# Patient Record
Sex: MALE | Race: WHITE | NOT HISPANIC OR LATINO
[De-identification: names, ages, dates, MRNs, and addresses within clinical notes are randomized per-mention and may not be internally consistent; named-entity substitution may affect disease eponyms.]

---

## 2021-09-02 ENCOUNTER — APPOINTMENT (OUTPATIENT)
Dept: UROLOGY | Facility: CLINIC | Age: 51
End: 2021-09-02

## 2021-09-02 ENCOUNTER — APPOINTMENT (OUTPATIENT)
Dept: UROLOGY | Facility: CLINIC | Age: 51
End: 2021-09-02
Payer: COMMERCIAL

## 2021-09-02 PROCEDURE — 99443: CPT | Mod: 95

## 2021-09-02 NOTE — ASSESSMENT
[FreeTextEntry1] : I had long discussion with patient about their stones, and about options, risks, and benefits of all treatments.  Main options for definitive stone treatment include ESWL, URS, PCNL.  If stones are pure uric acid, alkalinization may be an option. Generally, 545 HU density suggests not pure UA, but given size of the stone it is possible.\par \par ESWL success best with smaller, less dense stones, and with short skin to stone distance and favorable location of the stone within the urinary tract, while URS is more successful treatment with multiple stones, more dense stones, or challenging body habitus or stone location.  PCNL is best option for larger, more dense and complex stones, and particularly those involving the lower pole.  Non-definitive stone treatment options for drainage, using either stents or nephrostomy, also reviewed: these are of lower immediate surgical risks, but incur multiple procedures to manage and may have their own complications and effects on quality of life.  Still, nephrostomy or nephroureteral catheter can allow maintenance of urinary system drainage without surgical risks, and management in office with exchanges (avoiding the anesthesia and testing which would be present with bilateral internal stent exchange).\par \par Risks of nontreatment with obstruction can lead to very high rate of renal function loss in stone-bearing kidney over the next months to years.\par \par In this patient's case, I recommend... consider alkalinization up front, vs left PCNL or left URS with laser expecting more than 1 stage- notably, pt without pain or sig sx or obstruction at this time.\par \par Risks/benefits/success/recovery expectations all reviewed at length, particularly with respect to patient's comorbidities, and inclusive of infection/sepsis, bleeding, need for secondary procedures or secondary stages such as embolization or open surgery, and even risks of death due to acute issues superimposed on comorbidities.\par \par Pt prefers to undergo: alkalinization\par \par Will start potassium citrate\par Pt instructed to self test urine pH, 2 to 3 times per day using pH paper, and to record results over next ~ 1 week.\par \par Target pH range for UA stone prevention is between 6-7, for dissolution ideally 6.5\par \par If pH < 6, pt to call and will expect/need to titrate up.\par

## 2021-09-02 NOTE — HISTORY OF PRESENT ILLNESS
[FreeTextEntry1] : The patient-doctor relationship has been established in a face to face fashion via real time video/audio HIPAA compliant communication using telemedicine software. The patient's identity has been confirmed. The patient was previously emailed a copy of the telemedicine consent. They have had a chance to review and has now given verbal consent and has requested care to be assessed and treated via telemedicine. They understand there may be limitations in this process, and that they may need further followup care in the office and/or hospital settings.\par \par Verbal consent given on Sep  2 2021  5:20PM\par \par ANNMARIE RICK is a 51 year M who presents for eval and management of stones, referred by Dr. Bellamy. Pt with h/o passing stones twice approx 12 years ago with colic, and several smaller stones passed without pain (? bladder). Microhematuria prompted recent w/u and CT done (cystoscopy also done) showing 3.5 cm and 1.2 cm left mid-upper stones. Not aware of prior stone analysis, but pt notes he is obese, eats high animal flesh protein diet, and has DM.\par \par PMH: DM, HTN, cholesterol, esophageal achalasia\par PSH: splenic trauma (abdominal)\par FH: no sig  FH\par Meds: meformin, rosuvastatin, lisinopril, jardiance, alogliptin\par SH: former smoker\par Allergies: pcn (child, told by parent)\par \par CT film ([facility]) reviewed: 3.5 cm stone left upper, and 1.2 cm left upper mid. Mild dilation of left upper pole. Density 545 HU on largest stone.\par \par 09/02/2021 \par \par The patient denies fevers, chills, nausea and/or vomiting, and no unexplained weight loss.\par \par All pertinent parts of the patient PFSH (past medical, family, and social histories), laboratory, radiological studies and available physician notes were reviewed prior to starting the face-to-face portion of the telemedicine visit. Questionnaire results, where appropriate, were discussed with the patient.\par \par

## 2021-11-10 ENCOUNTER — APPOINTMENT (OUTPATIENT)
Dept: UROLOGY | Facility: CLINIC | Age: 51
End: 2021-11-10

## 2021-11-14 ENCOUNTER — TRANSCRIPTION ENCOUNTER (OUTPATIENT)
Age: 51
End: 2021-11-14

## 2021-11-16 ENCOUNTER — APPOINTMENT (OUTPATIENT)
Dept: CT IMAGING | Facility: CLINIC | Age: 51
End: 2021-11-16
Payer: COMMERCIAL

## 2021-11-16 ENCOUNTER — OUTPATIENT (OUTPATIENT)
Dept: OUTPATIENT SERVICES | Facility: HOSPITAL | Age: 51
LOS: 1 days | End: 2021-11-16
Payer: COMMERCIAL

## 2021-11-16 DIAGNOSIS — N20.0 CALCULUS OF KIDNEY: ICD-10-CM

## 2021-11-16 PROCEDURE — 74176 CT ABD & PELVIS W/O CONTRAST: CPT

## 2021-11-16 PROCEDURE — 74176 CT ABD & PELVIS W/O CONTRAST: CPT | Mod: 26

## 2021-12-02 ENCOUNTER — APPOINTMENT (OUTPATIENT)
Dept: UROLOGY | Facility: CLINIC | Age: 51
End: 2021-12-02
Payer: COMMERCIAL

## 2021-12-02 VITALS
WEIGHT: 252 LBS | HEART RATE: 91 BPM | DIASTOLIC BLOOD PRESSURE: 94 MMHG | RESPIRATION RATE: 16 BRPM | OXYGEN SATURATION: 96 % | HEIGHT: 72 IN | BODY MASS INDEX: 34.13 KG/M2 | SYSTOLIC BLOOD PRESSURE: 180 MMHG

## 2021-12-02 PROCEDURE — 99214 OFFICE O/P EST MOD 30 MIN: CPT

## 2021-12-02 NOTE — HISTORY OF PRESENT ILLNESS
[FreeTextEntry1] : ANNMARIE RICK is a 51 year M who presents for eval and management of stones, referred by Dr. Bellamy. Pt with h/o passing stones twice approx 12 years ago with colic, and several smaller stones passed without pain (? bladder). Microhematuria prompted recent w/u and CT done (cystoscopy also done) showing 3.5 cm and 1.2 cm left mid-upper stones. Not aware of prior stone analysis, but pt notes he is obese, eats high animal flesh protein diet, and has DM.\par \par \par f/u of CT scan and surgical planning

## 2021-12-02 NOTE — ASSESSMENT
[FreeTextEntry1] : CT scan:\par \par I had long discussion with patient about their stones, and about options, risks, and benefits of all treatments.  Main options for definitive stone treatment include ESWL, URS, PCNL.  \par \par ESWL success best with smaller, less dense stones, and with short skin to stone distance and favorable location of the stone within the urinary tract, while URS is more successful treatment with multiple stones, more dense stones, or challenging body habitus or stone location.  PCNL is best option for larger, more dense and complex stones, and particularly those involving the lower pole.  Non-definitive stone treatment options for drainage, using either stents or nephrostomy, also reviewed: these are of lower immediate surgical risks, but incur multiple procedures to manage and may have their own complications and effects on quality of life.  Still, nephrostomy or nephroureteral catheter can allow maintenance of urinary system drainage without surgical risks, and management in office with exchanges (avoiding the anesthesia and testing which would be present with bilateral internal stent exchange).\par \par Risks of nontreatment with obstruction can lead to very high rate of renal function loss in stone-bearing kidney over the next months to years.\par \par In this patient's case, I recommend...either left PCNL or multistage left URS with laser. Possible second stage for left PCNL reviewed\par \par Risks/benefits/success/recovery expectations all reviewed at length, particularly with respect to patient's comorbidities, and inclusive of infection/sepsis, bleeding, need for secondary procedures or secondary stages such as embolization or open surgery, and even risks of death due to acute issues superimposed on comorbidities.\par \par Pt prefers to undergo: left pcnl\par \par Will schedule.\par \par Urine culture, PST appt to schedule once surgery scheduled.\par

## 2022-02-28 ENCOUNTER — OUTPATIENT (OUTPATIENT)
Dept: OUTPATIENT SERVICES | Facility: HOSPITAL | Age: 52
LOS: 1 days | End: 2022-02-28
Payer: COMMERCIAL

## 2022-02-28 VITALS
WEIGHT: 250 LBS | HEIGHT: 71.5 IN | OXYGEN SATURATION: 98 % | DIASTOLIC BLOOD PRESSURE: 89 MMHG | SYSTOLIC BLOOD PRESSURE: 147 MMHG | TEMPERATURE: 97 F | HEART RATE: 74 BPM | RESPIRATION RATE: 16 BRPM

## 2022-02-28 DIAGNOSIS — N20.0 CALCULUS OF KIDNEY: ICD-10-CM

## 2022-02-28 DIAGNOSIS — S36.09XA OTHER INJURY OF SPLEEN, INITIAL ENCOUNTER: Chronic | ICD-10-CM

## 2022-02-28 DIAGNOSIS — Z01.818 ENCOUNTER FOR OTHER PREPROCEDURAL EXAMINATION: ICD-10-CM

## 2022-02-28 DIAGNOSIS — Z98.890 OTHER SPECIFIED POSTPROCEDURAL STATES: Chronic | ICD-10-CM

## 2022-02-28 DIAGNOSIS — E11.9 TYPE 2 DIABETES MELLITUS WITHOUT COMPLICATIONS: ICD-10-CM

## 2022-02-28 DIAGNOSIS — Z29.9 ENCOUNTER FOR PROPHYLACTIC MEASURES, UNSPECIFIED: ICD-10-CM

## 2022-02-28 LAB
A1C WITH ESTIMATED AVERAGE GLUCOSE RESULT: 7.2 % — HIGH (ref 4–5.6)
ANION GAP SERPL CALC-SCNC: 12 MMOL/L — SIGNIFICANT CHANGE UP (ref 5–17)
BLD GP AB SCN SERPL QL: NEGATIVE — SIGNIFICANT CHANGE UP
BUN SERPL-MCNC: 20 MG/DL — SIGNIFICANT CHANGE UP (ref 7–23)
CALCIUM SERPL-MCNC: 10.2 MG/DL — SIGNIFICANT CHANGE UP (ref 8.4–10.5)
CHLORIDE SERPL-SCNC: 103 MMOL/L — SIGNIFICANT CHANGE UP (ref 96–108)
CO2 SERPL-SCNC: 26 MMOL/L — SIGNIFICANT CHANGE UP (ref 22–31)
CREAT SERPL-MCNC: 0.86 MG/DL — SIGNIFICANT CHANGE UP (ref 0.5–1.3)
EGFR: 104 ML/MIN/1.73M2 — SIGNIFICANT CHANGE UP
ESTIMATED AVERAGE GLUCOSE: 160 MG/DL — HIGH (ref 68–114)
GLUCOSE SERPL-MCNC: 177 MG/DL — HIGH (ref 70–99)
HCT VFR BLD CALC: 43.6 % — SIGNIFICANT CHANGE UP (ref 39–50)
HGB BLD-MCNC: 14.3 G/DL — SIGNIFICANT CHANGE UP (ref 13–17)
MCHC RBC-ENTMCNC: 27.9 PG — SIGNIFICANT CHANGE UP (ref 27–34)
MCHC RBC-ENTMCNC: 32.8 GM/DL — SIGNIFICANT CHANGE UP (ref 32–36)
MCV RBC AUTO: 85 FL — SIGNIFICANT CHANGE UP (ref 80–100)
NRBC # BLD: 0 /100 WBCS — SIGNIFICANT CHANGE UP (ref 0–0)
PLATELET # BLD AUTO: 169 K/UL — SIGNIFICANT CHANGE UP (ref 150–400)
POTASSIUM SERPL-MCNC: 5 MMOL/L — SIGNIFICANT CHANGE UP (ref 3.5–5.3)
POTASSIUM SERPL-SCNC: 5 MMOL/L — SIGNIFICANT CHANGE UP (ref 3.5–5.3)
RBC # BLD: 5.13 M/UL — SIGNIFICANT CHANGE UP (ref 4.2–5.8)
RBC # FLD: 12.6 % — SIGNIFICANT CHANGE UP (ref 10.3–14.5)
RH IG SCN BLD-IMP: POSITIVE — SIGNIFICANT CHANGE UP
SODIUM SERPL-SCNC: 141 MMOL/L — SIGNIFICANT CHANGE UP (ref 135–145)
WBC # BLD: 7.64 K/UL — SIGNIFICANT CHANGE UP (ref 3.8–10.5)
WBC # FLD AUTO: 7.64 K/UL — SIGNIFICANT CHANGE UP (ref 3.8–10.5)

## 2022-02-28 PROCEDURE — 86850 RBC ANTIBODY SCREEN: CPT

## 2022-02-28 PROCEDURE — 80048 BASIC METABOLIC PNL TOTAL CA: CPT

## 2022-02-28 PROCEDURE — 83036 HEMOGLOBIN GLYCOSYLATED A1C: CPT

## 2022-02-28 PROCEDURE — 86900 BLOOD TYPING SEROLOGIC ABO: CPT

## 2022-02-28 PROCEDURE — 86901 BLOOD TYPING SEROLOGIC RH(D): CPT

## 2022-02-28 PROCEDURE — 87086 URINE CULTURE/COLONY COUNT: CPT

## 2022-02-28 PROCEDURE — 85027 COMPLETE CBC AUTOMATED: CPT

## 2022-02-28 PROCEDURE — G0463: CPT

## 2022-02-28 RX ORDER — CIPROFLOXACIN LACTATE 400MG/40ML
400 VIAL (ML) INTRAVENOUS ONCE
Refills: 0 | Status: DISCONTINUED | OUTPATIENT
Start: 2022-03-21 | End: 2022-03-25

## 2022-02-28 NOTE — H&P PST ADULT - NSICDXPASTMEDICALHX_GEN_ALL_CORE_FT
PAST MEDICAL HISTORY:  Calculus of kidney passed on own in the past; CT scan showing a 3.5 cm and 1.2 cm left mid-upper stones in December 2021    Esophageal achalasia s/p esophageal repair about 5 years ago    Former smoker 1 PPD x 16 years; Quit in 2003    Obesity BMI 34.4    Type 2 diabetes mellitus

## 2022-02-28 NOTE — H&P PST ADULT - PROBLEM SELECTOR PLAN 1
Pt is scheduled for a cystoscopy and left percutaneous stone removal with Dr. Hoenig on 3/21/22  Covid PCR test scheduled for 3/17/22 at [facility]  CBC, BMP, T/S ordered and obtained at [facility]  ABO on admit

## 2022-02-28 NOTE — H&P PST ADULT - PROBLEM SELECTOR PLAN 2
HGA1C ordered and obtained at [facility]  FS on admit  Advised to hold metformin and Alogliptin 24 hours prior to procedure; Last doses 3/20/22 AM Dose  Advised to hold Januvia 3 days prior to procedure; Last dose 3/18/22 AM dose

## 2022-02-28 NOTE — H&P PST ADULT - NSICDXPASTSURGICALHX_GEN_ALL_CORE_FT
PAST SURGICAL HISTORY:  History of esophageal surgery esophageal repair about 5 years ago esopheal achalasia    Other injury of spleen fall on ice s/p spleen embolization at [facility] IR over 10 years ago     PAST SURGICAL HISTORY:  History of esophageal surgery esophageal repair about 5 years ago for esopheal achalasia    Other injury of spleen fall on ice s/p spleen embolization at [facility] IR over 10 years ago

## 2022-02-28 NOTE — H&P PST ADULT - ASSESSMENT
ALIVIAI VTE 2.0 SCORE [CLOT updated 2019]    AGE RELATED RISK FACTORS                                                       MOBILITY RELATED FACTORS  [X ] Age 41-60 years                                            (1 Point)                    [ ] Bed rest                                                        (1 Point)  [ ] Age: 61-74 years                                           (2 Points)                  [ ] Plaster cast                                                   (2 Points)  [ ] Age= 75 years                                              (3 Points)                    [ ] Bed bound for more than 72 hours                 (2 Points)    DISEASE RELATED RISK FACTORS                                               GENDER SPECIFIC FACTORS  [ ] Edema in the lower extremities                       (1 Point)              [ ] Pregnancy                                                     (1 Point)  [ ] Varicose veins                                               (1 Point)                     [ ] Post-partum < 6 weeks                                   (1 Point)             [X ] BMI > 25 Kg/m2                                            (1 Point)                     [ ] Hormonal therapy  or oral contraception          (1 Point)                 [ ] Sepsis (in the previous month)                        (1 Point)               [ ] History of pregnancy complications                 (1 point)  [ ] Pneumonia or serious lung disease                                               [ ] Unexplained or recurrent                     (1 Point)           (in the previous month)                               (1 Point)  [ ] Abnormal pulmonary function test                     (1 Point)                 SURGERY RELATED RISK FACTORS  [ ] Acute myocardial infarction                              (1 Point)               [ ]  Section                                             (1 Point)  [ ] Congestive heart failure (in the previous month)  (1 Point)      [ ] Minor surgery                                                  (1 Point)   [ ] Inflammatory bowel disease                             (1 Point)               [ ] Arthroscopic surgery                                        (2 Points)  [ ] Central venous access                                      (2 Points)                [ X] General surgery lasting more than 45 minutes (2 points)  [ ] Malignancy- Present or previous                   (2 Points)                [ ] Elective arthroplasty                                         (5 points)    [ ] Stroke (in the previous month)                          (5 Points)                                                                                                                                                           HEMATOLOGY RELATED FACTORS                                                 TRAUMA RELATED RISK FACTORS  [ ] Prior episodes of VTE                                     (3 Points)                [ ] Fracture of the hip, pelvis, or leg                       (5 Points)  [ ] Positive family history for VTE                         (3 Points)             [ ] Acute spinal cord injury (in the previous month)  (5 Points)  [ ] Prothrombin 71897 A                                     (3 Points)               [ ] Paralysis  (less than 1 month)                             (5 Points)  [ ] Factor V Leiden                                             (3 Points)                  [ ] Multiple Trauma within 1 month                        (5 Points)  [ ] Lupus anticoagulants                                     (3 Points)                                                           [ ] Anticardiolipin antibodies                               (3 Points)                                                       [ ] High homocysteine in the blood                      (3 Points)                                             [ ] Other congenital or acquired thrombophilia      (3 Points)                                                [ ] Heparin induced thrombocytopenia                  (3 Points)                                     Total Score [    4      ]

## 2022-02-28 NOTE — H&P PST ADULT - FALL HARM RISK - UNIVERSAL INTERVENTIONS
Bed in lowest position, wheels locked, appropriate side rails in place/Call bell, personal items and telephone in reach/Instruct patient to call for assistance before getting out of bed or chair/Non-slip footwear when patient is out of bed/[place] to call system/Physically safe environment - no spills, clutter or unnecessary equipment/Purposeful Proactive Rounding/Room/bathroom lighting operational, light cord in reach

## 2022-02-28 NOTE — H&P PST ADULT - REASON FOR ADMISSION
1. Reduce sulfasalazine to 1 pill twice a day with meals, continue the HÜTTEN twice a day. 2. Send me a message in 3 months once you've done labs for your oncologist and can update me on how you're doing on the low-dose sulfasalazine. .. if you're doing well then, we may just stop the suflasalazine entirely. 3. We'll call you to schedule a 6 month followup in person. "I am having a kidney stone removed."

## 2022-02-28 NOTE — H&P PST ADULT - HISTORY OF PRESENT ILLNESS
51 year old male with PMH Former Smoker (1 PPD x 16 years; Quit in 2003), DMT2, Obesity (BMI 34.4)    Covid vaccine Moderna 2nd dose received 3/29/22; Moderna Booster received 11/5/2021  Covid PCR test scheduled 3/17/2022 at [facility]  Denies Recent travel, Exposure or Covid symptoms   No recent hospitalizations in the last 3 months 51 year old male with PMH Former Smoker (1 PPD x 16 years; Quit in 2003), DMT2 and Obesity (BMI 34.4) reports that he was recently at his regular check-up with PCP when microscopic hematuria was noted on urinalysis s/p CT scan revealed a 3.5 cm and 1.2 cm left mid-upper stones. Pt denies any hematuria, flank pain, renal colic, fever or chills. Pt now presents to [facility] for scheduled cystoscopy and left percutaneous stone removal with Dr. Hoenig on 3/21/22.    Covid vaccine Moderna 2nd dose received 3/29/22; Moderna Booster received 11/5/2021  Covid PCR test scheduled 3/17/2022 at [facility]  Denies Recent travel, Exposure or Covid symptoms   No recent hospitalizations in the last 3 months

## 2022-03-01 LAB
CULTURE RESULTS: SIGNIFICANT CHANGE UP
SPECIMEN SOURCE: SIGNIFICANT CHANGE UP

## 2022-03-04 PROBLEM — Z87.891 PERSONAL HISTORY OF NICOTINE DEPENDENCE: Chronic | Status: ACTIVE | Noted: 2022-02-28

## 2022-03-04 PROBLEM — E66.9 OBESITY, UNSPECIFIED: Chronic | Status: ACTIVE | Noted: 2022-02-28

## 2022-03-04 PROBLEM — K22.0 ACHALASIA OF CARDIA: Chronic | Status: ACTIVE | Noted: 2022-02-28

## 2022-03-04 PROBLEM — E11.9 TYPE 2 DIABETES MELLITUS WITHOUT COMPLICATIONS: Chronic | Status: ACTIVE | Noted: 2022-02-28

## 2022-03-04 PROBLEM — N20.0 CALCULUS OF KIDNEY: Chronic | Status: ACTIVE | Noted: 2022-02-28

## 2022-03-17 ENCOUNTER — OUTPATIENT (OUTPATIENT)
Dept: OUTPATIENT SERVICES | Facility: HOSPITAL | Age: 52
LOS: 1 days | End: 2022-03-17
Payer: COMMERCIAL

## 2022-03-17 DIAGNOSIS — Z11.52 ENCOUNTER FOR SCREENING FOR COVID-19: ICD-10-CM

## 2022-03-17 DIAGNOSIS — S36.09XA OTHER INJURY OF SPLEEN, INITIAL ENCOUNTER: Chronic | ICD-10-CM

## 2022-03-17 DIAGNOSIS — Z98.890 OTHER SPECIFIED POSTPROCEDURAL STATES: Chronic | ICD-10-CM

## 2022-03-17 LAB — SARS-COV-2 RNA SPEC QL NAA+PROBE: SIGNIFICANT CHANGE UP

## 2022-03-17 PROCEDURE — C9803: CPT

## 2022-03-17 PROCEDURE — U0003: CPT

## 2022-03-17 PROCEDURE — U0005: CPT

## 2022-03-20 ENCOUNTER — TRANSCRIPTION ENCOUNTER (OUTPATIENT)
Age: 52
End: 2022-03-20

## 2022-03-21 ENCOUNTER — APPOINTMENT (OUTPATIENT)
Dept: UROLOGY | Facility: HOSPITAL | Age: 52
End: 2022-03-21

## 2022-03-21 ENCOUNTER — INPATIENT (INPATIENT)
Facility: HOSPITAL | Age: 52
LOS: 3 days | Discharge: HOME | DRG: 660 | End: 2022-03-25
Attending: UROLOGY | Admitting: UROLOGY
Payer: COMMERCIAL

## 2022-03-21 ENCOUNTER — RESULT REVIEW (OUTPATIENT)
Age: 52
End: 2022-03-21

## 2022-03-21 VITALS
SYSTOLIC BLOOD PRESSURE: 178 MMHG | RESPIRATION RATE: 14 BRPM | DIASTOLIC BLOOD PRESSURE: 82 MMHG | HEART RATE: 66 BPM | HEIGHT: 71 IN | WEIGHT: 250 LBS | TEMPERATURE: 98 F | OXYGEN SATURATION: 100 %

## 2022-03-21 DIAGNOSIS — S36.09XA OTHER INJURY OF SPLEEN, INITIAL ENCOUNTER: Chronic | ICD-10-CM

## 2022-03-21 DIAGNOSIS — N20.0 CALCULUS OF KIDNEY: ICD-10-CM

## 2022-03-21 DIAGNOSIS — Z98.890 OTHER SPECIFIED POSTPROCEDURAL STATES: Chronic | ICD-10-CM

## 2022-03-21 LAB
ANION GAP SERPL CALC-SCNC: 12 MMOL/L — SIGNIFICANT CHANGE UP (ref 5–17)
BASOPHILS # BLD AUTO: 0.02 K/UL — SIGNIFICANT CHANGE UP (ref 0–0.2)
BASOPHILS NFR BLD AUTO: 0.2 % — SIGNIFICANT CHANGE UP (ref 0–2)
BUN SERPL-MCNC: 17 MG/DL — SIGNIFICANT CHANGE UP (ref 7–23)
CALCIUM SERPL-MCNC: 9 MG/DL — SIGNIFICANT CHANGE UP (ref 8.4–10.5)
CHLORIDE SERPL-SCNC: 104 MMOL/L — SIGNIFICANT CHANGE UP (ref 96–108)
CO2 SERPL-SCNC: 22 MMOL/L — SIGNIFICANT CHANGE UP (ref 22–31)
CREAT SERPL-MCNC: 0.79 MG/DL — SIGNIFICANT CHANGE UP (ref 0.5–1.3)
EGFR: 106 ML/MIN/1.73M2 — SIGNIFICANT CHANGE UP
EOSINOPHIL # BLD AUTO: 0.02 K/UL — SIGNIFICANT CHANGE UP (ref 0–0.5)
EOSINOPHIL NFR BLD AUTO: 0.2 % — SIGNIFICANT CHANGE UP (ref 0–6)
GLUCOSE BLDC GLUCOMTR-MCNC: 156 MG/DL — HIGH (ref 70–99)
GLUCOSE BLDC GLUCOMTR-MCNC: 214 MG/DL — HIGH (ref 70–99)
GLUCOSE BLDC GLUCOMTR-MCNC: 217 MG/DL — HIGH (ref 70–99)
GLUCOSE BLDC GLUCOMTR-MCNC: 226 MG/DL — HIGH (ref 70–99)
GLUCOSE BLDC GLUCOMTR-MCNC: 228 MG/DL — HIGH (ref 70–99)
GLUCOSE SERPL-MCNC: 234 MG/DL — HIGH (ref 70–99)
HCT VFR BLD CALC: 41.6 % — SIGNIFICANT CHANGE UP (ref 39–50)
HGB BLD-MCNC: 14.1 G/DL — SIGNIFICANT CHANGE UP (ref 13–17)
IMM GRANULOCYTES NFR BLD AUTO: 0.5 % — SIGNIFICANT CHANGE UP (ref 0–1.5)
LYMPHOCYTES # BLD AUTO: 0.51 K/UL — LOW (ref 1–3.3)
LYMPHOCYTES # BLD AUTO: 4.8 % — LOW (ref 13–44)
MCHC RBC-ENTMCNC: 28.1 PG — SIGNIFICANT CHANGE UP (ref 27–34)
MCHC RBC-ENTMCNC: 33.9 GM/DL — SIGNIFICANT CHANGE UP (ref 32–36)
MCV RBC AUTO: 82.9 FL — SIGNIFICANT CHANGE UP (ref 80–100)
MONOCYTES # BLD AUTO: 0.17 K/UL — SIGNIFICANT CHANGE UP (ref 0–0.9)
MONOCYTES NFR BLD AUTO: 1.6 % — LOW (ref 2–14)
NEUTROPHILS # BLD AUTO: 9.8 K/UL — HIGH (ref 1.8–7.4)
NEUTROPHILS NFR BLD AUTO: 92.7 % — HIGH (ref 43–77)
NRBC # BLD: 0 /100 WBCS — SIGNIFICANT CHANGE UP (ref 0–0)
PLATELET # BLD AUTO: 158 K/UL — SIGNIFICANT CHANGE UP (ref 150–400)
POTASSIUM SERPL-MCNC: 4.5 MMOL/L — SIGNIFICANT CHANGE UP (ref 3.5–5.3)
POTASSIUM SERPL-SCNC: 4.5 MMOL/L — SIGNIFICANT CHANGE UP (ref 3.5–5.3)
RBC # BLD: 5.02 M/UL — SIGNIFICANT CHANGE UP (ref 4.2–5.8)
RBC # FLD: 13 % — SIGNIFICANT CHANGE UP (ref 10.3–14.5)
SODIUM SERPL-SCNC: 138 MMOL/L — SIGNIFICANT CHANGE UP (ref 135–145)
WBC # BLD: 10.57 K/UL — HIGH (ref 3.8–10.5)
WBC # FLD AUTO: 10.57 K/UL — HIGH (ref 3.8–10.5)

## 2022-03-21 PROCEDURE — 50390 DRAINAGE OF KIDNEY LESION: CPT | Mod: LT,59

## 2022-03-21 PROCEDURE — 71045 X-RAY EXAM CHEST 1 VIEW: CPT | Mod: 26

## 2022-03-21 PROCEDURE — 88300 SURGICAL PATH GROSS: CPT | Mod: 26

## 2022-03-21 PROCEDURE — 50433 PLMT NEPHROURETERAL CATHETER: CPT | Mod: LT,59

## 2022-03-21 PROCEDURE — 50081 PERQ NL/PL LITHOTRP CPLX>2CM: CPT | Mod: LT

## 2022-03-21 DEVICE — AMPLATZ RENAL DILATOR 6FR-30FR X 16CM: Type: IMPLANTABLE DEVICE | Status: FUNCTIONAL

## 2022-03-21 DEVICE — TRILOGY PROBE KIT 3.9MM X 440MM (BLUE): Type: IMPLANTABLE DEVICE | Status: FUNCTIONAL

## 2022-03-21 DEVICE — GUIDEWIRE SENSOR DUAL-FLEX NITINOL STRAIGHT .035" X 150CM: Type: IMPLANTABLE DEVICE | Status: FUNCTIONAL

## 2022-03-21 DEVICE — NEPHROSTOMY BALLOON CATH X-FORCE 7FR X 15CM 10MM: Type: IMPLANTABLE DEVICE | Status: FUNCTIONAL

## 2022-03-21 RX ORDER — CIPROFLOXACIN LACTATE 400MG/40ML
400 VIAL (ML) INTRAVENOUS EVERY 12 HOURS
Refills: 0 | Status: DISCONTINUED | OUTPATIENT
Start: 2022-03-21 | End: 2022-03-24

## 2022-03-21 RX ORDER — LIDOCAINE 4 G/100G
1 CREAM TOPICAL EVERY 24 HOURS
Refills: 0 | Status: DISCONTINUED | OUTPATIENT
Start: 2022-03-21 | End: 2022-03-24

## 2022-03-21 RX ORDER — SODIUM CHLORIDE 9 MG/ML
3 INJECTION INTRAMUSCULAR; INTRAVENOUS; SUBCUTANEOUS EVERY 8 HOURS
Refills: 0 | Status: DISCONTINUED | OUTPATIENT
Start: 2022-03-21 | End: 2022-03-21

## 2022-03-21 RX ORDER — ACETAMINOPHEN 500 MG
975 TABLET ORAL EVERY 6 HOURS
Refills: 0 | Status: DISCONTINUED | OUTPATIENT
Start: 2022-03-21 | End: 2022-03-24

## 2022-03-21 RX ORDER — ONDANSETRON 8 MG/1
4 TABLET, FILM COATED ORAL ONCE
Refills: 0 | Status: DISCONTINUED | OUTPATIENT
Start: 2022-03-21 | End: 2022-03-21

## 2022-03-21 RX ORDER — DEXTROSE 50 % IN WATER 50 %
25 SYRINGE (ML) INTRAVENOUS ONCE
Refills: 0 | Status: DISCONTINUED | OUTPATIENT
Start: 2022-03-21 | End: 2022-03-24

## 2022-03-21 RX ORDER — OXYCODONE HYDROCHLORIDE 5 MG/1
5 TABLET ORAL EVERY 4 HOURS
Refills: 0 | Status: DISCONTINUED | OUTPATIENT
Start: 2022-03-21 | End: 2022-03-24

## 2022-03-21 RX ORDER — SENNA PLUS 8.6 MG/1
2 TABLET ORAL AT BEDTIME
Refills: 0 | Status: DISCONTINUED | OUTPATIENT
Start: 2022-03-21 | End: 2022-03-24

## 2022-03-21 RX ORDER — SIMETHICONE 80 MG/1
80 TABLET, CHEWABLE ORAL ONCE
Refills: 0 | Status: COMPLETED | OUTPATIENT
Start: 2022-03-21 | End: 2022-03-21

## 2022-03-21 RX ORDER — SODIUM CHLORIDE 9 MG/ML
1000 INJECTION, SOLUTION INTRAVENOUS
Refills: 0 | Status: DISCONTINUED | OUTPATIENT
Start: 2022-03-21 | End: 2022-03-24

## 2022-03-21 RX ORDER — LIDOCAINE HCL 20 MG/ML
0.2 VIAL (ML) INJECTION ONCE
Refills: 0 | Status: DISCONTINUED | OUTPATIENT
Start: 2022-03-21 | End: 2022-03-21

## 2022-03-21 RX ORDER — HEPARIN SODIUM 5000 [USP'U]/ML
5000 INJECTION INTRAVENOUS; SUBCUTANEOUS EVERY 8 HOURS
Refills: 0 | Status: DISCONTINUED | OUTPATIENT
Start: 2022-03-21 | End: 2022-03-24

## 2022-03-21 RX ORDER — GLUCAGON INJECTION, SOLUTION 0.5 MG/.1ML
1 INJECTION, SOLUTION SUBCUTANEOUS ONCE
Refills: 0 | Status: DISCONTINUED | OUTPATIENT
Start: 2022-03-21 | End: 2022-03-24

## 2022-03-21 RX ORDER — DEXTROSE 50 % IN WATER 50 %
12.5 SYRINGE (ML) INTRAVENOUS ONCE
Refills: 0 | Status: DISCONTINUED | OUTPATIENT
Start: 2022-03-21 | End: 2022-03-24

## 2022-03-21 RX ORDER — INSULIN LISPRO 100/ML
VIAL (ML) SUBCUTANEOUS
Refills: 0 | Status: DISCONTINUED | OUTPATIENT
Start: 2022-03-21 | End: 2022-03-24

## 2022-03-21 RX ORDER — DEXTROSE 50 % IN WATER 50 %
15 SYRINGE (ML) INTRAVENOUS ONCE
Refills: 0 | Status: DISCONTINUED | OUTPATIENT
Start: 2022-03-21 | End: 2022-03-24

## 2022-03-21 RX ORDER — FENTANYL CITRATE 50 UG/ML
50 INJECTION INTRAVENOUS
Refills: 0 | Status: DISCONTINUED | OUTPATIENT
Start: 2022-03-21 | End: 2022-03-21

## 2022-03-21 RX ORDER — CHLORHEXIDINE GLUCONATE 213 G/1000ML
1 SOLUTION TOPICAL ONCE
Refills: 0 | Status: DISCONTINUED | OUTPATIENT
Start: 2022-03-21 | End: 2022-03-21

## 2022-03-21 RX ORDER — KETOROLAC TROMETHAMINE 30 MG/ML
15 SYRINGE (ML) INJECTION EVERY 6 HOURS
Refills: 0 | Status: DISCONTINUED | OUTPATIENT
Start: 2022-03-21 | End: 2022-03-24

## 2022-03-21 RX ORDER — INSULIN LISPRO 100/ML
VIAL (ML) SUBCUTANEOUS AT BEDTIME
Refills: 0 | Status: DISCONTINUED | OUTPATIENT
Start: 2022-03-21 | End: 2022-03-24

## 2022-03-21 RX ORDER — HYDROMORPHONE HYDROCHLORIDE 2 MG/ML
0.5 INJECTION INTRAMUSCULAR; INTRAVENOUS; SUBCUTANEOUS
Refills: 0 | Status: DISCONTINUED | OUTPATIENT
Start: 2022-03-21 | End: 2022-03-21

## 2022-03-21 RX ORDER — SODIUM CHLORIDE 9 MG/ML
1000 INJECTION, SOLUTION INTRAVENOUS
Refills: 0 | Status: DISCONTINUED | OUTPATIENT
Start: 2022-03-21 | End: 2022-03-22

## 2022-03-21 RX ADMIN — Medication 975 MILLIGRAM(S): at 14:19

## 2022-03-21 RX ADMIN — Medication 2: at 14:24

## 2022-03-21 RX ADMIN — HYDROMORPHONE HYDROCHLORIDE 0.5 MILLIGRAM(S): 2 INJECTION INTRAMUSCULAR; INTRAVENOUS; SUBCUTANEOUS at 10:52

## 2022-03-21 RX ADMIN — HYDROMORPHONE HYDROCHLORIDE 0.5 MILLIGRAM(S): 2 INJECTION INTRAMUSCULAR; INTRAVENOUS; SUBCUTANEOUS at 11:15

## 2022-03-21 RX ADMIN — SODIUM CHLORIDE 3 MILLILITER(S): 9 INJECTION INTRAMUSCULAR; INTRAVENOUS; SUBCUTANEOUS at 06:06

## 2022-03-21 RX ADMIN — Medication 975 MILLIGRAM(S): at 20:12

## 2022-03-21 RX ADMIN — SODIUM CHLORIDE 125 MILLILITER(S): 9 INJECTION, SOLUTION INTRAVENOUS at 11:14

## 2022-03-21 RX ADMIN — Medication 15 MILLIGRAM(S): at 12:52

## 2022-03-21 RX ADMIN — Medication 2: at 11:40

## 2022-03-21 RX ADMIN — HEPARIN SODIUM 5000 UNIT(S): 5000 INJECTION INTRAVENOUS; SUBCUTANEOUS at 17:05

## 2022-03-21 RX ADMIN — Medication 200 MILLIGRAM(S): at 17:02

## 2022-03-21 RX ADMIN — Medication 15 MILLIGRAM(S): at 18:22

## 2022-03-21 RX ADMIN — Medication 15 MILLIGRAM(S): at 19:10

## 2022-03-21 RX ADMIN — SIMETHICONE 80 MILLIGRAM(S): 80 TABLET, CHEWABLE ORAL at 21:17

## 2022-03-21 RX ADMIN — Medication 15 MILLIGRAM(S): at 13:20

## 2022-03-21 RX ADMIN — Medication 975 MILLIGRAM(S): at 21:12

## 2022-03-21 RX ADMIN — Medication 2: at 18:15

## 2022-03-21 RX ADMIN — SENNA PLUS 2 TABLET(S): 8.6 TABLET ORAL at 20:12

## 2022-03-21 NOTE — PRE-ANESTHESIA EVALUATION ADULT - NSANTHPMHFT_GEN_ALL_CORE
51 year old male with PMH Former Smoker (1 PPD x 16 years; Quit in 2003), DMT2 and Obesity (BMI 34.4) reports that he was recently at his regular check-up with PCP when microscopic hematuria was noted on urinalysis s/p CT scan revealed a 3.5 cm and 1.2 cm left mid-upper stones. Pt denies any hematuria, flank pain, renal colic, fever or chills. Pt now presents to PST for scheduled cystoscopy and left percutaneous stone removal with Dr. Hoenig 51 year old male with PMH Former Smoker (1 PPD x 16 years; Quit in 2003), DMT2 and Obesity (BMI 34.4) reports that he was recently at his regular check-up with PCP when microscopic hematuria was noted on urinalysis s/p CT scan revealed a 3.5 cm and 1.2 cm left mid-upper stones. Pt denies any hematuria, flank pain, renal colic, fever or chills. Pt now presents to PST for scheduled cystoscopy and left percutaneous stone removal with Dr. Hoenig    Pt with loose tooth, poor dentition 51 year old male with PMH Former Smoker (1 PPD x 16 years; Quit in 2003), DMT2 and Obesity (BMI 34.4) reports that he was recently at his regular check-up with PCP when microscopic hematuria was noted on urinalysis s/p CT scan revealed a 3.5 cm and 1.2 cm left mid-upper stones. Pt denies any hematuria, flank pain, renal colic, fever or chills. Pt now presents to PST for scheduled cystoscopy and left percutaneous stone removal with Dr. Hoenig    Pt with loose tooth and poor dentition. Pt counseled on possibility of damage to teeth during intubation, given poor dentition. Patient expresses understanding.

## 2022-03-21 NOTE — CONSULT NOTE ADULT - SUBJECTIVE AND OBJECTIVE BOX
HPI:  51 year old male with PMH Former Smoker (1 PPD x 16 years; Quit in 2003), DMT2 and Obesity (BMI 34.4) reports that he was recently at his regular check-up with PCP when microscopic hematuria was noted on urinalysis s/p CT scan revealed a 3.5 cm and 1.2 cm left mid-upper stones. Pt denies any hematuria, flank pain, renal colic, fever or chills. Pt now presents to [facility] for scheduled cystoscopy and left percutaneous stone removal with Dr. Hoenig on 3/21/22.    Covid vaccine Moderna 2nd dose received 3/29/22; Moderna Booster received 11/5/2021  Covid PCR test scheduled 3/17/2022 at [facility]  Denies Recent travel, Exposure or Covid symptoms   No recent hospitalizations in the last 3 months (28 Feb 2022 11:16)      PAST MEDICAL & SURGICAL HISTORY:  Esophageal achalasia  s/p esophageal repair about 5 years ago    Type 2 diabetes mellitus    Calculus of kidney  passed on own in the past; CT scan showing a 3.5 cm and 1.2 cm left mid-upper stones in December 2021    Former smoker  1 PPD x 16 years; Quit in 2003    Obesity  BMI 34.4    History of esophageal surgery  esophageal repair about 5 years ago for esopheal achalasia    Other injury of spleen  fall on ice s/p spleen embolization at [facility] IR over 10 years ago        Review of Systems:   CONSTITUTIONAL: No fever, weight loss, or fatigue  EYES: No eye pain, visual disturbances, or discharge  ENMT:  No difficulty hearing, tinnitus, vertigo; No sinus or throat pain  NECK: No pain or stiffness  BREASTS: No pain, masses, or nipple discharge  RESPIRATORY: No cough, wheezing, chills or hemoptysis; No shortness of breath  CARDIOVASCULAR: No chest pain, palpitations, dizziness, or leg swelling  GASTROINTESTINAL: No abdominal or epigastric pain. No nausea, vomiting, or hematemesis; No diarrhea or constipation. No melena or hematochezia.  GENITOURINARY: No dysuria, frequency, hematuria, or incontinence  NEUROLOGICAL: No headaches, memory loss, loss of strength, numbness, or tremors  SKIN: No itching, burning, rashes, or lesions   LYMPH NODES: No enlarged glands  ENDOCRINE: No heat or cold intolerance; No hair loss  MUSCULOSKELETAL: No joint pain or swelling; No muscle, back, or extremity pain  PSYCHIATRIC: No depression, anxiety, mood swings, or difficulty sleeping  HEME/LYMPH: No easy bruising, or bleeding gums  ALLERY AND IMMUNOLOGIC: No hives or eczema    Allergies    penicillins (Unknown)    Intolerances        Social History:     FAMILY HISTORY:      MEDICATIONS  (STANDING):  acetaminophen     Tablet .. 975 milliGRAM(s) Oral every 6 hours  ciprofloxacin   IVPB 400 milliGRAM(s) IV Intermittent once  ciprofloxacin   IVPB 400 milliGRAM(s) IV Intermittent every 12 hours  dextrose 40% Gel 15 Gram(s) Oral once  dextrose 5%. 1000 milliLiter(s) (50 mL/Hr) IV Continuous <Continuous>  dextrose 5%. 1000 milliLiter(s) (100 mL/Hr) IV Continuous <Continuous>  dextrose 50% Injectable 25 Gram(s) IV Push once  dextrose 50% Injectable 12.5 Gram(s) IV Push once  dextrose 50% Injectable 25 Gram(s) IV Push once  glucagon  Injectable 1 milliGRAM(s) IntraMuscular once  heparin   Injectable 5000 Unit(s) SubCutaneous every 8 hours  insulin lispro (ADMELOG) corrective regimen sliding scale   SubCutaneous three times a day before meals  insulin lispro (ADMELOG) corrective regimen sliding scale   SubCutaneous at bedtime  lactated ringers. 1000 milliLiter(s) (125 mL/Hr) IV Continuous <Continuous>    MEDICATIONS  (PRN):  ketorolac   Injectable 15 milliGRAM(s) IV Push every 6 hours PRN Moderate Pain (4 - 6)  lidocaine   4% Patch 1 Patch Transdermal every 24 hours PRN surgical site pain  oxyCODONE    IR 5 milliGRAM(s) Oral every 4 hours PRN Severe Pain (7 - 10)  senna 2 Tablet(s) Oral at bedtime PRN Constipation        CAPILLARY BLOOD GLUCOSE      POCT Blood Glucose.: 214 mg/dL (21 Mar 2022 21:41)  POCT Blood Glucose.: 226 mg/dL (21 Mar 2022 18:06)  POCT Blood Glucose.: 217 mg/dL (21 Mar 2022 14:14)  POCT Blood Glucose.: 228 mg/dL (21 Mar 2022 11:32)  POCT Blood Glucose.: 156 mg/dL (21 Mar 2022 05:56)      PHYSICAL EXAM:  GENERAL: NAD, well-developed  HEAD:  Atraumatic, Normocephalic  EYES: EOMI, PERRLA, conjunctiva and sclera clear  NECK: Supple, No JVD  CHEST/LUNG: Clear to auscultation bilaterally; No wheeze  HEART: Regular rate and rhythm; No murmurs, rubs, or gallops  ABDOMEN: Soft, Nontender, Nondistended; Bowel sounds present  EXTREMITIES:  2+ Peripheral Pulses, No clubbing, cyanosis, or edema  PSYCH: AAOx3  NEUROLOGY: non-focal  SKIN: No rashes or lesions                          14.1   10.57 )-----------( 158      ( 21 Mar 2022 12:50 )             41.6               138|104|17<234  4.5|22|0.79  9.0,--,--  03-21 @ 12:50      CAPILLARY BLOOD GLUCOSE      POCT Blood Glucose.: 214 mg/dL (21 Mar 2022 21:41)  POCT Blood Glucose.: 226 mg/dL (21 Mar 2022 18:06)  POCT Blood Glucose.: 217 mg/dL (21 Mar 2022 14:14)  POCT Blood Glucose.: 228 mg/dL (21 Mar 2022 11:32)  POCT Blood Glucose.: 156 mg/dL (21 Mar 2022 05:56)              RADIOLOGY & ADDITIONAL TESTS:    Imaging Personally Reviewed:    Consultant(s) Notes Reviewed:      Care Discussed with Consultants/Other Providers:

## 2022-03-21 NOTE — PRE-ANESTHESIA EVALUATION ADULT - NSANTHAIRWAYFT_ENT_ALL_CORE
adequate mouth opening but large tongue, MP 4, extremely loose tooth top left, poor dentition overall, neck ROM wnl

## 2022-03-21 NOTE — PATIENT PROFILE ADULT - FALL HARM RISK - UNIVERSAL INTERVENTIONS
Bed in lowest position, wheels locked, appropriate side rails in place/Call bell, personal items and telephone in reach/Instruct patient to call for assistance before getting out of bed or chair/Non-slip footwear when patient is out of bed/[place] to call system/Physically safe environment - no spills, clutter or unnecessary equipment/Purposeful Proactive Rounding/Room/bathroom lighting operational, light cord in reach

## 2022-03-21 NOTE — CONSULT NOTE ADULT - SUBJECTIVE AND OBJECTIVE BOX
Patient is a 51y old  Male who presents with a chief complaint of "I am having a kidney stone removed." (28 Feb 2022 11:16)      HPI:  51 year old male with PMH Former Smoker (1 PPD x 16 years; Quit in 2003), DMT2 and Obesity (BMI 34.4) reports that he was recently at his regular check-up with PCP when microscopic hematuria was noted on urinalysis s/p CT scan revealed a 3.5 cm and 1.2 cm left mid-upper stones. Pt denies any hematuria, flank pain, renal colic, fever or chills. Pt now presents to [facility] for scheduled cystoscopy and left percutaneous stone removal with Dr. Hoenig on 3/21/22.    Covid vaccine Moderna 2nd dose received 3/29/22; Moderna Booster received 11/5/2021  Covid PCR test scheduled 3/17/2022 at [facility]  Denies Recent travel, Exposure or Covid symptoms   No recent hospitalizations in the last 3 months (28 Feb 2022 11:16)      PAST MEDICAL & SURGICAL HISTORY:  Esophageal achalasia  s/p esophageal repair about 5 years ago    Type 2 diabetes mellitus    Calculus of kidney  passed on own in the past; CT scan showing a 3.5 cm and 1.2 cm left mid-upper stones in December 2021    Former smoker  1 PPD x 16 years; Quit in 2003    Obesity  BMI 34.4    History of esophageal surgery  esophageal repair about 5 years ago for esopheal achalasia    Other injury of spleen  fall on ice s/p spleen embolization at [facility] IR over 10 years ago      (   ) heart valve replacement  (   ) joint replacement  (   ) pregnancy    MEDICATIONS  (STANDING):  acetaminophen     Tablet .. 975 milliGRAM(s) Oral every 6 hours  ciprofloxacin   IVPB 400 milliGRAM(s) IV Intermittent once  ciprofloxacin   IVPB 400 milliGRAM(s) IV Intermittent every 12 hours  dextrose 40% Gel 15 Gram(s) Oral once  dextrose 5%. 1000 milliLiter(s) (50 mL/Hr) IV Continuous <Continuous>  dextrose 5%. 1000 milliLiter(s) (100 mL/Hr) IV Continuous <Continuous>  dextrose 50% Injectable 25 Gram(s) IV Push once  dextrose 50% Injectable 12.5 Gram(s) IV Push once  dextrose 50% Injectable 25 Gram(s) IV Push once  glucagon  Injectable 1 milliGRAM(s) IntraMuscular once  heparin   Injectable 5000 Unit(s) SubCutaneous every 8 hours  insulin lispro (ADMELOG) corrective regimen sliding scale   SubCutaneous three times a day before meals  insulin lispro (ADMELOG) corrective regimen sliding scale   SubCutaneous at bedtime  lactated ringers. 1000 milliLiter(s) (125 mL/Hr) IV Continuous <Continuous>    MEDICATIONS  (PRN):  fentaNYL    Injectable 50 MICROGram(s) IV Push every 10 minutes PRN Moderate Pain (4 - 6)  HYDROmorphone  Injectable 0.5 milliGRAM(s) IV Push every 30 minutes PRN Severe Pain (7 - 10)  ketorolac   Injectable 15 milliGRAM(s) IV Push every 6 hours PRN Moderate Pain (4 - 6)  lidocaine   4% Patch 1 Patch Transdermal every 24 hours PRN surgical site pain  ondansetron Injectable 4 milliGRAM(s) IV Push once PRN Nausea and/or Vomiting  oxyCODONE    IR 5 milliGRAM(s) Oral every 4 hours PRN Severe Pain (7 - 10)  senna 2 Tablet(s) Oral at bedtime PRN Constipation      Allergies    penicillins (Unknown)    Intolerances        FAMILY HISTORY:      *SOCIAL HISTORY: (guardian or who pt came with), (smoking hx)    *Last Dental Visit:    Vital Signs Last 24 Hrs  T(C): 36 (21 Mar 2022 12:15), Max: 36.8 (21 Mar 2022 05:45)  T(F): 96.8 (21 Mar 2022 12:15), Max: 98.2 (21 Mar 2022 05:45)  HR: 70 (21 Mar 2022 12:15) (58 - 73)  BP: 158/80 (21 Mar 2022 12:15) (112/74 - 178/82)  BP(mean): 112 (21 Mar 2022 12:15) (88 - 112)  RR: 16 (21 Mar 2022 12:15) (14 - 16)  SpO2: 92% (21 Mar 2022 12:15) (92% - 100%)    EOE:  TMJ ( - ) clicks                    ( - ) pops                    ( - ) crepitus             Mandible FROM             Facial bones and MOM grossly intact             ( - ) trismus             ( - ) LAD             ( - ) swelling             ( - ) asymmetry             ( - ) palpation             ( - ) SOB             ( - ) dysphagia             ( - ) LOC    IOE:  permanent dentition: generalized recession and signs of periodontal disease           hard/soft palate:  ( - ) palatal torus           tongue/FOM WNL           labial/buccal mucosa WNL           ( - ) percussion           ( - ) palpation           ( - ) swelling     Radiographs: patient not transportable at this time    ASSESSMENT: #12 (upper left first premolar) reported being very mobile by patient to pre-op anesthesia.  During surgery, #12 was avulsed, chest x-ray taken & no evidence of foreign body aspiration found. #12 retrieved intraorally and saved for evaluation.  Evaluation of #12 showed both roots present and no evidence of fracture fragments. Intraoral evaluation of socket was hemostatic without signs of infection or inflammation.  Explained findings to patient and all questions/concerns were addressed.     RECOMMENDATIONS:   1) If bleeding, gauze pressure for 20 minutes  2) Dental F/U with outpatient dentist for comprehensive dental care.      Polina Woods, BLADIMIR #[id number]

## 2022-03-21 NOTE — CONSULT NOTE ADULT - ASSESSMENT
51 year old male s/p left PCNL      # s/p Left PCNL iv abx follow up blood cultures  IV Abx Ciprofloxacin     # DM- HISS follow up A1C

## 2022-03-22 LAB
ANION GAP SERPL CALC-SCNC: 12 MMOL/L — SIGNIFICANT CHANGE UP (ref 5–17)
BASOPHILS # BLD AUTO: 0.02 K/UL — SIGNIFICANT CHANGE UP (ref 0–0.2)
BASOPHILS NFR BLD AUTO: 0.2 % — SIGNIFICANT CHANGE UP (ref 0–2)
BUN SERPL-MCNC: 25 MG/DL — HIGH (ref 7–23)
CALCIUM SERPL-MCNC: 8.8 MG/DL — SIGNIFICANT CHANGE UP (ref 8.4–10.5)
CHLORIDE SERPL-SCNC: 100 MMOL/L — SIGNIFICANT CHANGE UP (ref 96–108)
CO2 SERPL-SCNC: 22 MMOL/L — SIGNIFICANT CHANGE UP (ref 22–31)
CREAT SERPL-MCNC: 1.02 MG/DL — SIGNIFICANT CHANGE UP (ref 0.5–1.3)
EGFR: 88 ML/MIN/1.73M2 — SIGNIFICANT CHANGE UP
EOSINOPHIL # BLD AUTO: 0.07 K/UL — SIGNIFICANT CHANGE UP (ref 0–0.5)
EOSINOPHIL NFR BLD AUTO: 0.6 % — SIGNIFICANT CHANGE UP (ref 0–6)
GLUCOSE BLDC GLUCOMTR-MCNC: 172 MG/DL — HIGH (ref 70–99)
GLUCOSE BLDC GLUCOMTR-MCNC: 175 MG/DL — HIGH (ref 70–99)
GLUCOSE BLDC GLUCOMTR-MCNC: 213 MG/DL — HIGH (ref 70–99)
GLUCOSE BLDC GLUCOMTR-MCNC: 218 MG/DL — HIGH (ref 70–99)
GLUCOSE SERPL-MCNC: 183 MG/DL — HIGH (ref 70–99)
HCT VFR BLD CALC: 37.5 % — LOW (ref 39–50)
HGB BLD-MCNC: 12.3 G/DL — LOW (ref 13–17)
IMM GRANULOCYTES NFR BLD AUTO: 0.5 % — SIGNIFICANT CHANGE UP (ref 0–1.5)
LYMPHOCYTES # BLD AUTO: 0.8 K/UL — LOW (ref 1–3.3)
LYMPHOCYTES # BLD AUTO: 6.5 % — LOW (ref 13–44)
MCHC RBC-ENTMCNC: 27.8 PG — SIGNIFICANT CHANGE UP (ref 27–34)
MCHC RBC-ENTMCNC: 32.8 GM/DL — SIGNIFICANT CHANGE UP (ref 32–36)
MCV RBC AUTO: 84.7 FL — SIGNIFICANT CHANGE UP (ref 80–100)
MONOCYTES # BLD AUTO: 1.03 K/UL — HIGH (ref 0–0.9)
MONOCYTES NFR BLD AUTO: 8.3 % — SIGNIFICANT CHANGE UP (ref 2–14)
NEUTROPHILS # BLD AUTO: 10.41 K/UL — HIGH (ref 1.8–7.4)
NEUTROPHILS NFR BLD AUTO: 83.9 % — HIGH (ref 43–77)
NRBC # BLD: 0 /100 WBCS — SIGNIFICANT CHANGE UP (ref 0–0)
PLATELET # BLD AUTO: 141 K/UL — LOW (ref 150–400)
POTASSIUM SERPL-MCNC: 3.7 MMOL/L — SIGNIFICANT CHANGE UP (ref 3.5–5.3)
POTASSIUM SERPL-SCNC: 3.7 MMOL/L — SIGNIFICANT CHANGE UP (ref 3.5–5.3)
RBC # BLD: 4.43 M/UL — SIGNIFICANT CHANGE UP (ref 4.2–5.8)
RBC # FLD: 13 % — SIGNIFICANT CHANGE UP (ref 10.3–14.5)
SODIUM SERPL-SCNC: 134 MMOL/L — LOW (ref 135–145)
WBC # BLD: 12.39 K/UL — HIGH (ref 3.8–10.5)
WBC # FLD AUTO: 12.39 K/UL — HIGH (ref 3.8–10.5)

## 2022-03-22 RX ORDER — SODIUM CHLORIDE 9 MG/ML
1000 INJECTION, SOLUTION INTRAVENOUS
Refills: 0 | Status: DISCONTINUED | OUTPATIENT
Start: 2022-03-22 | End: 2022-03-23

## 2022-03-22 RX ORDER — OXYCODONE HYDROCHLORIDE 5 MG/1
5 TABLET ORAL ONCE
Refills: 0 | Status: DISCONTINUED | OUTPATIENT
Start: 2022-03-22 | End: 2022-03-22

## 2022-03-22 RX ORDER — KETOROLAC TROMETHAMINE 30 MG/ML
15 SYRINGE (ML) INJECTION ONCE
Refills: 0 | Status: DISCONTINUED | OUTPATIENT
Start: 2022-03-22 | End: 2022-03-22

## 2022-03-22 RX ORDER — HYDROMORPHONE HYDROCHLORIDE 2 MG/ML
1 INJECTION INTRAMUSCULAR; INTRAVENOUS; SUBCUTANEOUS ONCE
Refills: 0 | Status: DISCONTINUED | OUTPATIENT
Start: 2022-03-22 | End: 2022-03-22

## 2022-03-22 RX ADMIN — Medication 15 MILLIGRAM(S): at 15:59

## 2022-03-22 RX ADMIN — HEPARIN SODIUM 5000 UNIT(S): 5000 INJECTION INTRAVENOUS; SUBCUTANEOUS at 10:14

## 2022-03-22 RX ADMIN — Medication 15 MILLIGRAM(S): at 13:34

## 2022-03-22 RX ADMIN — OXYCODONE HYDROCHLORIDE 5 MILLIGRAM(S): 5 TABLET ORAL at 04:20

## 2022-03-22 RX ADMIN — HEPARIN SODIUM 5000 UNIT(S): 5000 INJECTION INTRAVENOUS; SUBCUTANEOUS at 17:09

## 2022-03-22 RX ADMIN — OXYCODONE HYDROCHLORIDE 5 MILLIGRAM(S): 5 TABLET ORAL at 01:51

## 2022-03-22 RX ADMIN — OXYCODONE HYDROCHLORIDE 5 MILLIGRAM(S): 5 TABLET ORAL at 20:00

## 2022-03-22 RX ADMIN — Medication 2: at 18:29

## 2022-03-22 RX ADMIN — Medication 975 MILLIGRAM(S): at 16:00

## 2022-03-22 RX ADMIN — OXYCODONE HYDROCHLORIDE 5 MILLIGRAM(S): 5 TABLET ORAL at 19:27

## 2022-03-22 RX ADMIN — OXYCODONE HYDROCHLORIDE 5 MILLIGRAM(S): 5 TABLET ORAL at 23:29

## 2022-03-22 RX ADMIN — Medication 975 MILLIGRAM(S): at 10:11

## 2022-03-22 RX ADMIN — Medication 15 MILLIGRAM(S): at 16:30

## 2022-03-22 RX ADMIN — Medication 200 MILLIGRAM(S): at 17:06

## 2022-03-22 RX ADMIN — Medication 2: at 14:23

## 2022-03-22 RX ADMIN — Medication 15 MILLIGRAM(S): at 04:23

## 2022-03-22 RX ADMIN — OXYCODONE HYDROCHLORIDE 5 MILLIGRAM(S): 5 TABLET ORAL at 03:36

## 2022-03-22 RX ADMIN — OXYCODONE HYDROCHLORIDE 5 MILLIGRAM(S): 5 TABLET ORAL at 02:50

## 2022-03-22 RX ADMIN — Medication 975 MILLIGRAM(S): at 15:53

## 2022-03-22 RX ADMIN — Medication 15 MILLIGRAM(S): at 15:00

## 2022-03-22 RX ADMIN — HEPARIN SODIUM 5000 UNIT(S): 5000 INJECTION INTRAVENOUS; SUBCUTANEOUS at 01:17

## 2022-03-22 RX ADMIN — SODIUM CHLORIDE 75 MILLILITER(S): 9 INJECTION, SOLUTION INTRAVENOUS at 08:16

## 2022-03-22 RX ADMIN — Medication 15 MILLIGRAM(S): at 05:23

## 2022-03-22 RX ADMIN — Medication 975 MILLIGRAM(S): at 20:00

## 2022-03-22 RX ADMIN — LIDOCAINE 1 PATCH: 4 CREAM TOPICAL at 14:29

## 2022-03-22 RX ADMIN — Medication 975 MILLIGRAM(S): at 08:14

## 2022-03-22 RX ADMIN — Medication 975 MILLIGRAM(S): at 01:17

## 2022-03-22 RX ADMIN — Medication 975 MILLIGRAM(S): at 19:26

## 2022-03-22 RX ADMIN — LIDOCAINE 1 PATCH: 4 CREAM TOPICAL at 03:15

## 2022-03-22 RX ADMIN — Medication 30 MILLILITER(S): at 12:35

## 2022-03-22 RX ADMIN — Medication 200 MILLIGRAM(S): at 05:04

## 2022-03-22 RX ADMIN — Medication 975 MILLIGRAM(S): at 01:50

## 2022-03-22 RX ADMIN — HYDROMORPHONE HYDROCHLORIDE 1 MILLIGRAM(S): 2 INJECTION INTRAMUSCULAR; INTRAVENOUS; SUBCUTANEOUS at 15:30

## 2022-03-22 RX ADMIN — Medication 1: at 09:22

## 2022-03-22 RX ADMIN — HYDROMORPHONE HYDROCHLORIDE 1 MILLIGRAM(S): 2 INJECTION INTRAMUSCULAR; INTRAVENOUS; SUBCUTANEOUS at 14:19

## 2022-03-22 RX ADMIN — LIDOCAINE 1 PATCH: 4 CREAM TOPICAL at 06:19

## 2022-03-23 ENCOUNTER — TRANSCRIPTION ENCOUNTER (OUTPATIENT)
Age: 52
End: 2022-03-23

## 2022-03-23 LAB
ANION GAP SERPL CALC-SCNC: 13 MMOL/L — SIGNIFICANT CHANGE UP (ref 5–17)
BUN SERPL-MCNC: 15 MG/DL — SIGNIFICANT CHANGE UP (ref 7–23)
CALCIUM SERPL-MCNC: 9 MG/DL — SIGNIFICANT CHANGE UP (ref 8.4–10.5)
CHLORIDE SERPL-SCNC: 100 MMOL/L — SIGNIFICANT CHANGE UP (ref 96–108)
CO2 SERPL-SCNC: 24 MMOL/L — SIGNIFICANT CHANGE UP (ref 22–31)
CREAT SERPL-MCNC: 1.08 MG/DL — SIGNIFICANT CHANGE UP (ref 0.5–1.3)
CULTURE RESULTS: SIGNIFICANT CHANGE UP
EGFR: 82 ML/MIN/1.73M2 — SIGNIFICANT CHANGE UP
GLUCOSE BLDC GLUCOMTR-MCNC: 146 MG/DL — HIGH (ref 70–99)
GLUCOSE BLDC GLUCOMTR-MCNC: 158 MG/DL — HIGH (ref 70–99)
GLUCOSE BLDC GLUCOMTR-MCNC: 184 MG/DL — HIGH (ref 70–99)
GLUCOSE BLDC GLUCOMTR-MCNC: 245 MG/DL — HIGH (ref 70–99)
GLUCOSE SERPL-MCNC: 182 MG/DL — HIGH (ref 70–99)
HCT VFR BLD CALC: 34.7 % — LOW (ref 39–50)
HGB BLD-MCNC: 11.7 G/DL — LOW (ref 13–17)
MCHC RBC-ENTMCNC: 28 PG — SIGNIFICANT CHANGE UP (ref 27–34)
MCHC RBC-ENTMCNC: 33.7 GM/DL — SIGNIFICANT CHANGE UP (ref 32–36)
MCV RBC AUTO: 83 FL — SIGNIFICANT CHANGE UP (ref 80–100)
NRBC # BLD: 0 /100 WBCS — SIGNIFICANT CHANGE UP (ref 0–0)
PLATELET # BLD AUTO: 139 K/UL — LOW (ref 150–400)
POTASSIUM SERPL-MCNC: 4 MMOL/L — SIGNIFICANT CHANGE UP (ref 3.5–5.3)
POTASSIUM SERPL-SCNC: 4 MMOL/L — SIGNIFICANT CHANGE UP (ref 3.5–5.3)
RBC # BLD: 4.18 M/UL — LOW (ref 4.2–5.8)
RBC # FLD: 13.1 % — SIGNIFICANT CHANGE UP (ref 10.3–14.5)
SARS-COV-2 RNA SPEC QL NAA+PROBE: SIGNIFICANT CHANGE UP
SODIUM SERPL-SCNC: 137 MMOL/L — SIGNIFICANT CHANGE UP (ref 135–145)
SPECIMEN SOURCE: SIGNIFICANT CHANGE UP
WBC # BLD: 9.37 K/UL — SIGNIFICANT CHANGE UP (ref 3.8–10.5)
WBC # FLD AUTO: 9.37 K/UL — SIGNIFICANT CHANGE UP (ref 3.8–10.5)

## 2022-03-23 RX ORDER — SODIUM CHLORIDE 9 MG/ML
1000 INJECTION, SOLUTION INTRAVENOUS
Refills: 0 | Status: DISCONTINUED | OUTPATIENT
Start: 2022-03-23 | End: 2022-03-24

## 2022-03-23 RX ORDER — ACETAMINOPHEN 500 MG
1000 TABLET ORAL ONCE
Refills: 0 | Status: COMPLETED | OUTPATIENT
Start: 2022-03-23 | End: 2022-03-23

## 2022-03-23 RX ORDER — OXYCODONE HYDROCHLORIDE 5 MG/1
5 TABLET ORAL ONCE
Refills: 0 | Status: DISCONTINUED | OUTPATIENT
Start: 2022-03-23 | End: 2022-03-23

## 2022-03-23 RX ADMIN — HEPARIN SODIUM 5000 UNIT(S): 5000 INJECTION INTRAVENOUS; SUBCUTANEOUS at 17:45

## 2022-03-23 RX ADMIN — OXYCODONE HYDROCHLORIDE 5 MILLIGRAM(S): 5 TABLET ORAL at 06:54

## 2022-03-23 RX ADMIN — Medication 200 MILLIGRAM(S): at 17:45

## 2022-03-23 RX ADMIN — Medication 400 MILLIGRAM(S): at 19:07

## 2022-03-23 RX ADMIN — Medication 15 MILLIGRAM(S): at 23:42

## 2022-03-23 RX ADMIN — Medication 1: at 09:18

## 2022-03-23 RX ADMIN — Medication 975 MILLIGRAM(S): at 07:58

## 2022-03-23 RX ADMIN — OXYCODONE HYDROCHLORIDE 5 MILLIGRAM(S): 5 TABLET ORAL at 03:41

## 2022-03-23 RX ADMIN — HEPARIN SODIUM 5000 UNIT(S): 5000 INJECTION INTRAVENOUS; SUBCUTANEOUS at 01:20

## 2022-03-23 RX ADMIN — Medication 1: at 18:49

## 2022-03-23 RX ADMIN — Medication 975 MILLIGRAM(S): at 01:21

## 2022-03-23 RX ADMIN — Medication 975 MILLIGRAM(S): at 14:21

## 2022-03-23 RX ADMIN — Medication 400 MILLIGRAM(S): at 07:14

## 2022-03-23 RX ADMIN — Medication 975 MILLIGRAM(S): at 08:38

## 2022-03-23 RX ADMIN — OXYCODONE HYDROCHLORIDE 5 MILLIGRAM(S): 5 TABLET ORAL at 00:00

## 2022-03-23 RX ADMIN — Medication 1000 MILLIGRAM(S): at 19:37

## 2022-03-23 RX ADMIN — Medication 2: at 14:21

## 2022-03-23 RX ADMIN — Medication 975 MILLIGRAM(S): at 15:30

## 2022-03-23 RX ADMIN — Medication 200 MILLIGRAM(S): at 05:52

## 2022-03-23 RX ADMIN — HEPARIN SODIUM 5000 UNIT(S): 5000 INJECTION INTRAVENOUS; SUBCUTANEOUS at 09:21

## 2022-03-23 RX ADMIN — OXYCODONE HYDROCHLORIDE 5 MILLIGRAM(S): 5 TABLET ORAL at 06:13

## 2022-03-23 RX ADMIN — Medication 15 MILLIGRAM(S): at 23:12

## 2022-03-23 RX ADMIN — OXYCODONE HYDROCHLORIDE 5 MILLIGRAM(S): 5 TABLET ORAL at 04:11

## 2022-03-24 LAB
ANION GAP SERPL CALC-SCNC: 13 MMOL/L — SIGNIFICANT CHANGE UP (ref 5–17)
ANION GAP SERPL CALC-SCNC: 13 MMOL/L — SIGNIFICANT CHANGE UP (ref 5–17)
APTT BLD: 34.7 SEC — SIGNIFICANT CHANGE UP (ref 27.5–35.5)
BUN SERPL-MCNC: 14 MG/DL — SIGNIFICANT CHANGE UP (ref 7–23)
BUN SERPL-MCNC: 16 MG/DL — SIGNIFICANT CHANGE UP (ref 7–23)
CALCIUM SERPL-MCNC: 8.8 MG/DL — SIGNIFICANT CHANGE UP (ref 8.4–10.5)
CALCIUM SERPL-MCNC: 8.8 MG/DL — SIGNIFICANT CHANGE UP (ref 8.4–10.5)
CHLORIDE SERPL-SCNC: 101 MMOL/L — SIGNIFICANT CHANGE UP (ref 96–108)
CHLORIDE SERPL-SCNC: 99 MMOL/L — SIGNIFICANT CHANGE UP (ref 96–108)
CO2 SERPL-SCNC: 24 MMOL/L — SIGNIFICANT CHANGE UP (ref 22–31)
CO2 SERPL-SCNC: 24 MMOL/L — SIGNIFICANT CHANGE UP (ref 22–31)
CREAT SERPL-MCNC: 1 MG/DL — SIGNIFICANT CHANGE UP (ref 0.5–1.3)
CREAT SERPL-MCNC: 1.13 MG/DL — SIGNIFICANT CHANGE UP (ref 0.5–1.3)
EGFR: 78 ML/MIN/1.73M2 — SIGNIFICANT CHANGE UP
EGFR: 90 ML/MIN/1.73M2 — SIGNIFICANT CHANGE UP
GLUCOSE BLDC GLUCOMTR-MCNC: 158 MG/DL — HIGH (ref 70–99)
GLUCOSE BLDC GLUCOMTR-MCNC: 175 MG/DL — HIGH (ref 70–99)
GLUCOSE BLDC GLUCOMTR-MCNC: 182 MG/DL — HIGH (ref 70–99)
GLUCOSE BLDC GLUCOMTR-MCNC: 273 MG/DL — HIGH (ref 70–99)
GLUCOSE SERPL-MCNC: 157 MG/DL — HIGH (ref 70–99)
GLUCOSE SERPL-MCNC: 179 MG/DL — HIGH (ref 70–99)
HCT VFR BLD CALC: 32.7 % — LOW (ref 39–50)
HCT VFR BLD CALC: 33.5 % — LOW (ref 39–50)
HGB BLD-MCNC: 10.7 G/DL — LOW (ref 13–17)
HGB BLD-MCNC: 11 G/DL — LOW (ref 13–17)
INR BLD: 1.15 RATIO — SIGNIFICANT CHANGE UP (ref 0.88–1.16)
MCHC RBC-ENTMCNC: 27.8 PG — SIGNIFICANT CHANGE UP (ref 27–34)
MCHC RBC-ENTMCNC: 28.1 PG — SIGNIFICANT CHANGE UP (ref 27–34)
MCHC RBC-ENTMCNC: 32.7 GM/DL — SIGNIFICANT CHANGE UP (ref 32–36)
MCHC RBC-ENTMCNC: 32.8 GM/DL — SIGNIFICANT CHANGE UP (ref 32–36)
MCV RBC AUTO: 84.6 FL — SIGNIFICANT CHANGE UP (ref 80–100)
MCV RBC AUTO: 85.8 FL — SIGNIFICANT CHANGE UP (ref 80–100)
NRBC # BLD: 0 /100 WBCS — SIGNIFICANT CHANGE UP (ref 0–0)
NRBC # BLD: 0 /100 WBCS — SIGNIFICANT CHANGE UP (ref 0–0)
PLATELET # BLD AUTO: 135 K/UL — LOW (ref 150–400)
PLATELET # BLD AUTO: 148 K/UL — LOW (ref 150–400)
POTASSIUM SERPL-MCNC: 3.7 MMOL/L — SIGNIFICANT CHANGE UP (ref 3.5–5.3)
POTASSIUM SERPL-MCNC: 4.4 MMOL/L — SIGNIFICANT CHANGE UP (ref 3.5–5.3)
POTASSIUM SERPL-SCNC: 3.7 MMOL/L — SIGNIFICANT CHANGE UP (ref 3.5–5.3)
POTASSIUM SERPL-SCNC: 4.4 MMOL/L — SIGNIFICANT CHANGE UP (ref 3.5–5.3)
PROTHROM AB SERPL-ACNC: 13.4 SEC — SIGNIFICANT CHANGE UP (ref 10.5–13.4)
RBC # BLD: 3.81 M/UL — LOW (ref 4.2–5.8)
RBC # BLD: 3.96 M/UL — LOW (ref 4.2–5.8)
RBC # FLD: 12.9 % — SIGNIFICANT CHANGE UP (ref 10.3–14.5)
RBC # FLD: 13 % — SIGNIFICANT CHANGE UP (ref 10.3–14.5)
SODIUM SERPL-SCNC: 136 MMOL/L — SIGNIFICANT CHANGE UP (ref 135–145)
SODIUM SERPL-SCNC: 138 MMOL/L — SIGNIFICANT CHANGE UP (ref 135–145)
WBC # BLD: 7.46 K/UL — SIGNIFICANT CHANGE UP (ref 3.8–10.5)
WBC # BLD: 9.42 K/UL — SIGNIFICANT CHANGE UP (ref 3.8–10.5)
WBC # FLD AUTO: 7.46 K/UL — SIGNIFICANT CHANGE UP (ref 3.8–10.5)
WBC # FLD AUTO: 9.42 K/UL — SIGNIFICANT CHANGE UP (ref 3.8–10.5)

## 2022-03-24 PROCEDURE — 50435 EXCHANGE NEPHROSTOMY CATH: CPT | Mod: LT,58

## 2022-03-24 PROCEDURE — 50551 KIDNEY ENDOSCOPY: CPT | Mod: LT,58

## 2022-03-24 DEVICE — CATH GUID IMAGER II 5FR 65CM: Type: IMPLANTABLE DEVICE | Site: LEFT | Status: FUNCTIONAL

## 2022-03-24 DEVICE — STENT URET PERCFLX 8X24 DBL J: Type: IMPLANTABLE DEVICE | Site: LEFT | Status: FUNCTIONAL

## 2022-03-24 DEVICE — URETERAL CATH AXXCESS OPEN END 6FR 70CM: Type: IMPLANTABLE DEVICE | Site: LEFT | Status: FUNCTIONAL

## 2022-03-24 RX ORDER — OXYCODONE HYDROCHLORIDE 5 MG/1
5 TABLET ORAL EVERY 4 HOURS
Refills: 0 | Status: DISCONTINUED | OUTPATIENT
Start: 2022-03-24 | End: 2022-03-25

## 2022-03-24 RX ORDER — DEXTROSE 50 % IN WATER 50 %
25 SYRINGE (ML) INTRAVENOUS ONCE
Refills: 0 | Status: DISCONTINUED | OUTPATIENT
Start: 2022-03-24 | End: 2022-03-25

## 2022-03-24 RX ORDER — INSULIN LISPRO 100/ML
VIAL (ML) SUBCUTANEOUS
Refills: 0 | Status: DISCONTINUED | OUTPATIENT
Start: 2022-03-24 | End: 2022-03-25

## 2022-03-24 RX ORDER — HYDROMORPHONE HYDROCHLORIDE 2 MG/ML
0.5 INJECTION INTRAMUSCULAR; INTRAVENOUS; SUBCUTANEOUS ONCE
Refills: 0 | Status: DISCONTINUED | OUTPATIENT
Start: 2022-03-24 | End: 2022-03-24

## 2022-03-24 RX ORDER — SENNA PLUS 8.6 MG/1
2 TABLET ORAL AT BEDTIME
Refills: 0 | Status: DISCONTINUED | OUTPATIENT
Start: 2022-03-24 | End: 2022-03-25

## 2022-03-24 RX ORDER — DEXTROSE 50 % IN WATER 50 %
12.5 SYRINGE (ML) INTRAVENOUS ONCE
Refills: 0 | Status: DISCONTINUED | OUTPATIENT
Start: 2022-03-24 | End: 2022-03-25

## 2022-03-24 RX ORDER — INSULIN LISPRO 100/ML
VIAL (ML) SUBCUTANEOUS EVERY 6 HOURS
Refills: 0 | Status: DISCONTINUED | OUTPATIENT
Start: 2022-03-24 | End: 2022-03-24

## 2022-03-24 RX ORDER — DEXTROSE 50 % IN WATER 50 %
15 SYRINGE (ML) INTRAVENOUS ONCE
Refills: 0 | Status: DISCONTINUED | OUTPATIENT
Start: 2022-03-24 | End: 2022-03-25

## 2022-03-24 RX ORDER — SODIUM CHLORIDE 9 MG/ML
1000 INJECTION, SOLUTION INTRAVENOUS
Refills: 0 | Status: DISCONTINUED | OUTPATIENT
Start: 2022-03-24 | End: 2022-03-25

## 2022-03-24 RX ORDER — ACETAMINOPHEN 500 MG
975 TABLET ORAL EVERY 6 HOURS
Refills: 0 | Status: DISCONTINUED | OUTPATIENT
Start: 2022-03-24 | End: 2022-03-25

## 2022-03-24 RX ORDER — ONDANSETRON 8 MG/1
4 TABLET, FILM COATED ORAL ONCE
Refills: 0 | Status: DISCONTINUED | OUTPATIENT
Start: 2022-03-24 | End: 2022-03-24

## 2022-03-24 RX ORDER — HEPARIN SODIUM 5000 [USP'U]/ML
5000 INJECTION INTRAVENOUS; SUBCUTANEOUS EVERY 8 HOURS
Refills: 0 | Status: DISCONTINUED | OUTPATIENT
Start: 2022-03-24 | End: 2022-03-25

## 2022-03-24 RX ORDER — INSULIN LISPRO 100/ML
VIAL (ML) SUBCUTANEOUS AT BEDTIME
Refills: 0 | Status: DISCONTINUED | OUTPATIENT
Start: 2022-03-24 | End: 2022-03-25

## 2022-03-24 RX ORDER — GLUCAGON INJECTION, SOLUTION 0.5 MG/.1ML
1 INJECTION, SOLUTION SUBCUTANEOUS ONCE
Refills: 0 | Status: DISCONTINUED | OUTPATIENT
Start: 2022-03-24 | End: 2022-03-25

## 2022-03-24 RX ORDER — FENTANYL CITRATE 50 UG/ML
50 INJECTION INTRAVENOUS
Refills: 0 | Status: DISCONTINUED | OUTPATIENT
Start: 2022-03-24 | End: 2022-03-24

## 2022-03-24 RX ORDER — LIDOCAINE 4 G/100G
1 CREAM TOPICAL EVERY 24 HOURS
Refills: 0 | Status: DISCONTINUED | OUTPATIENT
Start: 2022-03-24 | End: 2022-03-25

## 2022-03-24 RX ORDER — CIPROFLOXACIN LACTATE 400MG/40ML
400 VIAL (ML) INTRAVENOUS EVERY 12 HOURS
Refills: 0 | Status: DISCONTINUED | OUTPATIENT
Start: 2022-03-24 | End: 2022-03-25

## 2022-03-24 RX ADMIN — Medication 200 MILLIGRAM(S): at 17:35

## 2022-03-24 RX ADMIN — SODIUM CHLORIDE 75 MILLILITER(S): 9 INJECTION, SOLUTION INTRAVENOUS at 10:18

## 2022-03-24 RX ADMIN — Medication 15 MILLIGRAM(S): at 11:31

## 2022-03-24 RX ADMIN — SODIUM CHLORIDE 75 MILLILITER(S): 9 INJECTION, SOLUTION INTRAVENOUS at 00:30

## 2022-03-24 RX ADMIN — Medication 975 MILLIGRAM(S): at 07:41

## 2022-03-24 RX ADMIN — HEPARIN SODIUM 5000 UNIT(S): 5000 INJECTION INTRAVENOUS; SUBCUTANEOUS at 10:18

## 2022-03-24 RX ADMIN — Medication 975 MILLIGRAM(S): at 01:41

## 2022-03-24 RX ADMIN — Medication 975 MILLIGRAM(S): at 23:36

## 2022-03-24 RX ADMIN — SODIUM CHLORIDE 125 MILLILITER(S): 9 INJECTION, SOLUTION INTRAVENOUS at 17:11

## 2022-03-24 RX ADMIN — Medication 1: at 12:30

## 2022-03-24 RX ADMIN — Medication 1: at 17:00

## 2022-03-24 RX ADMIN — HEPARIN SODIUM 5000 UNIT(S): 5000 INJECTION INTRAVENOUS; SUBCUTANEOUS at 21:33

## 2022-03-24 RX ADMIN — Medication 1: at 05:38

## 2022-03-24 RX ADMIN — Medication 15 MILLIGRAM(S): at 11:01

## 2022-03-24 RX ADMIN — Medication 200 MILLIGRAM(S): at 05:38

## 2022-03-24 RX ADMIN — Medication 30 MILLILITER(S): at 10:28

## 2022-03-24 RX ADMIN — HEPARIN SODIUM 5000 UNIT(S): 5000 INJECTION INTRAVENOUS; SUBCUTANEOUS at 01:41

## 2022-03-24 RX ADMIN — Medication 1: at 21:33

## 2022-03-24 NOTE — PRE-ANESTHESIA EVALUATION ADULT - NSANTHOSAYNRD_GEN_A_CORE
No. CHAU screening performed.  STOP BANG Legend: 0-2 = LOW Risk; 3-4 = INTERMEDIATE Risk; 5-8 = HIGH Risk
No. CHAU screening performed.  STOP BANG Legend: 0-2 = LOW Risk; 3-4 = INTERMEDIATE Risk; 5-8 = HIGH Risk

## 2022-03-24 NOTE — BRIEF OPERATIVE NOTE - NSICDXBRIEFPROCEDURE_GEN_ALL_CORE_FT
PROCEDURES:  Nephroscopy, second look, after percutaneous nephrolithotomy 24-Mar-2022 16:32:32  Jackson Acuna

## 2022-03-24 NOTE — BRIEF OPERATIVE NOTE - OPERATION/FINDINGS
second look nephroscopy, left antegrade nephrostogram, exchange of nephrostomy tube to nephroureteral catheter   collecting system filled with clot, unable to visualize clear anatomy. decision made to place tube and return to OR at later date

## 2022-03-24 NOTE — PRE-ANESTHESIA EVALUATION ADULT - MALLAMPATI CLASS
Class IV (difficult) - the soft palate is not visible at all
Class IV (difficult) - the soft palate is not visible at all

## 2022-03-25 ENCOUNTER — TRANSCRIPTION ENCOUNTER (OUTPATIENT)
Age: 52
End: 2022-03-25

## 2022-03-25 VITALS
RESPIRATION RATE: 18 BRPM | OXYGEN SATURATION: 95 % | DIASTOLIC BLOOD PRESSURE: 72 MMHG | HEART RATE: 80 BPM | SYSTOLIC BLOOD PRESSURE: 128 MMHG | TEMPERATURE: 99 F

## 2022-03-25 LAB
ANION GAP SERPL CALC-SCNC: 13 MMOL/L — SIGNIFICANT CHANGE UP (ref 5–17)
BUN SERPL-MCNC: 14 MG/DL — SIGNIFICANT CHANGE UP (ref 7–23)
CALCIUM SERPL-MCNC: 8.9 MG/DL — SIGNIFICANT CHANGE UP (ref 8.4–10.5)
CHLORIDE SERPL-SCNC: 101 MMOL/L — SIGNIFICANT CHANGE UP (ref 96–108)
CO2 SERPL-SCNC: 24 MMOL/L — SIGNIFICANT CHANGE UP (ref 22–31)
CREAT SERPL-MCNC: 0.96 MG/DL — SIGNIFICANT CHANGE UP (ref 0.5–1.3)
EGFR: 95 ML/MIN/1.73M2 — SIGNIFICANT CHANGE UP
GLUCOSE BLDC GLUCOMTR-MCNC: 147 MG/DL — HIGH (ref 70–99)
GLUCOSE SERPL-MCNC: 147 MG/DL — HIGH (ref 70–99)
HCT VFR BLD CALC: 31.8 % — LOW (ref 39–50)
HGB BLD-MCNC: 10.7 G/DL — LOW (ref 13–17)
MCHC RBC-ENTMCNC: 28.1 PG — SIGNIFICANT CHANGE UP (ref 27–34)
MCHC RBC-ENTMCNC: 33.6 GM/DL — SIGNIFICANT CHANGE UP (ref 32–36)
MCV RBC AUTO: 83.5 FL — SIGNIFICANT CHANGE UP (ref 80–100)
NIDUS STONE QN: SIGNIFICANT CHANGE UP
NRBC # BLD: 0 /100 WBCS — SIGNIFICANT CHANGE UP (ref 0–0)
PLATELET # BLD AUTO: 166 K/UL — SIGNIFICANT CHANGE UP (ref 150–400)
POTASSIUM SERPL-MCNC: 4.3 MMOL/L — SIGNIFICANT CHANGE UP (ref 3.5–5.3)
POTASSIUM SERPL-SCNC: 4.3 MMOL/L — SIGNIFICANT CHANGE UP (ref 3.5–5.3)
RBC # BLD: 3.81 M/UL — LOW (ref 4.2–5.8)
RBC # FLD: 13.2 % — SIGNIFICANT CHANGE UP (ref 10.3–14.5)
SODIUM SERPL-SCNC: 138 MMOL/L — SIGNIFICANT CHANGE UP (ref 135–145)
WBC # BLD: 8.6 K/UL — SIGNIFICANT CHANGE UP (ref 3.8–10.5)
WBC # FLD AUTO: 8.6 K/UL — SIGNIFICANT CHANGE UP (ref 3.8–10.5)

## 2022-03-25 PROCEDURE — 36415 COLL VENOUS BLD VENIPUNCTURE: CPT

## 2022-03-25 PROCEDURE — C9399: CPT

## 2022-03-25 PROCEDURE — 85025 COMPLETE CBC W/AUTO DIFF WBC: CPT

## 2022-03-25 PROCEDURE — C1758: CPT

## 2022-03-25 PROCEDURE — 80048 BASIC METABOLIC PNL TOTAL CA: CPT

## 2022-03-25 PROCEDURE — C2617: CPT

## 2022-03-25 PROCEDURE — 87086 URINE CULTURE/COLONY COUNT: CPT

## 2022-03-25 PROCEDURE — 85027 COMPLETE CBC AUTOMATED: CPT

## 2022-03-25 PROCEDURE — U0003: CPT

## 2022-03-25 PROCEDURE — 87070 CULTURE OTHR SPECIMN AEROBIC: CPT

## 2022-03-25 PROCEDURE — C1769: CPT

## 2022-03-25 PROCEDURE — C1726: CPT

## 2022-03-25 PROCEDURE — 85610 PROTHROMBIN TIME: CPT

## 2022-03-25 PROCEDURE — U0005: CPT

## 2022-03-25 PROCEDURE — C1887: CPT

## 2022-03-25 PROCEDURE — 88300 SURGICAL PATH GROSS: CPT

## 2022-03-25 PROCEDURE — 82365 CALCULUS SPECTROSCOPY: CPT

## 2022-03-25 PROCEDURE — 85730 THROMBOPLASTIN TIME PARTIAL: CPT

## 2022-03-25 PROCEDURE — 71045 X-RAY EXAM CHEST 1 VIEW: CPT

## 2022-03-25 PROCEDURE — 82962 GLUCOSE BLOOD TEST: CPT

## 2022-03-25 PROCEDURE — 76000 FLUOROSCOPY <1 HR PHYS/QHP: CPT

## 2022-03-25 PROCEDURE — C1889: CPT

## 2022-03-25 RX ORDER — OXYCODONE HYDROCHLORIDE 5 MG/1
1 TABLET ORAL
Qty: 12 | Refills: 0
Start: 2022-03-25 | End: 2022-03-27

## 2022-03-25 RX ORDER — ACETAMINOPHEN 500 MG
3 TABLET ORAL
Qty: 0 | Refills: 0 | DISCHARGE
Start: 2022-03-25

## 2022-03-25 RX ORDER — MOXIFLOXACIN HYDROCHLORIDE TABLETS, 400 MG 400 MG/1
1 TABLET, FILM COATED ORAL
Qty: 2 | Refills: 0
Start: 2022-03-25 | End: 2022-03-25

## 2022-03-25 RX ADMIN — Medication 975 MILLIGRAM(S): at 05:41

## 2022-03-25 RX ADMIN — Medication 975 MILLIGRAM(S): at 11:17

## 2022-03-25 RX ADMIN — Medication 30 MILLILITER(S): at 10:45

## 2022-03-25 RX ADMIN — HEPARIN SODIUM 5000 UNIT(S): 5000 INJECTION INTRAVENOUS; SUBCUTANEOUS at 05:40

## 2022-03-25 RX ADMIN — Medication 200 MILLIGRAM(S): at 05:39

## 2022-03-25 NOTE — PROGRESS NOTE ADULT - SUBJECTIVE AND OBJECTIVE BOX
HPI:  51 year old male with PMH Former Smoker (1 PPD x 16 years; Quit in 2003), DMT2 and Obesity (BMI 34.4) reports that he was recently at his regular check-up with PCP when microscopic hematuria was noted on urinalysis s/p CT scan revealed a 3.5 cm and 1.2 cm left mid-upper stones. Pt denies any hematuria, flank pain, renal colic, fever or chills. Pt now presents to [facility] for scheduled cystoscopy and left percutaneous stone removal with Dr. Hoenig on 3/21/22.    Covid vaccine Moderna 2nd dose received 3/29/22; Moderna Booster received 11/5/2021  Covid PCR test scheduled 3/17/2022 at [facility]  Denies Recent travel, Exposure or Covid symptoms   No recent hospitalizations in the last 3 months (28 Feb 2022 11:16)      PAST MEDICAL & SURGICAL HISTORY:  Esophageal achalasia  s/p esophageal repair about 5 years ago    Type 2 diabetes mellitus    Calculus of kidney  passed on own in the past; CT scan showing a 3.5 cm and 1.2 cm left mid-upper stones in December 2021    Former smoker  1 PPD x 16 years; Quit in 2003    Obesity  BMI 34.4    History of esophageal surgery  esophageal repair about 5 years ago for esopheal achalasia    Other injury of spleen  fall on ice s/p spleen embolization at [facility] IR over 10 years ago        Review of Systems:   CONSTITUTIONAL: No fever, weight loss, or fatigue  EYES: No eye pain, visual disturbances, or discharge  ENMT:  No difficulty hearing, tinnitus, vertigo; No sinus or throat pain  NECK: No pain or stiffness  BREASTS: No pain, masses, or nipple discharge  RESPIRATORY: No cough, wheezing, chills or hemoptysis; No shortness of breath  CARDIOVASCULAR: No chest pain, palpitations, dizziness, or leg swelling  GASTROINTESTINAL: No abdominal or epigastric pain. No nausea, vomiting, or hematemesis; No diarrhea or constipation. No melena or hematochezia.  GENITOURINARY: No dysuria, frequency, hematuria, or incontinence  NEUROLOGICAL: No headaches, memory loss, loss of strength, numbness, or tremors  SKIN: No itching, burning, rashes, or lesions   LYMPH NODES: No enlarged glands  ENDOCRINE: No heat or cold intolerance; No hair loss  MUSCULOSKELETAL: No joint pain or swelling; No muscle, back, or extremity pain  PSYCHIATRIC: No depression, anxiety, mood swings, or difficulty sleeping  HEME/LYMPH: No easy bruising, or bleeding gums  ALLERY AND IMMUNOLOGIC: No hives or eczema    Allergies    penicillins (Unknown)    Intolerances        Social History:     FAMILY HISTORY:    T(C): 37.1 (03-23-22 @ 22:05), Max: 37.1 (03-23-22 @ 22:05)  HR: 63 (03-23-22 @ 22:05) (63 - 66)  BP: 139/83 (03-23-22 @ 22:05) (130/79 - 139/83)  RR: 18 (03-23-22 @ 22:05) (18 - 18)  SpO2: 95% (03-23-22 @ 22:05) (95% - 96%)      MEDICATIONS  (STANDING):  acetaminophen     Tablet .. 975 milliGRAM(s) Oral every 6 hours  ciprofloxacin   IVPB 400 milliGRAM(s) IV Intermittent once  ciprofloxacin   IVPB 400 milliGRAM(s) IV Intermittent every 12 hours  dextrose 40% Gel 15 Gram(s) Oral once  dextrose 5% + sodium chloride 0.45%. 1000 milliLiter(s) (75 mL/Hr) IV Continuous <Continuous>  dextrose 5%. 1000 milliLiter(s) (50 mL/Hr) IV Continuous <Continuous>  dextrose 5%. 1000 milliLiter(s) (100 mL/Hr) IV Continuous <Continuous>  dextrose 50% Injectable 25 Gram(s) IV Push once  dextrose 50% Injectable 12.5 Gram(s) IV Push once  dextrose 50% Injectable 25 Gram(s) IV Push once  glucagon  Injectable 1 milliGRAM(s) IntraMuscular once  heparin   Injectable 5000 Unit(s) SubCutaneous every 8 hours  insulin lispro (ADMELOG) corrective regimen sliding scale   SubCutaneous three times a day before meals  insulin lispro (ADMELOG) corrective regimen sliding scale   SubCutaneous at bedtime    MEDICATIONS  (PRN):  aluminum hydroxide/magnesium hydroxide/simethicone Suspension 30 milliLiter(s) Oral every 6 hours PRN Dyspepsia  ketorolac   Injectable 15 milliGRAM(s) IV Push every 6 hours PRN Moderate Pain (4 - 6)  lidocaine   4% Patch 1 Patch Transdermal every 24 hours PRN surgical site pain  oxyCODONE    IR 5 milliGRAM(s) Oral every 4 hours PRN Severe Pain (7 - 10)  senna 2 Tablet(s) Oral at bedtime PRN Constipation      PHYSICAL EXAM:  GENERAL: NAD, well-developed  HEAD:  Atraumatic, Normocephalic  EYES: EOMI, PERRLA, conjunctiva and sclera clear  NECK: Supple, No JVD  CHEST/LUNG: Clear to auscultation bilaterally; No wheeze  HEART: Regular rate and rhythm; No murmurs, rubs, or gallops  ABDOMEN: Soft, Nontender, Nondistended; Bowel sounds present  EXTREMITIES:  2+ Peripheral Pulses, No clubbing, cyanosis, or edema  PSYCH: AAOx3  NEUROLOGY: non-focal  SKIN: No rashes or lesions                                        11.7   9.37  )-----------( 139      ( 23 Mar 2022 07:23 )             34.7               137|100|15<182  4.0|24|1.08  9.0,--,--  03-23 @ 07:23      CAPILLARY BLOOD GLUCOSE    CAPILLARY BLOOD GLUCOSE      POCT Blood Glucose.: 146 mg/dL (23 Mar 2022 21:32)  POCT Blood Glucose.: 158 mg/dL (23 Mar 2022 18:47)  POCT Blood Glucose.: 245 mg/dL (23 Mar 2022 13:30)  POCT Blood Glucose.: 184 mg/dL (23 Mar 2022 08:59)          RADIOLOGY & ADDITIONAL TESTS:    Imaging Personally Reviewed:    Consultant(s) Notes Reviewed:      Care Discussed with Consultants/Other Providers:  
Subjective  pain controlled, urine concentrated red wine no clots   Objective    Vital signs  T(F): , Max: 98.7 (03-23-22 @ 22:05)  HR: 58 (03-24-22 @ 05:40)  BP: 136/82 (03-24-22 @ 05:40)  SpO2: 97% (03-24-22 @ 05:40)  Wt(kg): --    Output     03-23 @ 07:01  -  03-24 @ 07:00  --------------------------------------------------------  IN: 1360 mL / OUT: 1650 mL / NET: -290 mL        Gen awake alert nad axox3  Abd obese soft ntnd   Back tube in place scant red output   no hematoma or ecchymosis    nonpalp bladder   voided urine concentrated red wine     Labs      03-23 @ 07:23    WBC 9.37  / Hct 34.7  / SCr 1.08       
UROLOGY DAILY PROGRESS NOTE:     Subjective: Patient seen and examined at bedside. No overnight events. Pain better controlled, voiding       Objective:  Vital signs  T(F): , Max: 98.9 (03-22-22 @ 13:16)  HR: 68 (03-23-22 @ 06:35)  BP: 159/81 (03-23-22 @ 06:35)  SpO2: 96% (03-23-22 @ 06:35)  Wt(kg): --    I&O's Summary    22 Mar 2022 07:01  -  23 Mar 2022 07:00  --------------------------------------------------------  IN: 1915 mL / OUT: 2850 mL / NET: -935 mL        Gen: NAD  Pulm: No respiratory distress, no subcostal retractions  CV: RRR, no JVD  Abd: Soft, NT, ND  Back: L NT - scant bloody drainage     Labs:  03-22  12.39 / 37.5  /1.02   03-21  10.57 / 41.6  /0.79                           12.3   12.39 )-----------( 141      ( 22 Mar 2022 06:33 )             37.5     03-22    134<L>  |  100  |  25<H>  ----------------------------<  183<H>  3.7   |  22  |  1.02    Ca    8.8      22 Mar 2022 06:33          Urine Cx:  Culture - Urine (03.21.22 @ 18:37)    Specimen Source: Kidney Kidney    Culture Results:   No growth to date.    Culture - Other (03.21.22 @ 18:35)    Specimen Source: .Other Other    Culture Results:   No growth    
Urology Preop Note     Diagnosis: kidney stone  Procedure: second stage L PCNL  Surgeon: Hoenig       T(C): 36.7 (03-23-22 @ 13:08), Max: 36.8 (03-23-22 @ 06:35)  HR: 66 (03-23-22 @ 13:08) (61 - 76)  BP: 136/79 (03-23-22 @ 13:08) (136/79 - 159/81)  RR: 18 (03-23-22 @ 13:08) (17 - 18)  SpO2: 96% (03-23-22 @ 13:08) (95% - 96%)  Wt(kg): --        Ucx:  Culture - Urine (03.21.22 @ 18:37)    Specimen Source: Kidney Kidney    Culture Results:   No growth to date.        EKG: see chart     CXR: < from: Xray Chest 1 View AP/PA (03.21.22 @ 11:02) >    INTERPRETATION:  Chest one view    HISTORY: Post PCNL    COMPARISON STUDY: Earlier the same day    Frontal expiratory view of the chest shows the heart to be enlarged in   size. The lungs show partial clearing of the lower left lung and there is   no evidence of pneumothorax nor pleural effusion.    IMPRESSION:  No pneumothorax.        Thank you for the courtesy of this referral.    --- End of Report ---    < end of copied text >      51yMale admitted for  going to OR for second stage L PCNL  - NPO p MN  - IV fluids  - consent obtained  - 2 units pRBC on hold      
 Post op Check    Pt seen and examined without complaints. Pain is controlled. Denies SOB/CP/N/V. States he is hungry and wants to eat. Pain significantly better than previously.        Vital Signs Last 24 Hrs  T(C): 36.7 (24 Mar 2022 18:20), Max: 37.1 (23 Mar 2022 22:05)  T(F): 98 (24 Mar 2022 18:20), Max: 98.7 (23 Mar 2022 22:05)  HR: 73 (24 Mar 2022 18:20) (58 - 74)  BP: 132/79 (24 Mar 2022 18:20) (108/69 - 160/94)  BP(mean): 93 (24 Mar 2022 17:45) (88 - 100)  RR: 18 (24 Mar 2022 18:20) (16 - 18)  SpO2: 93% (24 Mar 2022 18:20) (93% - 100%)    I&O's Summary    23 Mar 2022 07:01  -  24 Mar 2022 07:00  --------------------------------------------------------  IN: 1360 mL / OUT: 1650 mL / NET: -290 mL    24 Mar 2022 07:01  -  24 Mar 2022 19:02  --------------------------------------------------------  IN: 825 mL / OUT: 1230 mL / NET: -405 mL        Physical Exam  Gen: NAD, A&Ox3  Pulm: No respiratory distress, no subcostal retractions  Abd: Soft, NT, ND  Back: L flank dressing saturated, changed at bedside. L NT output cherry  : +sierra; clear yellow urine                          10.7   9.42  )-----------( 135      ( 24 Mar 2022 16:47 )             32.7       03-24    136  |  99  |  16  ----------------------------<  179<H>  4.4   |  24  |  1.13    Ca    8.8      24 Mar 2022 16:47      
Subjective    Objective    Vital signs  T(F): , Max: 98.4 (03-21-22 @ 13:40)  HR: 59 (03-22-22 @ 05:30)  BP: 143/77 (03-22-22 @ 05:30)  SpO2: 96% (03-22-22 @ 05:30)  Wt(kg): --    Output     03-21 @ 07:01  -  03-22 @ 07:00  --------------------------------------------------------  IN: 2420 mL / OUT: 2857 mL / NET: -437 mL        Gen awake alert nad axox3  Abd obese soft ntnd   Back tube in place no hematoma/ ecchymosis  urine wine    sierra in place urine wine to d[place]     Labs      03-22 @ 06:33    WBC 12.39 / Hct 37.5  / SCr 1.02     03-21 @ 12:50    WBC 10.57 / Hct 41.6  / SCr 0.79       Urine Cx: kid x 2 and stone sent and rec 
The patient was seen and examined at bedside.  Denies complaints of chest pain, shortness of breath, nausea, acute pain.    T(C): 36.9 (03-21-22 @ 13:40), Max: 36.9 (03-21-22 @ 13:40)  HR: 67 (03-21-22 @ 13:40) (58 - 73)  BP: 131/77 (03-21-22 @ 13:40) (112/74 - 178/82)  RR: 16 (03-21-22 @ 13:40) (14 - 18)  SpO2: 95% (03-21-22 @ 13:40) (92% - 100%)  Wt(kg): --    Physical Exam:    General: NAD, A+Ox3  Abdomen: soft, non-tender, non-distended  Back: no hematoma, dressing clean/dry/intact      03-21 @ 07:01  -  03-21 @ 14:13  --------------------------------------------------------  IN: 375 mL / OUT: 1350 mL / NET: -975 mL      L NT - 450cc punch-colored    F - 900cc blood tinged                            14.1   10.57 )-----------( 158      ( 21 Mar 2022 12:50 )             41.6       138  |  104  |  17  ----------------------------<  234<H>  4.5   |  22  |  0.79    Ca    9.0      21 Mar 2022 12:50  
UROLOGY DAILY PROGRESS NOTE:     Subjective: Patient seen and examined at bedside. No overnight events. Pain controlled.       Objective:  Vital signs  T(F): , Max: 98.8 (03-24-22 @ 21:22)  HR: 64 (03-25-22 @ 05:48)  BP: 132/83 (03-25-22 @ 05:48)  SpO2: 95% (03-25-22 @ 05:48)  Wt(kg): --    I&O's Summary    24 Mar 2022 07:01  -  25 Mar 2022 07:00  --------------------------------------------------------  IN: 2565 mL / OUT: 3330 mL / NET: -765 mL    I&O's Detail    24 Mar 2022 07:01  -  25 Mar 2022 07:00  --------------------------------------------------------  IN:    dextrose 5% + sodium chloride 0.45%: 450 mL    Lactated Ringers: 1875 mL    Oral Fluid: 240 mL  Total IN: 2565 mL    OUT:    Indwelling Catheter - Urethral (mL): 2275 mL    Nephrostomy Tube (mL): 375 mL    Voided (mL): 680 mL  Total OUT: 3330 mL    Total NET: -765 mL          Gen: NAD  Pulm: No respiratory distress, no subcostal retractions  CV: RRR, no JVD  Abd: Soft, NT, ND  Back: L NT draining red urine  : sierra- clear     Labs:  03-25  8.60  / 31.8  /0.96   03-24  9.42  / 32.7  /1.13                           10.7   8.60  )-----------( 166      ( 25 Mar 2022 05:59 )             31.8     03-25    138  |  101  |  14  ----------------------------<  147<H>  4.3   |  24  |  0.96    Ca    8.9      25 Mar 2022 05:59      PT/INR - ( 24 Mar 2022 07:15 )   PT: 13.4 sec;   INR: 1.15 ratio         PTT - ( 24 Mar 2022 07:15 )  PTT:34.7 sec    Urine Cx:

## 2022-03-25 NOTE — DISCHARGE NOTE PROVIDER - CARE PROVIDERS DIRECT ADDRESSES
[email or web address]

## 2022-03-25 NOTE — DISCHARGE NOTE PROVIDER - HOSPITAL COURSE
Underwent a L PCNL on 3/21/22. Post op he voided and ambulated, had some pain issues but controlled.  He had second look PCNL on 3/24 however unable to get to stone due to bleeding so the neph tube was changed to a nephroureterstomy tube.  POD1 he passed TOV.  Labs were stable.  He was sent home with NT  open to bag drainage.

## 2022-03-25 NOTE — PROGRESS NOTE ADULT - ASSESSMENT
A/P: 51y Male s/p second look nephroscopy, left antegrade nephrostogram, exchange of nephrostomy tube to nephroureteral catheter   -DVT prophylaxis/OOB  -Incentive spirometry  -Strict I&O's  -Analgesia and antiemetics as needed  -Diet  -AM labs  -continue cipro  -plan for AM TOV  -dc planning for tomorrow with NT
51 year old male s/p left PCNL    -AM labs  -f/u CXR read  -monitor I's and O's  -continue antibiotics  -f/u OR cultures  -OOB/DVT prophylaxis/incentive spirometry  -possible second stage PCNL this week
51 year old male s/p left PCNL 3/21/22    -AM labs  -monitor I's and O's  -continue antibiotics  -f/u OR cultures  -OOB/DVT prophylaxis/incentive spirometry  -possible second stage PCNL this week vs pt wishes to go home and return   tov underway 
A/P: 51y Male s/p second look nephroscopy, left antegrade nephrostogram, exchange of nephrostomy tube to nephroureteral catheter   -DVT prophylaxis/OOB  -Incentive spirometry  -Strict I&O's  -Analgesia and antiemetics as needed  -Diet  -AM labs  -continue cipro  -TOV   -dc planning for tomorrow with NT open 
51 year old male s/p left PCNL      # s/p Left PCNL iv abx follow up blood cultures  IV Abx Ciprofloxacin     For 2 nd stage PCNL     # DM- HISS follow up A1C  7.2 
51 year old male s/p left PCNL 3/21/22,     -AM labs  -monitor I's and O's  -continue antibiotics  - cont L NT   -f/u OR cultures  -OOB/DVT prophylaxis/incentive spirometry  - second stage PCNL today  - NPO   - pain control 
51 year old male s/p left PCNL 3/21/22, POD2    -AM labs  -monitor I's and O's  -continue antibiotics  - cont L NT   -f/u OR cultures  -OOB/DVT prophylaxis/incentive spirometry  - second stage PCNL tomorrow  - NPO p MN  - pain control

## 2022-03-25 NOTE — DISCHARGE NOTE PROVIDER - NSDCCPCAREPLAN_GEN_ALL_CORE_FT
PRINCIPAL DISCHARGE DIAGNOSIS  Diagnosis: Calculus of kidney  Assessment and Plan of Treatment: Please follow up with Dr. Hoenig in 1-2 weeks.  Call [phone] to schedule/confirm your appointment.  Call or follow up sooner with fevers, chills, nausea, vomiting, increasing pain, or with other concerns.

## 2022-03-25 NOTE — DISCHARGE NOTE NURSING/CASE MANAGEMENT/SOCIAL WORK - PATIENT PORTAL LINK FT
You can access the FollowMyHealth Patient Portal offered by [facility] by registering at the following website: [email or web address]. By joining [facility]’s FollowMyHealth portal, you will also be able to view your health information using other applications (apps) compatible with our system.

## 2022-03-25 NOTE — DISCHARGE NOTE NURSING/CASE MANAGEMENT/SOCIAL WORK - NSDCPEFALRISK_GEN_ALL_CORE
For information on Fall & Injury Prevention, visit: [email or web address] OR  [email or web address] OR  https://www.cdc.gov/steadi/patient.html

## 2022-03-25 NOTE — DISCHARGE NOTE NURSING/CASE MANAGEMENT/SOCIAL WORK - NSTRANSFERBELONGINGSDISPO_GEN_A_NUR
MICHEAL Clinician Contact & Progress Note  For Individual Resiliency Training (IRT)  A Part of the [facility] First Episode of Psychosis Program    NAVIGATE Enrollee: Alex العراقي (2006)     MRN: [id number]  Date:  8/31/20  Diagnosis: unspecified psychosis  Clinician: MICHEAL Individual Resiliency Trainer, Lori Vazquez, PhD     1. Type of contact: (majority of time spent)  IRT Session via telehealth  Mode of communication: [employer] (HIPAA compliant, secure platform). Patient consented verbally to this mode of therapy today.  Reason for telehealth: COVID-19. This patient visit was converted to a telehealth visit to minimize exposure to COVID-19.    2. People present:   Writer  Client: Yes - Alex العراقي  Other: none    3. Length of Actual Contact: Start Time: 3:00pm; End Time: 3:50pm     4. Location of contact:  Originating Location (patient location):  home, located in [place], [place]  Distant Location (provider location): Home office, located in [place], [place], using appropriate privacy considerations and procedures    5. Did the client complete the home practice option(s) from the previous session: Partially Completed    6. Motivational Teaching Strategies:  Connect info and skills with personal goals  Promote hope and positive expectations  Re-frame experiences in positive light    7. Educational Teaching Strategies:  Relate information to client's experience  Ask questions to check comprehension  Break down information into small chunks  Adopt client's language     8. CBT Teaching Strategies:  Reinforcement and shaping (positive feedback for steps towards goals, gains in knowledge & skills and follow-through on home assignments)    9. IRT Module(s) Addressed:  Module 3 - Education about Psychosis    10. Techniques utilized:   [place] announced at beginning of session  Review of homework  Review of goal  Review of previous meeting  Present new material  Help client choose a home practice  option  Summarize progress made in current session    11. Measures:    Mental Status Exam  Alertness: alert  and oriented  Behavior/Demeanor: cooperative, pleasant and calm  Speech: normal and regular rate and rhythm  Language: intact, no problems, good and no obvious problem.   Mood: description consistent with euthymia  Thought Process/Associations: unremarkable  Thought Content:  Reports none;  Denies suicidal and violent ideation  Perception:  Reports auditory hallucinations and visual hallucinations;  Denies none  Insight: excellent  Judgment: excellent  Cognition: does  appear grossly intact; formal cognitive testing was not done    DEVI-7  Over the last 2 weeks, how often have you been bothered by the following problems?    1. Feeling nervous, anxious or on edge: 0 - Not at all  2. Not being able to stop or control worryin - Not at all  3. Worrying too much about different things: 0 - Not at all  4. Trouble relaxin - Not at all  5. Being so restless that it is hard to sit still: 0 - Not at all  6. Becoming easily annoyed or irritable: 0 - Not at all  7. Feeling afraid as if something awful might happen: 0 - Not at all    PHQ-9  Over the last 2 weeks, how often have you been bothered by any the following problems?    1. Little interest or pleasure in doing things: 0 - Not at all  2. Feeling down, depressed, or hopeless: 0 - Not at all  3. Trouble falling or staying asleep, or sleeping too much: 0 - Not at all  4. Feeling tired or having little energy: 0 - Not at all  5. Poor appetite or overeatin - Not at all  6. Feeling bad about yourself - or that you are a failure or have let yourself or your family down: 0 - Not at all  7. Trouble concentrating on things, such as reading the newspaper or watching television: 0 - Not at all  8. Moving or speaking so slowly that other people could have noticed. Or the opposite-being fidgety or restless that you have been moving around a lot more than usual: 0 -  Not at all  9. Thoughts that you would be better off dead, or of hurting yourself in some way: 0 - Not at all    If you checked off any problems, how difficulty have these problems made it for you to do your work, take care of things at home, or get along with other people? Not answered    Hamel Protocol Risk Identification  1) Have you wished you were dead or wished you could go to sleep and not wake up? No  2) Have you actually had any thoughts about killing yourself? No  If YES to 2, answer questions 3, 4, 5, 6  If NO to 2, go directly to question 6  3) Have you thought about how you might do this? N/A  4) Have you had any intension of acting on these thoughts of killing yourself, as opposed to you have the thoughts but you definitely would not act on them? N/A  5) Have you started to work out or worked out the details of how to kill yourself? Do you intend to carry out this plan? N/A  Always Ask Question 6  6) Have you done anything, started to do anything, or prepared to do anything to end your life? No  Examples: collected pills, obtained a gun, gave away valuables, wrote a will or suicide note, held a gun but changed your mind, cut yourself, tried to hang yourself, etc.    12. Assessment/Progress Note:     Individual IRT session. Pt reported that he has been doing well.  He stated that his mood has been good and he has been sleeping well.  He reported that he has not had any upsetting experiences over the last 3 weeks.    Pt continues to work towards goal of finding a community.  He stated that he has been in touch with SEE and working on reaching out to another person to interview.    Pt has started his class at [employer] and he will be doing school remotely this semester for his high school classes.  So far, he reported that he is enjoying his class and that he has been listening to some psychology lectures online.    Pt stated that he had some questions about diagnosis that we discussed in  "session.  He asked what his current dx is and we discussed what it means to have a unspecified diagnosis.  He brought some of the people that he has been listening about in his psychology lectures which he states encouraged him to think about his past history of therapy.  We discussed how his diverse treatment approaches have been helpful to him and how a diagnosis is made. Pt responded well to the educational information.    13. Plan/Referrals:     Continue weekly IRT  sessions after labor day; continue working with SEE to identify a community    Billing for \"Interactive Complexity\"?    No      Lori Vazquez, PhD    NAVIGATE Individual Resiliency Trainer  " with patient

## 2022-03-25 NOTE — DISCHARGE NOTE PROVIDER - NSDCMRMEDTOKEN_GEN_ALL_CORE_FT
acetaminophen 325 mg oral tablet: 3 tab(s) orally every 6 hours  Alogliptin 6.25 mg oral tablet: 1 tab(s) orally once a day  Cipro 500 mg oral tablet: 1 tab(s) orally every 12 hours   Jardiance 25 mg oral tablet: 1 tab(s) orally once a day (in the morning)  lisinopril 20 mg oral tablet: 1 tab(s) orally once a day  metFORMIN 500 mg oral tablet: 2 tab(s) orally 2 times a day  oxyCODONE 5 mg oral tablet: 1 tab(s) orally every 6 hours, As Needed -Severe Pain (7 - 10) MDD:4  rosuvastatin 10 mg oral tablet: 1 tab(s) orally once a day

## 2022-03-26 LAB — SURGICAL PATHOLOGY STUDY: SIGNIFICANT CHANGE UP

## 2022-04-05 ENCOUNTER — NON-APPOINTMENT (OUTPATIENT)
Age: 52
End: 2022-04-05

## 2022-04-05 ENCOUNTER — INPATIENT (INPATIENT)
Facility: HOSPITAL | Age: 52
LOS: 1 days | Discharge: HOME HEALTH | DRG: 674 | End: 2022-04-07
Attending: UROLOGY | Admitting: UROLOGY
Payer: COMMERCIAL

## 2022-04-05 VITALS
OXYGEN SATURATION: 99 % | SYSTOLIC BLOOD PRESSURE: 157 MMHG | HEART RATE: 80 BPM | HEIGHT: 71 IN | TEMPERATURE: 98 F | DIASTOLIC BLOOD PRESSURE: 90 MMHG | RESPIRATION RATE: 20 BRPM | WEIGHT: 250 LBS

## 2022-04-05 DIAGNOSIS — T83.83XA HEMORRHAGE DUE TO GENITOURINARY PROSTHETIC DEVICES, IMPLANTS AND GRAFTS, INITIAL ENCOUNTER: Chronic | ICD-10-CM

## 2022-04-05 DIAGNOSIS — Z98.890 OTHER SPECIFIED POSTPROCEDURAL STATES: Chronic | ICD-10-CM

## 2022-04-05 DIAGNOSIS — N99.528 OTHER COMPLICATION OF INCONTINENT EXTERNAL STOMA OF URINARY TRACT: ICD-10-CM

## 2022-04-05 DIAGNOSIS — S36.09XA OTHER INJURY OF SPLEEN, INITIAL ENCOUNTER: Chronic | ICD-10-CM

## 2022-04-05 LAB
ALBUMIN SERPL ELPH-MCNC: 4.6 G/DL — SIGNIFICANT CHANGE UP (ref 3.3–5)
ALP SERPL-CCNC: 76 U/L — SIGNIFICANT CHANGE UP (ref 40–120)
ALT FLD-CCNC: 20 U/L — SIGNIFICANT CHANGE UP (ref 10–45)
ANION GAP SERPL CALC-SCNC: 13 MMOL/L — SIGNIFICANT CHANGE UP (ref 5–17)
APPEARANCE UR: ABNORMAL
APTT BLD: 34.1 SEC — SIGNIFICANT CHANGE UP (ref 27.5–35.5)
AST SERPL-CCNC: 16 U/L — SIGNIFICANT CHANGE UP (ref 10–40)
BACTERIA # UR AUTO: NEGATIVE — SIGNIFICANT CHANGE UP
BASOPHILS # BLD AUTO: 0.04 K/UL — SIGNIFICANT CHANGE UP (ref 0–0.2)
BASOPHILS NFR BLD AUTO: 0.2 % — SIGNIFICANT CHANGE UP (ref 0–2)
BILIRUB SERPL-MCNC: 0.3 MG/DL — SIGNIFICANT CHANGE UP (ref 0.2–1.2)
BILIRUB UR-MCNC: NEGATIVE — SIGNIFICANT CHANGE UP
BLD GP AB SCN SERPL QL: NEGATIVE — SIGNIFICANT CHANGE UP
BUN SERPL-MCNC: 22 MG/DL — SIGNIFICANT CHANGE UP (ref 7–23)
CALCIUM SERPL-MCNC: 10.1 MG/DL — SIGNIFICANT CHANGE UP (ref 8.4–10.5)
CHLORIDE SERPL-SCNC: 103 MMOL/L — SIGNIFICANT CHANGE UP (ref 96–108)
CO2 SERPL-SCNC: 25 MMOL/L — SIGNIFICANT CHANGE UP (ref 22–31)
COLOR SPEC: ABNORMAL
CREAT SERPL-MCNC: 1.04 MG/DL — SIGNIFICANT CHANGE UP (ref 0.5–1.3)
DIFF PNL FLD: ABNORMAL
EGFR: 86 ML/MIN/1.73M2 — SIGNIFICANT CHANGE UP
EOSINOPHIL # BLD AUTO: 0.1 K/UL — SIGNIFICANT CHANGE UP (ref 0–0.5)
EOSINOPHIL NFR BLD AUTO: 0.6 % — SIGNIFICANT CHANGE UP (ref 0–6)
EPI CELLS # UR: 2 /HPF — SIGNIFICANT CHANGE UP
GLUCOSE BLDC GLUCOMTR-MCNC: 141 MG/DL — HIGH (ref 70–99)
GLUCOSE BLDC GLUCOMTR-MCNC: 195 MG/DL — HIGH (ref 70–99)
GLUCOSE SERPL-MCNC: 182 MG/DL — HIGH (ref 70–99)
GLUCOSE UR QL: ABNORMAL
HCT VFR BLD CALC: 36.5 % — LOW (ref 39–50)
HGB BLD-MCNC: 11.6 G/DL — LOW (ref 13–17)
HYALINE CASTS # UR AUTO: 0 /LPF — SIGNIFICANT CHANGE UP (ref 0–2)
IMM GRANULOCYTES NFR BLD AUTO: 1.1 % — SIGNIFICANT CHANGE UP (ref 0–1.5)
INR BLD: 1.05 RATIO — SIGNIFICANT CHANGE UP (ref 0.88–1.16)
KETONES UR-MCNC: NEGATIVE — SIGNIFICANT CHANGE UP
LEUKOCYTE ESTERASE UR-ACNC: NEGATIVE — SIGNIFICANT CHANGE UP
LYMPHOCYTES # BLD AUTO: 0.99 K/UL — LOW (ref 1–3.3)
LYMPHOCYTES # BLD AUTO: 5.8 % — LOW (ref 13–44)
MCHC RBC-ENTMCNC: 28 PG — SIGNIFICANT CHANGE UP (ref 27–34)
MCHC RBC-ENTMCNC: 31.8 GM/DL — LOW (ref 32–36)
MCV RBC AUTO: 88 FL — SIGNIFICANT CHANGE UP (ref 80–100)
MONOCYTES # BLD AUTO: 0.79 K/UL — SIGNIFICANT CHANGE UP (ref 0–0.9)
MONOCYTES NFR BLD AUTO: 4.7 % — SIGNIFICANT CHANGE UP (ref 2–14)
NEUTROPHILS # BLD AUTO: 14.85 K/UL — HIGH (ref 1.8–7.4)
NEUTROPHILS NFR BLD AUTO: 87.6 % — HIGH (ref 43–77)
NITRITE UR-MCNC: NEGATIVE — SIGNIFICANT CHANGE UP
NRBC # BLD: 0 /100 WBCS — SIGNIFICANT CHANGE UP (ref 0–0)
PH UR: 6.5 — SIGNIFICANT CHANGE UP (ref 5–8)
PLATELET # BLD AUTO: 300 K/UL — SIGNIFICANT CHANGE UP (ref 150–400)
POTASSIUM SERPL-MCNC: 4.7 MMOL/L — SIGNIFICANT CHANGE UP (ref 3.5–5.3)
POTASSIUM SERPL-SCNC: 4.7 MMOL/L — SIGNIFICANT CHANGE UP (ref 3.5–5.3)
PROT SERPL-MCNC: 7.6 G/DL — SIGNIFICANT CHANGE UP (ref 6–8.3)
PROT UR-MCNC: >600
PROTHROM AB SERPL-ACNC: 12.2 SEC — SIGNIFICANT CHANGE UP (ref 10.5–13.4)
RBC # BLD: 4.15 M/UL — LOW (ref 4.2–5.8)
RBC # FLD: 14.6 % — HIGH (ref 10.3–14.5)
RBC CASTS # UR COMP ASSIST: >50 /HPF — HIGH (ref 0–4)
RH IG SCN BLD-IMP: POSITIVE — SIGNIFICANT CHANGE UP
SARS-COV-2 RNA SPEC QL NAA+PROBE: SIGNIFICANT CHANGE UP
SODIUM SERPL-SCNC: 141 MMOL/L — SIGNIFICANT CHANGE UP (ref 135–145)
SP GR SPEC: 1.04 — HIGH (ref 1.01–1.02)
UROBILINOGEN FLD QL: NEGATIVE — SIGNIFICANT CHANGE UP
WBC # BLD: 16.96 K/UL — HIGH (ref 3.8–10.5)
WBC # FLD AUTO: 16.96 K/UL — HIGH (ref 3.8–10.5)
WBC UR QL: 1 /HPF — SIGNIFICANT CHANGE UP (ref 0–5)

## 2022-04-05 PROCEDURE — 99284 EMERGENCY DEPT VISIT MOD MDM: CPT

## 2022-04-05 PROCEDURE — 99252 IP/OBS CONSLTJ NEW/EST SF 35: CPT

## 2022-04-05 RX ORDER — LISINOPRIL 2.5 MG/1
20 TABLET ORAL DAILY
Refills: 0 | Status: DISCONTINUED | OUTPATIENT
Start: 2022-04-05 | End: 2022-04-07

## 2022-04-05 RX ORDER — DEXTROSE 50 % IN WATER 50 %
25 SYRINGE (ML) INTRAVENOUS ONCE
Refills: 0 | Status: DISCONTINUED | OUTPATIENT
Start: 2022-04-05 | End: 2022-04-07

## 2022-04-05 RX ORDER — CIPROFLOXACIN LACTATE 400MG/40ML
400 VIAL (ML) INTRAVENOUS EVERY 12 HOURS
Refills: 0 | Status: DISCONTINUED | OUTPATIENT
Start: 2022-04-05 | End: 2022-04-07

## 2022-04-05 RX ORDER — SODIUM CHLORIDE 9 MG/ML
1000 INJECTION, SOLUTION INTRAVENOUS
Refills: 0 | Status: DISCONTINUED | OUTPATIENT
Start: 2022-04-05 | End: 2022-04-07

## 2022-04-05 RX ORDER — DEXTROSE 50 % IN WATER 50 %
15 SYRINGE (ML) INTRAVENOUS ONCE
Refills: 0 | Status: DISCONTINUED | OUTPATIENT
Start: 2022-04-05 | End: 2022-04-07

## 2022-04-05 RX ORDER — INSULIN LISPRO 100/ML
VIAL (ML) SUBCUTANEOUS
Refills: 0 | Status: DISCONTINUED | OUTPATIENT
Start: 2022-04-05 | End: 2022-04-06

## 2022-04-05 RX ORDER — INSULIN LISPRO 100/ML
VIAL (ML) SUBCUTANEOUS AT BEDTIME
Refills: 0 | Status: DISCONTINUED | OUTPATIENT
Start: 2022-04-05 | End: 2022-04-06

## 2022-04-05 RX ORDER — OXYCODONE HYDROCHLORIDE 5 MG/1
5 TABLET ORAL EVERY 6 HOURS
Refills: 0 | Status: DISCONTINUED | OUTPATIENT
Start: 2022-04-05 | End: 2022-04-07

## 2022-04-05 RX ORDER — ATORVASTATIN CALCIUM 80 MG/1
40 TABLET, FILM COATED ORAL AT BEDTIME
Refills: 0 | Status: DISCONTINUED | OUTPATIENT
Start: 2022-04-05 | End: 2022-04-07

## 2022-04-05 RX ORDER — DEXTROSE 50 % IN WATER 50 %
12.5 SYRINGE (ML) INTRAVENOUS ONCE
Refills: 0 | Status: DISCONTINUED | OUTPATIENT
Start: 2022-04-05 | End: 2022-04-07

## 2022-04-05 RX ORDER — LIDOCAINE HCL 20 MG/ML
10 VIAL (ML) INJECTION ONCE
Refills: 0 | Status: COMPLETED | OUTPATIENT
Start: 2022-04-05 | End: 2022-04-05

## 2022-04-05 RX ORDER — SODIUM CHLORIDE 9 MG/ML
1000 INJECTION INTRAMUSCULAR; INTRAVENOUS; SUBCUTANEOUS
Refills: 0 | Status: DISCONTINUED | OUTPATIENT
Start: 2022-04-05 | End: 2022-04-07

## 2022-04-05 RX ORDER — GLUCAGON INJECTION, SOLUTION 0.5 MG/.1ML
1 INJECTION, SOLUTION SUBCUTANEOUS ONCE
Refills: 0 | Status: DISCONTINUED | OUTPATIENT
Start: 2022-04-05 | End: 2022-04-07

## 2022-04-05 RX ORDER — ONDANSETRON 8 MG/1
4 TABLET, FILM COATED ORAL EVERY 6 HOURS
Refills: 0 | Status: DISCONTINUED | OUTPATIENT
Start: 2022-04-05 | End: 2022-04-07

## 2022-04-05 RX ORDER — ACETAMINOPHEN 500 MG
650 TABLET ORAL EVERY 6 HOURS
Refills: 0 | Status: DISCONTINUED | OUTPATIENT
Start: 2022-04-05 | End: 2022-04-07

## 2022-04-05 RX ADMIN — Medication 10 MILLILITER(S): at 15:00

## 2022-04-05 RX ADMIN — Medication 650 MILLIGRAM(S): at 22:33

## 2022-04-05 RX ADMIN — SODIUM CHLORIDE 75 MILLILITER(S): 9 INJECTION INTRAMUSCULAR; INTRAVENOUS; SUBCUTANEOUS at 17:43

## 2022-04-05 RX ADMIN — ATORVASTATIN CALCIUM 40 MILLIGRAM(S): 80 TABLET, FILM COATED ORAL at 22:31

## 2022-04-05 RX ADMIN — OXYCODONE HYDROCHLORIDE 5 MILLIGRAM(S): 5 TABLET ORAL at 17:43

## 2022-04-05 RX ADMIN — Medication 200 MILLIGRAM(S): at 17:44

## 2022-04-05 RX ADMIN — OXYCODONE HYDROCHLORIDE 5 MILLIGRAM(S): 5 TABLET ORAL at 18:43

## 2022-04-05 NOTE — H&P ADULT - NSHPLABSRESULTS_GEN_ALL_CORE
11.6   16.96 )-----------( 300      ( 05 Apr 2022 15:20 )             36.5     chem pending  pt/ptt/inr pending  UA/UCx pending

## 2022-04-05 NOTE — H&P ADULT - NSICDXPASTSURGICALHX_GEN_ALL_CORE_FT
PAST SURGICAL HISTORY:  History of esophageal surgery esophageal repair about 5 years ago for esopheal achalasia    Nephrostomy tube bleed     Other injury of spleen fall on ice s/p spleen embolization at [facility] IR over 10 years ago

## 2022-04-05 NOTE — H&P ADULT - ASSESSMENT
52 yo male PMHx diabetes, kidney stones s/p L PCNL on 3/21 with second look nephroscopy and placement of L nephroureteral tube 3/24 by Dr. David Hoenig now with gross hematuria and clots c/f pseudoaneurysm     - Admit to Dr. Hoenig  - IR consulted for L renal angio/poss embo  - monitor color  - trend H/H  - follow up urine culture     Discussed with Dr. Hoenig

## 2022-04-05 NOTE — ED PROVIDER NOTE - SKIN, MLM
+blood around L nephrostomy tube site as above. Otherwise skin normal color for race, warm, dry and intact. No evidence of rash.

## 2022-04-05 NOTE — ED PROVIDER NOTE - NSICDXPASTSURGICALHX_GEN_ALL_CORE_FT
PAST SURGICAL HISTORY:  History of esophageal surgery esophageal repair about 5 years ago for esopheal achalasia    Nephrostomy tube bleed     Other injury of spleen fall on ice s/p spleen embolization at [facility] IR over 10 years ago

## 2022-04-05 NOTE — H&P ADULT - HISTORY OF PRESENT ILLNESS
52 yo male pmhx diabetes, kidney stones s/p L PCNL on 3/21 with second look nephroscopy and placement of L nephroureteral tube 3/24 by Dr. David Hoenig, d/c'ed home on 3/25 scheduled to remove nephrostomy tube tomorrow in office presents to the ED c/o blood in nephrostomy tube bag and at insertion site. States urine has looked clear since d/c until this AM, noted blood in the bag with clots. Called Dr. Hoenig and was told to monitor, 1 hour later noted blood around insertion site, held pressure w/ resolution, but states still having blood in bag. Last changed about 1 hour ago. Has not urinated much on own since procedure, states small amount this AM, bloody. Denies fever/chills, back/flank pain, abd pain, n/v/d, anticoagulant usage. 50 yo male pmhx diabetes, kidney stones s/p L PCNL on 3/21 with second look nephroscopy and placement of L nephroureteral tube 3/24 by Dr. David Hoenig, d/c'ed home on 3/25 scheduled to remove nephrostomy tube tomorrow in office presents to the ED c/o blood in nephrostomy tube bag and at insertion site. States urine has looked clear since d/c until this AM, noted blood in the bag with small clot. Called Dr. Hoenig and was told to monitor, 1 hour later noted blood around insertion site, held pressure w/ resolution, but states still having blood in bag. Dressing last changed about 1 hour ago. Has not urinated much on own since procedure, states small amount this AM, bloody. Denies fever/chills, back/flank pain, abd pain, n/v/d, anticoagulant usage.

## 2022-04-05 NOTE — ED PROVIDER NOTE - OBJECTIVE STATEMENT
52 yo male pmhx diabetes, kidney stones s/p lithotripsy on 3/21 and 3/24 by Dr. David Hoenig, d/c'ed home on 3/25 scheduled to remove nephrostomy tube tomorrow in office presents to the ED c/o blood in nephrostomy tube bag and at insertion site. States urine has looked clear since d/c until this AM, noted blood in the bag, called Dr. Hoenig and was told to monitor, 1 hour later noted blood around insertion site, held pressure w/ resolution, but states still having blood in bag. Last changed about 1 hour ago. Has not urinated much on own since procedure, states small amount this AM, bloody. Denies fever/chills, back/flank pain, abd pain, n/v/d, anticoagulant usage.

## 2022-04-05 NOTE — ED ADULT NURSE NOTE - NSICDXPASTMEDICALHX_GEN_ALL_CORE_FT
PAST MEDICAL HISTORY:  Calculus of kidney passed on own in the past; CT scan showing a 3.5 cm and 1.2 cm left mid-upper stones in December 2021    Esophageal achalasia s/p esophageal repair about 5 years ago    Former smoker 1 PPD x 16 years; Quit in 2003    Kidney stones     Obesity BMI 34.4    Type 2 diabetes mellitus

## 2022-04-05 NOTE — H&P ADULT - NSHPPHYSICALEXAM_GEN_ALL_CORE
Vital Signs Last 24 Hrs  T(C): 36.8 (05 Apr 2022 13:32), Max: 36.8 (05 Apr 2022 13:32)  T(F): 98.2 (05 Apr 2022 13:32), Max: 98.2 (05 Apr 2022 13:32)  HR: 80 (05 Apr 2022 13:32) (80 - 80)  BP: 157/90 (05 Apr 2022 13:32) (157/90 - 157/90)  RR: 20 (05 Apr 2022 13:32) (20 - 20)  SpO2: 99% (05 Apr 2022 13:32) (99% - 99%)    General: NAD, Lying in bed comfortably  Neuro: A+Ox3, no focal deficits  Resp: No respiratory distress or accessory muscle use. no supplemental O2  Abd: Soft, NT/ND, no rebound/guarding  Back: L NephU in place draining maroon urine in bag and clot. L flank dressing with small area of pinkish red saturation, mostly c/d/i. no active bleeding around NephU. no ecchymosis or palpable hematoma  : + suprapubic tenderness. + palpable bladder. sierra placed with 700cc of maroon urine minimal clots.  Vascular: All 4 extremities warm and appear well perfused  Skin: Intact, no breakdown  Musculoskeletal: All 4 extremities moving spontaneously, no limitations.     using aseptic technique, sierra placed with maroon urine return. bladder irrigated with NS. few small clots evacuated. color improved to clear red. draining well. Vital Signs Last 24 Hrs  T(C): 36.8 (05 Apr 2022 13:32), Max: 36.8 (05 Apr 2022 13:32)  T(F): 98.2 (05 Apr 2022 13:32), Max: 98.2 (05 Apr 2022 13:32)  HR: 80 (05 Apr 2022 13:32) (80 - 80)  BP: 157/90 (05 Apr 2022 13:32) (157/90 - 157/90)  RR: 20 (05 Apr 2022 13:32) (20 - 20)  SpO2: 99% (05 Apr 2022 13:32) (99% - 99%)    General: NAD, Lying in bed comfortably  Neuro: A+Ox3, no focal deficits  Resp: No respiratory distress or accessory muscle use. no supplemental O2  Abd: Soft, NT/ND, no rebound/guarding  Back: L NephU in place draining maroon urine in bag and clot. L flank dressing with small area of pinkish red saturation, mostly c/d/i. no active bleeding around NephU. no ecchymosis or palpable hematoma  : + suprapubic tenderness. + palpable bladder. sierra placed with 700cc of maroon urine minimal clots.  Vascular: All 4 extremities warm and appear well perfused  Skin: Intact, no breakdown  Musculoskeletal: All 4 extremities moving spontaneously, no limitations.     using aseptic technique, sierra placed with maroon urine return ~ 700 cc. bladder irrigated with NS. few small clots evacuated. color improved to clear red. draining well.

## 2022-04-05 NOTE — ED ADULT NURSE NOTE - NSICDXPASTSURGICALHX_GEN_ALL_CORE_FT
PAST SURGICAL HISTORY:  History of esophageal surgery esophageal repair about 5 years ago for esopheal achalasia    Nephrostomy tube bleed     Other injury of spleen fall on ice s/p spleen embolization at [facility] IR over 10 years ago

## 2022-04-05 NOTE — ED PROVIDER NOTE - PROGRESS NOTE DETAILS
Urology called, informed to admit to Dr. Hoenig. They will plan w/ IR for exchange or removal. - Jayjay Dumont PA-C

## 2022-04-05 NOTE — ED PROVIDER NOTE - CLINICAL SUMMARY MEDICAL DECISION MAKING FREE TEXT BOX
50 yo male hx DM, HTN, renal stones s/p lithotripsy on 3/21 and 3/24 by Dr. David Hoenig, d/c'ed home on 3/25 scheduled to remove nephrostomy tube tomorrow, came in c/o gross hematuria in sierra bag and bleeding around nephrostomy bag. No fever/chills, d/w Dr. Hoenig who requested come to ED for checkup. Will get bloodwork, UA,  consult and reassess. MELIDA. 50 yo male hx DM, HTN, renal stones s/p lithotripsy on 3/21 and 3/24 by Dr. David Hoenig, d/c'ed home on 3/25 scheduled to remove nephrostomy tube tomorrow, came in c/o gross hematuria in Jung bag and bleeding around nephrostomy bag. No fever/chills, d/w Dr. Hoenig who requested come to ED for checkup. Will get blood work, UA,  consult and reassess. MELIDA.

## 2022-04-05 NOTE — ED ADULT NURSE NOTE - OBJECTIVE STATEMENT
Pt presents for eval of new onset bleeding at the left nephrostomy tube insertion site and in the drainage tube as well.  He was instructed by his Urologist to apply direct pressure to the site which he did and bleeding at the site decreased, but persisted into the tube.  Denies fevers or chills.  States he passes little to no urine from his penis and was told this was normal by urology.  Bladder scan shows over 500cc urine in his bladder.  Urology at bedside.

## 2022-04-06 ENCOUNTER — APPOINTMENT (OUTPATIENT)
Dept: UROLOGY | Facility: CLINIC | Age: 52
End: 2022-04-06

## 2022-04-06 LAB
ANION GAP SERPL CALC-SCNC: 9 MMOL/L — SIGNIFICANT CHANGE UP (ref 5–17)
APTT BLD: 34.3 SEC — SIGNIFICANT CHANGE UP (ref 27.5–35.5)
BLD GP AB SCN SERPL QL: NEGATIVE — SIGNIFICANT CHANGE UP
BUN SERPL-MCNC: 25 MG/DL — HIGH (ref 7–23)
CALCIUM SERPL-MCNC: 9.7 MG/DL — SIGNIFICANT CHANGE UP (ref 8.4–10.5)
CHLORIDE SERPL-SCNC: 101 MMOL/L — SIGNIFICANT CHANGE UP (ref 96–108)
CO2 SERPL-SCNC: 27 MMOL/L — SIGNIFICANT CHANGE UP (ref 22–31)
CREAT SERPL-MCNC: 1.05 MG/DL — SIGNIFICANT CHANGE UP (ref 0.5–1.3)
CULTURE RESULTS: SIGNIFICANT CHANGE UP
EGFR: 85 ML/MIN/1.73M2 — SIGNIFICANT CHANGE UP
GLUCOSE BLDC GLUCOMTR-MCNC: 102 MG/DL — HIGH (ref 70–99)
GLUCOSE BLDC GLUCOMTR-MCNC: 120 MG/DL — HIGH (ref 70–99)
GLUCOSE BLDC GLUCOMTR-MCNC: 144 MG/DL — HIGH (ref 70–99)
GLUCOSE BLDC GLUCOMTR-MCNC: 147 MG/DL — HIGH (ref 70–99)
GLUCOSE SERPL-MCNC: 150 MG/DL — HIGH (ref 70–99)
HCT VFR BLD CALC: 32.9 % — LOW (ref 39–50)
HGB BLD-MCNC: 10.4 G/DL — LOW (ref 13–17)
INR BLD: 1.12 RATIO — SIGNIFICANT CHANGE UP (ref 0.88–1.16)
MCHC RBC-ENTMCNC: 28.2 PG — SIGNIFICANT CHANGE UP (ref 27–34)
MCHC RBC-ENTMCNC: 31.6 GM/DL — LOW (ref 32–36)
MCV RBC AUTO: 89.2 FL — SIGNIFICANT CHANGE UP (ref 80–100)
NRBC # BLD: 0 /100 WBCS — SIGNIFICANT CHANGE UP (ref 0–0)
PLATELET # BLD AUTO: 253 K/UL — SIGNIFICANT CHANGE UP (ref 150–400)
POTASSIUM SERPL-MCNC: 5 MMOL/L — SIGNIFICANT CHANGE UP (ref 3.5–5.3)
POTASSIUM SERPL-SCNC: 5 MMOL/L — SIGNIFICANT CHANGE UP (ref 3.5–5.3)
PROTHROM AB SERPL-ACNC: 12.9 SEC — SIGNIFICANT CHANGE UP (ref 10.5–13.4)
RBC # BLD: 3.69 M/UL — LOW (ref 4.2–5.8)
RBC # FLD: 14.7 % — HIGH (ref 10.3–14.5)
RH IG SCN BLD-IMP: POSITIVE — SIGNIFICANT CHANGE UP
SODIUM SERPL-SCNC: 137 MMOL/L — SIGNIFICANT CHANGE UP (ref 135–145)
SPECIMEN SOURCE: SIGNIFICANT CHANGE UP
WBC # BLD: 9.16 K/UL — SIGNIFICANT CHANGE UP (ref 3.8–10.5)
WBC # FLD AUTO: 9.16 K/UL — SIGNIFICANT CHANGE UP (ref 3.8–10.5)

## 2022-04-06 PROCEDURE — 36247 INS CATH ABD/L-EXT ART 3RD: CPT

## 2022-04-06 PROCEDURE — 99024 POSTOP FOLLOW-UP VISIT: CPT

## 2022-04-06 PROCEDURE — 50387 CHANGE NEPHROURETERAL CATH: CPT | Mod: LT

## 2022-04-06 PROCEDURE — 76937 US GUIDE VASCULAR ACCESS: CPT | Mod: 26

## 2022-04-06 PROCEDURE — 37242 VASC EMBOLIZE/OCCLUDE ARTERY: CPT

## 2022-04-06 RX ORDER — ONDANSETRON 8 MG/1
4 TABLET, FILM COATED ORAL ONCE
Refills: 0 | Status: DISCONTINUED | OUTPATIENT
Start: 2022-04-06 | End: 2022-04-06

## 2022-04-06 RX ORDER — INSULIN LISPRO 100/ML
VIAL (ML) SUBCUTANEOUS AT BEDTIME
Refills: 0 | Status: DISCONTINUED | OUTPATIENT
Start: 2022-04-06 | End: 2022-04-07

## 2022-04-06 RX ORDER — INSULIN LISPRO 100/ML
VIAL (ML) SUBCUTANEOUS
Refills: 0 | Status: DISCONTINUED | OUTPATIENT
Start: 2022-04-06 | End: 2022-04-07

## 2022-04-06 RX ORDER — INSULIN LISPRO 100/ML
VIAL (ML) SUBCUTANEOUS EVERY 6 HOURS
Refills: 0 | Status: DISCONTINUED | OUTPATIENT
Start: 2022-04-06 | End: 2022-04-06

## 2022-04-06 RX ADMIN — OXYCODONE HYDROCHLORIDE 5 MILLIGRAM(S): 5 TABLET ORAL at 02:03

## 2022-04-06 RX ADMIN — LISINOPRIL 20 MILLIGRAM(S): 2.5 TABLET ORAL at 06:18

## 2022-04-06 RX ADMIN — Medication 200 MILLIGRAM(S): at 06:18

## 2022-04-06 RX ADMIN — OXYCODONE HYDROCHLORIDE 5 MILLIGRAM(S): 5 TABLET ORAL at 01:03

## 2022-04-06 RX ADMIN — SODIUM CHLORIDE 75 MILLILITER(S): 9 INJECTION INTRAMUSCULAR; INTRAVENOUS; SUBCUTANEOUS at 20:20

## 2022-04-06 NOTE — CONSULT NOTE ADULT - ASSESSMENT
50 yo male PMHx diabetes, kidney stones s/p L PCNL on 3/21 with second look nephroscopy and placement of L nephroureteral tube 3/24 by Dr. David Hoenig now with gross hematuria and clots c/f pseudoaneurysm     -- IR consulted for L renal angio/poss embo  -Monitor CBC   -Urology following     -DM HISS 
Assessment and plan: This is a 51 year old male with a PMH of diabetes and renal stones s/p Left PCNL on 3/21 with Dr. Hoenig admitted with hematuria in PCNL collection bag. IR consulted for left renal angiogram and embolization.    Patient seen and examined at bedside. VSS, H/H stable. Patient is hemodynamically stable. Case discussed with IR attending Dr. Kelly and Dr. Hoening, no emergent indication for intervention today. Will plan to perform procedure tomorrow. In the event the patient should worsen clinically (drop in H/H, hemodynamic instability) please reach out to IR.    -Place IR procedure order for Left renal angiogram and embolization. Case approved with Dr. Kelly, will plan to perform procedure tomorrow 4/6/22.  -Keep patient NPO after midnight except meds  -Send STAT AM labs and transfuse accordingly, maintain active type and screen  -Hold anticoagulation   -Case discussed with Dr. Kelly    Please call extension [phone] with any questions, concerns or issues regarding above.

## 2022-04-06 NOTE — PROGRESS NOTE ADULT - SUBJECTIVE AND OBJECTIVE BOX
Urology PA Progress Note    Subjective:  Patient seen and examined at bedside. No acute events overnight. No complaints    Objective:  Vital signs  T(F): , Max: 98.7 (22 @ 00:41)  HR: 65 (22 @ 06:07)  BP: 124/77 (22 @ 06:07)  SpO2: 98% (22 @ 06:07)  Wt(kg): --    Output      @ 07:01  -   @ 06:50  --------------------------------------------------------  IN: 725 mL / OUT: 2150 mL / NET: -1425 mL    L NT- 50cc  Sierra 1300cc    Physical Exam:  Gen: NAD. AAOx3  Pulm: nonlabored  Abd: soft, NT/ND  : Left NT with dark red urine. sierra in place with dark red urine    Labs:  04  16.96 / 36.5  /1.04                           11.6   16.96 )-----------( 300      ( 2022 15:20 )             36.5     04-    141  |  103  |  22  ----------------------------<  182<H>  4.7   |  25  |  1.04    Ca    10.1      2022 15:20    TPro  7.6  /  Alb  4.6  /  TBili  0.3  /  DBili  x   /  AST  16  /  ALT  20  /  AlkPhos  76  04-05    PT/INR - ( 2022 15:20 )   PT: 12.2 sec;   INR: 1.05 ratio         PTT - ( 2022 15:20 )  PTT:34.1 sec  Urinalysis Basic - ( 2022 16:10 )    Color: Red / Appearance: Turbid / S.038 / pH: x  Gluc: x / Ketone: Negative  / Bili: Negative / Urobili: Negative   Blood: x / Protein: >600 / Nitrite: Negative   Leuk Esterase: Negative / RBC: >50 /hpf / WBC 1 /HPF   Sq Epi: x / Non Sq Epi: 2 /hpf / Bacteria: Negative      Cx: Kidney Kidney   @ 18:37   No growth at 48 hours  --  --      .Other Other   @ 18:35   No growth at 48 hours  --  --      Clean Catch Clean Catch (Midstream)   @ 14:35   <10,000 CFU/mL Normal Urogenital Kelsie  --  --

## 2022-04-06 NOTE — CONSULT NOTE ADULT - SUBJECTIVE AND OBJECTIVE BOX
Interventional Radiology    Evaluate for Procedure: Left renal angiogram and embolization    HPI:  51 year male with a PMH of diabetes, kidney stones s/p Left PCNL on 3/21 with second look nephroscopy and placement of Left nephroureteral tube on 3/24 by Dr. David Hoenig, d/c'ed home on 3/25 scheduled to remove nephrostomy tube tomorrow in office presents to the ED c/o blood in nephrostomy tube bag and at insertion site. States urine has looked clear since d/c until this AM, noted blood in the bag with clots. Called Dr. Hoenig and was told to monitor, 1 hour later noted blood around insertion site, held pressure w/ resolution, but states still having blood in bag. Denies fever/chills, back/flank pain, abd pain, n/v/d, anticoagulant usage. IR consulted for left renal angiogram and embolization.    ROS  General:  No , fevers, chills  CV:  No pain, palpitations,   Resp:  No dyspnea, cough, tachypnea, wheezing  GI:  No pain, nausea, vomiting  :  hemeturia    PMHx  Esophageal achalasia    Type 2 diabetes mellitus    Calculus of kidney    Former smoker    Obesity    Kidney stones      Allergies  penicillins (Unknown)    Medications  lisinopril, STAT    PHYSICAL EXAM:  T(C): 36.8, Max: 36.8 (04-05-22 @ 13:32)  HR: 65  BP: 112/65  RR: 18  SpO2: 96%    General:  No acute distress, well-appearing  Neuro:  A&O x 3  Respiratory: Non-labored breathing  Abdomen:  Soft, non-tender, non-distended, no peritoneal signs  Extremities:  No swelling, warm, normal color  : Hematuria noted in PCN collection bag and urethral catheter bag    LABS:  WBC 16.96 / HgB 11.6 / Hct 36.5 / Plt 300  Na 141 / K 4.7 / CO2 25 / Cl 103 / BUN 22 / Cr 1.04 / Glucose 182  ALT 20 / 16 / Alk Phos 76 / TBili 0.3  PTT 34.1 / PT 12.2 / INR 1.05              
HPI:  52 yo male pmhx diabetes, kidney stones s/p L PCNL on 3/21 with second look nephroscopy and placement of L nephroureteral tube 3/24 by Dr. David Hoenig, d/c'ed home on 3/25 scheduled to remove nephrostomy tube tomorrow in office presents to the ED c/o blood in nephrostomy tube bag and at insertion site. States urine has looked clear since d/c until this AM, noted blood in the bag with small clot. Called Dr. Hoenig and was told to monitor, 1 hour later noted blood around insertion site, held pressure w/ resolution, but states still having blood in bag. Dressing last changed about 1 hour ago. Has not urinated much on own since procedure, states small amount this AM, bloody. Denies fever/chills, back/flank pain, abd pain, n/v/d, anticoagulant usage. (2022 15:23)      PAST MEDICAL & SURGICAL HISTORY:  Esophageal achalasia  s/p esophageal repair about 5 years ago    Type 2 diabetes mellitus    Calculus of kidney  passed on own in the past; CT scan showing a 3.5 cm and 1.2 cm left mid-upper stones in 2021    Former smoker  1 PPD x 16 years; Quit in     Obesity  BMI 34.4    Kidney stones    History of esophageal surgery  esophageal repair about 5 years ago for esopheal achalasia    Other injury of spleen  fall on ice s/p spleen embolization at [facility] over 10 years ago    Nephrostomy tube bleed        Review of Systems:   CONSTITUTIONAL: No fever, weight loss, or fatigue  EYES: No eye pain, visual disturbances, or discharge  ENMT:  No difficulty hearing, tinnitus, vertigo; No sinus or throat pain  NECK: No pain or stiffness  BREASTS: No pain, masses, or nipple discharge  RESPIRATORY: No cough, wheezing, chills or hemoptysis; No shortness of breath  CARDIOVASCULAR: No chest pain, palpitations, dizziness, or leg swelling  GASTROINTESTINAL: No abdominal or epigastric pain. No nausea, vomiting, or hematemesis; No diarrhea or constipation. No melena or hematochezia.  GENITOURINARY: No dysuria, frequency, hematuria, or incontinence  NEUROLOGICAL: No headaches, memory loss, loss of strength, numbness, or tremors  SKIN: No itching, burning, rashes, or lesions   LYMPH NODES: No enlarged glands  ENDOCRINE: No heat or cold intolerance; No hair loss  MUSCULOSKELETAL: No joint pain or swelling; No muscle, back, or extremity pain  PSYCHIATRIC: No depression, anxiety, mood swings, or difficulty sleeping  HEME/LYMPH: No easy bruising, or bleeding gums  ALLERY AND IMMUNOLOGIC: No hives or eczema    Allergies    penicillins (Unknown)    Intolerances        Social History:     FAMILY HISTORY:      MEDICATIONS  (STANDING):  atorvastatin 40 milliGRAM(s) Oral at bedtime  ciprofloxacin   IVPB 400 milliGRAM(s) IV Intermittent every 12 hours  dextrose 5%. 1000 milliLiter(s) (50 mL/Hr) IV Continuous <Continuous>  dextrose 5%. 1000 milliLiter(s) (100 mL/Hr) IV Continuous <Continuous>  dextrose 50% Injectable 25 Gram(s) IV Push once  dextrose 50% Injectable 12.5 Gram(s) IV Push once  dextrose 50% Injectable 25 Gram(s) IV Push once  glucagon  Injectable 1 milliGRAM(s) IntraMuscular once  insulin lispro (ADMELOG) corrective regimen sliding scale   SubCutaneous three times a day before meals  insulin lispro (ADMELOG) corrective regimen sliding scale   SubCutaneous at bedtime  lisinopril 20 milliGRAM(s) Oral daily  sodium chloride 0.9%. 1000 milliLiter(s) (75 mL/Hr) IV Continuous <Continuous>    MEDICATIONS  (PRN):  acetaminophen     Tablet .. 650 milliGRAM(s) Oral every 6 hours PRN Temp greater or equal to 38C (100.4F), Mild Pain (1 - 3)  dextrose Oral Gel 15 Gram(s) Oral once PRN Blood Glucose LESS THAN 70 milliGRAM(s)/deciliter  ondansetron Injectable 4 milliGRAM(s) IV Push every 6 hours PRN Nausea and/or Vomiting  oxyCODONE    IR 5 milliGRAM(s) Oral every 6 hours PRN Severe Pain (7 - 10)        CAPILLARY BLOOD GLUCOSE      POCT Blood Glucose.: 102 mg/dL (2022 22:12)  POCT Blood Glucose.: 120 mg/dL (2022 20:05)  POCT Blood Glucose.: 144 mg/dL (2022 12:03)  POCT Blood Glucose.: 147 mg/dL (2022 06:21)    I&O's Summary    2022 07:01  -  2022 07:00  --------------------------------------------------------  IN: 725 mL / OUT: 2150 mL / NET: -1425 mL    2022 07:01  -  2022 00:16  --------------------------------------------------------  IN: 1200 mL / OUT: 2000 mL / NET: -800 mL        PHYSICAL EXAM:  GENERAL: NAD, well-developed  HEAD:  Atraumatic, Normocephalic  EYES: EOMI, PERRLA, conjunctiva and sclera clear  NECK: Supple, No JVD  CHEST/LUNG: Clear to auscultation bilaterally; No wheeze  HEART: Regular rate and rhythm; No murmurs, rubs, or gallops  ABDOMEN: Soft, Nontender, Nondistended; Bowel sounds present  EXTREMITIES:  2+ Peripheral Pulses, No clubbing, cyanosis, or edema  PSYCH: AAOx3  NEUROLOGY: non-focal  SKIN: No rashes or lesions    LABS:                        10.4   9.16  )-----------( 253      ( 2022 07:06 )             32.9     04-06    137  |  101  |  25<H>  ----------------------------<  150<H>  5.0   |  27  |  1.05    Ca    9.7      2022 07:04    TPro  7.6  /  Alb  4.6  /  TBili  0.3  /  DBili  x   /  AST  16  /  ALT  20  /  AlkPhos  76  04-05    PT/INR - ( 2022 07:06 )   PT: 12.9 sec;   INR: 1.12 ratio         PTT - ( 2022 07:06 )  PTT:34.3 sec      Urinalysis Basic - ( 2022 16:10 )    Color: Red / Appearance: Turbid / S.038 / pH: x  Gluc: x / Ketone: Negative  / Bili: Negative / Urobili: Negative   Blood: x / Protein: >600 / Nitrite: Negative   Leuk Esterase: Negative / RBC: >50 /hpf / WBC 1 /HPF   Sq Epi: x / Non Sq Epi: 2 /hpf / Bacteria: Negative        RADIOLOGY & ADDITIONAL TESTS:    Imaging Personally Reviewed:    Consultant(s) Notes Reviewed:      Care Discussed with Consultants/Other Providers:

## 2022-04-06 NOTE — PRE PROCEDURE NOTE - PRE PROCEDURE EVALUATION
Interventional Radiology    HPI: 51y Male with hematuria for 1 week status post left sided PCNL    Allergies: penicillins (Unknown)    Medications (Abx/Cardiac/Anticoagulation/Blood Products)  ciprofloxacin   IVPB: 200 mL/Hr IV Intermittent (04-06 @ 06:18)  lisinopril: 20 milliGRAM(s) Oral (04-06 @ 06:18)    Data:    T(C): 36.8  HR: 67  BP: 125/76  RR: 18  SpO2: 98%    Exam  General: No acute distress  Chest: Non labored breathing  Abdomen: Non-distended  Extremities: No swelling, warm    -WBC 9.16 / HgB 10.4 / Hct 32.9 / Plt 253  -Na 137 / Cl 101 / BUN 25 / Glucose 150  -K 5.0 / CO2 27 / Cr 1.05  -ALT -- / Alk Phos -- / T.Bili --  -INR1.12    Imaging:     Plan: 51y Male presents for bilateral renal angiogram, possible embolization  -Discussed with patient that left renal angiogram will be performed first, as that is the side where the procedure was done, but that if a bleed is not visualized, right sided angiogram will be done in order to exclude all potential arterial causes of hematuria  -Discussed post embolization syndrome, as well as potential for renal infarction if embolization is performed. I explained to the patient that while patient has a normally functioning kidney on the side opposite embolization, and injury to the kidney can put the patient at risk of requiring dialysis in the future should he have more global renal dysfunction or future injury. Patient states he understood.   -Risks/Benefits/alternatives explained with the patient and/or healthcare proxy and witnessed informed consent obtained.

## 2022-04-06 NOTE — PROGRESS NOTE ADULT - ASSESSMENT
A/P: 51y Male s/p IR angio and embolization of 2 pseudoaneurysms     -DVT prophylaxis/OOB  -Incentive spirometry  -Strict I&O's  -Analgesia and antiemetics as needed  -Diabetic Diet  -AM labs

## 2022-04-06 NOTE — PATIENT PROFILE ADULT - FOOD INSECURITY
January 3, 2022        Cristian Barros    To Whom It May Concern:    This is to certify Cristian Barros was evaluated on 01/03/22 and is unable to return to work.    Cristian Barros should self-isolate.  ?  CDC guidelines for return to work are as follows:  · At least 24 hours have passed since fever resolution without use of fever reducing medication and   · Symptoms have improved and  · At least 5 days have passed since symptoms first appeared  · A mask should be worn when around others for the 5 days after return to work    **The loss of taste and smell may persist for weeks or months after recovery and do not need to delay the end of isolation.     Per CDC recommendations, employers should not require a COVID-19 test result or a healthcare provider’s note for employees who are sick to validate their illness, qualify for sick leave, or to return to work.    The Coronavirus is a rapidly evolving situation and recommendations are changing regularly to prevent spread of the disease and further loss of life.    Thank you for your understanding during these unusual times.     Electronically signed by:     Loreto Martinez CNP                 [facility]   [address] [place], [place] [address]         no

## 2022-04-06 NOTE — PROCEDURE NOTE - PROCEDURE FINDINGS AND DETAILS
L radial access. L renal angio w/ 2 pseudoaneurysms arising from the lower pole s/p glue embo. L PCNU exchange. TR band closure.

## 2022-04-06 NOTE — PROGRESS NOTE ADULT - ASSESSMENT
A/P: 51 year old male PMHx diabetes, kidney stones s/p L PCNL on 3/21 with second look nephroscopy and placement of L nephroureteral tube 3/24 a/w gross hematuria c/f pseudoaneurysm     - IR today for L renal angio/poss embolization  - AM labs  - NPO/IVF  - Cipro  - OOB/ambulate  - L NT and sierra to gravity  - monitor urine color  - DVT ppx

## 2022-04-06 NOTE — PROGRESS NOTE ADULT - SUBJECTIVE AND OBJECTIVE BOX
Post IR angio and Embolization check     Pt seen and examined without complaints. Pain is controlled. Has not had anything to eat/ drink yet. Denies SOB/CP/N/V.     Vital Signs Last 24 Hrs  T(C): 37.3 (06 Apr 2022 19:45), Max: 37.3 (06 Apr 2022 19:45)  T(F): 99.1 (06 Apr 2022 19:45), Max: 99.1 (06 Apr 2022 19:45)  HR: 68 (06 Apr 2022 21:15) (60 - 79)  BP: 127/70 (06 Apr 2022 21:15) (97/62 - 149/73)  BP(mean): 93 (06 Apr 2022 21:15) (93 - 108)  RR: 16 (06 Apr 2022 21:15) (16 - 18)  SpO2: 98% (06 Apr 2022 21:15) (96% - 100%)    I&O's Summary    05 Apr 2022 07:01  -  06 Apr 2022 07:00  --------------------------------------------------------  IN: 725 mL / OUT: 2150 mL / NET: -1425 mL    06 Apr 2022 07:01  -  06 Apr 2022 21:47  --------------------------------------------------------  IN: 1050 mL / OUT: 1720 mL / NET: -670 mL    I&O's Detail    05 Apr 2022 07:01  -  06 Apr 2022 07:00  --------------------------------------------------------  IN:    IV PiggyBack: 200 mL    sodium chloride 0.9%: 525 mL  Total IN: 725 mL    OUT:    Indwelling Catheter - Urethral (mL): 2100 mL    Nephrostomy Tube (mL): 50 mL    Oral Fluid: 0 mL  Total OUT: 2150 mL    Total NET: -1425 mL      06 Apr 2022 07:01  -  06 Apr 2022 21:47  --------------------------------------------------------  IN:    sodium chloride 0.9%: 1050 mL  Total IN: 1050 mL    OUT:    Indwelling Catheter - Urethral (mL): 1650 mL    Nephrostomy Tube (mL): 70 mL    Oral Fluid: 0 mL  Total OUT: 1720 mL    Total NET: -670 mL          Physical Exam  Gen: awake alert NAD AXOX3  Pulm: No respiratory distress  Abd: Soft, NT, ND  Back: L NT minimal SS output, L flank nontender to palpation, dressing C/D/I, no palpable hematoma   : 3 way sierra in place wine colored with a few small clots in tubing, flushed with NS with aspiration of 30-40cc clot, urine is now draining pink lemonade colored urine   Extremities: L wrist access, no active bleeding, nontender to palpation                           10.4   9.16  )-----------( 253      ( 06 Apr 2022 07:06 )             32.9       04-06    137  |  101  |  25<H>  ----------------------------<  150<H>  5.0   |  27  |  1.05    Ca    9.7      06 Apr 2022 07:04    TPro  7.6  /  Alb  4.6  /  TBili  0.3  /  DBili  x   /  AST  16  /  ALT  20  /  AlkPhos  76  04-05

## 2022-04-06 NOTE — PROGRESS NOTE ADULT - ATTENDING COMMENTS
Pt seen and examined  agree with above assessment, plan  d/w pt timing, angiogram, and management/expectations post

## 2022-04-06 NOTE — PATIENT PROFILE ADULT - MONEY FOR FOOD
Followup with primary care  Return to the ED as needed    Our goal in the emergency department is to always give you outstanding care and exceptional service. You may receive a survey by mail or e-mail in the next week regarding your experience in our ED. We would greatly appreciate your completing and returning the survey. Your feedback provides us with a way to recognize our staff who give very good care and it helps us learn how to improve when your experience was below our aspiration of excellence.     
no

## 2022-04-06 NOTE — PATIENT PROFILE ADULT - FALL HARM RISK - UNIVERSAL INTERVENTIONS
Bed in lowest position, wheels locked, appropriate side rails in place/Call bell, personal items and telephone in reach/Instruct patient to call for assistance before getting out of bed or chair/Non-slip footwear when patient is out of bed/[place] to call system/Physically safe environment - no spills, clutter or unnecessary equipment/Purposeful Proactive Rounding/Room/bathroom lighting operational, light cord in reach

## 2022-04-07 ENCOUNTER — TRANSCRIPTION ENCOUNTER (OUTPATIENT)
Age: 52
End: 2022-04-07

## 2022-04-07 VITALS
TEMPERATURE: 98 F | HEART RATE: 86 BPM | SYSTOLIC BLOOD PRESSURE: 132 MMHG | OXYGEN SATURATION: 98 % | DIASTOLIC BLOOD PRESSURE: 81 MMHG | RESPIRATION RATE: 18 BRPM

## 2022-04-07 LAB
ANION GAP SERPL CALC-SCNC: 13 MMOL/L — SIGNIFICANT CHANGE UP (ref 5–17)
BUN SERPL-MCNC: 22 MG/DL — SIGNIFICANT CHANGE UP (ref 7–23)
CALCIUM SERPL-MCNC: 9.2 MG/DL — SIGNIFICANT CHANGE UP (ref 8.4–10.5)
CHLORIDE SERPL-SCNC: 100 MMOL/L — SIGNIFICANT CHANGE UP (ref 96–108)
CO2 SERPL-SCNC: 24 MMOL/L — SIGNIFICANT CHANGE UP (ref 22–31)
CREAT SERPL-MCNC: 0.99 MG/DL — SIGNIFICANT CHANGE UP (ref 0.5–1.3)
EGFR: 91 ML/MIN/1.73M2 — SIGNIFICANT CHANGE UP
GLUCOSE BLDC GLUCOMTR-MCNC: 131 MG/DL — HIGH (ref 70–99)
GLUCOSE BLDC GLUCOMTR-MCNC: 153 MG/DL — HIGH (ref 70–99)
GLUCOSE SERPL-MCNC: 118 MG/DL — HIGH (ref 70–99)
HCT VFR BLD CALC: 32.6 % — LOW (ref 39–50)
HGB BLD-MCNC: 10.3 G/DL — LOW (ref 13–17)
MCHC RBC-ENTMCNC: 27.8 PG — SIGNIFICANT CHANGE UP (ref 27–34)
MCHC RBC-ENTMCNC: 31.6 GM/DL — LOW (ref 32–36)
MCV RBC AUTO: 88.1 FL — SIGNIFICANT CHANGE UP (ref 80–100)
NRBC # BLD: 0 /100 WBCS — SIGNIFICANT CHANGE UP (ref 0–0)
PLATELET # BLD AUTO: 250 K/UL — SIGNIFICANT CHANGE UP (ref 150–400)
POTASSIUM SERPL-MCNC: 4.5 MMOL/L — SIGNIFICANT CHANGE UP (ref 3.5–5.3)
POTASSIUM SERPL-SCNC: 4.5 MMOL/L — SIGNIFICANT CHANGE UP (ref 3.5–5.3)
RBC # BLD: 3.7 M/UL — LOW (ref 4.2–5.8)
RBC # FLD: 14.6 % — HIGH (ref 10.3–14.5)
SODIUM SERPL-SCNC: 137 MMOL/L — SIGNIFICANT CHANGE UP (ref 135–145)
WBC # BLD: 10.23 K/UL — SIGNIFICANT CHANGE UP (ref 3.8–10.5)
WBC # FLD AUTO: 10.23 K/UL — SIGNIFICANT CHANGE UP (ref 3.8–10.5)

## 2022-04-07 PROCEDURE — 86850 RBC ANTIBODY SCREEN: CPT

## 2022-04-07 PROCEDURE — 81001 URINALYSIS AUTO W/SCOPE: CPT

## 2022-04-07 PROCEDURE — 76937 US GUIDE VASCULAR ACCESS: CPT

## 2022-04-07 PROCEDURE — 80053 COMPREHEN METABOLIC PANEL: CPT

## 2022-04-07 PROCEDURE — 85027 COMPLETE CBC AUTOMATED: CPT

## 2022-04-07 PROCEDURE — C1887: CPT

## 2022-04-07 PROCEDURE — 50387 CHANGE NEPHROURETERAL CATH: CPT

## 2022-04-07 PROCEDURE — 85730 THROMBOPLASTIN TIME PARTIAL: CPT

## 2022-04-07 PROCEDURE — 85610 PROTHROMBIN TIME: CPT

## 2022-04-07 PROCEDURE — 85025 COMPLETE CBC W/AUTO DIFF WBC: CPT

## 2022-04-07 PROCEDURE — C2625: CPT

## 2022-04-07 PROCEDURE — 86901 BLOOD TYPING SEROLOGIC RH(D): CPT

## 2022-04-07 PROCEDURE — U0003: CPT

## 2022-04-07 PROCEDURE — 82962 GLUCOSE BLOOD TEST: CPT

## 2022-04-07 PROCEDURE — 99231 SBSQ HOSP IP/OBS SF/LOW 25: CPT

## 2022-04-07 PROCEDURE — C1889: CPT

## 2022-04-07 PROCEDURE — 86900 BLOOD TYPING SEROLOGIC ABO: CPT

## 2022-04-07 PROCEDURE — 87086 URINE CULTURE/COLONY COUNT: CPT

## 2022-04-07 PROCEDURE — 36415 COLL VENOUS BLD VENIPUNCTURE: CPT

## 2022-04-07 PROCEDURE — 80048 BASIC METABOLIC PNL TOTAL CA: CPT

## 2022-04-07 PROCEDURE — 37242 VASC EMBOLIZE/OCCLUDE ARTERY: CPT

## 2022-04-07 PROCEDURE — 36247 INS CATH ABD/L-EXT ART 3RD: CPT

## 2022-04-07 PROCEDURE — C1769: CPT

## 2022-04-07 PROCEDURE — 99285 EMERGENCY DEPT VISIT HI MDM: CPT | Mod: 25

## 2022-04-07 PROCEDURE — U0005: CPT

## 2022-04-07 RX ORDER — OXYCODONE HYDROCHLORIDE 5 MG/1
5 TABLET ORAL ONCE
Refills: 0 | Status: DISCONTINUED | OUTPATIENT
Start: 2022-04-07 | End: 2022-04-07

## 2022-04-07 RX ORDER — OXYCODONE HYDROCHLORIDE 5 MG/1
1 TABLET ORAL
Qty: 12 | Refills: 0
Start: 2022-04-07 | End: 2022-04-09

## 2022-04-07 RX ADMIN — OXYCODONE HYDROCHLORIDE 5 MILLIGRAM(S): 5 TABLET ORAL at 06:26

## 2022-04-07 RX ADMIN — OXYCODONE HYDROCHLORIDE 5 MILLIGRAM(S): 5 TABLET ORAL at 11:31

## 2022-04-07 RX ADMIN — OXYCODONE HYDROCHLORIDE 5 MILLIGRAM(S): 5 TABLET ORAL at 00:48

## 2022-04-07 RX ADMIN — Medication 650 MILLIGRAM(S): at 09:35

## 2022-04-07 RX ADMIN — Medication 200 MILLIGRAM(S): at 05:43

## 2022-04-07 RX ADMIN — Medication 1: at 08:55

## 2022-04-07 RX ADMIN — OXYCODONE HYDROCHLORIDE 5 MILLIGRAM(S): 5 TABLET ORAL at 05:56

## 2022-04-07 RX ADMIN — OXYCODONE HYDROCHLORIDE 5 MILLIGRAM(S): 5 TABLET ORAL at 12:01

## 2022-04-07 RX ADMIN — OXYCODONE HYDROCHLORIDE 5 MILLIGRAM(S): 5 TABLET ORAL at 00:18

## 2022-04-07 RX ADMIN — Medication 650 MILLIGRAM(S): at 09:05

## 2022-04-07 NOTE — DISCHARGE NOTE NURSING/CASE MANAGEMENT/SOCIAL WORK - NSSCTYPOFSERV_GEN_ALL_CORE
pls make sure you are given flushing and dressings supplies to take home - reinforce teaching and nephrostomy tube care

## 2022-04-07 NOTE — PROGRESS NOTE ADULT - ASSESSMENT
Interventional Radiology Follow-Up Note  This is a 51y Male s/p left renal embolization of two pseudoaneurysms and left pcn-u exchange on 4/6 in Interventional Radiology with Dr. Gonzalez.     S: Patient seen and examined @ bedside. No complaints offered.     Medication:   ciprofloxacin   IVPB: (04-07)  lisinopril: (04-06)    Vitals:   T(F): 97.9, Max: 99.1 (19:45)  HR: 72  BP: 105/69  RR: 18  SpO2: 96%    Physical Exam:  General: Nontoxic, in NAD  Abdomen: soft, NTND, no peritoneal signs.  Extremities:  Left wrist clean, dry and intact, soft with no evidence of hematoma, radial pulse +2. No edema or mild tenderness noted.  Drain Device: Drain intact attached to gravity bag. Dressing clean, dry, intact.    LABS:  WBC 9.16 / Hgb 10.4 / Hct 32.9 / Plt 253  Na -- / K -- / CO2 -- / Cl -- / BUN -- / Cr -- / Glucose --  ALT -- / AST -- / Alk Phos -- / Tbili --  Ptt 34.3 / Pt 12.9 / INR 1.12      24hr Drain output: 245cc; Flushed with 5cc NS without resistance, leaking or pain at the site     Assessment/Plan: 51 year old male with hematuria s/p L PCNL on 3/21, 2nd look nephroscopy and placement of L nephroureteral tube 3/24 c/b hematuria now most recently s/p IR left renal angioembolization of 2x pseudoaneurysms and pcn-u exchange on 4/6.    -continue global management per primary team  -monitor h/h; transfuse as needed  -trend vs/labs  -flush drain with 5cc NS daily forward only; DO NOT aspirate  -change dressing q3 days or when dressing is saturated  -regarding outpatient follow up with IR, if the patient is d/c home with drainage catheter they can make an appointment with IR by calling the IR booking office at [phone]; recommend IR follow in 3 months for tube evaluation.  -they will benefit from VNS service to help with drainage catheter care; they should continue same drainage catheter care as an outpatient.     Please call IR at extension [phone] with any questions, concerns, or issues regarding above.      Lenora Charles AGAP-BC  Available on Teams

## 2022-04-07 NOTE — PROGRESS NOTE ADULT - ASSESSMENT
A/P: 51 year old male with hematuria s/p L PCNL on 3/21, 2nd look nephroscopy and placement of L nephroureteral tube 3/24; underwent IR angioembolization of 2x pseudoaneurysms 4/6    - AM labs  - continue Cipro  - OOB/ambulate/DVT ppx  - L NT and sierra to gravity  - monitor urine color   A/P: 51 year old male with hematuria s/p L PCNL on 3/21, 2nd look nephroscopy and placement of L nephroureteral tube 3/24; underwent IR angioembolization of 2x pseudoaneurysms 4/6    - AM labs  - continue Cipro  - trial of void and PVR today  - monitor urine color and outputs  - OOB/ambulate/DVT ppx  - discharge planning with antibiotics

## 2022-04-07 NOTE — DISCHARGE NOTE PROVIDER - CARE PROVIDERS DIRECT ADDRESSES
[email or web address]

## 2022-04-07 NOTE — PROGRESS NOTE ADULT - SUBJECTIVE AND OBJECTIVE BOX
The patient was seen and examined at bedside.  No acute events overnight and the patient is without acute complaints this AM.  Patient required irrigation of the sierra catheter overnight.    T(C): 36.6 (04-07-22 @ 05:50), Max: 37.3 (04-06-22 @ 19:45)  HR: 72 (04-07-22 @ 05:50) (65 - 98)  BP: 105/69 (04-07-22 @ 05:50) (105/69 - 149/73)  RR: 18 (04-07-22 @ 05:50) (16 - 18)  SpO2: 96% (04-07-22 @ 05:50) (94% - 100%)  Wt(kg): --    Physical Exam:    General: NAD, A+Ox3  Abdomen: soft, non-tender, non-distended  Ext: no hematoma at the access site on left arm      04-05 @ 07:01  -  04-06 @ 07:00  --------------------------------------------------------  IN: 725 mL / OUT: 2150 mL / NET: -1425 mL    04-06 @ 07:01  -  04-07 @ 06:46  --------------------------------------------------------  IN: 1665 mL / OUT: 2225 mL / NET: -560 mL      Sierra - 1180cc, dilute red color    L NT - 225cc

## 2022-04-07 NOTE — DISCHARGE NOTE PROVIDER - NSDCMRMEDTOKEN_GEN_ALL_CORE_FT
acetaminophen 325 mg oral tablet: 3 tab(s) orally every 6 hours  Alogliptin 6.25 mg oral tablet: 1 tab(s) orally once a day  Jardiance 25 mg oral tablet: 1 tab(s) orally once a day (in the morning)  lisinopril 20 mg oral tablet: 1 tab(s) orally once a day  metFORMIN 500 mg oral tablet: 2 tab(s) orally 2 times a day  oxyCODONE 5 mg oral tablet: 1 tab(s) orally every 6 hours, As Needed -Severe Pain (7 - 10) MDD:4  rosuvastatin 10 mg oral tablet: 1 tab(s) orally once a day

## 2022-04-07 NOTE — DISCHARGE NOTE NURSING/CASE MANAGEMENT/SOCIAL WORK - PATIENT PORTAL LINK FT
You can access the FollowMyHealth Patient Portal offered by [facility] by registering at the following website: [email or web address]. By joining [facility]’s FollowMyHealth portal, you will also be able to view your health information using other applications (apps) compatible with our system.

## 2022-04-07 NOTE — DISCHARGE NOTE PROVIDER - CARE PROVIDER_API CALL
Hoenig, David M (MD)  Urology  [facility]- Dept. of Urology, [address] [place] [address]  Phone: [phone]  Fax: [phone]  Follow Up Time:

## 2022-04-07 NOTE — DISCHARGE NOTE PROVIDER - HOSPITAL COURSE
Pt is as 52 yo m with a pmh of dm2, htn, hld and nephrolithiasis who had underwent a two stage right pcnl 3/21 & 3/24 who arrived at   [facility] on 4/5/22 for sudden gross hematuria rising concern for pseudoaneuryusm development. Vitals and labs were stable. On hosp day 1  he underwent successful right renal angio s/p embolization of two pseudoaneurysms and exchange of right nephro-ureteral tube. His urine color remained clear  He was dcd home in stable condition

## 2022-04-07 NOTE — DISCHARGE NOTE NURSING/CASE MANAGEMENT/SOCIAL WORK - NSDCPEFALRISK_GEN_ALL_CORE
For information on Fall & Injury Prevention, visit: [email or web address] OR  [email or web address] OR  https://www.cdc.gov/steadi/patient.html

## 2022-04-07 NOTE — DISCHARGE NOTE PROVIDER - NSDCCPCAREPLAN_GEN_ALL_CORE_FT
PRINCIPAL DISCHARGE DIAGNOSIS  Diagnosis: Gross hematuria  Assessment and Plan of Treatment: you required a right renal angiogram which successfully embolized two pseudoaneurysms.   Call the office if you experience fever, chills, uncontrolled pain, the inability to tolerate liquids, or the urine does not drain  Please care for your nephrostomy tube as you have been instructed   You may see Dr. Hoenig for follow up in on to two weeks for further tube plans    do not take a bath while tube is in place, continue to walk frequently, return to daily living activities slowly, no heavy lifting greater than 10lbs for 4-6 weeks,      SECONDARY DISCHARGE DIAGNOSES  Diagnosis: DM (diabetes mellitus)  Assessment and Plan of Treatment: continue home medication    Diagnosis: HTN (hypertension)  Assessment and Plan of Treatment: continue home medication    Diagnosis: HLD (hyperlipidemia)  Assessment and Plan of Treatment: continue home medication

## 2022-04-08 PROBLEM — N20.0 CALCULUS OF KIDNEY: Chronic | Status: ACTIVE | Noted: 2022-04-05

## 2022-04-14 ENCOUNTER — APPOINTMENT (OUTPATIENT)
Dept: UROLOGY | Facility: CLINIC | Age: 52
End: 2022-04-14
Payer: COMMERCIAL

## 2022-04-14 DIAGNOSIS — Z87.442 PERSONAL HISTORY OF URINARY CALCULI: ICD-10-CM

## 2022-04-14 PROCEDURE — 99213 OFFICE O/P EST LOW 20 MIN: CPT | Mod: 25

## 2022-04-19 NOTE — HISTORY OF PRESENT ILLNESS
[Currently Experiencing ___] :  [unfilled] [FreeTextEntry1] : Pt returns for metabolic evaluation and prevention of future stone disease following recent surgery for stone.  At issue today is review of the stone analysis, risk factors for future stone episodes and establishing whether they have pure dietary, metabolic, or mixed causes for their stone risks which is unrelated to the surgical management of their stone disease.\par \par They underwent 2 stage left PCNL on .... 3/21/22, 3/24/22\par \par Developed bleed, requiring angioembolization of pseudoaneurysm. Remains with 8 F NU cath in place.\par \par Stone analysis: mixed calcium oxalate and uric acid\par \par \par

## 2022-04-19 NOTE — PHYSICAL EXAM

## 2022-04-19 NOTE — ASSESSMENT
[FreeTextEntry1] : NU cath removed\par ostomy bag applied as 'dressing'.\par \par Diet modification reviewed at length- increasing fluids (primarily water), citrus is good, and decreasing/moderating salt, animal flesh protein, oxalate containing foods, and moderation of calcium intake (1000 mg/day is USRDA).\par \par I reviewed with the patient the risks of metabolic stone disease given their underlying risk parameters (all of which include large stones, multiple stones, bilateral stones, family history, and young age), and the indications for 24 hour urine metabolic assessment.\par \par DASH diet a good overall diet plan to consider and review for general health and stone health.\par \par We also discussed benefits of regular exercise and weight loss as independent risk reducers for stones.\par \par 1. Diet modification as discussed\par 2. 24 hour urine collection x 2 in the next few weeks\par 3. RTC with renal US in 8 to 10 weeks\par 4. start pot citrate 15 meq bid\par 5. Pt instructed to self test urine pH, 2 to 3 times per day using pH paper, and to record results over next ~ 1 week.\par \par Target pH range for UA stone prevention is between 6-7\par \par If pH < 6, pt to call and will expect/need to titrate up.\par \par For additional alkalinization of the urine, can also try 1/4 teaspoon baking soda in water or juice po bid for alkalinization-- studies have show sig alkalinization with low risks, though concerns about the addition of sodium also reviewed with pt.\par

## 2022-11-18 NOTE — H&P PST ADULT - DENTITION
59 year old male, from [facility], ambulates independently +left BKA w/ prosthetic leg, with PMHx ESRD (T/Th/S, last dialyzed Thursday), Anemia with remote GIB, DM, HTN, HLD, BPH, CHF, and PAD presents after incomplete dialysis session. Patient states that he only had 11 minutes of dialysis because he couldn't tolerate the mask given to him. Reports last full dialysis was Tuesday. Patient denies chest pain, shortness of breath, weakness or dizziness. Was seen last night in the ED for chronic leg pain and with multiple admission to [facility] for various medical problems, last discharged last weekend for AHRF 2/2 ARTHUR and COVID. NKDA    In ED:  VS afebrile, 92 HR, 163/86 BP, 18 RR SpO2 97% on RA  HGB 8.5, Na 127, Cr 14.50,    s/p 1mg ativan    Denies loose teeth or dentures/normal 59 year old male, from [facility], ambulates independently +left BKA w/ prosthetic leg, with PMHx ESRD (T/Th/S), Anemia with remote GIB, DM, HTN, HLD, BPH, CHF, and PAD presents after incomplete dialysis session. Patient states that he only had 11 minutes of dialysis because he couldn't tolerate the mask given to him. Reports last full dialysis was Tuesday. Patient denies chest pain, shortness of breath, weakness or dizziness. Was seen last night in the ED for chronic leg pain and with multiple admission to [facility] for various medical problems, last discharged last weekend for AHRF 2/2 ARTHUR and COVID. NKDA    In ED:  VS afebrile, 92 HR, 163/86 BP, 18 RR SpO2 97% on RA  HGB 8.5, Na 127, Cr 14.50,    s/p 1mg ativan    59 year old male, from [facility], ambulates independently +left BKA w/ prosthetic leg, with PMHx ESRD (T/Th/S), Anemia with remote GIB, DM, HTN, HLD, BPH, CHF, and PAD presents after incomplete dialysis session. Patient states that he only had 11 minutes of dialysis because he couldn't tolerate the mask given to him. Reports last full dialysis was Tuesday. Patient denies chest pain, shortness of breath, weakness or dizziness. Was seen last night in the ED for chronic leg pain and with multiple admission to [facility] for various medical problems, last discharged last weekend for AHRF 2/2 PNA and COVID. NKDA    In ED:  VS afebrile, 92 HR, 163/86 BP, 18 RR SpO2 97% on RA  HGB 8.5, Na 127, Cr 14.50,    s/p 1mg ativan

## 2023-04-24 NOTE — H&P PST ADULT - PRO ANTICIPATED DISCH DISP
Dunia Corley was seen and treated in our emergency department on 4/24/2023.  She may return to work on 04/25/2023.       If you have any questions or concerns, please don't hesitate to call.      Ambrosio Doss MD home

## 2023-07-07 NOTE — H&P PST ADULT - ADMIT DATE
The patient will be seen in the office between 2-3 weeks for wound check.   **Your first post-operative visit has been scheduled prior to your admission. PLEASE CONTACT OFFICE TO CONFIRM THE APPOINTMENT DATE. Sutures/Staples/Tape will be removed at that time.  **  The silver based dressing is to be removed 7 days from the date of surgery.   ** CONTACT THE OFFICE IF THE FOLLOWING DEVELOP:  - the dressing becomes soiled or saturated  - you develop a fever greater that 101F  - the wound becomes red or you develop blistering around the wound  * Patient may shower after post-op day #3.   * The patient will continue home PT consistent with posterior total hip replacement protocol and will continue to practice posterior total hip precautions for a minimum of 6 week. Transition to outpatient PT will occur at the time of the first office visit.   * The patient is FULL weight bearing.  * The patient will continue ASPIRIN for 6 weeks after surgery for blood clot prevention.  *** While on aspirin, the patient will take daily omeprazole or other similar medication to protect the stomach from irritation.   * The patient will take Tramadol AND TYLENOL for pain control and adjust according to prescription and patient needs. Contact the office if pain increases while taking prescribed pain medications or related concerns develop.  * Celebrex will be taken twice daily for 3 weeks for pain control and prevention of excessive bone growth. Additional prescription may be requested at your office follow-up visit.  * The patient will take Senna S while taking tramadol to prevent narcotic associated constipation.  Additionally, increase water intake (drink at least 8 glasses of water daily) and try adding fiber to the diet by eating fruits, vegetables and foods that are rich in grains. If constipation is experienced, contact the medical/primary care provider to discuss further treatment options.  * To avoid injury at home:  - continue use of rolling walker until cleared by physical therapist  - have family or friend remove all throw rug or objects in hallways that may present a trip hazard.  - if you experience any dizziness or medical concerns, call your medical doctor or  911.  * The implant may activate metal detection devices.    
English
The patient will be seen in the office between 2-3 weeks for wound check.   **Your first post-operative visit has been scheduled prior to your admission. PLEASE CONTACT OFFICE TO CONFIRM THE APPOINTMENT DATE. Tape will be removed at that time.  **  The silver based dressing is to be removed 7 days from the date of surgery.   ** CONTACT THE OFFICE IF THE FOLLOWING DEVELOP:  - the dressing becomes soiled or saturated  - you develop a fever greater that 101F  - the wound becomes red or you develop blistering around the wound  * Patient may shower after post-op day #3.   * The patient will continue home PT consistent with posterior total hip replacement protocol and will continue to practice posterior total hip precautions for a minimum of 6 week. Transition to outpatient PT will occur at the time of the first office visit.   * The patient is FULL weight bearing.  * The patient will continue ASPIRIN for 6 weeks after surgery for blood clot prevention.  *** While on aspirin, the patient will take daily omeprazole or other similar medication to protect the stomach from irritation.   * The patient will take Tramadol AND TYLENOL for pain control and adjust according to prescription and patient needs. Contact the office if pain increases while taking prescribed pain medications or related concerns develop.  * Celebrex will be taken twice daily for 3 weeks for pain control and prevention of excessive bone growth. Additional prescription may be requested at your office follow-up visit.  * The patient will take Senna S while taking tramadol to prevent narcotic associated constipation.  Additionally, increase water intake (drink at least 8 glasses of water daily) and try adding fiber to the diet by eating fruits, vegetables and foods that are rich in grains. If constipation is experienced, contact the medical/primary care provider to discuss further treatment options.  * To avoid injury at home:  - continue use of rolling walker until cleared by physical therapist  - have family or friend remove all throw rug or objects in hallways that may present a trip hazard.  - if you experience any dizziness or medical concerns, call your medical doctor or  911.  * The implant may activate metal detection devices.    
28-Feb-2022

## 2023-11-02 ENCOUNTER — APPOINTMENT (OUTPATIENT)
Dept: UROLOGY | Facility: CLINIC | Age: 53
End: 2023-11-02
Payer: COMMERCIAL

## 2023-11-02 VITALS
DIASTOLIC BLOOD PRESSURE: 88 MMHG | TEMPERATURE: 97.7 F | HEIGHT: 72 IN | HEART RATE: 86 BPM | WEIGHT: 247 LBS | OXYGEN SATURATION: 98 % | BODY MASS INDEX: 33.46 KG/M2 | SYSTOLIC BLOOD PRESSURE: 176 MMHG

## 2023-11-02 PROCEDURE — 99214 OFFICE O/P EST MOD 30 MIN: CPT

## 2023-11-02 RX ORDER — POTASSIUM CITRATE 15 MEQ/1
15 MEQ TABLET, EXTENDED RELEASE ORAL
Qty: 270 | Refills: 3 | Status: ACTIVE | COMMUNITY
Start: 2021-09-02 | End: 1900-01-01

## 2023-11-02 RX ORDER — METFORMIN HYDROCHLORIDE 500 MG/1
500 TABLET, COATED ORAL
Refills: 0 | Status: ACTIVE | COMMUNITY

## 2023-11-02 RX ORDER — ROSUVASTATIN CALCIUM 5 MG/1
TABLET, FILM COATED ORAL
Refills: 0 | Status: ACTIVE | COMMUNITY

## 2023-11-02 RX ORDER — EMPAGLIFLOZIN 25 MG/1
TABLET, FILM COATED ORAL
Refills: 0 | Status: ACTIVE | COMMUNITY

## 2023-11-02 RX ORDER — LISINOPRIL 30 MG/1
TABLET ORAL
Refills: 0 | Status: ACTIVE | COMMUNITY

## 2023-11-02 RX ORDER — SULFAMETHOXAZOLE AND TRIMETHOPRIM 800; 160 MG/1; MG/1
800-160 TABLET ORAL
Qty: 28 | Refills: 0 | Status: ACTIVE | COMMUNITY
Start: 2023-11-02 | End: 1900-01-01

## 2023-11-06 LAB
APPEARANCE: CLEAR
BACTERIA UR CULT: NORMAL
BACTERIA: NEGATIVE /HPF
BILIRUBIN URINE: NEGATIVE
BLOOD URINE: ABNORMAL
CAST: 1 /LPF
COLOR: YELLOW
EPITHELIAL CELLS: 0 /HPF
GLUCOSE QUALITATIVE U: >=1000 MG/DL
KETONES URINE: NEGATIVE MG/DL
LEUKOCYTE ESTERASE URINE: ABNORMAL
MICROSCOPIC-UA: NORMAL
NITRITE URINE: NEGATIVE
PH URINE: 5.5
PROTEIN URINE: 30 MG/DL
RED BLOOD CELLS URINE: 30 /HPF
SPECIFIC GRAVITY URINE: >1.03
UROBILINOGEN URINE: 0.2 MG/DL
WHITE BLOOD CELLS URINE: 315 /HPF

## 2023-11-17 ENCOUNTER — APPOINTMENT (OUTPATIENT)
Dept: ULTRASOUND IMAGING | Facility: CLINIC | Age: 53
End: 2023-11-17
Payer: COMMERCIAL

## 2023-11-17 PROCEDURE — 76770 US EXAM ABDO BACK WALL COMP: CPT

## 2023-11-21 ENCOUNTER — APPOINTMENT (OUTPATIENT)
Dept: UROLOGY | Facility: CLINIC | Age: 53
End: 2023-11-21
Payer: COMMERCIAL

## 2023-11-21 DIAGNOSIS — N48.1 BALANITIS: ICD-10-CM

## 2023-11-21 DIAGNOSIS — N28.1 CYST OF KIDNEY, ACQUIRED: ICD-10-CM

## 2023-11-21 PROCEDURE — 99442: CPT

## 2023-11-21 RX ORDER — NYSTATIN AND TRIAMCINOLONE ACETONIDE 100000; 1 MG/G; MG/G
100000-0.1 CREAM TOPICAL TWICE DAILY
Qty: 30 | Refills: 0 | Status: ACTIVE | COMMUNITY
Start: 2023-11-21 | End: 1900-01-01

## 2023-12-11 RX ORDER — CIPROFLOXACIN HYDROCHLORIDE 500 MG/1
500 TABLET, FILM COATED ORAL
Qty: 28 | Refills: 0 | Status: ACTIVE | COMMUNITY
Start: 2023-12-11 | End: 1900-01-01

## 2023-12-13 LAB
APPEARANCE: ABNORMAL
BACTERIA UR CULT: NORMAL
BACTERIA: NEGATIVE /HPF
BILIRUBIN URINE: NEGATIVE
BLOOD URINE: ABNORMAL
CAST: 0 /LPF
COLOR: YELLOW
EPITHELIAL CELLS: 0 /HPF
GLUCOSE QUALITATIVE U: >=1000 MG/DL
KETONES URINE: NEGATIVE MG/DL
LEUKOCYTE ESTERASE URINE: ABNORMAL
MICROSCOPIC-UA: NORMAL
NITRITE URINE: NEGATIVE
PH URINE: 6
PROTEIN URINE: 100 MG/DL
RED BLOOD CELLS URINE: 100 /HPF
SPECIFIC GRAVITY URINE: >1.03
UROBILINOGEN URINE: 0.2 MG/DL
WHITE BLOOD CELLS URINE: >998 /HPF

## 2024-01-29 NOTE — CONSULT NOTE ADULT - PA/NP ONLY VISIT
Problem: Alteration in Thoughts and Perception  Goal: Treatment Goal: Gain control of psychotic behaviors/thinking, reduce/eliminate presenting symptoms and demonstrate improved reality functioning upon discharge  Outcome: Progressing  Goal: Verbalize thoughts and feelings  Description: Interventions:  - Promote a nonjudgmental and trusting relationship with the patient through active listening and therapeutic communication  - Assess patient's level of functioning, behavior and potential for risk  - Engage patient in 1 on 1 interactions  - Encourage patient to express fears, feelings, frustrations, and discuss symptoms    - [place] patient to reality, help patient recognize reality-based thinking   - Administer medications as ordered and assess for potential side effects  - Provide the patient education related to the signs and symptoms of the illness and desired effects of prescribed medications  Outcome: Progressing  Goal: Refrain from acting on delusional thinking/internal stimuli  Description: Interventions:  - Monitor patient closely, per order   - Utilize least restrictive measures   - Set reasonable limits, give positive feedback for acceptable   - Administer medications as ordered and monitor of potential side effects  Outcome: Progressing  Goal: Agree to be compliant with medication regime, as prescribed and report medication side effects  Description: Interventions:  - Offer appropriate PRN medication and supervise ingestion; conduct AIMS, as needed   Outcome: Progressing  Goal: Attend and participate in unit activities, including therapeutic, recreational, and educational groups  Description: Interventions:  -Encourage Visitation and family involvement in care  Outcome: Progressing  Goal: Recognize dysfunctional thoughts, communicate reality-based thoughts at the time of discharge  Description: Interventions:  - Provide medication and psycho-education to assist patient in compliance and developing  insight into his/her illness   Outcome: Progressing  Goal: Complete daily ADLs, including personal hygiene independently, as able  Description: Interventions:  - Observe, teach, and assist patient with ADLS  - Monitor and promote a balance of rest/activity, with adequate nutrition and elimination   Outcome: Progressing     Problem: Ineffective Coping  Goal: Identifies ineffective coping skills  Outcome: Progressing  Goal: Identifies healthy coping skills  Outcome: Progressing  Goal: Demonstrates healthy coping skills  Outcome: Progressing     Problem: Anxiety  Goal: Anxiety is at manageable level  Description: Interventions:  - Assess and monitor patient's anxiety level.   - Monitor for signs and symptoms (heart palpitations, chest pain, shortness of breath, headaches, nausea, feeling jumpy, restlessness, irritable, apprehensive).   - Collaborate with interdisciplinary team and initiate plan and interventions as ordered.  - [place] patient to unit/surroundings  - Explain treatment plan  - Encourage participation in care  - Encourage verbalization of concerns/fears  - Identify coping mechanisms  - Assist in developing anxiety-reducing skills  - Administer/offer alternative therapies  - Limit or eliminate stimulants  Outcome: Progressing     Problem: Alteration in Orientation  Goal: Treatment Goal: Demonstrate a reduction of confusion and improved orientation to person, place, time and/or situation upon discharge, according to optimum baseline/ability  Outcome: Progressing  Goal: Allow medical examinations, as recommended  Description: Interventions:  - Provide physical/neurological exams and/or referrals, per provider   Outcome: Progressing  Goal: Cooperate with recommended testing/procedures  Description: Interventions:  - Determine need for ancillary testing  - Observe for mental status changes  - Implement falls/precaution protocol   Outcome: Progressing     Problem: Ineffective Coping  Goal: Participates in unit  activities  Description: Interventions:  - Provide therapeutic environment   - Provide required programming   - Redirect inappropriate behaviors   Outcome: Progressing      PA/NP only visit

## 2024-02-06 ENCOUNTER — APPOINTMENT (OUTPATIENT)
Dept: UROLOGY | Facility: CLINIC | Age: 54
End: 2024-02-06
Payer: COMMERCIAL

## 2024-02-06 DIAGNOSIS — N20.0 CALCULUS OF KIDNEY: ICD-10-CM

## 2024-02-06 PROCEDURE — 99442: CPT

## 2024-02-06 RX ORDER — CEFUROXIME AXETIL 500 MG/1
500 TABLET ORAL
Qty: 28 | Refills: 0 | Status: ACTIVE | COMMUNITY
Start: 2024-02-06 | End: 1900-01-01

## 2024-02-06 NOTE — HISTORY OF PRESENT ILLNESS
[Other Location: e.g. School (Enter Location, City,State)___] : at [unfilled], at the time of the visit. [Other Location: e.g. Home (Enter Location, City,State)___] : at [unfilled] [Verbal consent obtained from patient] : the patient, [unfilled] [FreeTextEntry1] : The patient-doctor relationship has been established in audio HIPAA compliant communication. The patient's identity has been confirmed. The patient was previously emailed a copy of the telemedicine consent. They have had a chance to review and has now given verbal consent and has requested care to be assessed and treated via telemedicine. They understand there may be limitations in this process, and that they may need further followup care in the office and/or hospital settings.  Verbal consent given on Feb 6 2024  1:20PM  ANNMARIE RICK is a 53 year M who presents for f/u of possible subacute prostatitis and stones. Left renal stone on recent CT scan ([facility]), but non obstructive- no ureteral or bladder stones noted. There is thickening of bladder wall, appears reactive including left ureter and renal pelvis and bladder- ? inflammatory? No hydro.  Pt with neg culture x2, but very sig u/a with heavy pyuria and microhematuria. Has had a course of bactrim with mild, transient improvement. Now back on but again with neg culture results.  Pain in lower abd/pelvis, bladder area.  02/06/2024   The patient denies fevers, chills, nausea and/or vomiting, and no unexplained weight loss.  All pertinent parts of the patient PFSH (past medical, family, and social histories), laboratory, radiological studies and available physician notes were reviewed prior to starting the face-to-face portion of the telemedicine visit. Questionnaire results, where appropriate, were discussed with the patient. [Urinary Urgency] : urinary urgency [Urinary Frequency] : urinary frequency [Dysuria] : dysuria

## 2024-02-06 NOTE — ASSESSMENT
[FreeTextEntry1] : d/w pt at length; sig concerns regarding sx, u/a results. ? prostatitis, with resistance to  sulfa? we discussed alternative abx- has childhood reported 'pcn' allergy; given local antibiotigram, will avoid cipro at this time, and try cefuroxime x 2 weeks will also rec to come for cysto in office- last was with surgery pcnl 2022. Pt in agreement- will start cefuroxime and come for cyst in next 2-3 weeks

## 2024-02-13 ENCOUNTER — LABORATORY RESULT (OUTPATIENT)
Age: 54
End: 2024-02-13

## 2024-02-13 ENCOUNTER — RESULT CHARGE (OUTPATIENT)
Age: 54
End: 2024-02-13

## 2024-02-14 ENCOUNTER — OUTPATIENT (OUTPATIENT)
Dept: OUTPATIENT SERVICES | Facility: HOSPITAL | Age: 54
LOS: 1 days | End: 2024-02-14
Payer: COMMERCIAL

## 2024-02-14 ENCOUNTER — NON-APPOINTMENT (OUTPATIENT)
Age: 54
End: 2024-02-14

## 2024-02-14 ENCOUNTER — APPOINTMENT (OUTPATIENT)
Dept: UROLOGY | Facility: CLINIC | Age: 54
End: 2024-02-14
Payer: COMMERCIAL

## 2024-02-14 DIAGNOSIS — Z98.890 OTHER SPECIFIED POSTPROCEDURAL STATES: Chronic | ICD-10-CM

## 2024-02-14 DIAGNOSIS — T83.83XA HEMORRHAGE DUE TO GENITOURINARY PROSTHETIC DEVICES, IMPLANTS AND GRAFTS, INITIAL ENCOUNTER: Chronic | ICD-10-CM

## 2024-02-14 DIAGNOSIS — N41.1 CHRONIC PROSTATITIS: ICD-10-CM

## 2024-02-14 DIAGNOSIS — S36.09XA OTHER INJURY OF SPLEEN, INITIAL ENCOUNTER: Chronic | ICD-10-CM

## 2024-02-14 LAB
BILIRUB UR QL STRIP: NORMAL
CLARITY UR: NORMAL
COLLECTION METHOD: NORMAL
GLUCOSE UR-MCNC: NORMAL
HCG UR QL: 0.2 EU/DL
HGB UR QL STRIP.AUTO: NORMAL
KETONES UR-MCNC: NORMAL
LEUKOCYTE ESTERASE UR QL STRIP: NORMAL
NITRITE UR QL STRIP: NORMAL
PH UR STRIP: 5.5
PROT UR STRIP-MCNC: NORMAL
SP GR UR STRIP: 1.03

## 2024-02-14 PROCEDURE — 52000 CYSTOURETHROSCOPY: CPT

## 2024-02-14 PROCEDURE — 99213 OFFICE O/P EST LOW 20 MIN: CPT | Mod: 25

## 2024-02-14 RX ORDER — FLUCONAZOLE 100 MG/1
100 TABLET ORAL
Qty: 11 | Refills: 0 | Status: ACTIVE | COMMUNITY
Start: 2024-02-14 | End: 1900-01-01

## 2024-02-14 RX ORDER — PHENAZOPYRIDINE HYDROCHLORIDE 200 MG/1
200 TABLET ORAL 3 TIMES DAILY
Qty: 30 | Refills: 1 | Status: ACTIVE | COMMUNITY
Start: 2024-02-14 | End: 1900-01-01

## 2024-02-15 LAB — BACTERIA UR CULT: NORMAL

## 2024-02-16 ENCOUNTER — NON-APPOINTMENT (OUTPATIENT)
Age: 54
End: 2024-02-16

## 2024-02-21 NOTE — HISTORY OF PRESENT ILLNESS
[Urinary Urgency] : urinary urgency [Urinary Frequency] : urinary frequency [Weak Stream] : weak stream [Dysuria] : dysuria [FreeTextEntry1] : This is a 53-year-old male presenting to the office c/o sharp and burning groin pain and dysuria. PMH includes prostatitis and stones.   Pt was diagnosed with prostatitis on 2/6/24 and started on 2 weeks of cefuroxime. Pt states he is taking the cefuroxime as prescribed. Pt states that states he has been having the groin pain since before seeing Dr. Hoenig on 2/6/24, but it has been getting worse. He states that in the morning, he feels intolerable pain especially after waking up in morning, has the urge to urinate, but only a little urine comes out. He states that at some point during the day, he releases a lot of urine into his diaper and then the pain resolves. The same episodes of pain and retention also happen at night before bed.  Pt endorses pelvic/urethral pain, dysuria, urinary retention, straining and frequency.   Pt denies fevers, chills, n/v

## 2024-02-21 NOTE — PHYSICAL EXAM
[Normal Appearance] : normal appearance [Well Groomed] : well groomed [General Appearance - In No Acute Distress] : no acute distress [Edema] : no peripheral edema [Respiration, Rhythm And Depth] : normal respiratory rhythm and effort [Exaggerated Use Of Accessory Muscles For Inspiration] : no accessory muscle use [Abdomen Soft] : soft [Abdomen Tenderness] : non-tender [Costovertebral Angle Tenderness] : no ~M costovertebral angle tenderness [Urinary Bladder Findings] : the bladder was normal on palpation [Rectal Exam - Rectum] : digital rectal exam was normal [Prostate Size ___ (0-4)] : prostate size [unfilled] (scale: 0-4) [Normal Station and Gait] : the gait and station were normal for the patient's age [] : no rash [No Focal Deficits] : no focal deficits [Oriented To Time, Place, And Person] : oriented to person, place, and time [Affect] : the affect was normal [Mood] : the mood was normal [No Palpable Adenopathy] : no palpable adenopathy [de-identified] : sensitive at bladder palpation, without pain. Tender at bulbous urethra. No tenderness on CLAUDIA palpation

## 2024-02-21 NOTE — ASSESSMENT
[FreeTextEntry1] : u/a with 3+ blood, glu. min leuk est. bladder scan: 0 cc pvr  CT scan reviewed- bladder inflammation and thickening, unclear etiology. will do cysto today discussed with pt

## 2024-02-22 ENCOUNTER — APPOINTMENT (OUTPATIENT)
Dept: UROLOGY | Facility: CLINIC | Age: 54
End: 2024-02-22
Payer: COMMERCIAL

## 2024-02-22 VITALS
SYSTOLIC BLOOD PRESSURE: 189 MMHG | OXYGEN SATURATION: 97 % | TEMPERATURE: 97.8 F | DIASTOLIC BLOOD PRESSURE: 68 MMHG | HEART RATE: 84 BPM | RESPIRATION RATE: 16 BRPM

## 2024-02-22 DIAGNOSIS — R30.0 DYSURIA: ICD-10-CM

## 2024-02-22 DIAGNOSIS — N30.00 ACUTE CYSTITIS WITHOUT HEMATURIA: ICD-10-CM

## 2024-02-22 DIAGNOSIS — N41.1 CHRONIC PROSTATITIS: ICD-10-CM

## 2024-02-22 PROCEDURE — 99213 OFFICE O/P EST LOW 20 MIN: CPT

## 2024-02-24 NOTE — PHYSICAL EXAM
[General Appearance - Well Nourished] : well nourished [Well Groomed] : well groomed [Normal Appearance] : normal appearance [Normal Station and Gait] : the gait and station were normal for the patient's age [Respiration, Rhythm And Depth] : normal respiratory rhythm and effort [No Focal Deficits] : no focal deficits [] : no rash [Oriented To Time, Place, And Person] : oriented to person, place, and time [Affect] : the affect was normal [Mood] : the mood was normal [de-identified] : no jvd

## 2024-02-24 NOTE — HISTORY OF PRESENT ILLNESS
[FreeTextEntry1] : pt returns early in f/u cont with sig bladder pain, more so prior to urination, and during, with some relief upon completion urination  urine culture with rare fungus, candida glabrata- currently on diflucan since cysto last week no fever.

## 2024-02-24 NOTE — ASSESSMENT
[FreeTextEntry1] : on antifungal awaiting finalized fungal culture urine sx persist cysto findings last week, CT scan findings all reviewed  d/w pt at length; 1. will send urine for afb today to r/o tb 2. rec cysto, bladder biopsies/fulg, wash-out in OR  may benefit from course steroid, but is of unclear origin- reviewed with pt, will proceed

## 2024-02-25 ENCOUNTER — TRANSCRIPTION ENCOUNTER (OUTPATIENT)
Age: 54
End: 2024-02-25

## 2024-02-26 ENCOUNTER — RESULT REVIEW (OUTPATIENT)
Age: 54
End: 2024-02-26

## 2024-02-26 ENCOUNTER — TRANSCRIPTION ENCOUNTER (OUTPATIENT)
Age: 54
End: 2024-02-26

## 2024-02-26 ENCOUNTER — OUTPATIENT (OUTPATIENT)
Dept: OUTPATIENT SERVICES | Facility: HOSPITAL | Age: 54
LOS: 1 days | End: 2024-02-26
Payer: COMMERCIAL

## 2024-02-26 ENCOUNTER — APPOINTMENT (OUTPATIENT)
Dept: UROLOGY | Facility: HOSPITAL | Age: 54
End: 2024-02-26

## 2024-02-26 VITALS
SYSTOLIC BLOOD PRESSURE: 165 MMHG | DIASTOLIC BLOOD PRESSURE: 98 MMHG | HEIGHT: 69.02 IN | TEMPERATURE: 99 F | HEART RATE: 77 BPM | WEIGHT: 149.91 LBS | OXYGEN SATURATION: 96 % | RESPIRATION RATE: 18 BRPM

## 2024-02-26 VITALS
RESPIRATION RATE: 18 BRPM | SYSTOLIC BLOOD PRESSURE: 115 MMHG | HEART RATE: 78 BPM | OXYGEN SATURATION: 94 % | DIASTOLIC BLOOD PRESSURE: 91 MMHG

## 2024-02-26 DIAGNOSIS — N30.00 ACUTE CYSTITIS WITHOUT HEMATURIA: ICD-10-CM

## 2024-02-26 DIAGNOSIS — S36.09XA OTHER INJURY OF SPLEEN, INITIAL ENCOUNTER: Chronic | ICD-10-CM

## 2024-02-26 DIAGNOSIS — Z98.890 OTHER SPECIFIED POSTPROCEDURAL STATES: Chronic | ICD-10-CM

## 2024-02-26 DIAGNOSIS — N32.89 OTHER SPECIFIED DISORDERS OF BLADDER: ICD-10-CM

## 2024-02-26 DIAGNOSIS — T83.83XA HEMORRHAGE DUE TO GENITOURINARY PROSTHETIC DEVICES, IMPLANTS AND GRAFTS, INITIAL ENCOUNTER: Chronic | ICD-10-CM

## 2024-02-26 LAB
GLUCOSE BLDC GLUCOMTR-MCNC: 211 MG/DL — HIGH (ref 70–99)
GLUCOSE BLDC GLUCOMTR-MCNC: 255 MG/DL — HIGH (ref 70–99)

## 2024-02-26 PROCEDURE — 52214 CYSTOSCOPY AND TREATMENT: CPT

## 2024-02-26 PROCEDURE — 88305 TISSUE EXAM BY PATHOLOGIST: CPT | Mod: 26

## 2024-02-26 PROCEDURE — 82962 GLUCOSE BLOOD TEST: CPT

## 2024-02-26 PROCEDURE — 52224 CYSTOSCOPY AND TREATMENT: CPT

## 2024-02-26 PROCEDURE — 88305 TISSUE EXAM BY PATHOLOGIST: CPT

## 2024-02-26 RX ORDER — EMPAGLIFLOZIN 10 MG/1
1 TABLET, FILM COATED ORAL
Qty: 0 | Refills: 0 | DISCHARGE

## 2024-02-26 RX ORDER — LISINOPRIL 2.5 MG/1
1 TABLET ORAL
Qty: 0 | Refills: 0 | DISCHARGE

## 2024-02-26 RX ORDER — ROSUVASTATIN CALCIUM 5 MG/1
1 TABLET ORAL
Qty: 0 | Refills: 0 | DISCHARGE

## 2024-02-26 RX ORDER — FLUCONAZOLE 150 MG/1
1 TABLET ORAL
Qty: 21 | Refills: 0
Start: 2024-02-26 | End: 2024-03-17

## 2024-02-26 RX ORDER — METFORMIN HYDROCHLORIDE 850 MG/1
2 TABLET ORAL
Qty: 0 | Refills: 0 | DISCHARGE

## 2024-02-26 RX ORDER — ALOGLIPTIN 12.5 MG/1
1 TABLET, FILM COATED ORAL
Qty: 0 | Refills: 0 | DISCHARGE

## 2024-02-26 NOTE — ASU DISCHARGE PLAN (ADULT/PEDIATRIC) - ASU DC SPECIAL INSTRUCTIONSFT
It is common to have blood in the urine after your procedure.  It may be pink or even red; inform your doctor if you have a significant amount of clots in the urine or if you are unable to void at all.  Be sure to drink plenty of liquids at all times.    -You may resume your regular diet and regular medication regimen.    -Provided that you are not restricted with fluids by your physician, you should drink 6-8 (8 oz.) glasses of fluid per day.  -You may shower or bathe.    -Provided you are not restricted by your physician, you may take Tylenol and Ibuprofen, available over the counter, for pain. You may take either one every 6 hours, you may alternate these medications so that you take one every 3 hours (e.g., Tylenol at 12pm, Ibuprofen at 3pm, Tylenol at 6pm, Ibuprofen at 9pm, etc...). Follow the maximum doses and administration instructions on the bottles. Do not exceed 4 grams of Tylenol daily. If your pain is not controlled with over the counter pain medications, please call your urologist.  -You will likely have a prescription for an antibiotic when you go home.  Take this medication as instructed; if you miss one dose, resume taking it on your previous schedule until you have completed the entire course of treatment.  -Do not perform strenuous activities or resume sexual activity until your are told to do so by your doctor.   You may climb stairs.  -Call your physician if you have a fever over 101F.  -Make a follow up appointment with your urologist when you arrive home (or the next business day).  -Call your urologist during normal business hours with any other routine questions.  -Please take diflucan daily as prescribed  -Follow up with Dr. Hoenig in 7-10 days

## 2024-02-26 NOTE — H&P ADULT - NSICDXPASTSURGICALHX_GEN_ALL_CORE_FT
PAST SURGICAL HISTORY:  History of esophageal surgery esophageal repair about 5 years ago for esopheal achalasia    Nephrostomy tube bleed     Other injury of spleen fall on ice s/p spleen embolization at [facility] IR over 10 years ago

## 2024-02-26 NOTE — BRIEF OPERATIVE NOTE - OPERATION/FINDINGS
Cystoscopy, bladder biopsy, and fulguration. Bladder was very inflamed. 3 biopsies were sent. No sierra

## 2024-02-26 NOTE — BRIEF OPERATIVE NOTE - SPECIMENS
Bladder trigone biopsy, left lateral posterior bladder wall biopsy, right lateral bladder wall biopsy

## 2024-02-26 NOTE — ASU DISCHARGE PLAN (ADULT/PEDIATRIC) - NS MD DC FALL RISK RISK
For information on Fall & Injury Prevention, visit: [email or web address] OR  [email or web address] OR  https://www.cdc.gov/steadi/patient.html

## 2024-02-26 NOTE — H&P ADULT - NSHPPHYSICALEXAM_GEN_ALL_CORE
General: wnl  alert, oriented  neuro nonfocal  resp: NAD, normal rate  cv no jvd  abd soft   tender over bladder

## 2024-02-26 NOTE — H&P ADULT - HISTORY OF PRESENT ILLNESS
Pt is 52 yo male h/o clinical prostatitis and stones- Has had persistence of bladder pain, dysuria, 2/14 UC is negative.  Pt completed course of Fluconazole and Pyridium, both were started on 2/15, per pt symptoms initially improved significantly and now have returned. Pt denies fever, denies chills, states he does not feel that he is retaining urine, had PVR done in office on 2/14 =0ml.    Bladder abnormal at cysto in office and via CT scan    for cysto and biopsies today

## 2024-02-26 NOTE — BRIEF OPERATIVE NOTE - NSICDXBRIEFPROCEDURE_GEN_ALL_CORE_FT
PROCEDURES:  Cystoscopy, with bladder biopsy and clot evacuation 26-Feb-2024 14:30:11  Jerome Vera

## 2024-02-26 NOTE — PRE-OP CHECKLIST - SKIN PREP
"  CHIEF COMPLAINT                                                      Chief Complaint   Patient presents with     Vaginal Problem     getting boils in vaginal area, [facility] STI screen     Medication Reconciliation     SUBJECTIVE:                                                    Ochoa Peters is a 28 year old year old female who presents to clinic today for the following health issues:    Vaginal symptoms/repeat \"zits\" coming to a head and draining  -Has had three \"boils\" in the last month   -Start and will get worse, then drain on their own before improving  -Does not recall having these problems previously  -With this has also had some vaginal pruritis consistent with her history of bacterial vaginosis  -Denies any new sexual encounters  -Currently has regular menstrual cycle   -18th was her last period, just stopped haiivng a couple of days ago.    -Denies any new body washes or anything like that   -No new jobs or new underwear or other concerns like that.      Patient is an established patient of this clinic.  ----------------------------------------------------------------------------------------------------------------------  Patient Active Problem List   Diagnosis     Encounter for cervical Pap smear with pelvic exam     Past Surgical History:   Procedure Laterality Date     C EACH ADD TOOTH EXTRACTION       NO HISTORY OF SURGERY         Social History     Tobacco Use     Smoking status: Never Smoker     Smokeless tobacco: Never Used   Substance Use Topics     Alcohol use: Yes     Comment: Rare     Family History   Problem Relation Age of Onset     Hypertension Mother      Diabetes No family hx of      Cancer No family hx of          Problem list and past medical, surgical, social, and family histories reviewed & adjusted, as indicated.    Current Outpatient Medications   Medication Sig Dispense Refill     ibuprofen (ADVIL/MOTRIN) 200 MG tablet Take 400 mg by mouth       omeprazole (PRILOSEC) " "20 MG DR capsule Take 20 mg by mouth       Medication list reviewed and updated as indicated.    No Known Allergies  Allergies reviewed and updated as indicated.  ----------------------------------------------------------------------------------------------------------------------  ROS:  Constitutional, HEENT, cardiovascular, pulmonary, GI, musculoskeletal, neuro, skin, and psych systems are negative, except as otherwise noted.    OBJECTIVE:     /83 (BP Location: Right arm, Cuff Size: Adult Large)   Pulse 76   Temp 97.7  F (36.5  C) (Oral)   Resp 16   Ht 1.67 m (5' 5.75\")   Wt 109.3 kg (241 lb)   LMP 12/18/2021   SpO2 99%   BMI 39.20 kg/m    Body mass index is 39.2 kg/m .  Exam:  Constitutional: healthy, alert and no distress  Cardiovascular: negative, PMI normal. No lifts, heaves, or thrills. RRR. No murmurs, clicks gallops or rub  Respiratory: negative, Percussion normal. Good diaphragmatic excursion. Lungs clear  Gastrointestinal: Elevated BMI. Abdomen soft, non-tender. BS normal. No masses, organomegaly  : Normal external genitalia without lesions and female positive for some white discharge from the cervix  Musculoskeletal: extremities normal- no gross deformities noted, gait normal and normal muscle tone  Skin: no suspicious lesions or rashes  Neurologic: Gait normal. Reflexes normal and symmetric. Sensation grossly WNL.  Psychiatric: mentation appears normal and affect normal/bright  Hematologic/Lymphatic/Immunologic: Normal cervical lymph nodes    Diagnostic Test Results:  Results for orders placed or performed in visit on 12/23/21 (from the past 24 hour(s))   Wet preparation    Specimen: Vagina; Swab   Result Value Ref Range    Trichomonas Absent Absent    Yeast Present (A) Absent    Clue Cells Present (A) Absent    WBCs/high power field 1+ (A) None    Narrative    No odor  Ph 5.6  Clue cells >20%   Gonorrhea, chlamydia, syphillis, HIV pending    ASSESSMENT/PLAN:     (L73.2) Hidradenitis " suppurativa  (primary encounter diagnosis)  -Patient has symptoms consistent with hidradenitis suparative   -Currently has small area on her left buttocks consistent with problems with this   -Went over general recommendations to help with this, including weight loss, avoiding gluten, and other lifestyle changes  -Will prescribe Hibiclens to use intermittently to help with skin to improve  -No need for incision and drainage at this time      (Z11.3) Routine screening for STI (sexually transmitted infection)  -Patient requesting STI screening today  -She denies any new sexual encounters at this time but is having some symptoms and would like to be screened for this  -On exam had some white discharge from the cervix but is otherwise stable  -Other tests are pending at this time     There are no discontinued medications.    Options for treatment and follow-up care were reviewed with the patient and/or guardian. Ochoa Peters and/or guardian engaged in the decision making process and verbalized understanding of the options discussed and agreed with the final plan    Precepted with Dr. Baum.    Satish Amos MD on 12/23/2021 at 9:44 AM     n/a

## 2024-02-29 ENCOUNTER — APPOINTMENT (OUTPATIENT)
Dept: UROLOGY | Facility: CLINIC | Age: 54
End: 2024-02-29
Payer: COMMERCIAL

## 2024-02-29 ENCOUNTER — NON-APPOINTMENT (OUTPATIENT)
Age: 54
End: 2024-02-29

## 2024-02-29 DIAGNOSIS — R31.0 GROSS HEMATURIA: ICD-10-CM

## 2024-02-29 LAB — SURGICAL PATHOLOGY STUDY: SIGNIFICANT CHANGE UP

## 2024-02-29 PROCEDURE — 51798 US URINE CAPACITY MEASURE: CPT

## 2024-02-29 PROCEDURE — 99213 OFFICE O/P EST LOW 20 MIN: CPT | Mod: 25

## 2024-02-29 RX ORDER — METHYLPREDNISOLONE 4 MG/1
4 TABLET ORAL
Qty: 21 | Refills: 0 | Status: ACTIVE | COMMUNITY
Start: 2024-02-29 | End: 1900-01-01

## 2024-02-29 NOTE — ASSESSMENT
[FreeTextEntry1] : bladder scan: 0 cc PVR here  d/w pt options to cath irrigate, vs. observe conservatively without cath as emptying, can do the latter, which pt prefers to do will cont diflucan awaiting path reassured- may need ER or office and catheter/irrigation, but will try to stay hydrated and treat conservatively we spoke about lab work with CBC, but pt also feels not needed today. no straining or lifting cont hydration will call if new issues/retention and needs more urgent intervention

## 2024-02-29 NOTE — PHYSICAL EXAM
[Normal Appearance] : normal appearance [Well Groomed] : well groomed [General Appearance - In No Acute Distress] : no acute distress [Edema] : no peripheral edema [Respiration, Rhythm And Depth] : normal respiratory rhythm and effort [Exaggerated Use Of Accessory Muscles For Inspiration] : no accessory muscle use [Abdomen Soft] : soft [Costovertebral Angle Tenderness] : no ~M costovertebral angle tenderness [Abdomen Tenderness] : non-tender [Urinary Bladder Findings] : the bladder was normal on palpation [Normal Station and Gait] : the gait and station were normal for the patient's age [] : no rash [No Focal Deficits] : no focal deficits [Oriented To Time, Place, And Person] : oriented to person, place, and time [Affect] : the affect was normal [Mood] : the mood was normal

## 2024-02-29 NOTE — HISTORY OF PRESENT ILLNESS
[Hematuria - Gross] : gross hematuria [FreeTextEntry1] : Pt returns early for eval- new gross hematuria and clots starting last night, after ~ 48 hours clear since OR 2/26/24 where we did cysto, bladder bx'es and fulguration under general.  Feels able to void completely. No dysuria. Prior pain in bladder resolved since OR, and   pathology not yet back  on diflucan extended course, 200 mg per day- only organism so far is candida glabrata, which can be potential source.

## 2024-03-05 ENCOUNTER — APPOINTMENT (OUTPATIENT)
Dept: UROLOGY | Facility: CLINIC | Age: 54
End: 2024-03-05
Payer: COMMERCIAL

## 2024-03-05 DIAGNOSIS — R30.0 DYSURIA: ICD-10-CM

## 2024-03-05 PROCEDURE — 99443: CPT

## 2024-03-05 RX ORDER — CELECOXIB 100 MG/1
100 CAPSULE ORAL TWICE DAILY
Qty: 28 | Refills: 0 | Status: ACTIVE | COMMUNITY
Start: 2024-03-05 | End: 1900-01-01

## 2024-03-05 RX ORDER — OXYBUTYNIN CHLORIDE 5 MG/1
5 TABLET, EXTENDED RELEASE ORAL
Qty: 90 | Refills: 3 | Status: ACTIVE | COMMUNITY
Start: 2024-03-05 | End: 1900-01-01

## 2024-03-05 NOTE — ASSESSMENT
[FreeTextEntry1] : D/w pt at length  to date, appears to be acute cystitis with denudation seen on path some improvement since diflucan given candida glabrata. and some improvement based on cysto and fulg some further mild incremental improvement on medrol pack, though still with sig urge/frequency  1. will trial celebrex 100 mg bid with meals x 2 weeks after the medrol done. 2. oxybutynin 5 mg ER for OAB/frequency, which may be temporary for now depending on future course 3. a very good option which is more intrusive would be bladder instillations with cocktail of lidocaine, gentamycin, heparin to see if any regeneration of GAG layer and further improvement in sx possible. Pt would like to reserve this if the meds don't have full desired effect- will review at new ttm in approx 2 weeks, or earlier if any issues

## 2024-03-05 NOTE — HISTORY OF PRESENT ILLNESS
[Other Location: e.g. School (Enter Location, City,State)___] : at [unfilled], at the time of the visit. [Verbal consent obtained from patient] : the patient, [unfilled] [Other Location: e.g. Home (Enter Location, City,State)___] : at [unfilled] [FreeTextEntry1] : The patient-doctor relationship has been established in audio HIPAA compliant communication. The patient's identity has been confirmed. The patient was previously emailed a copy of the telemedicine consent. They have had a chance to review and has now given verbal consent and has requested care to be assessed and treated via telemedicine. They understand there may be limitations in this process, and that they may need further followup care in the office and/or hospital settings.  Verbal consent given on Mar  5 2024  1:20PM  ANNMARIE RICK is a 53 year M who presents for acute cystitis, pain, dysuria. Had gross hematuria and clots starting after ~ 48 hours clear since OR 2/26/24 where we did cysto, bladder bx'es and fulguration under general.  Feels able to void completely. No dysuria. Prior pain in bladder resolved since OR, and  pathology not yet back  on diflucan extended course, 200 mg per day- only organism so far is candida glabrata on urinary studies.  started medrol pack 2/29/24.  Pain dramatically better than prior, including dysuria. Now, however, continues with extreme urinary frequency, to the point where difficult to leave the house. Some gassiness, but pt states having normal bowel movements at this time. Hematuria has improved- has passed some small clots, but urine remaining clear now.  03/05/2024   The patient denies fevers, chills, nausea and/or vomiting, and no unexplained weight loss.  All pertinent parts of the patient PFSH (past medical, family, and social histories), laboratory, radiological studies and available physician notes were reviewed prior to starting the face-to-face portion of the telemedicine visit. Questionnaire results, where appropriate, were discussed with the patient.

## 2024-03-19 ENCOUNTER — APPOINTMENT (OUTPATIENT)
Dept: UROLOGY | Facility: CLINIC | Age: 54
End: 2024-03-19
Payer: COMMERCIAL

## 2024-03-19 DIAGNOSIS — B37.49 OTHER UROGENITAL CANDIDIASIS: ICD-10-CM

## 2024-03-19 DIAGNOSIS — N32.89 OTHER SPECIFIED DISORDERS OF BLADDER: ICD-10-CM

## 2024-03-19 DIAGNOSIS — N20.9 URINARY CALCULUS, UNSPECIFIED: ICD-10-CM

## 2024-03-19 PROCEDURE — 99442: CPT

## 2024-03-19 NOTE — HISTORY OF PRESENT ILLNESS
[Other Location: e.g. School (Enter Location, City,State)___] : at [unfilled], at the time of the visit. [Other Location: e.g. Home (Enter Location, City,State)___] : at [unfilled] [Verbal consent obtained from patient] : the patient, [unfilled] [FreeTextEntry1] : The patient-doctor relationship has been established in audio HIPAA compliant communication. The patient's identity has been confirmed. The patient was previously emailed a copy of the telemedicine consent. They have had a chance to review and has now given verbal consent and has requested care to be assessed and treated via telemedicine. They understand there may be limitations in this process, and that they may need further followup care in the office and/or hospital settings.  Verbal consent given on Mar  19 2024  2:00PM  ANNMARIE RICK is a 53 year M who presents for acute cystitis, pain, dysuria. Had gross hematuria and clots starting after ~ 48 hours clear since OR 2/26/24 where we did cysto, bladder bx'es and fulguration under general.  Feels able to void completely. No dysuria. Prior pain in bladder resolved since OR, and  completed diflucan extended course, 200 mg per day- only organism so far is candida glabrata on urinary studies.  took medrol pack 2/29/24. Overall feels improved.  Pain dramatically better than prior, including dysuria. Now, however, continues with extreme urinary frequency, urgency, despite trial 5 mg oxybutynin ER. No further clots, urine more clear than prior. Still with urge/and urge incontinence.  03/19/2024   The patient denies fevers, chills, nausea and/or vomiting, and no unexplained weight loss.  All pertinent parts of the patient PFSH (past medical, family, and social histories), laboratory, radiological studies and available physician notes were reviewed prior to starting the face-to-face portion of the telemedicine visit. Questionnaire results, where appropriate, were discussed with the patient.

## 2024-03-19 NOTE — ASSESSMENT
[FreeTextEntry1] : D/w pt at length  to date, appears to be acute cystitis with denudation seen on path some improvement since diflucan given candida glabrata. and some improvement based on cysto and fulg some further mild incremental improvement on medrol pack, though still with sig urge/frequency  1. will finish celebrex 100 mg bid  2. increase oxybutynin 10 mg ER for OAB/frequency, which may be temporary for now depending on future course 3. a very good option which is more intrusive would be bladder instillations with cocktail of lidocaine, gentamycin, heparin to see if any regeneration of GAG layer and further improvement in sx possible. Pt in agreement, will come for  instillation in next couple of weeks

## 2024-03-25 ENCOUNTER — NON-APPOINTMENT (OUTPATIENT)
Age: 54
End: 2024-03-25

## 2024-03-25 RX ORDER — KETOROLAC TROMETHAMINE 10 MG/1
10 TABLET, FILM COATED ORAL EVERY 6 HOURS
Qty: 20 | Refills: 0 | Status: ACTIVE | COMMUNITY
Start: 2024-03-25 | End: 1900-01-01

## 2024-03-27 ENCOUNTER — OUTPATIENT (OUTPATIENT)
Dept: OUTPATIENT SERVICES | Facility: HOSPITAL | Age: 54
LOS: 1 days | End: 2024-03-27
Payer: COMMERCIAL

## 2024-03-27 ENCOUNTER — APPOINTMENT (OUTPATIENT)
Dept: UROLOGY | Facility: CLINIC | Age: 54
End: 2024-03-27
Payer: COMMERCIAL

## 2024-03-27 VITALS
DIASTOLIC BLOOD PRESSURE: 74 MMHG | SYSTOLIC BLOOD PRESSURE: 142 MMHG | RESPIRATION RATE: 16 BRPM | HEART RATE: 79 BPM | OXYGEN SATURATION: 98 %

## 2024-03-27 DIAGNOSIS — R35.0 FREQUENCY OF MICTURITION: ICD-10-CM

## 2024-03-27 DIAGNOSIS — Z98.890 OTHER SPECIFIED POSTPROCEDURAL STATES: Chronic | ICD-10-CM

## 2024-03-27 DIAGNOSIS — T83.83XA HEMORRHAGE DUE TO GENITOURINARY PROSTHETIC DEVICES, IMPLANTS AND GRAFTS, INITIAL ENCOUNTER: Chronic | ICD-10-CM

## 2024-03-27 DIAGNOSIS — S36.09XA OTHER INJURY OF SPLEEN, INITIAL ENCOUNTER: Chronic | ICD-10-CM

## 2024-03-27 PROCEDURE — 51700 IRRIGATION OF BLADDER: CPT

## 2024-03-28 DIAGNOSIS — R39.89 OTHER SYMPTOMS AND SIGNS INVOLVING THE GENITOURINARY SYSTEM: ICD-10-CM

## 2024-03-28 DIAGNOSIS — N30.00 ACUTE CYSTITIS WITHOUT HEMATURIA: ICD-10-CM

## 2024-03-28 DIAGNOSIS — N32.81 OVERACTIVE BLADDER: ICD-10-CM

## 2024-04-03 ENCOUNTER — APPOINTMENT (OUTPATIENT)
Dept: UROLOGY | Facility: CLINIC | Age: 54
End: 2024-04-03
Payer: COMMERCIAL

## 2024-04-03 VITALS — HEART RATE: 79 BPM | OXYGEN SATURATION: 95 % | SYSTOLIC BLOOD PRESSURE: 106 MMHG | DIASTOLIC BLOOD PRESSURE: 66 MMHG

## 2024-04-03 DIAGNOSIS — N30.00 ACUTE CYSTITIS W/OUT HEMATURIA: ICD-10-CM

## 2024-04-03 DIAGNOSIS — R39.89 OTHER SYMPTOMS AND SIGNS INVOLVING THE GENITOURINARY SYSTEM: ICD-10-CM

## 2024-04-03 DIAGNOSIS — N32.81 OVERACTIVE BLADDER: ICD-10-CM

## 2024-04-03 PROCEDURE — 99213 OFFICE O/P EST LOW 20 MIN: CPT

## 2024-04-03 NOTE — HISTORY OF PRESENT ILLNESS
[Dysuria] : dysuria [FreeTextEntry1] : Pt feels more symptoms since last catheterization and instillation anesthesia cocktail to the bladder- new cloudiness is back in the urine, dysuria with burning at meatus. Frequency remains better on oxybutynin 5 mg. No fever. No hematuria.  Pt notes ~ 30  lb weight loss total over the past 5 months or so with all the urinary disturbance  Prefers to hold off on cath and instillation today, and I agree

## 2024-04-03 NOTE — PHYSICAL EXAM
[Normal Appearance] : normal appearance [Well Groomed] : well groomed [General Appearance - In No Acute Distress] : no acute distress [Respiration, Rhythm And Depth] : normal respiratory rhythm and effort [Exaggerated Use Of Accessory Muscles For Inspiration] : no accessory muscle use [Normal Station and Gait] : the gait and station were normal for the patient's age [] : no rash [No Focal Deficits] : no focal deficits [Oriented To Time, Place, And Person] : oriented to person, place, and time [Mood] : the mood was normal [Affect] : the affect was normal [de-identified] : no jvd

## 2024-04-03 NOTE — ASSESSMENT
[FreeTextEntry1] : u/a, culture, fungal urine culture today will review by phone once available possible return in 1 week for anesthesia/heparin bladder instillation

## 2024-04-04 ENCOUNTER — NON-APPOINTMENT (OUTPATIENT)
Age: 54
End: 2024-04-04

## 2024-04-04 RX ORDER — FLUCONAZOLE 100 MG/1
100 TABLET ORAL
Qty: 22 | Refills: 0 | Status: ACTIVE | COMMUNITY
Start: 2024-04-04 | End: 1900-01-01

## 2024-04-09 LAB
APPEARANCE: ABNORMAL
BACTERIA UR CULT: ABNORMAL
BACTERIA: NEGATIVE /HPF
BILIRUBIN URINE: NEGATIVE
BLOOD URINE: ABNORMAL
CAST: 2 /LPF
COLOR: ABNORMAL
EPITHELIAL CELLS: 3 /HPF
GLUCOSE QUALITATIVE U: >=1000 MG/DL
KETONES URINE: NEGATIVE MG/DL
LEUKOCYTE ESTERASE URINE: ABNORMAL
MICROSCOPIC-UA: NORMAL
NITRITE URINE: NEGATIVE
PH URINE: 6
PROTEIN URINE: 30 MG/DL
RED BLOOD CELLS URINE: NORMAL /HPF
REVIEW: NORMAL
SPECIFIC GRAVITY URINE: 1.03
UROBILINOGEN URINE: 0.2 MG/DL
WBC CLUMPS: PRESENT
WHITE BLOOD CELLS URINE: 984 /HPF
YEAST-LIKE CELLS: PRESENT

## 2024-04-10 LAB — ACID FAST STN UR: NORMAL

## 2024-04-19 ENCOUNTER — NON-APPOINTMENT (OUTPATIENT)
Age: 54
End: 2024-04-19

## 2024-04-19 DIAGNOSIS — N39.0 URINARY TRACT INFECTION, SITE NOT SPECIFIED: ICD-10-CM

## 2024-04-19 RX ORDER — NYSTATIN AND TRIAMCINOLONE ACETONIDE 100000; 1 MG/G; MG/G
100000-0.1 CREAM TOPICAL TWICE DAILY
Qty: 1 | Refills: 1 | Status: ACTIVE | COMMUNITY
Start: 2024-04-19 | End: 1900-01-01

## 2024-06-20 ENCOUNTER — APPOINTMENT (OUTPATIENT)
Dept: MRI IMAGING | Facility: IMAGING CENTER | Age: 54
End: 2024-06-20
Payer: COMMERCIAL

## 2024-06-20 ENCOUNTER — OUTPATIENT (OUTPATIENT)
Dept: OUTPATIENT SERVICES | Facility: HOSPITAL | Age: 54
LOS: 1 days | End: 2024-06-20
Payer: COMMERCIAL

## 2024-06-20 DIAGNOSIS — T83.83XA HEMORRHAGE DUE TO GENITOURINARY PROSTHETIC DEVICES, IMPLANTS AND GRAFTS, INITIAL ENCOUNTER: Chronic | ICD-10-CM

## 2024-06-20 DIAGNOSIS — Z98.890 OTHER SPECIFIED POSTPROCEDURAL STATES: Chronic | ICD-10-CM

## 2024-06-20 DIAGNOSIS — S36.09XA OTHER INJURY OF SPLEEN, INITIAL ENCOUNTER: Chronic | ICD-10-CM

## 2024-06-20 DIAGNOSIS — Z00.8 ENCOUNTER FOR OTHER GENERAL EXAMINATION: ICD-10-CM

## 2024-06-20 PROCEDURE — 72197 MRI PELVIS W/O & W/DYE: CPT | Mod: 26

## 2024-06-20 PROCEDURE — 72197 MRI PELVIS W/O & W/DYE: CPT

## 2024-06-20 PROCEDURE — A9585: CPT

## 2024-12-21 ENCOUNTER — INPATIENT (INPATIENT)
Facility: HOSPITAL | Age: 54
LOS: 8 days | Discharge: HOME | End: 2024-12-30
Attending: GENERAL PRACTICE | Admitting: INTERNAL MEDICINE
Payer: COMMERCIAL

## 2024-12-21 VITALS
DIASTOLIC BLOOD PRESSURE: 72 MMHG | SYSTOLIC BLOOD PRESSURE: 114 MMHG | HEIGHT: 71 IN | RESPIRATION RATE: 14 BRPM | OXYGEN SATURATION: 100 % | HEART RATE: 81 BPM | WEIGHT: 205.91 LBS | TEMPERATURE: 98 F

## 2024-12-21 DIAGNOSIS — T83.83XA HEMORRHAGE DUE TO GENITOURINARY PROSTHETIC DEVICES, IMPLANTS AND GRAFTS, INITIAL ENCOUNTER: Chronic | ICD-10-CM

## 2024-12-21 DIAGNOSIS — Z98.890 OTHER SPECIFIED POSTPROCEDURAL STATES: Chronic | ICD-10-CM

## 2024-12-21 DIAGNOSIS — S36.09XA OTHER INJURY OF SPLEEN, INITIAL ENCOUNTER: Chronic | ICD-10-CM

## 2024-12-21 LAB
ALBUMIN SERPL ELPH-MCNC: 4 G/DL — SIGNIFICANT CHANGE UP (ref 3.3–5)
ALP SERPL-CCNC: 72 U/L — SIGNIFICANT CHANGE UP (ref 40–120)
ALT FLD-CCNC: 10 U/L — SIGNIFICANT CHANGE UP (ref 4–41)
ANION GAP SERPL CALC-SCNC: 25 MMOL/L — HIGH (ref 7–14)
APPEARANCE UR: ABNORMAL
AST SERPL-CCNC: 6 U/L — SIGNIFICANT CHANGE UP (ref 4–40)
BACTERIA # UR AUTO: ABNORMAL /HPF
BASOPHILS # BLD AUTO: 0.02 K/UL — SIGNIFICANT CHANGE UP (ref 0–0.2)
BASOPHILS NFR BLD AUTO: 0.2 % — SIGNIFICANT CHANGE UP (ref 0–2)
BILIRUB SERPL-MCNC: 0.3 MG/DL — SIGNIFICANT CHANGE UP (ref 0.2–1.2)
BILIRUB UR-MCNC: NEGATIVE — SIGNIFICANT CHANGE UP
BLOOD GAS VENOUS COMPREHENSIVE RESULT: SIGNIFICANT CHANGE UP
BUN SERPL-MCNC: 164 MG/DL — HIGH (ref 7–23)
CALCIUM SERPL-MCNC: 9.4 MG/DL — SIGNIFICANT CHANGE UP (ref 8.4–10.5)
CHLORIDE SERPL-SCNC: 94 MMOL/L — LOW (ref 98–107)
CHLORIDE UR-SCNC: 51 MMOL/L — SIGNIFICANT CHANGE UP
CO2 SERPL-SCNC: 11 MMOL/L — LOW (ref 22–31)
COLOR SPEC: ABNORMAL
COMMENT - URINE: SIGNIFICANT CHANGE UP
CREAT SERPL-MCNC: 12.52 MG/DL — HIGH (ref 0.5–1.3)
DIFF PNL FLD: ABNORMAL
EGFR: 4 ML/MIN/1.73M2 — LOW
EOSINOPHIL # BLD AUTO: 0.91 K/UL — HIGH (ref 0–0.5)
EOSINOPHIL NFR BLD AUTO: 8 % — HIGH (ref 0–6)
GLUCOSE SERPL-MCNC: 170 MG/DL — HIGH (ref 70–99)
GLUCOSE UR QL: NEGATIVE MG/DL — SIGNIFICANT CHANGE UP
HCT VFR BLD CALC: 32.5 % — LOW (ref 39–50)
HGB BLD-MCNC: 10.5 G/DL — LOW (ref 13–17)
IANC: 8.82 K/UL — HIGH (ref 1.8–7.4)
IMM GRANULOCYTES NFR BLD AUTO: 0.5 % — SIGNIFICANT CHANGE UP (ref 0–0.9)
KETONES UR-MCNC: ABNORMAL MG/DL
LEUKOCYTE ESTERASE UR-ACNC: ABNORMAL
LYMPHOCYTES # BLD AUTO: 0.51 K/UL — LOW (ref 1–3.3)
LYMPHOCYTES # BLD AUTO: 4.5 % — LOW (ref 13–44)
MCHC RBC-ENTMCNC: 25.9 PG — LOW (ref 27–34)
MCHC RBC-ENTMCNC: 32.3 G/DL — SIGNIFICANT CHANGE UP (ref 32–36)
MCV RBC AUTO: 80.2 FL — SIGNIFICANT CHANGE UP (ref 80–100)
MONOCYTES # BLD AUTO: 0.99 K/UL — HIGH (ref 0–0.9)
MONOCYTES NFR BLD AUTO: 8.8 % — SIGNIFICANT CHANGE UP (ref 2–14)
NEUTROPHILS # BLD AUTO: 8.82 K/UL — HIGH (ref 1.8–7.4)
NEUTROPHILS NFR BLD AUTO: 78 % — HIGH (ref 43–77)
NITRITE UR-MCNC: NEGATIVE — SIGNIFICANT CHANGE UP
NRBC # BLD: 0 /100 WBCS — SIGNIFICANT CHANGE UP (ref 0–0)
NRBC # FLD: 0 K/UL — SIGNIFICANT CHANGE UP (ref 0–0)
PH UR: 6 — SIGNIFICANT CHANGE UP (ref 5–8)
PLATELET # BLD AUTO: 217 K/UL — SIGNIFICANT CHANGE UP (ref 150–400)
POTASSIUM SERPL-MCNC: 5.5 MMOL/L — HIGH (ref 3.5–5.3)
POTASSIUM SERPL-SCNC: 5.5 MMOL/L — HIGH (ref 3.5–5.3)
POTASSIUM UR-SCNC: 16.1 MMOL/L — SIGNIFICANT CHANGE UP
PROT SERPL-MCNC: 8.2 G/DL — SIGNIFICANT CHANGE UP (ref 6–8.3)
PROT UR-MCNC: 300 MG/DL
RBC # BLD: 4.05 M/UL — LOW (ref 4.2–5.8)
RBC # FLD: 13.5 % — SIGNIFICANT CHANGE UP (ref 10.3–14.5)
RBC CASTS # UR COMP ASSIST: ABNORMAL /HPF
SODIUM SERPL-SCNC: 130 MMOL/L — LOW (ref 135–145)
SODIUM UR-SCNC: 77 MMOL/L — SIGNIFICANT CHANGE UP
SP GR SPEC: 1.02 — SIGNIFICANT CHANGE UP (ref 1–1.03)
UROBILINOGEN FLD QL: 0.2 MG/DL — SIGNIFICANT CHANGE UP (ref 0.2–1)
WBC # BLD: 11.31 K/UL — HIGH (ref 3.8–10.5)
WBC # FLD AUTO: 11.31 K/UL — HIGH (ref 3.8–10.5)
WBC UR QL: ABNORMAL /HPF (ref 0–5)

## 2024-12-21 PROCEDURE — 99285 EMERGENCY DEPT VISIT HI MDM: CPT

## 2024-12-21 PROCEDURE — 76770 US EXAM ABDO BACK WALL COMP: CPT | Mod: 26

## 2024-12-21 RX ORDER — PIPERACILLIN AND TAZOBACTAM 3; .375 G/15ML; G/15ML
3.38 INJECTION, POWDER, LYOPHILIZED, FOR SOLUTION INTRAVENOUS ONCE
Refills: 0 | Status: DISCONTINUED | OUTPATIENT
Start: 2024-12-21 | End: 2024-12-21

## 2024-12-21 RX ORDER — SODIUM ZIRCONIUM CYCLOSILICATE 10 G/10G
5 POWDER, FOR SUSPENSION ORAL ONCE
Refills: 0 | Status: COMPLETED | OUTPATIENT
Start: 2024-12-21 | End: 2024-12-21

## 2024-12-21 RX ADMIN — SODIUM ZIRCONIUM CYCLOSILICATE 5 GRAM(S): 10 POWDER, FOR SUSPENSION ORAL at 22:37

## 2024-12-21 NOTE — ED ADULT TRIAGE NOTE - CHIEF COMPLAINT QUOTE
pt ambulatory, sent by MD for abnormal "kdiney labs" pt endorses R flank pain, polyuria, dysuria, n/v, lack of appetite. denies chest pain, sob. FS: 183. hx: DM,

## 2024-12-21 NOTE — ED PROVIDER NOTE - NSICDXPASTSURGICALHX_GEN_ALL_CORE_FT
PAST SURGICAL HISTORY:  History of esophageal surgery esophageal repair about 5 years ago for esopheal achalasia    Nephrostomy tube bleed     Other injury of spleen fall on ice s/p spleen embolization at [facility] IR over 10 years ago

## 2024-12-21 NOTE — ED PROVIDER NOTE - OBJECTIVE STATEMENT
Richa DUQUE:   54-year-old male, history of diabetes "prostate inflammation", on Ozempic, presents with a chief complaint of concern for abnormal lab results, was seen recently by PMD was sent to the ER for abnormal kidney test, patient reports he is having history of darker urine, mild left-sided flank pain radiating to the left lower quadrant has been going on for the last few months, his outpatient doctors are aware of this, has a history of kidney stones but this does not feel like a kidney stones, denies any new midline back pain or pain with defecation, has had a history of urinary tract infections, but no fever no chest pain or trouble breathing, he can move everything and feel everything, no testicular pain.

## 2024-12-21 NOTE — ED PROVIDER NOTE - PROGRESS NOTE DETAILS
Dr. Bryan D.O.: pt signed out by dr jean. pt stable, very well appearing even tho labs are sig abn. k is moderately hemolyzed with no concern for hyperk. pt speaking full sentences, non diaphoretic, with no signs of instability.  pending nephro w/o for admission. potentially emergent hd. Dr. Irvin ROCHA: renal recommended bicarb, and tba, likely admission, pt only complaining of mild suprrapubic pain, likely consistent with cystitis.

## 2024-12-21 NOTE — ED PROVIDER NOTE - PHYSICAL EXAMINATION
Richa DUQUE:  VITALS: Initial triage and subsequent vitals have been reviewed by me.  GEN APPEARANCE: Alert, cooperative. Non-toxic appearing. Well appearing. NAD.  HEAD: Atraumatic, normocephalic   EYES: PERRLa, EOMI, vision grossly intact.   EARS: Gross hearing intact.   NOSE: No nasal discharge, no external evidence of epistaxis.   NECK: Supple  CV: RRR, S1S2, no c/r/m/g. No cyanosis. Extremities warm, well perfused. Cap refill <2 seconds. No bruits.   LUNGS: CTAB. No wheezing. No rales. No rhonchi. No diminished breath sounds.   ABDOMEN: Soft, NTND. No guarding or rebound. No masses.   MSK/EXT: Spine appears normal, no spine point tenderness. No CVA ttp. Normal muscular development. Pelvis stable. No obvious joint or bony deformity, no peripheral edema.   NEURO: Alert, follows commands. Weight bearing normal. Speech normal. Sensation and motor normal x4 extremities.   SKIN: Normal color for race, warm, dry and intact. No evidence of rash.  PSYCH: Normal mood and affect.  PROSTATE: mild diffuse ttp exam w RADHA Noonan    Exam (Male): Un-Circumcised penis, external anatomy appears normal, no e/o priapism, no e/o trauma. No testicular swelling, erythema, or tenderness. No urethral discharge or bleeding. Cremasteric reflex intact b/l. trace rash/ petechial? to L inguinal fold non tender   EXAM WITH: Saran GARCIA,

## 2024-12-21 NOTE — ED ADULT NURSE NOTE - OBJECTIVE STATEMENT
pt sent to ed by primary care physician for abnormal labs , pt has a history of ongoing prostate problems , with poor empting of bladder

## 2024-12-21 NOTE — ED PROVIDER NOTE - CLINICAL SUMMARY MEDICAL DECISION MAKING FREE TEXT BOX
Richa DUQUE: Exam vitals are stable nontoxic with physical exam as above DDx patient is unable to provide lab results unclear etiology of constellation symptoms consider chronic urinary tract infection, complication of diabetes, possible subacute to chronic prostatitis, given presentation we will check labs urine get renal ultrasound, give antibiotics for prostatitis, will reassess, pending lab studies patient may require admission. Presentation does not appear consistent with that of kidney stone.  Exam is otherwise reassuring.

## 2024-12-21 NOTE — ED ADULT NURSE NOTE - CAS EDN DISCHARGE ASSESSMENT
Opt out
Alert and oriented to person, place and time/Patient baseline mental status/Awake/No adverse reaction to first time med in ED

## 2024-12-22 DIAGNOSIS — Z29.9 ENCOUNTER FOR PROPHYLACTIC MEASURES, UNSPECIFIED: ICD-10-CM

## 2024-12-22 DIAGNOSIS — D72.829 ELEVATED WHITE BLOOD CELL COUNT, UNSPECIFIED: ICD-10-CM

## 2024-12-22 DIAGNOSIS — N17.9 ACUTE KIDNEY FAILURE, UNSPECIFIED: ICD-10-CM

## 2024-12-22 DIAGNOSIS — E11.9 TYPE 2 DIABETES MELLITUS WITHOUT COMPLICATIONS: ICD-10-CM

## 2024-12-22 DIAGNOSIS — E87.5 HYPERKALEMIA: ICD-10-CM

## 2024-12-22 DIAGNOSIS — E87.20 ACIDOSIS, UNSPECIFIED: ICD-10-CM

## 2024-12-22 DIAGNOSIS — N13.30 UNSPECIFIED HYDRONEPHROSIS: ICD-10-CM

## 2024-12-22 LAB
A1C WITH ESTIMATED AVERAGE GLUCOSE RESULT: 8.3 % — HIGH (ref 4–5.6)
ANION GAP SERPL CALC-SCNC: 19 MMOL/L — HIGH (ref 7–14)
ANION GAP SERPL CALC-SCNC: 20 MMOL/L — HIGH (ref 7–14)
ANION GAP SERPL CALC-SCNC: 21 MMOL/L — HIGH (ref 7–14)
ANION GAP SERPL CALC-SCNC: 22 MMOL/L — HIGH (ref 7–14)
APTT BLD: 38.4 SEC — HIGH (ref 24.5–35.6)
B-OH-BUTYR SERPL-SCNC: 0.6 MMOL/L — HIGH (ref 0–0.4)
BLD GP AB SCN SERPL QL: NEGATIVE — SIGNIFICANT CHANGE UP
BLOOD GAS VENOUS COMPREHENSIVE RESULT: SIGNIFICANT CHANGE UP
BUN SERPL-MCNC: 117 MG/DL — HIGH (ref 7–23)
BUN SERPL-MCNC: 119 MG/DL — HIGH (ref 7–23)
BUN SERPL-MCNC: 160 MG/DL — HIGH (ref 7–23)
BUN SERPL-MCNC: 162 MG/DL — HIGH (ref 7–23)
C3 SERPL-MCNC: 109 MG/DL — SIGNIFICANT CHANGE UP (ref 90–180)
C4 SERPL-MCNC: 28 MG/DL — SIGNIFICANT CHANGE UP (ref 10–40)
CALCIUM SERPL-MCNC: 9 MG/DL — SIGNIFICANT CHANGE UP (ref 8.4–10.5)
CALCIUM SERPL-MCNC: 9 MG/DL — SIGNIFICANT CHANGE UP (ref 8.4–10.5)
CALCIUM SERPL-MCNC: 9.1 MG/DL — SIGNIFICANT CHANGE UP (ref 8.4–10.5)
CALCIUM SERPL-MCNC: 9.7 MG/DL — SIGNIFICANT CHANGE UP (ref 8.4–10.5)
CHLORIDE SERPL-SCNC: 96 MMOL/L — LOW (ref 98–107)
CHLORIDE SERPL-SCNC: 96 MMOL/L — LOW (ref 98–107)
CHLORIDE SERPL-SCNC: 98 MMOL/L — SIGNIFICANT CHANGE UP (ref 98–107)
CHLORIDE SERPL-SCNC: 99 MMOL/L — SIGNIFICANT CHANGE UP (ref 98–107)
CO2 SERPL-SCNC: 14 MMOL/L — LOW (ref 22–31)
CO2 SERPL-SCNC: 17 MMOL/L — LOW (ref 22–31)
CO2 SERPL-SCNC: 18 MMOL/L — LOW (ref 22–31)
CO2 SERPL-SCNC: 9 MMOL/L — CRITICAL LOW (ref 22–31)
CREAT ?TM UR-MCNC: 88 MG/DL — SIGNIFICANT CHANGE UP
CREAT SERPL-MCNC: 11.81 MG/DL — HIGH (ref 0.5–1.3)
CREAT SERPL-MCNC: 12.08 MG/DL — HIGH (ref 0.5–1.3)
CREAT SERPL-MCNC: 9.21 MG/DL — HIGH (ref 0.5–1.3)
CREAT SERPL-MCNC: 9.36 MG/DL — HIGH (ref 0.5–1.3)
DIALYSIS INSTRUMENT RESULT - HEPATITIS B SURFACE ANTIGEN: NEGATIVE — SIGNIFICANT CHANGE UP
EGFR: 4 ML/MIN/1.73M2 — LOW
EGFR: 5 ML/MIN/1.73M2 — LOW
EGFR: 6 ML/MIN/1.73M2 — LOW
EGFR: 6 ML/MIN/1.73M2 — LOW
ESTIMATED AVERAGE GLUCOSE: 192 — SIGNIFICANT CHANGE UP
GAS PNL BLDV: SIGNIFICANT CHANGE UP
GLUCOSE BLDC GLUCOMTR-MCNC: 120 MG/DL — HIGH (ref 70–99)
GLUCOSE BLDC GLUCOMTR-MCNC: 126 MG/DL — HIGH (ref 70–99)
GLUCOSE BLDC GLUCOMTR-MCNC: 130 MG/DL — HIGH (ref 70–99)
GLUCOSE BLDC GLUCOMTR-MCNC: 131 MG/DL — HIGH (ref 70–99)
GLUCOSE BLDC GLUCOMTR-MCNC: 137 MG/DL — HIGH (ref 70–99)
GLUCOSE BLDC GLUCOMTR-MCNC: 143 MG/DL — HIGH (ref 70–99)
GLUCOSE BLDC GLUCOMTR-MCNC: 206 MG/DL — HIGH (ref 70–99)
GLUCOSE SERPL-MCNC: 129 MG/DL — HIGH (ref 70–99)
GLUCOSE SERPL-MCNC: 143 MG/DL — HIGH (ref 70–99)
GLUCOSE SERPL-MCNC: 233 MG/DL — HIGH (ref 70–99)
GLUCOSE SERPL-MCNC: 238 MG/DL — HIGH (ref 70–99)
HAV IGM SER-ACNC: SIGNIFICANT CHANGE UP
HBV CORE IGM SER-ACNC: SIGNIFICANT CHANGE UP
HBV SURFACE AG SER-ACNC: SIGNIFICANT CHANGE UP
HCV AB S/CO SERPL IA: 0.38 S/CO — SIGNIFICANT CHANGE UP (ref 0–0.99)
HCV AB SERPL-IMP: SIGNIFICANT CHANGE UP
HIV 1+2 AB+HIV1 P24 AG SERPL QL IA: SIGNIFICANT CHANGE UP
INR BLD: 1.03 RATIO — SIGNIFICANT CHANGE UP (ref 0.85–1.16)
MAGNESIUM SERPL-MCNC: 2.3 MG/DL — SIGNIFICANT CHANGE UP (ref 1.6–2.6)
MAGNESIUM SERPL-MCNC: 2.3 MG/DL — SIGNIFICANT CHANGE UP (ref 1.6–2.6)
MAGNESIUM SERPL-MCNC: 2.7 MG/DL — HIGH (ref 1.6–2.6)
MAGNESIUM SERPL-MCNC: 2.7 MG/DL — HIGH (ref 1.6–2.6)
OSMOLALITY UR: 329 MOSM/KG — SIGNIFICANT CHANGE UP (ref 50–1200)
PHOSPHATE SERPL-MCNC: 7.2 MG/DL — HIGH (ref 2.5–4.5)
PHOSPHATE SERPL-MCNC: 7.3 MG/DL — HIGH (ref 2.5–4.5)
PHOSPHATE SERPL-MCNC: 8.2 MG/DL — HIGH (ref 2.5–4.5)
PHOSPHATE SERPL-MCNC: 8.6 MG/DL — HIGH (ref 2.5–4.5)
POTASSIUM SERPL-MCNC: 4.2 MMOL/L — SIGNIFICANT CHANGE UP (ref 3.5–5.3)
POTASSIUM SERPL-MCNC: 4.2 MMOL/L — SIGNIFICANT CHANGE UP (ref 3.5–5.3)
POTASSIUM SERPL-MCNC: 5.5 MMOL/L — HIGH (ref 3.5–5.3)
POTASSIUM SERPL-MCNC: 5.5 MMOL/L — HIGH (ref 3.5–5.3)
POTASSIUM SERPL-SCNC: 4.2 MMOL/L — SIGNIFICANT CHANGE UP (ref 3.5–5.3)
POTASSIUM SERPL-SCNC: 4.2 MMOL/L — SIGNIFICANT CHANGE UP (ref 3.5–5.3)
POTASSIUM SERPL-SCNC: 5.5 MMOL/L — HIGH (ref 3.5–5.3)
POTASSIUM SERPL-SCNC: 5.5 MMOL/L — HIGH (ref 3.5–5.3)
PROT ?TM UR-MCNC: 98 MG/DL — SIGNIFICANT CHANGE UP
PROT SERPL-MCNC: 6.9 G/DL — SIGNIFICANT CHANGE UP (ref 6–8.3)
PROT/CREAT UR-RTO: 1.1 RATIO — HIGH (ref 0–0.2)
PROTHROM AB SERPL-ACNC: 12.2 SEC — SIGNIFICANT CHANGE UP (ref 9.9–13.4)
RH IG SCN BLD-IMP: POSITIVE — SIGNIFICANT CHANGE UP
SODIUM SERPL-SCNC: 130 MMOL/L — LOW (ref 135–145)
SODIUM SERPL-SCNC: 133 MMOL/L — LOW (ref 135–145)
SODIUM UR-SCNC: 51 MMOL/L — SIGNIFICANT CHANGE UP
UUN UR-MCNC: 439.7 MG/DL — SIGNIFICANT CHANGE UP

## 2024-12-22 PROCEDURE — 84165 PROTEIN E-PHORESIS SERUM: CPT | Mod: 26

## 2024-12-22 PROCEDURE — 71046 X-RAY EXAM CHEST 2 VIEWS: CPT | Mod: 26

## 2024-12-22 PROCEDURE — 99223 1ST HOSP IP/OBS HIGH 75: CPT

## 2024-12-22 PROCEDURE — 74176 CT ABD & PELVIS W/O CONTRAST: CPT | Mod: 26

## 2024-12-22 PROCEDURE — 36556 INSERT NON-TUNNEL CV CATH: CPT

## 2024-12-22 PROCEDURE — 86334 IMMUNOFIX E-PHORESIS SERUM: CPT | Mod: 26

## 2024-12-22 PROCEDURE — 99223 1ST HOSP IP/OBS HIGH 75: CPT | Mod: GC

## 2024-12-22 PROCEDURE — 99254 IP/OBS CNSLTJ NEW/EST MOD 60: CPT | Mod: GC

## 2024-12-22 RX ORDER — DEXTROSE MONOHYDRATE 25 G/50ML
15 INJECTION, SOLUTION INTRAVENOUS ONCE
Refills: 0 | Status: DISCONTINUED | OUTPATIENT
Start: 2024-12-22 | End: 2024-12-30

## 2024-12-22 RX ORDER — INSULIN HUMAN 100 [IU]/ML
5 INJECTION, SOLUTION SUBCUTANEOUS ONCE
Refills: 0 | Status: COMPLETED | OUTPATIENT
Start: 2024-12-22 | End: 2024-12-22

## 2024-12-22 RX ORDER — DEXTROSE MONOHYDRATE 25 G/50ML
25 INJECTION, SOLUTION INTRAVENOUS ONCE
Refills: 0 | Status: DISCONTINUED | OUTPATIENT
Start: 2024-12-22 | End: 2024-12-30

## 2024-12-22 RX ORDER — INSULIN LISPRO 100/ML
VIAL (ML) SUBCUTANEOUS AT BEDTIME
Refills: 0 | Status: DISCONTINUED | OUTPATIENT
Start: 2024-12-22 | End: 2024-12-30

## 2024-12-22 RX ORDER — CALCIUM GLUCONATE 94 MG/ML
2 INJECTION, SOLUTION INTRAVENOUS ONCE
Refills: 0 | Status: COMPLETED | OUTPATIENT
Start: 2024-12-22 | End: 2024-12-22

## 2024-12-22 RX ORDER — ACETAMINOPHEN 80 MG/.8ML
1000 SOLUTION/ DROPS ORAL ONCE
Refills: 0 | Status: COMPLETED | OUTPATIENT
Start: 2024-12-22 | End: 2024-12-22

## 2024-12-22 RX ORDER — SODIUM CHLORIDE 9 MG/ML
1000 INJECTION, SOLUTION INTRAVENOUS
Refills: 0 | Status: DISCONTINUED | OUTPATIENT
Start: 2024-12-22 | End: 2024-12-30

## 2024-12-22 RX ORDER — INSULIN LISPRO 100/ML
VIAL (ML) SUBCUTANEOUS
Refills: 0 | Status: DISCONTINUED | OUTPATIENT
Start: 2024-12-22 | End: 2024-12-30

## 2024-12-22 RX ORDER — SODIUM BICARBONATE 84 MG/ML
0.16 INJECTION, SOLUTION INTRAVENOUS
Qty: 150 | Refills: 0 | Status: DISCONTINUED | OUTPATIENT
Start: 2024-12-22 | End: 2024-12-22

## 2024-12-22 RX ORDER — ORAL SEMAGLUTIDE 3 MG/1
0.25 TABLET ORAL
Refills: 0 | DISCHARGE

## 2024-12-22 RX ORDER — DEXTROSE MONOHYDRATE 25 G/50ML
50 INJECTION, SOLUTION INTRAVENOUS ONCE
Refills: 0 | Status: COMPLETED | OUTPATIENT
Start: 2024-12-22 | End: 2024-12-22

## 2024-12-22 RX ORDER — ONDANSETRON 4 MG/1
1 TABLET ORAL
Refills: 0 | DISCHARGE

## 2024-12-22 RX ORDER — CHLORHEXIDINE GLUCONATE 1.2 MG/ML
1 RINSE ORAL DAILY
Refills: 0 | Status: DISCONTINUED | OUTPATIENT
Start: 2024-12-22 | End: 2024-12-30

## 2024-12-22 RX ORDER — SODIUM ZIRCONIUM CYCLOSILICATE 10 G/10G
10 POWDER, FOR SUSPENSION ORAL ONCE
Refills: 0 | Status: COMPLETED | OUTPATIENT
Start: 2024-12-22 | End: 2024-12-22

## 2024-12-22 RX ORDER — GLUCAGON INJECTION, SOLUTION 0.5 MG/.1ML
1 INJECTION, SOLUTION SUBCUTANEOUS ONCE
Refills: 0 | Status: DISCONTINUED | OUTPATIENT
Start: 2024-12-22 | End: 2024-12-30

## 2024-12-22 RX ORDER — ROSUVASTATIN 40 MG/1
10 TABLET, FILM COATED ORAL AT BEDTIME
Refills: 0 | Status: DISCONTINUED | OUTPATIENT
Start: 2024-12-22 | End: 2024-12-30

## 2024-12-22 RX ORDER — SODIUM CHLORIDE 9 MG/ML
100 INJECTION, SOLUTION INTRAMUSCULAR; INTRAVENOUS; SUBCUTANEOUS
Refills: 0 | Status: DISCONTINUED | OUTPATIENT
Start: 2024-12-22 | End: 2024-12-30

## 2024-12-22 RX ORDER — SODIUM BICARBONATE 84 MG/ML
1300 INJECTION, SOLUTION INTRAVENOUS THREE TIMES A DAY
Refills: 0 | Status: DISCONTINUED | OUTPATIENT
Start: 2024-12-22 | End: 2024-12-30

## 2024-12-22 RX ORDER — DEXTROSE MONOHYDRATE 25 G/50ML
12.5 INJECTION, SOLUTION INTRAVENOUS ONCE
Refills: 0 | Status: DISCONTINUED | OUTPATIENT
Start: 2024-12-22 | End: 2024-12-30

## 2024-12-22 RX ORDER — ACETAMINOPHEN 80 MG/.8ML
650 SOLUTION/ DROPS ORAL EVERY 6 HOURS
Refills: 0 | Status: DISCONTINUED | OUTPATIENT
Start: 2024-12-22 | End: 2024-12-27

## 2024-12-22 RX ADMIN — Medication 100 MILLIGRAM(S): at 23:03

## 2024-12-22 RX ADMIN — ACETAMINOPHEN 650 MILLIGRAM(S): 80 SOLUTION/ DROPS ORAL at 13:15

## 2024-12-22 RX ADMIN — INSULIN HUMAN 5 UNIT(S): 100 INJECTION, SOLUTION SUBCUTANEOUS at 08:57

## 2024-12-22 RX ADMIN — ACETAMINOPHEN 400 MILLIGRAM(S): 80 SOLUTION/ DROPS ORAL at 02:19

## 2024-12-22 RX ADMIN — SODIUM BICARBONATE 100 MEQ/KG/HR: 84 INJECTION, SOLUTION INTRAVENOUS at 16:08

## 2024-12-22 RX ADMIN — CHLORHEXIDINE GLUCONATE 1 APPLICATION(S): 1.2 RINSE ORAL at 23:54

## 2024-12-22 RX ADMIN — ACETAMINOPHEN 650 MILLIGRAM(S): 80 SOLUTION/ DROPS ORAL at 12:15

## 2024-12-22 RX ADMIN — SODIUM ZIRCONIUM CYCLOSILICATE 10 GRAM(S): 10 POWDER, FOR SUSPENSION ORAL at 06:04

## 2024-12-22 RX ADMIN — SODIUM BICARBONATE 100 MEQ/KG/HR: 84 INJECTION, SOLUTION INTRAVENOUS at 04:09

## 2024-12-22 RX ADMIN — ACETAMINOPHEN 400 MILLIGRAM(S): 80 SOLUTION/ DROPS ORAL at 19:22

## 2024-12-22 RX ADMIN — SODIUM ZIRCONIUM CYCLOSILICATE 10 GRAM(S): 10 POWDER, FOR SUSPENSION ORAL at 13:34

## 2024-12-22 RX ADMIN — CALCIUM GLUCONATE 200 GRAM(S): 94 INJECTION, SOLUTION INTRAVENOUS at 08:51

## 2024-12-22 RX ADMIN — ROSUVASTATIN 10 MILLIGRAM(S): 40 TABLET, FILM COATED ORAL at 22:58

## 2024-12-22 RX ADMIN — SODIUM BICARBONATE 1300 MILLIGRAM(S): 84 INJECTION, SOLUTION INTRAVENOUS at 22:59

## 2024-12-22 RX ADMIN — Medication 100 MILLIGRAM(S): at 00:34

## 2024-12-22 RX ADMIN — DEXTROSE MONOHYDRATE 50 MILLILITER(S): 25 INJECTION, SOLUTION INTRAVENOUS at 08:58

## 2024-12-22 NOTE — H&P ADULT - PROBLEM SELECTOR PLAN 3
- renal US showed moderate to severe left and moderate right hydronephrosis which persists post void. Non obstructing 1 cm stone in the left midpole and multiple renal cysts as above  - Likely in setting of nephrolithiasis   - outpatient urologist Dr. Hoenig    Plan:  - Continue sierra  - urology consult

## 2024-12-22 NOTE — CONSULT NOTE ADULT - ASSESSMENT
A 54 year old male with PMHx of prostatitis, kidney stones, DM presents with abnormal laboratory results concerning for renal failure. Vascular surgery consulted for shiley placement.    Recommendations:  - Nephrology documentation requiring emergent dialysis today.  - INR/PT/PTT     Plans discussed with the vascular fellow on call.    Vascular Surgery  [id number]

## 2024-12-22 NOTE — CONSULT NOTE ADULT - SUBJECTIVE AND OBJECTIVE BOX
INFECTIOUS DISEASE CONSULT NOTE    Patient is a 54y old  Male who presents with a chief complaint of Abnormal Labs (22 Dec 2024 11:23)    HPI: 54M with past medical history of T2DM, prostatitis, chronic cystitis, and nephrolithiasis s/p PCNL 02/21, left nephroureteral tube 03/24 c/b hematuria needing L renal angioembolization of pseudoaneurysms and PCN-U 04/06 who presented to the ED for abnormal outpatient labs    He visited his PCP on Thursday (12/19) and received a phone from them yesterday asking him to present to  the ED as his "kidney's were shutting down". Per patient he, has been having issues with prostatitis, cystitis and nephrolithiasis for many years now and follows with urologist Dr. Hoenig. He states his since 03/24 after he had the nephroureteral tube placement for nephrolithiasis his "kidney numbers" have been worsening. He states his urologist is aware and he is following with [facility] nephrologist Dr. Chung Burgos. For the last week he has been having dark colored urine, nausea, vomiting, fatigue, with mild left sided flank pain that radiates to the left lower quadrant for the past month. Pt. had kidney biopsies done but he is unsure why and thought it may be to rule out cancer.  Pt. denies fever, chills, CP, SOB, testicular pain or LE swelling. He also denies any family hx of renal cysts, or kidney disease.           (22 Dec 2024 11:23)       REVIEW OF SYSTEMS:  Constitutional: No fevers, No chills  Skin: +left groin rash  Eyes: No change in vision	  ENMT: No sore throat, No ulcers  Respiratory: No cough, no SOB  Cardiovascular:  No chest pain, No palpitations   Gastrointestinal: +nausea, decreased appetite and vomiting 	  Genitourinary: +dysuria  MSK: No Joint pain, No back pain, No edema  Neurological: No HA, no weakness, no seizures, no AMS    Prior hospital charts reviewed [Yes]  Primary team notes reviewed [Yes]  Other consultant notes reviewed [Yes]    PAST MEDICAL & SURGICAL HISTORY:  Esophageal achalasia  s/p esophageal repair about 5 years ago  Type 2 diabetes mellitus  Calculus of kidney  passed on own in the past; CT scan showing a 3.5 cm and 1.2 cm left mid-upper stones in December 2021  Former smoker  1 PPD x 16 years; Quit in 2003  Obesity  BMI 34.4  Kidney stones  History of esophageal surgery  esophageal repair about 5 years ago for esopheal achalasia  Other injury of spleen  fall on ice s/p spleen embolization at [facility] over 10 years ago  Nephrostomy tube bleed          SOCIAL HISTORY:  Former smoker 1PPD x 16 years; Quit in 2003  Lives with wife and 2 boys  No recent travel   Denies alcohol use           Allergies:  penicillins (Unknown)      ANTIMICROBIALS:  cefepime   IVPB 1000 every 24 hours      ANTIMICROBIALS (past 90 days):  MEDICATIONS  (STANDING):  cefepime   IVPB   100 mL/Hr IV Intermittent (12-22-24 @ 00:34)        OTHER MEDS:   MEDICATIONS  (STANDING):  acetaminophen     Tablet .. 650 every 6 hours PRN  dextrose 50% Injectable 25 once  dextrose 50% Injectable 12.5 once  dextrose 50% Injectable 25 once  dextrose Oral Gel 15 once  glucagon  Injectable 1 once  insulin lispro (ADMELOG) corrective regimen sliding scale  three times a day before meals  insulin lispro (ADMELOG) corrective regimen sliding scale  at bedtime      VITALS:  Vital Signs Last 24 Hrs  T(F): 97.7 (12-22-24 @ 11:05), Max: 98.1 (12-22-24 @ 00:31)    Vital Signs Last 24 Hrs  HR: 71 (12-22-24 @ 11:05) (69 - 81)  BP: 102/64 (12-22-24 @ 11:05) (98/52 - 120/64)  RR: 17 (12-22-24 @ 11:05)  SpO2: 100% (12-22-24 @ 11:05) (99% - 100%)  Wt(kg): --    EXAM:  General:  in no acute distress  HEENT: anicteric sclera  CV: +S1/S2, RRR, no murmurs heard  Lungs: No respiratory distress, CTA b/l  Abd:  BS4+, Soft  : No suprapubic tenderness, no sierra  Neuro: AAOx3. No focal deficits noted.   Ext: No cyanosis, no edema  Msk: freely moving upper and lower extremities  Skin: no rashes seen    Labs:                        10.5   11.31 )-----------( 217      ( 21 Dec 2024 21:30 )             32.5     12-22    133[L]  |  98  |  162[H]  ----------------------------<  143[H]  5.5[H]   |  14[L]  |  11.81[H]    Ca    9.7      22 Dec 2024 12:05  Phos  8.2     12-22  Mg     2.70     12-22    TPro  6.9  /  Alb  x   /  TBili  x   /  DBili  x   /  AST  x   /  ALT  x   /  AlkPhos  x   12-22      WBC Trend:  WBC Count: 11.31 (12-21-24 @ 21:30)      Auto Neutrophil #: 8.82 K/uL (12-21-24 @ 21:30)      Creatine Trend:  Creatinine: 11.81 (12-22)  Creatinine: 12.08 (12-22)  Creatinine: 12.52 (12-21)      Liver Biochemical Testing Trend:  Alanine Aminotransferase (ALT/SGPT): 10 (12-21)  Aspartate Aminotransferase (AST/SGOT): 6 (12-21-24 @ 21:30)  Bilirubin Total: 0.3 (12-21)      Trend LDH      Auto Eosinophil %: 8.0 % (12-21-24 @ 21:30)      Urinalysis Basic - ( 22 Dec 2024 12:05 )  Urinalysis with Rflx Culture (12.21.24 @ 21:30)   Urine Appearance: Turbid  Color: Orange  Specific Gravity: 1.017  pH Urine: 6.0  Protein, Urine: 300 mg/dL  Glucose Qualitative, Urine: Negative mg/dL  Ketone - Urine: Trace mg/dL  Blood, Urine: Large  Bilirubin: Negative  Urobilinogen: 0.2 mg/dL  Leukocyte Esterase Concentration: Large  Nitrite: Negative        MICROBIOLOGY:        Urinalysis with Rflx Culture (collected 21 Dec 2024 21:30)      HIV-1/2 Combo Result: Nonreact (12-22-24 @ 12:05)          Blood Gas Venous - Lactate: 0.9 (12-22 @ 12:05)  Blood Gas Venous - Lactate: 1.0 (12-21 @ 22:22)    A1C with Estimated Average Glucose Result: 8.3 % (12-21-24 @ 21:30)      RADIOLOGY:  < from: US Kidney and Bladder (12.21.24 @ 22:04) >  IMPRESSION:  Moderate to severe left and moderate right hydronephrosis which persists   post void. CT could evaluate for underlying etiology.    Nonobstructing 1 cm stone in the leftmidpole.    Multiple renal cysts as above.    < end of copied text >  < from: Xray Chest 2 Views PA/Lat (12.22.24 @ 00:22) >    Trachea midline.    Unremarkable osseous structures.    < end of copied text >  imaging below personally reviewed   INFECTIOUS DISEASE CONSULT NOTE    Patient is a 54y old  Male who presents with a chief complaint of Abnormal Labs (22 Dec 2024 11:23)    HPI: 54M with past medical history of T2DM, prostatitis, chronic cystitis, and nephrolithiasis s/p PCNL 02/21, left nephroureteral tube 03/24 c/b hematuria needing L renal angioembolization of pseudoaneurysms and PCN-U 04/06 who presented to the ED for abnormal outpatient labs    He visited his PCP on Thursday (12/19) and received a phone from them yesterday asking him to present to  the ED as his "kidney's were shutting down". Per patient he, has been having issues with prostatitis, cystitis and nephrolithiasis for many years now and follows with urologist Dr. Hoenig. He states his since 03/24 after he had the nephroureteral tube placement for nephrolithiasis his "kidney numbers" have been worsening. He states his urologist is aware and he is following with [facility] nephrologist Dr. Chung Burgos. For the last week he has been having dark colored urine, nausea, vomiting, fatigue, with mild left sided flank pain that radiates to the left lower quadrant for the past month. Pt. had kidney biopsies done but he is unsure why and thought it may be to rule out cancer.  Pt. denies fever, chills, CP, SOB, testicular pain or LE swelling. He also denies any family hx of renal cysts, or kidney disease.           (22 Dec 2024 11:23)       REVIEW OF SYSTEMS:  Constitutional: No fevers, No chills  Skin: +left groin rash  Eyes: No change in vision	  ENMT: No sore throat, No ulcers  Respiratory: No cough, no SOB  Cardiovascular:  No chest pain, No palpitations   Gastrointestinal: +nausea, decreased appetite and vomiting 	  Genitourinary: +dysuria  MSK: No Joint pain, No back pain, No edema  Neurological: No HA, no weakness, no seizures, no AMS    Prior hospital charts reviewed [Yes]  Primary team notes reviewed [Yes]  Other consultant notes reviewed [Yes]    PAST MEDICAL & SURGICAL HISTORY:  Esophageal achalasia  s/p esophageal repair about 5 years ago  Type 2 diabetes mellitus  Calculus of kidney  passed on own in the past; CT scan showing a 3.5 cm and 1.2 cm left mid-upper stones in December 2021  Former smoker  1 PPD x 16 years; Quit in 2003  Obesity  BMI 34.4  Kidney stones  History of esophageal surgery  esophageal repair about 5 years ago for esopheal achalasia  Other injury of spleen  fall on ice s/p spleen embolization at [facility] IR over 10 years ago  Nephrostomy tube bleed          SOCIAL HISTORY:  Former smoker 1PPD x 16 years; Quit in 2003  Lives with wife and 2 boys  No recent travel   Denies alcohol use           Allergies:  penicillins - unclear reaction, was told by his mom that he is allergic      ANTIMICROBIALS:  cefepime   IVPB 1000 every 24 hours      ANTIMICROBIALS (past 90 days):  MEDICATIONS  (STANDING):  cefepime   IVPB   100 mL/Hr IV Intermittent (12-22-24 @ 00:34)        OTHER MEDS:   MEDICATIONS  (STANDING):  acetaminophen     Tablet .. 650 every 6 hours PRN  dextrose 50% Injectable 25 once  dextrose 50% Injectable 12.5 once  dextrose 50% Injectable 25 once  dextrose Oral Gel 15 once  glucagon  Injectable 1 once  insulin lispro (ADMELOG) corrective regimen sliding scale  three times a day before meals  insulin lispro (ADMELOG) corrective regimen sliding scale  at bedtime      VITALS:  Vital Signs Last 24 Hrs  T(F): 97.7 (12-22-24 @ 11:05), Max: 98.1 (12-22-24 @ 00:31)    Vital Signs Last 24 Hrs  HR: 71 (12-22-24 @ 11:05) (69 - 81)  BP: 102/64 (12-22-24 @ 11:05) (98/52 - 120/64)  RR: 17 (12-22-24 @ 11:05)  SpO2: 100% (12-22-24 @ 11:05) (99% - 100%)  Wt(kg): --    EXAM:  General:  in no acute distress  HEENT: anicteric sclera  CV: +S1/S2, RRR, no murmurs heard  Lungs: No respiratory distress, CTA b/l  Abd:  BS4+, Soft  : No suprapubic tenderness, no sierra  Neuro: AAOx3. No focal deficits noted.   Ext: No cyanosis, no edema  Msk: freely moving upper and lower extremities  Skin: no rashes seen    Labs:                        10.5   11.31 )-----------( 217      ( 21 Dec 2024 21:30 )             32.5     12-22    133[L]  |  98  |  162[H]  ----------------------------<  143[H]  5.5[H]   |  14[L]  |  11.81[H]    Ca    9.7      22 Dec 2024 12:05  Phos  8.2     12-22  Mg     2.70     12-22    TPro  6.9  /  Alb  x   /  TBili  x   /  DBili  x   /  AST  x   /  ALT  x   /  AlkPhos  x   12-22      WBC Trend:  WBC Count: 11.31 (12-21-24 @ 21:30)      Auto Neutrophil #: 8.82 K/uL (12-21-24 @ 21:30)      Creatine Trend:  Creatinine: 11.81 (12-22)  Creatinine: 12.08 (12-22)  Creatinine: 12.52 (12-21)      Liver Biochemical Testing Trend:  Alanine Aminotransferase (ALT/SGPT): 10 (12-21)  Aspartate Aminotransferase (AST/SGOT): 6 (12-21-24 @ 21:30)  Bilirubin Total: 0.3 (12-21)      Trend LDH      Auto Eosinophil %: 8.0 % (12-21-24 @ 21:30)      Urinalysis Basic - ( 22 Dec 2024 12:05 )  Urinalysis with Rflx Culture (12.21.24 @ 21:30)   Urine Appearance: Turbid  Color: Orange  Specific Gravity: 1.017  pH Urine: 6.0  Protein, Urine: 300 mg/dL  Glucose Qualitative, Urine: Negative mg/dL  Ketone - Urine: Trace mg/dL  Blood, Urine: Large  Bilirubin: Negative  Urobilinogen: 0.2 mg/dL  Leukocyte Esterase Concentration: Large  Nitrite: Negative        MICROBIOLOGY:        Urinalysis with Rflx Culture (collected 21 Dec 2024 21:30)      HIV-1/2 Combo Result: Nonreact (12-22-24 @ 12:05)          Blood Gas Venous - Lactate: 0.9 (12-22 @ 12:05)  Blood Gas Venous - Lactate: 1.0 (12-21 @ 22:22)    A1C with Estimated Average Glucose Result: 8.3 % (12-21-24 @ 21:30)      RADIOLOGY:  < from: US Kidney and Bladder (12.21.24 @ 22:04) >  IMPRESSION:  Moderate to severe left and moderate right hydronephrosis which persists   post void. CT could evaluate for underlying etiology.    Nonobstructing 1 cm stone in the leftmidpole.    Multiple renal cysts as above.    < end of copied text >  < from: Xray Chest 2 Views PA/Lat (12.22.24 @ 00:22) >    Trachea midline.    Unremarkable osseous structures.    < end of copied text >  imaging below personally reviewed

## 2024-12-22 NOTE — DIETITIAN INITIAL EVALUATION ADULT - PERTINENT MEDS FT
MEDICATIONS  (STANDING):  cefepime   IVPB 1000 milliGRAM(s) IV Intermittent every 24 hours  dextrose 5%. 1000 milliLiter(s) (50 mL/Hr) IV Continuous <Continuous>  dextrose 5%. 1000 milliLiter(s) (100 mL/Hr) IV Continuous <Continuous>  dextrose 50% Injectable 25 Gram(s) IV Push once  dextrose 50% Injectable 12.5 Gram(s) IV Push once  dextrose 50% Injectable 25 Gram(s) IV Push once  dextrose Oral Gel 15 Gram(s) Oral once  glucagon  Injectable 1 milliGRAM(s) IntraMuscular once  insulin lispro (ADMELOG) corrective regimen sliding scale   SubCutaneous three times a day before meals  insulin lispro (ADMELOG) corrective regimen sliding scale   SubCutaneous at bedtime  rosuvastatin 10 milliGRAM(s) Oral at bedtime  sodium bicarbonate  Infusion 0.161 mEq/kG/Hr (100 mL/Hr) IV Continuous <Continuous>    MEDICATIONS  (PRN):  acetaminophen     Tablet .. 650 milliGRAM(s) Oral every 6 hours PRN Temp greater or equal to 38C (100.4F), Mild Pain (1 - 3)  sodium chloride 0.9% Bolus. 100 milliLiter(s) IV Bolus every 5 minutes PRN SBP LESS THAN or EQUAL to 90 mmHg

## 2024-12-22 NOTE — H&P ADULT - NSHPPHYSICALEXAM_GEN_ALL_CORE
VITALS:   T(C): 36.4 (12-22-24 @ 04:47), Max: 36.7 (12-22-24 @ 00:31)  HR: 69 (12-22-24 @ 04:47) (69 - 81)  BP: 120/64 (12-22-24 @ 04:47) (98/52 - 120/64)  RR: 18 (12-22-24 @ 04:47) (14 - 18)  SpO2: 100% (12-22-24 @ 04:47) (99% - 100%)    GENERAL: NAD, lying in bed comfortably  HEAD:  Atraumatic, normocephalic  NECK: Supple, no JVD  HEART: Regular rate and rhythm, no murmurs, rubs, or gallops  LUNGS: Unlabored respirations.  Clear to auscultation bilaterally, no crackles, wheezing, or rhonchi  ABDOMEN: Soft, nontender, nondistended, +BS  EXTREMITIES: 2+ peripheral pulses bilaterally. No clubbing, cyanosis, or edema  NERVOUS SYSTEM:  A&Ox3, no focal deficits   SKIN: No rashes or lesions

## 2024-12-22 NOTE — H&P ADULT - NSHPREVIEWOFSYSTEMS_GEN_ALL_CORE
GEN: no fever, no chills, no pain now  RESP: no SOB, no cough, no sputum  CVS: no chest pain, no palpitations, no edema  GI: no abdominal pain, no nausea, no vomiting now ( was past couple of days) , no constipation, no diarrhea reported   : no dysuria, no frequency  NEURO: no headache, no dizziness  Derm : no itching, no rash

## 2024-12-22 NOTE — CONSULT NOTE ADULT - SUBJECTIVE AND OBJECTIVE BOX
[facility] DIVISION OF KIDNEY DISEASES AND HYPERTENSION -- [phone]  -- INITIAL CONSULT NOTE  --------------------------------------------------------------------------------  HPI: Pt. is a 54 y.o. M w/ PMHx DM, "prostate inflammation", and kidney stones who presented to the ED for abnormal outpatient labs. Nephrology consulted for renal failure.    Pt. seen and examined earlier in the ED. Pt. states that he recently had blood work done with his PMD who called him this evening and told him to go to the ED because his kidneys were failing. Pt. states that he has never had kidney issues before other than kidney stones and has been following a nephrologist at [facility]. Pt. had kidney biopsies done but he is unsure why and thought it may be to rule out cancer. Pt. last had a urological procedure to removed a kidney stone in February 2023. Pt. states that for the past 7-10 days he has been having nausea, vomiting, and been feeling fatigued. He has had cloudy and bloody urine on occasion during this time as well. Pt. denies fever, chills, CP, SOB, or LE swelling.    PAST HISTORY  --------------------------------------------------------------------------------  PAST MEDICAL & SURGICAL HISTORY:  Esophageal achalasia  s/p esophageal repair about 5 years ago    Type 2 diabetes mellitus    Calculus of kidney  passed on own in the past; CT scan showing a 3.5 cm and 1.2 cm left mid-upper stones in December 2021    Former smoker  1 PPD x 16 years; Quit in 2003    Obesity  BMI 34.4    Kidney stones    History of esophageal surgery  esophageal repair about 5 years ago for esopheal achalasia    Other injury of spleen  fall on ice s/p spleen embolization at [facility] IR over 10 years ago    Nephrostomy tube bleed    FAMILY HISTORY:    PAST SOCIAL HISTORY:    ALLERGIES & MEDICATIONS  --------------------------------------------------------------------------------  Allergies    penicillins (Unknown)    Intolerances    Standing Inpatient Medications  cefepime   IVPB 1000 milliGRAM(s) IV Intermittent every 24 hours    PRN Inpatient Medications    REVIEW OF SYSTEMS  --------------------------------------------------------------------------------  Gen: No fevers/chills, +Fatigue  Skin: No rash  Head/Eyes/Ears: No headache  Respiratory: No cough  CV: No chest pain  GI: No abdominal pain  : +Hematuria  MSK: No edema  Heme: No easy bruising or bleeding    All other systems were reviewed and are negative, except as noted.    VITALS/PHYSICAL EXAM  --------------------------------------------------------------------------------  T(C): 36.6 (12-21-24 @ 20:04), Max: 36.6 (12-21-24 @ 20:04)  HR: 71 (12-21-24 @ 22:44) (71 - 81)  BP: 98/52 (12-21-24 @ 22:44) (98/52 - 114/72)  RR: 15 (12-21-24 @ 22:44) (14 - 15)  SpO2: 100% (12-21-24 @ 22:44) (100% - 100%)  Wt(kg): --  Height (cm): 180.3 (12-21-24 @ 20:04)  Weight (kg): 93.4 (12-21-24 @ 20:04)  BMI (kg/m2): 28.7 (12-21-24 @ 20:04)  BSA (m2): 2.13 (12-21-24 @ 20:04)    Physical Exam:  Gen: NAD  HEENT: MMM  Pulm: CTA B/L  CV: S1S2  Abd: Soft, +BS   Ext: No LE edema B/L  Neuro: Awake  Skin: Warm and dry  Vascular access: peripheral IVs    LABS/STUDIES  --------------------------------------------------------------------------------              10.5   11.31 >-----------<  217      [12-21-24 @ 21:30]              32.5     130  |  94  |  164  ----------------------------<  170      [12-21-24 @ 21:30]  5.5   |  11  |  12.52        Ca     9.4     [12-21-24 @ 21:30]    TPro  8.2  /  Alb  4.0  /  TBili  0.3  /  DBili  x   /  AST  6   /  ALT  10  /  AlkPhos  72  [12-21-24 @ 21:30]    Creatinine Trend:  SCr 12.52 [12-21 @ 21:30]    Urine Sodium 77      [12-21-24 @ 22:30]  Urine Potassium 16.1      [12-21-24 @ 22:30]  Urine Chloride 51      [12-21-24 @ 22:30] [facility] DIVISION OF KIDNEY DISEASES AND HYPERTENSION -- [phone]  -- INITIAL CONSULT NOTE  --------------------------------------------------------------------------------  HPI: Pt. is a 54 y.o. M w/ PMHx DM, "prostate inflammation", and kidney stones who presented to the ED for abnormal outpatient labs. Nephrology consulted for renal failure.    Pt. seen and examined earlier in the ED. Pt. states that he recently had blood work done with his PMD who called him this evening and told him to go to the ED because his kidneys were failing. Pt. states that he has never had kidney issues before other than kidney stones and has been following a nephrologist at [facility]. Pt. had kidney biopsies done but he is unsure why and thought it may be to rule out cancer. Pt. last had a urological procedure to removed a kidney stone in February 2023. Pt. states that for the past 7-10 days he has been having nausea, vomiting, and been feeling fatigued. He has had cloudy and bloody urine on occasion during this time as well. Pt. denies fever, chills, CP, SOB, or LE swelling.    PAST HISTORY  --------------------------------------------------------------------------------  PAST MEDICAL & SURGICAL HISTORY:  Esophageal achalasia  s/p esophageal repair about 5 years ago    Type 2 diabetes mellitus    Calculus of kidney  passed on own in the past; CT scan showing a 3.5 cm and 1.2 cm left mid-upper stones in December 2021    Former smoker  1 PPD x 16 years; Quit in 2003    Obesity  BMI 34.4    Kidney stones    History of esophageal surgery  esophageal repair about 5 years ago for esopheal achalasia    Other injury of spleen  fall on ice s/p spleen embolization at [facility] IR over 10 years ago    Nephrostomy tube bleed    FAMILY HISTORY: unable to verify a family history of nephrolithiasis    PAST SOCIAL HISTORY: no substance abuse.     ALLERGIES & MEDICATIONS  --------------------------------------------------------------------------------  Allergies    penicillins (Unknown)    Intolerances    Standing Inpatient Medications  cefepime   IVPB 1000 milliGRAM(s) IV Intermittent every 24 hours    PRN Inpatient Medications    REVIEW OF SYSTEMS  --------------------------------------------------------------------------------  Gen: No fevers/chills, +Fatigue  Skin: No rash  Head/Eyes/Ears: No headache  Respiratory: No cough  CV: No chest pain  GI: No abdominal pain  : +Hematuria  MSK: No edema  Heme: No easy bruising or bleeding    All other systems were reviewed and are negative, except as noted.    VITALS/PHYSICAL EXAM  --------------------------------------------------------------------------------  T(C): 36.6 (12-21-24 @ 20:04), Max: 36.6 (12-21-24 @ 20:04)  HR: 71 (12-21-24 @ 22:44) (71 - 81)  BP: 98/52 (12-21-24 @ 22:44) (98/52 - 114/72)  RR: 15 (12-21-24 @ 22:44) (14 - 15)  SpO2: 100% (12-21-24 @ 22:44) (100% - 100%)  Wt(kg): --  Height (cm): 180.3 (12-21-24 @ 20:04)  Weight (kg): 93.4 (12-21-24 @ 20:04)  BMI (kg/m2): 28.7 (12-21-24 @ 20:04)  BSA (m2): 2.13 (12-21-24 @ 20:04)    Physical Exam:  Gen: NAD  HEENT: MMM  Pulm: CTA B/L  CV: S1S2  Abd: Soft, +BS   Ext: No LE edema B/L  Neuro: Awake  Skin: Warm and dry  Vascular access: peripheral IVs    LABS/STUDIES  --------------------------------------------------------------------------------              10.5   11.31 >-----------<  217      [12-21-24 @ 21:30]              32.5     130  |  94  |  164  ----------------------------<  170      [12-21-24 @ 21:30]  5.5   |  11  |  12.52        Ca     9.4     [12-21-24 @ 21:30]    TPro  8.2  /  Alb  4.0  /  TBili  0.3  /  DBili  x   /  AST  6   /  ALT  10  /  AlkPhos  72  [12-21-24 @ 21:30]    Creatinine Trend:  SCr 12.52 [12-21 @ 21:30]    Urine Sodium 77      [12-21-24 @ 22:30]  Urine Potassium 16.1      [12-21-24 @ 22:30]  Urine Chloride 51      [12-21-24 @ 22:30]

## 2024-12-22 NOTE — DIETITIAN INITIAL EVALUATION ADULT - PERTINENT LABORATORY DATA
12-22    133[L]  |  98  |  162[H]  ----------------------------<  143[H]  5.5[H]   |  14[L]  |  11.81[H]    Ca    9.7      22 Dec 2024 12:05  Phos  8.2     12-22  Mg     2.70     12-22    TPro  6.9  /  Alb  x   /  TBili  x   /  DBili  x   /  AST  x   /  ALT  x   /  AlkPhos  x   12-22  POCT Blood Glucose.: 143 mg/dL (12-22-24 @ 11:32)  A1C with Estimated Average Glucose Result: 8.3 % (12-21-24 @ 21:30)

## 2024-12-22 NOTE — H&P ADULT - HISTORY OF PRESENT ILLNESS
54 y.o. M w/ PMHx T2DM, prostatitis, chronic cystitis, and nephrolithiasis s/p PCNL 02/21, left nephroureteral tube 03/24 c/b hematuria needing L renal angioembolization of pseudoaneurysms and PCN-U 04/06 who presented to the ED for abnormal outpatient labs    He visited his PCP on Thursday (12/19) and received a phone from them yesterday asking him to present to  the ED as his "kidney's were shutting down". Per patient he, has been having issues with prostatitis, cystitis and nephrolithiasis for many years now and follows with urologist Dr. Hoenig. He states his since 03/24 after he had the nephroureteral tube placement for nephrolithiasis his "kidney numbers" have been worsening. He states his urologist is aware and he is following with [facility] nephrologist Dr. Chung Burgos. For the last week he has been having dark colored urine, nausea, vomiting, fatigue, with mild left sided flank pain that radiates to the left lower quadrant for the past month. This pain does not feel like kidney stone pain to him and is not colicky. Additionally, he has also been having bladder discomfort and pain for the last few months for which he has been seeing urologist Dr. Hoenig. Pt. had kidney biopsies done but he is unsure why and thought it may be to rule out cancer.  Pt. denies fever, chills, CP, SOB, testicular pain or LE swelling. He also denies any family hx of renal cysts, or kidney disease.    At ED, patient was AOx4, afebrile, hemodynamically stable and on RA. Labs were significant for WBC 11.31 (neutrophil predominant), Hg 10.5, , Na 130, K 5.5, , SCr 12.52, AG 25. VBG showed pH 7.19, lactate 1.0, PCO2 35, HCO3 13. UA showed large LE and blood. Patient got zosyn x1 and then started on cefepime. K was shifted, lokelma x2, received calcium gluconate and EKG significant for mildly peaked T waves. Patient also started on Bicarb gtt, sierra placed, and nephrology consulted. CXR was clear but renal US showed moderate to severe left and moderate right hydronephrosis which persists post void. Non obstructing 1 cm stone in the left midpole and multiple renal cysts as above.           54 y.o. M w/ PMHx T2DM, prostatitis, chronic cystitis, and nephrolithiasis s/p PCNL 02/21, left nephroureteral tube 03/24 c/b hematuria needing L renal angioembolization of pseudoaneurysms and PCN-U 04/06 who presented to the ED for abnormal outpatient labs    He visited his PCP on Thursday (12/19) and received a phone from them yesterday asking him to present to  the ED as his "kidney's were shutting down". Per patient he, has been having issues with prostatitis, cystitis and nephrolithiasis for many years now and follows with urologist Dr. Hoenig. He states his since 03/24 after he had the nephroureteral tube placement for nephrolithiasis his "kidney numbers" have been worsening. He states his urologist is aware and he is following with [facility] nephrologist Dr. Chung Burgos. For the last week he has been having dark colored urine, nausea, vomiting, fatigue, with mild left sided flank pain that radiates to the left lower quadrant for the past month. This pain does not feel like kidney stone pain to him and is not colicky. Additionally, he has also been having bladder discomfort and pain for the last few months for which he has been seeing urologist Dr. Hoenig. Pt. had kidney biopsies done but he is unsure why and thought it may be to rule out cancer.  Pt. denies fever, chills, CP, SOB, testicular pain or LE swelling. He also denies any family hx of renal cysts, or kidney disease.    At ED, patient was AOx4, afebrile, hemodynamically stable and on RA. Labs were significant for WBC 11.31 (neutrophil predominant), Hg 10.5, , Na 130, K 5.5, , SCr 12.52, AG 25. VBG showed pH 7.19, lactate 1.0, PCO2 35, HCO3 13. UA showed large LE and blood. Patient got zosyn x1 and then started on cefepime. K was shifted, lokelma x2, received calcium gluconate and EKG significant for mildly peaked T waves. Patient also started on Bicarb gtt, sierra placed, and nephrology consulted. CXR was clear but renal US showed moderate to severe left and moderate right hydronephrosis which persists post void. Non obstructing 1 cm stone in the left midpole and multiple renal cysts

## 2024-12-22 NOTE — PROCEDURE NOTE - NSCATHTYPE_GEN_A_CORE
Problem: [facility] Patient Care Overview (Adult)  Goal: Plan of Care Review  Outcome: Ongoing (interventions implemented as appropriate)  Goal: Individualization and Mutuality  Outcome: Ongoing (interventions implemented as appropriate)  Goal: Discharge Needs Assessment  Outcome: Ongoing (interventions implemented as appropriate)    Problem: BH Overarching Goals  Goal: Adheres to Safety Considerations for Self and Others  Outcome: Ongoing (interventions implemented as appropriate)  Goal: Optimized Coping Skills in Response to Life Stressors  Outcome: Ongoing (interventions implemented as appropriate)  Goal: Develops/Participates in Therapeutic [place] to Support Successful Transition  Outcome: Ongoing (interventions implemented as appropriate)  Patient is alert to name only, is unable to participate in unit programs.       Dialysis

## 2024-12-22 NOTE — CONSULT NOTE ADULT - PROBLEM SELECTOR RECOMMENDATION 2
Pt. with metabolic acidosis in the setting of renal failure. Venous pH low at 7.19, SCO2 low at 11 on admission (12/21/24). Would recommend bicarb gtt with 3 amps of bicarb at 100cc/hr. Repeat BMP in 6 hours.

## 2024-12-22 NOTE — H&P ADULT - ASSESSMENT
54 y.o. M w/ PMHx T2DM, prostatitis, chronic cystitis, and nephrolithiasis s/p PCNL 02/21, left nephroureteral tube 03/24 c/b hematuria needing L renal angioembolization of pseudoaneurysms and PCN-U 04/06 who presented to the ED for abnormal outpatient labs. in acute renal failure and plan for HD today

## 2024-12-22 NOTE — H&P ADULT - NSHPADDITIONALINFOADULT_GEN_ALL_CORE
Patient seen, examined, and interviewed by me, case discussed with me, chart reviewed, agree with above H/P as reviewed.  See  full note above.  discussed with house staff and PMD  Dr. SRIRAM Stephens ([phone])

## 2024-12-22 NOTE — H&P ADULT - PROBLEM SELECTOR PLAN 5
A1c on admission 8.3%  - Home meds of alglipitin, jardiance. Need to confirm doses.    Plan:  - ISS premeal and bedtime

## 2024-12-22 NOTE — H&P ADULT - NSICDXPASTSURGICALHX_GEN_ALL_CORE_FT
PAST SURGICAL HISTORY:  History of esophageal surgery esophageal repair about 5 years ago for esopheal achalasia    Nephrostomy tube bleed     Other injury of spleen fall on ice s/p spleen embolization at [facility] IR over 10 years ago

## 2024-12-22 NOTE — H&P ADULT - PROBLEM SELECTOR PLAN 1
- [facility]. Scr elevated to 12.52 on admission (12/21/24). UA with proteinuria, hematuria, and bacteria (12/21/24). Urine Na 77.   - US Kidneys and bladder significant for moderate to severe left and moderate right hydronephrosis, multiple renal cysts, and nonobstructing 1cm stone in the left midpole (12/21/24).  - outpatient nephrologist  Dr. Chung Burgos [facility]    Plan:  - nephrology following. HD today   - f/u on UPCR SOLO, Anti-dsDNA, Anti-GBM ab, anti-PLA2R, C3, C4, ANCA w/ reflex, SPEP, serum immunofixation, free light chains, acute Hep panel, HIV, Syphilis screen.  - renally dose meds  - Medical release form in chart. Will follow up with outpatient nephrologist on Monday

## 2024-12-22 NOTE — DIETITIAN INITIAL EVALUATION ADULT - OTHER CALCULATIONS
COLORECTAL SURGERY CONSULT NOTE  --------------------------------------------------------------------------------------------    Patient is an 85 year old female with a PMH of breast cancer, dementia, HTN, COPD, DMII, diastolic CHF, Dementia and a LGIB 5 years ago who presented to the ED with maroon colored stool.  5 years ago she was found to have colitis of the hepatic flexure, thought to be ischemic.  Since being in the hospital she has received 2 units of PRBC and this morning her Hgb appeared to drop after getting a unit of PRBC and colorectal surgery was consulted.  However, on repeat CBC she had responded to the unit of PRBC.    She lives at home with her daughter and has a home health aid.  She walks at home, sometimes with a cane.    Patient denies fevers/chills, denies lightheadedness/dizziness, denies SOB/chest pain, denies nausea/vomiting, denies constipation/diarrhea.  ***    ROS: 10-system review is otherwise negative except HPI above.      PAST MEDICAL & SURGICAL HISTORY:  Chronic Diastolic Heart Failure  Chronic Obstructive Pulmonary Disease (COPD)  Depression  Dementia  Diabetes Mellitus Type II  History of Hypothyroidism  Hypertension  S/P total B/LKnee Replacement    FAMILY HISTORY:  No pertinent family history in first degree relatives    [] Family history not pertinent as reviewed with the patient and family    SOCIAL HISTORY:  ***    ALLERGIES: Vasotec (Unknown)      HOME MEDICATIONS: ***    CURRENT MEDICATIONS  MEDICATIONS (STANDING): atorvastatin 40 milliGRAM(s) Oral at bedtime  budesonide 160 MICROgram(s)/formoterol 4.5 MICROgram(s) Inhaler 2 Puff(s) Inhalation two times a day  dextrose 5%. 1000 milliLiter(s) IV Continuous <Continuous>  dextrose 50% Injectable 12.5 Gram(s) IV Push once  dextrose 50% Injectable 25 Gram(s) IV Push once  dextrose 50% Injectable 25 Gram(s) IV Push once  donepezil 5 milliGRAM(s) Oral at bedtime  insulin lispro (HumaLOG) corrective regimen sliding scale   SubCutaneous three times a day before meals  insulin lispro (HumaLOG) corrective regimen sliding scale   SubCutaneous at bedtime  levothyroxine 125 MICROGram(s) Oral daily  pantoprazole  Injectable 40 milliGRAM(s) IV Push daily  PARoxetine 30 milliGRAM(s) Oral daily  QUEtiapine 100 milliGRAM(s) Oral at bedtime  sodium chloride 0.9%. 1000 milliLiter(s) IV Continuous <Continuous>    MEDICATIONS (PRN):ALBUTerol    90 MICROgram(s) HFA Inhaler 2 Puff(s) Inhalation every 6 hours PRN Bronchospasm  dextrose 40% Gel 15 Gram(s) Oral once PRN Blood Glucose LESS THAN 70 milliGRAM(s)/deciliter  glucagon  Injectable 1 milliGRAM(s) IntraMuscular once PRN Glucose LESS THAN 70 milligrams/deciliter    --------------------------------------------------------------------------------------------    Vitals:   T(C): 36.6 (20 @ 08:36), Max: 37.3 (20 @ 19:58)  HR: 73 (20 @ 08:36) (73 - 90)  BP: 147/67 (20 @ 08:36) (108/62 - 172/91)  RR: 18 (20 @ 08:36) (18 - 18)  SpO2: 97% (20 @ 08:36) (93% - 98%)  CAPILLARY BLOOD GLUCOSE      POCT Blood Glucose.: 164 mg/dL (2020 08:33)  POCT Blood Glucose.: 178 mg/dL (2020 21:20)  POCT Blood Glucose.: 123 mg/dL (2020 17:08)  POCT Blood Glucose.: 110 mg/dL (2020 12:33)    CAPILLARY BLOOD GLUCOSE      POCT Blood Glucose.: 164 mg/dL (2020 08:33)  POCT Blood Glucose.: 178 mg/dL (2020 21:20)  POCT Blood Glucose.: 123 mg/dL (2020 17:08)  POCT Blood Glucose.: 110 mg/dL (2020 12:33)       07:01  -   07:00  --------------------------------------------------------  IN:    Oral Fluid: 240 mL    sodium chloride 0.9%.: 600 mL  Total IN: 840 mL    OUT:  Total OUT: 0 mL    Total NET: 840 mL        Height (cm): 152.4 ( 20:)  Weight (kg): 72.6 (:)  BMI (kg/m2): 31.3 (:)  BSA (m2): 1.7 (:)    PHYSICAL EXAM: ***  General: NAD, Lying in bed comfortably  Neuro: A+Ox3  HEENT: NC/AT, EOMI  Neck: Soft, supple  Cardio: RRR, nml S1/S2  Resp: Good effort, CTA b/l  Thorax: No chest wall tenderness  Breast: No lesions/masses, no drainage  GI/Abd: Soft, NT/ND, no rebound/guarding, no masses palpated  Vascular: All 4 extremities warm.  Skin: Intact, no breakdown  Lymphatic/Nodes: No palpable lymphadenopathy  Musculoskeletal: All 4 extremities moving spontaneously, no limitations  --------------------------------------------------------------------------------------------    LABS  CBC ( @ 10:55)                              8.9<L>                         9.25    )----------------(  193        --    % Neutrophils, --    % Lymphocytes, ANC: --                                  28.4<L>  CBC ( @ 07:02)                              6.3<LL>                         5.01    )----------------(  62<L>      --    % Neutrophils, --    % Lymphocytes, ANC: --                                  20.5<LL>    BMP ( @ 07:02)             140     |  105     |  30<H> 		Ca++ --      Ca 8.9                ---------------------------------( 169<H>		Mg 2.0                4.8     |  22      |  1.65<H>			Ph 4.2     BMP ( @ 10:59)             138     |  104     |  33<H> 		Ca++ --      Ca 8.8                ---------------------------------( 160<H>		Mg 1.9                4.2     |  22      |  1.48<H>			Ph 3.4                 --------------------------------------------------------------------------------------------    MICROBIOLOGY  Urinalysis ( @ 02:10):     Color: Light Yellow / Appearance: Turbid<!> / S.012 / pH: 8.5<H> / Gluc: Negative / Ketones: Negative / Bili: Negative / Urobili: <2 mg/dL / Protein :100 mg/dL<!> / Nitrites: Negative / Leuk.Est: Large<!> / RBC: 10<H> / WBC: 111<H> / Sq Epi:  / Non Sq Epi: 5 / Bacteria Many<!>   Moderate Amorphous Phosphates    -> .Stool Feces Culture ( @ 00:35)     NG    NG    GI PCR Results: NOT detected  *******Please Note:*******  GI panel PCR evaluates for:  Campylobacter, Plesiomonas shigelloides, Salmonella,  Vibrio, Yersinia enterocolitica, Enteroaggregative  Escherichia coli (EAEC), Enteropathogenic E.coli (EPEC),  Enterotoxigenic E. coli (ETEC) lt/st, Shiga-like  toxin-producing E. coli (STEC) stx1/stx2,  Shigella/ Enteroinvasive E. coli (EIEC), Cryptosporidium,  Cyclospora cayetanensis, Entamoeba histolytica,  Giardia lamblia, Adenovirus F 40/41, Astrovirus,  Norovirus GI/GII, Rotavirus A, Sapovirus      --------------------------------------------------------------------------------------------    IMAGING  ***    ASSESSMENT: Patient is a 85y old f with ****    PLAN:  ***  -   -   -   -   - Patient seen/examined with attending.  - Plan to be discussed with Attending,  COLORECTAL SURGERY CONSULT NOTE  --------------------------------------------------------------------------------------------    Patient is an 85 year old female with a PMH of breast cancer, dementia, HTN, COPD, DMII, diastolic CHF, Dementia and a LGIB 5 years ago who presented to the ED with maroon colored stool.  5 years ago she was found to have colitis of the hepatic flexure, thought to be ischemic.  Since being in the hospital she has received 2 units of PRBC and this morning her Hgb appeared to drop after getting a unit of PRBC and colorectal surgery was consulted.  However, on repeat CBC she had responded to the unit of PRBC.  Overnight she didnt have any bowel movements per the nurse.  GI has been seeing her and had originally offered a colonoscopy, however the daughter does not want a colonoscopy and is only willing to have a sigmoidoscopy.  Patient lives at home with her daughter and has a home health aid.  She walks at home, sometimes with a cane.      ROS: 10-system review is otherwise negative except HPI above.      PAST MEDICAL & SURGICAL HISTORY:  Chronic Diastolic Heart Failure  Chronic Obstructive Pulmonary Disease (COPD)  Depression  Dementia  Diabetes Mellitus Type II  History of Hypothyroidism  Hypertension  S/P total B/LKnee Replacement    FAMILY HISTORY:  No pertinent family history in first degree relatives    ALLERGIES: Vasotec (Unknown)      CURRENT MEDICATIONS  MEDICATIONS (STANDING): atorvastatin 40 milliGRAM(s) Oral at bedtime  budesonide 160 MICROgram(s)/formoterol 4.5 MICROgram(s) Inhaler 2 Puff(s) Inhalation two times a day  dextrose 5%. 1000 milliLiter(s) IV Continuous <Continuous>  dextrose 50% Injectable 12.5 Gram(s) IV Push once  dextrose 50% Injectable 25 Gram(s) IV Push once  dextrose 50% Injectable 25 Gram(s) IV Push once  donepezil 5 milliGRAM(s) Oral at bedtime  insulin lispro (HumaLOG) corrective regimen sliding scale   SubCutaneous three times a day before meals  insulin lispro (HumaLOG) corrective regimen sliding scale   SubCutaneous at bedtime  levothyroxine 125 MICROGram(s) Oral daily  pantoprazole  Injectable 40 milliGRAM(s) IV Push daily  PARoxetine 30 milliGRAM(s) Oral daily  QUEtiapine 100 milliGRAM(s) Oral at bedtime  sodium chloride 0.9%. 1000 milliLiter(s) IV Continuous <Continuous>    MEDICATIONS (PRN):ALBUTerol    90 MICROgram(s) HFA Inhaler 2 Puff(s) Inhalation every 6 hours PRN Bronchospasm  dextrose 40% Gel 15 Gram(s) Oral once PRN Blood Glucose LESS THAN 70 milliGRAM(s)/deciliter  glucagon  Injectable 1 milliGRAM(s) IntraMuscular once PRN Glucose LESS THAN 70 milligrams/deciliter    --------------------------------------------------------------------------------------------    Vitals:   T(C): 36.6 (20 @ 08:36), Max: 37.3 (20 @ 19:58)  HR: 73 (20 @ 08:36) (73 - 90)  BP: 147/67 (20 @ 08:36) (108/62 - 172/91)  RR: 18 (20 @ 08:36) (18 - 18)  SpO2: 97% (20 @ 08:36) (93% - 98%)  CAPILLARY BLOOD GLUCOSE      POCT Blood Glucose.: 164 mg/dL (2020 08:33)  POCT Blood Glucose.: 178 mg/dL (2020 21:20)  POCT Blood Glucose.: 123 mg/dL (2020 17:08)  POCT Blood Glucose.: 110 mg/dL (2020 12:33)    CAPILLARY BLOOD GLUCOSE      POCT Blood Glucose.: 164 mg/dL (2020 08:33)  POCT Blood Glucose.: 178 mg/dL (2020 21:20)  POCT Blood Glucose.: 123 mg/dL (2020 17:08)  POCT Blood Glucose.: 110 mg/dL (2020 12:33)       @ 07:01  -   07:00  --------------------------------------------------------  IN:    Oral Fluid: 240 mL    sodium chloride 0.9%.: 600 mL  Total IN: 840 mL    OUT:  Total OUT: 0 mL    Total NET: 840 mL        Height (cm): 152.4 (:02)  Weight (kg): 72.6 ( 20:)  BMI (kg/m2): 31.3 (:)  BSA (m2): 1.7 (:)    PHYSICAL EXAM:  General: NAD, Lying in bed comfortably  HEENT: NC/AT  Cardio: Regular rate  Resp: Good effort  GI/Abd: Soft, NT/ND, On rectal exam there were no external hemorrhoids and there was maroon colored feces upon BETTY.  Musculoskeletal: All 4 extremities moving spontaneously  --------------------------------------------------------------------------------------------    LABS  CBC ( @ 10:55)                              8.9<L>                         9.25    )----------------(  193        --    % Neutrophils, --    % Lymphocytes, ANC: --                                  28.4<L>  CBC ( @ 07:02)                              6.3<LL>                         5.01    )----------------(  62<L>      --    % Neutrophils, --    % Lymphocytes, ANC: --                                  20.5<LL>    BMP ( @ 07:02)             140     |  105     |  30<H> 		Ca++ --      Ca 8.9                ---------------------------------( 169<H>		Mg 2.0                4.8     |  22      |  1.65<H>			Ph 4.2     BMP ( @ 10:59)             138     |  104     |  33<H> 		Ca++ --      Ca 8.8                ---------------------------------( 160<H>		Mg 1.9                4.2     |  22      |  1.48<H>			Ph 3.4                 --------------------------------------------------------------------------------------------    MICROBIOLOGY  Urinalysis ( @ 02:10):     Color: Light Yellow / Appearance: Turbid<!> / S.012 / pH: 8.5<H> / Gluc: Negative / Ketones: Negative / Bili: Negative / Urobili: <2 mg/dL / Protein :100 mg/dL<!> / Nitrites: Negative / Leuk.Est: Large<!> / RBC: 10<H> / WBC: 111<H> / Sq Epi:  / Non Sq Epi: 5 / Bacteria Many<!>   Moderate Amorphous Phosphates    -> .Stool Feces Culture ( @ 00:35)     NG    NG    GI PCR Results: NOT detected  *******Please Note:*******  GI panel PCR evaluates for:  Campylobacter, Plesiomonas shigelloides, Salmonella,  Vibrio, Yersinia enterocolitica, Enteroaggregative  Escherichia coli (EAEC), Enteropathogenic E.coli (EPEC),  Enterotoxigenic E. coli (ETEC) lt/st, Shiga-like  toxin-producing E. coli (STEC) stx1/stx2,  Shigella/ Enteroinvasive E. coli (EIEC), Cryptosporidium,  Cyclospora cayetanensis, Entamoeba histolytica,  Giardia lamblia, Adenovirus F 40/41, Astrovirus,  Norovirus GI/GII, Rotavirus A, Sapovirus      --------------------------------------------------------------------------------------------    IMAGING  CT A/P:  FINDINGS:    LOWER CHEST: Cardiomegaly.    LIVER: Within normal limits.  BILE DUCTS: Normal caliber.  GALLBLADDER: Within normal limits.  SPLEEN: Within normal limits.  PANCREAS: Within normal limits.  ADRENALS: Within normal limits.  KIDNEYS/URETERS: Bilateral subcentimeter hypodense foci, too small to characterize. No hydronephrosis.    BLADDER: Diffuse wall thickening.  REPRODUCTIVE ORGANS: Uterus and adnexa within normal limits.    BOWEL: Small hiatal hernia. No bowel obstruction. Appendix is not visualized. Colonic diverticulosis without diverticulitis. Mild colonic wall thickening involving the proximal sigmoid and descending colon.  PERITONEUM: No drainable fluid collection.  VESSELS: Atherosclerotic changes.  RETROPERITONEUM/LYMPH NODES: No lymphadenopathy.    ABDOMINAL WALL: Small fat-containing umbilical hernia.  BONES: Unchanged sclerotic focus in the L1 vertebral body. Degenerative changes.    IMPRESSION:     Longer segment wall thickening involving the proximal sigmoid and descending colon favoring colitis.    Extensive diverticular disease.    Diffuse bladder wall thickening; correlate with urinalysis for underlying cystitis.      ASSESSMENT: Patient is an 85 year old female with a PMH of breast cancer, dementia, HTN, COPD, DMII, diastolic CHF, Dementia and a LGIB 5 years ago with a GI bleed, most likely lower.    PLAN:   - Follow up GI recs and possible sigmoidoscopy.  Would trend H/H.  - Discussed with daughter, Jessica, and does not want to pursue any surgical options.  Family is against colonoscopy (willing to have sigmoidoscopy) and any surgical options if needed at this point.    - Please call back with any questions or if family changes mind.  - Plan discussed with Attending, Dr. Ericka De La Cruz PGY3  [facility] #[id number] [phone]% DBW

## 2024-12-22 NOTE — H&P ADULT - PROBLEM SELECTOR PLAN 2
VBG showed pH 7.19, lactate 1.0, PCO2 35, HCO3 13  - in setting of ESRD    Plan:  - Continue bicarb gtt  - f/u repeat labs  - ESRD plan as above

## 2024-12-22 NOTE — ED ADULT NURSE REASSESSMENT NOTE - NS ED NURSE REASSESS COMMENT FT1
14F sierra in place using sterile technique; 300mL of hematuria noted. Patient on CM; NSR. VS noted. Respirations even and unlabored, chest rise symmetrical b/l. Patient medicated per chart. Comfort measures maintained. Bed in lowest position. Safety maintained. Patient pending admission and nephrology consults.

## 2024-12-22 NOTE — H&P ADULT - PROBLEM SELECTOR PLAN 6
- Fluids: None  - Electrolytes: Will replete to maintain K>4, Phos>3, and Mag>2  - Nutrition: NPO for now  - Bowel Regiment: Hold  - Activity: As tolerated. PT not indicated  - DVT Prophylaxis: Hold for now  - Stress Ulcer/GI Prophylaxis: None  - Disposition: Admit for HD

## 2024-12-22 NOTE — CONSULT NOTE ADULT - PROBLEM SELECTOR RECOMMENDATION 3
Pt. with hyperkalemia in the setting of renal failure. Serum potassium elevated at 5.5 on admission (12/21/24). Recommend medical management with IV insulin/D50, calcium gluconate, lokelma 10 gm once. Obtain EKG. Monitor serum potassium.    Discussed with attending on call.  If you have any questions, please feel free to contact me  Virgil Sterling  Nephrology Fellow  Page [phone] / [employer] Teams (Preferred)  (After 4pm or on weekends please page the on-call fellow)

## 2024-12-22 NOTE — CONSULT NOTE ADULT - PROBLEM SELECTOR RECOMMENDATION 9
Pt. with renal failure in the setting of b/l hydronephrosis. On review of [facility], Scr 0.99 on 4/7/22. Scr elevated to 12.52 on admission (12/21/24). UA with proteinuria, hematuria, and bacteria (12/21/24). Urine Na 77. US Kidneys and bladder significant for moderate to severe left and moderate right hydronephrosis, multiple renal cysts, and nonobstructing 1cm stone in the left midpole (12/21/24).    Place sierra catheter. Renal failure likely secondary to hydronephrosis, however will need to rule out secondary causes. Please order UPCR SOLO, Anti-dsDNA, Anti-GBM ab, anti-PLA2R, C3, C4, ANCA w/ reflex, SPEP, serum immunofixation, free light chains, acute Hep panel, HIV, Syphilis screen. Monitor labs and urine output. HD consent obtained and placed in chart, however, no plan for HD at this time. Avoid any potential nephrotoxins when possible and dose medications per eGFR.

## 2024-12-22 NOTE — H&P ADULT - NSHPLABSRESULTS_GEN_ALL_CORE
LABS:                        10.5   11.31 )-----------( 217      ( 21 Dec 2024 21:30 )             32.5     12-22    130[L]  |  99  |  160[H]  ----------------------------<  129[H]  5.5[H]   |  9[LL]  |  12.08[H]    Ca    9.0      22 Dec 2024 03:10  Phos  8.6     12-22  Mg     2.70     12-22    TPro  8.2  /  Alb  4.0  /  TBili  0.3  /  DBili  x   /  AST  6   /  ALT  10  /  AlkPhos  72  12-21          Urinalysis Basic - ( 22 Dec 2024 03:10 )    Color: x / Appearance: x / SG: x / pH: x  Gluc: 129 mg/dL / Ketone: x  / Bili: x / Urobili: x   Blood: x / Protein: x / Nitrite: x   Leuk Esterase: x / RBC: x / WBC x   Sq Epi: x / Non Sq Epi: x / Bacteria: x        Urinalysis with Rflx Culture (collected 21 Dec 2024 21:30)        ADDITIONAL TESTS & IMAGING  RADIOLOGY:    Radiology Reviewed

## 2024-12-22 NOTE — DIETITIAN INITIAL EVALUATION ADULT - PROBLEM SELECTOR PLAN 1
- [facility] on [facility]. Scr elevated to 12.52 on admission (12/21/24). UA with proteinuria, hematuria, and bacteria (12/21/24). Urine Na 77.   - US Kidneys and bladder significant for moderate to severe left and moderate right hydronephrosis, multiple renal cysts, and nonobstructing 1cm stone in the left midpole (12/21/24).  - outpatient nephrologist  Dr. Chung Burgos [facility]    Plan:  - nephrology following. HD today   - f/u on UPCR SOLO, Anti-dsDNA, Anti-GBM ab, anti-PLA2R, C3, C4, ANCA w/ reflex, SPEP, serum immunofixation, free light chains, acute Hep panel, HIV, Syphilis screen.  - renally dose meds  - Medical release form in chart. Will follow up with outpatient nephrologist on Monday

## 2024-12-22 NOTE — H&P ADULT - PROBLEM SELECTOR PLAN 4
- UA showed large LE and blood  - hx of nephrostomy tubes. Will need ppx pseudomonas coverage  - CXR clear  DDx: likely UTI    Plan:  - F/u on urine culture  - Continue cefepime - UA showed large LE and blood  - hx of nephrostomy tubes. Will need ppx pseudomonas coverage  - CXR clear  - Jung draining significant pus    DDx: likely UTI    Plan:  - F/u on urine culture  - Continue cefepime  - ID consult

## 2024-12-22 NOTE — DIETITIAN INITIAL EVALUATION ADULT - PROBLEM SELECTOR PLAN 4
- UA showed large LE and blood  - hx of nephrostomy tubes. Will need ppx pseudomonas coverage  - CXR clear  - Jung draining significant pus    DDx: likely UTI    Plan:  - F/u on urine culture  - Continue cefepime  - ID consult

## 2024-12-22 NOTE — CONSULT NOTE ADULT - ASSESSMENT
Impression/Hospital Course: 54M with past medical history of T2DM, prostatitis, chronic cystitis, and nephrolithiasis s/p PCNL 02/21, left nephroureteral tube 03/24 c/b hematuria needing L renal angioembolization of pseudoaneurysms and PCN-U 04/06 who presented to the ED for elevated creatinine. Patient in the ED is afebrile and hemodynamically stable. UA with pyuria and bacteruria. Renal US with Moderate to severe left and moderate right hydronephrosis which persists post void. ID asked to evaluate for possible UTI.     Assessment:  #Acute renal failure  #Bilateral Hydronpehrosis  #Chronic cystitis - suspect chronic interstitial cystitis  #Bacteruria - unclear is this is an active infections process at this time.     Recommendations:   -Continue with Cefepime 1g IV Q24H   -Follow up urine culture and blood culture  -Urology Eval   -HD per nephrology team   -Monitor Vitals    Case discussed with attending. Recommendations were made to the primary tea.     Bernardo Nguyễn DO, PGY-4  Infectious Disease Fellow  [facility] Teams Preferred  After 5pm/weekends call [phone]

## 2024-12-22 NOTE — PATIENT PROFILE ADULT - FALL HARM RISK - HARM RISK INTERVENTIONS
Assistance with ambulation/Communicate Risk of Fall with Harm to all staff/Monitor for mental status changes/Monitor gait and stability/Reinforce activity limits and safety measures with patient and family/Reorient to person, place and time as needed/Review medications for side effects contributing to fall risk/Sit up slowly, dangle for a short time, stand at bedside before walking/Tailored Fall Risk Interventions/Toileting schedule using arm’s reach rule for commode and bathroom/Use of alarms - bed, chair and/or voice tab/Visual Cue: Yellow wristband and red socks/Bed in lowest position, wheels locked, appropriate side rails in place/Call bell, personal items and telephone in reach/Instruct patient to call for assistance before getting out of bed or chair/Non-slip footwear when patient is out of bed/Moody to call system/Physically safe environment - no spills, clutter or unnecessary equipment/Purposeful Proactive Rounding/Room/bathroom lighting operational, light cord in reach

## 2024-12-22 NOTE — H&P ADULT - NSHPSOCIALHISTORY_GEN_ALL_CORE
- Quit smoking 20 years ago. 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

## 2024-12-22 NOTE — CONSULT NOTE ADULT - SUBJECTIVE AND OBJECTIVE BOX
VASCULAR SURGERY CONSULT NOTE  --------------------------------------------------------------------------------------------  Patient is a 54y old  Male who presents with a chief complaint of abnormal laboratory results.    HPI: HPI:  A 54 year old male with PMHx of DM, prostatitis, and kidney stones presents to the ED for abnormal laboratory results.     ROS: 10-system review is otherwise negative except HPI above.      PAST MEDICAL & SURGICAL HISTORY:  Esophageal achalasia  s/p esophageal repair about 5 years ago      Type 2 diabetes mellitus      Calculus of kidney  passed on own in the past; CT scan showing a 3.5 cm and 1.2 cm left mid-upper stones in December 2021      Former smoker  1 PPD x 16 years; Quit in 2003      Obesity  BMI 34.4      Kidney stones      History of esophageal surgery  esophageal repair about 5 years ago for esopheal achalasia      Other injury of spleen  fall on ice s/p spleen embolization at [facility] IR over 10 years ago      Nephrostomy tube bleed        FAMILY HISTORY:      SOCIAL HISTORY:      ALLERGIES: penicillins (Unknown)      HOME MEDICATIONS:     CURRENT MEDICATIONS  MEDICATIONS (STANDING): cefepime   IVPB 1000 milliGRAM(s) IV Intermittent every 24 hours  dextrose 5%. 1000 milliLiter(s) IV Continuous <Continuous>  dextrose 5%. 1000 milliLiter(s) IV Continuous <Continuous>  dextrose 50% Injectable 25 Gram(s) IV Push once  dextrose 50% Injectable 12.5 Gram(s) IV Push once  dextrose 50% Injectable 25 Gram(s) IV Push once  dextrose Oral Gel 15 Gram(s) Oral once  glucagon  Injectable 1 milliGRAM(s) IntraMuscular once  insulin lispro (ADMELOG) corrective regimen sliding scale   SubCutaneous three times a day before meals  insulin lispro (ADMELOG) corrective regimen sliding scale   SubCutaneous at bedtime  sodium bicarbonate  Infusion 0.161 mEq/kG/Hr IV Continuous <Continuous>    MEDICATIONS (PRN):acetaminophen     Tablet .. 650 milliGRAM(s) Oral every 6 hours PRN Temp greater or equal to 38C (100.4F), Mild Pain (1 - 3)    --------------------------------------------------------------------------------------------    Vitals:   T(C): 36.4 (12-22-24 @ 04:47), Max: 36.7 (12-22-24 @ 00:31)  HR: 69 (12-22-24 @ 04:47) (69 - 81)  BP: 120/64 (12-22-24 @ 04:47) (98/52 - 120/64)  RR: 18 (12-22-24 @ 04:47) (14 - 18)  SpO2: 100% (12-22-24 @ 04:47) (99% - 100%)  CAPILLARY BLOOD GLUCOSE      POCT Blood Glucose.: 131 mg/dL (22 Dec 2024 08:40)  POCT Blood Glucose.: 130 mg/dL (22 Dec 2024 06:18)  POCT Blood Glucose.: 126 mg/dL (22 Dec 2024 04:05)  POCT Blood Glucose.: 183 mg/dL (21 Dec 2024 20:07)    CAPILLARY BLOOD GLUCOSE      POCT Blood Glucose.: 131 mg/dL (22 Dec 2024 08:40)  POCT Blood Glucose.: 130 mg/dL (22 Dec 2024 06:18)  POCT Blood Glucose.: 126 mg/dL (22 Dec 2024 04:05)  POCT Blood Glucose.: 183 mg/dL (21 Dec 2024 20:07)      Height (cm): 180.3 (12-22 @ 04:47)  Weight (kg): 94 (12-22 @ 04:47)  BMI (kg/m2): 28.9 (12-22 @ 04:47)  BSA (m2): 2.14 (12-22 @ 04:47)    --------------------------------------------------------------------------------------------    LABS  CBC (12-21 @ 21:30)                              10.5[L]                         11.31[H]  )----------------(  217        78.0[H]% Neutrophils, 4.5[L]% Lymphocytes, ANC: 8.82[H]                              32.5[L]    BMP (12-22 @ 03:10)             130[L]  |  99      |  160[H]		Ca++ --      Ca 9.0                ---------------------------------( 129[H]		Mg 2.70[H]             5.5[H]  |  9[LL]   |  12.08[H]			Ph 8.6[H]  BMP (12-21 @ 21:30)             130[L]  |  94[L]   |  164[H]		Ca++ --      Ca 9.4                ---------------------------------( 170[H]		Mg --                 5.5[H]  |  11[L]   |  12.52[H]			Ph --        LFTs (12-21 @ 21:30)      TPro 8.2 / Alb 4.0 / TBili 0.3 / DBili -- / AST 6 / ALT 10 / AlkPhos 72          VBG (12-21 @ 22:22)     7.19[L] / 35[L] / 35 / 13[L] / -13.8[L] / 47.2[L]%     Lactate: 1.0    --------------------------------------------------------------------------------------------    MICROBIOLOGY  Urinalysis (12-22 @ 03:10):     Color:  / Appearance:  / SG:  / pH:  / Gluc: 129[H] / Ketones:  / Bili:  / Urobili:  / Protein : / Nitrites:  / Leuk.Est:  / RBC:  / WBC:  / Sq Epi:  / Non Sq Epi:  / Bacteria          --------------------------------------------------------------------------------------------    IMAGING

## 2024-12-22 NOTE — DIETITIAN INITIAL EVALUATION ADULT - OTHER INFO
54M with past medical history of T2DM, prostatitis, chronic cystitis, and nephrolithiasis s/p PCNL 02/21, left nephroureteral tube 03/24 c/b hematuria needing L renal angioembolization of pseudoaneurysms and PCN-U 04/06 who presented to the ED for elevated creatinine.    Pt seen NPO at present for test, when medically feasible suggest Renal, CHO consistent diet. Noted skin ecchymosis, no edema per nursing flow sheet. Labs reviewed. A1c- 8.3% (H) (12/21/24)- uncontrolled DM.

## 2024-12-22 NOTE — DIETITIAN INITIAL EVALUATION ADULT - NSICDXPASTSURGICALHX_GEN_ALL_CORE_FT
PAST SURGICAL HISTORY:  History of esophageal surgery esophageal repair about 5 years ago for esopheal achalasia    Nephrostomy tube bleed     Other injury of spleen fall on ice s/p spleen embolization at [facility] IR over 10 years ago

## 2024-12-23 LAB
ADD ON TEST-SPECIMEN IN LAB: SIGNIFICANT CHANGE UP
ANION GAP SERPL CALC-SCNC: 18 MMOL/L — HIGH (ref 7–14)
BASOPHILS # BLD AUTO: 0.03 K/UL — SIGNIFICANT CHANGE UP (ref 0–0.2)
BASOPHILS NFR BLD AUTO: 0.3 % — SIGNIFICANT CHANGE UP (ref 0–2)
BLOOD GAS VENOUS COMPREHENSIVE RESULT: SIGNIFICANT CHANGE UP
BUN SERPL-MCNC: 123 MG/DL — HIGH (ref 7–23)
CALCIUM SERPL-MCNC: 9.3 MG/DL — SIGNIFICANT CHANGE UP (ref 8.4–10.5)
CHLORIDE SERPL-SCNC: 96 MMOL/L — LOW (ref 98–107)
CO2 SERPL-SCNC: 20 MMOL/L — LOW (ref 22–31)
CREAT SERPL-MCNC: 8.71 MG/DL — HIGH (ref 0.5–1.3)
DSDNA AB SER-ACNC: <1 IU/ML — SIGNIFICANT CHANGE UP
EGFR: 7 ML/MIN/1.73M2 — LOW
EOSINOPHIL # BLD AUTO: 0.64 K/UL — HIGH (ref 0–0.5)
EOSINOPHIL NFR BLD AUTO: 7.4 % — HIGH (ref 0–6)
FERRITIN SERPL-MCNC: 612 NG/ML — HIGH (ref 30–400)
GLUCOSE BLDC GLUCOMTR-MCNC: 143 MG/DL — HIGH (ref 70–99)
GLUCOSE BLDC GLUCOMTR-MCNC: 160 MG/DL — HIGH (ref 70–99)
GLUCOSE BLDC GLUCOMTR-MCNC: 164 MG/DL — HIGH (ref 70–99)
GLUCOSE BLDC GLUCOMTR-MCNC: 192 MG/DL — HIGH (ref 70–99)
GLUCOSE BLDC GLUCOMTR-MCNC: 215 MG/DL — HIGH (ref 70–99)
GLUCOSE SERPL-MCNC: 168 MG/DL — HIGH (ref 70–99)
HBV CORE AB SER-ACNC: SIGNIFICANT CHANGE UP
HBV CORE IGM SER-ACNC: SIGNIFICANT CHANGE UP
HBV SURFACE AB SER-ACNC: <3 MIU/ML — LOW
HBV SURFACE AG SER-ACNC: SIGNIFICANT CHANGE UP
HCT VFR BLD CALC: 28.5 % — LOW (ref 39–50)
HCV AB S/CO SERPL IA: 0.38 S/CO — SIGNIFICANT CHANGE UP (ref 0–0.99)
HCV AB SERPL-IMP: SIGNIFICANT CHANGE UP
HGB BLD-MCNC: 9.8 G/DL — LOW (ref 13–17)
IANC: 6.07 K/UL — SIGNIFICANT CHANGE UP (ref 1.8–7.4)
IMM GRANULOCYTES NFR BLD AUTO: 0.6 % — SIGNIFICANT CHANGE UP (ref 0–0.9)
IRON SATN MFR SERPL: 18 % — SIGNIFICANT CHANGE UP (ref 14–50)
IRON SATN MFR SERPL: 27 UG/DL — LOW (ref 45–165)
KAPPA LC SER QL IFE: 9.86 MG/DL — HIGH (ref 0.33–1.94)
KAPPA/LAMBDA FREE LIGHT CHAIN RATIO, SERUM: 1.65 RATIO — SIGNIFICANT CHANGE UP (ref 0.26–1.65)
LAMBDA LC SER QL IFE: 5.97 MG/DL — HIGH (ref 0.57–2.63)
LYMPHOCYTES # BLD AUTO: 0.44 K/UL — LOW (ref 1–3.3)
LYMPHOCYTES # BLD AUTO: 5.1 % — LOW (ref 13–44)
MAGNESIUM SERPL-MCNC: 2.5 MG/DL — SIGNIFICANT CHANGE UP (ref 1.6–2.6)
MCHC RBC-ENTMCNC: 26.6 PG — LOW (ref 27–34)
MCHC RBC-ENTMCNC: 34.4 G/DL — SIGNIFICANT CHANGE UP (ref 32–36)
MCV RBC AUTO: 77.4 FL — LOW (ref 80–100)
MONOCYTES # BLD AUTO: 1.43 K/UL — HIGH (ref 0–0.9)
MONOCYTES NFR BLD AUTO: 16.5 % — HIGH (ref 2–14)
MRSA PCR RESULT.: SIGNIFICANT CHANGE UP
NEUTROPHILS # BLD AUTO: 6.07 K/UL — SIGNIFICANT CHANGE UP (ref 1.8–7.4)
NEUTROPHILS NFR BLD AUTO: 70.1 % — SIGNIFICANT CHANGE UP (ref 43–77)
NRBC # BLD: 0 /100 WBCS — SIGNIFICANT CHANGE UP (ref 0–0)
NRBC # FLD: 0 K/UL — SIGNIFICANT CHANGE UP (ref 0–0)
PHOSPHATE SERPL-MCNC: 7.6 MG/DL — HIGH (ref 2.5–4.5)
PLATELET # BLD AUTO: 197 K/UL — SIGNIFICANT CHANGE UP (ref 150–400)
POTASSIUM SERPL-MCNC: 4.4 MMOL/L — SIGNIFICANT CHANGE UP (ref 3.5–5.3)
POTASSIUM SERPL-SCNC: 4.4 MMOL/L — SIGNIFICANT CHANGE UP (ref 3.5–5.3)
RBC # BLD: 3.68 M/UL — LOW (ref 4.2–5.8)
RBC # FLD: 13.4 % — SIGNIFICANT CHANGE UP (ref 10.3–14.5)
S AUREUS DNA NOSE QL NAA+PROBE: SIGNIFICANT CHANGE UP
SODIUM SERPL-SCNC: 134 MMOL/L — LOW (ref 135–145)
T PALLIDUM AB TITR SER: NEGATIVE — SIGNIFICANT CHANGE UP
TIBC SERPL-MCNC: 154 UG/DL — LOW (ref 220–430)
UIBC SERPL-MCNC: 127 UG/DL — SIGNIFICANT CHANGE UP (ref 110–370)
WBC # BLD: 8.66 K/UL — SIGNIFICANT CHANGE UP (ref 3.8–10.5)
WBC # FLD AUTO: 8.66 K/UL — SIGNIFICANT CHANGE UP (ref 3.8–10.5)

## 2024-12-23 PROCEDURE — 99233 SBSQ HOSP IP/OBS HIGH 50: CPT | Mod: GC

## 2024-12-23 PROCEDURE — 99232 SBSQ HOSP IP/OBS MODERATE 35: CPT

## 2024-12-23 RX ORDER — LIDOCAINE/PRILOCAINE 2.5 %-2.5%
1 CREAM (GRAM) TOPICAL DAILY
Refills: 0 | Status: DISCONTINUED | OUTPATIENT
Start: 2024-12-23 | End: 2024-12-30

## 2024-12-23 RX ORDER — SENNOSIDES 8.6 MG/1
2 TABLET, FILM COATED ORAL AT BEDTIME
Refills: 0 | Status: DISCONTINUED | OUTPATIENT
Start: 2024-12-23 | End: 2024-12-30

## 2024-12-23 RX ORDER — POLYETHYLENE GLYCOL 3350 17 G/DOSE
17 POWDER (GRAM) ORAL DAILY
Refills: 0 | Status: DISCONTINUED | OUTPATIENT
Start: 2024-12-23 | End: 2024-12-30

## 2024-12-23 RX ORDER — SODIUM CHLORIDE 9 MG/ML
1000 INJECTION, SOLUTION INTRAMUSCULAR; INTRAVENOUS; SUBCUTANEOUS
Refills: 0 | Status: DISCONTINUED | OUTPATIENT
Start: 2024-12-23 | End: 2024-12-24

## 2024-12-23 RX ORDER — MIDODRINE HYDROCHLORIDE 5 MG/1
10 TABLET ORAL THREE TIMES A DAY
Refills: 0 | Status: DISCONTINUED | OUTPATIENT
Start: 2024-12-23 | End: 2024-12-26

## 2024-12-23 RX ADMIN — SODIUM BICARBONATE 1300 MILLIGRAM(S): 84 INJECTION, SOLUTION INTRAVENOUS at 05:29

## 2024-12-23 RX ADMIN — SODIUM BICARBONATE 1300 MILLIGRAM(S): 84 INJECTION, SOLUTION INTRAVENOUS at 12:46

## 2024-12-23 RX ADMIN — ACETAMINOPHEN 650 MILLIGRAM(S): 80 SOLUTION/ DROPS ORAL at 12:45

## 2024-12-23 RX ADMIN — Medication 1 APPLICATION(S): at 17:41

## 2024-12-23 RX ADMIN — CHLORHEXIDINE GLUCONATE 1 APPLICATION(S): 1.2 RINSE ORAL at 11:30

## 2024-12-23 RX ADMIN — Medication 1: at 08:08

## 2024-12-23 RX ADMIN — Medication 100 MILLIGRAM(S): at 22:24

## 2024-12-23 RX ADMIN — MIDODRINE HYDROCHLORIDE 10 MILLIGRAM(S): 5 TABLET ORAL at 11:29

## 2024-12-23 RX ADMIN — SODIUM CHLORIDE 100 MILLILITER(S): 9 INJECTION, SOLUTION INTRAMUSCULAR; INTRAVENOUS; SUBCUTANEOUS at 11:29

## 2024-12-23 RX ADMIN — Medication 2: at 11:34

## 2024-12-23 RX ADMIN — SODIUM BICARBONATE 1300 MILLIGRAM(S): 84 INJECTION, SOLUTION INTRAVENOUS at 22:22

## 2024-12-23 RX ADMIN — SODIUM CHLORIDE 100 MILLILITER(S): 9 INJECTION, SOLUTION INTRAMUSCULAR; INTRAVENOUS; SUBCUTANEOUS at 21:02

## 2024-12-23 RX ADMIN — Medication 1: at 17:40

## 2024-12-23 RX ADMIN — ROSUVASTATIN 10 MILLIGRAM(S): 40 TABLET, FILM COATED ORAL at 22:22

## 2024-12-23 NOTE — PROGRESS NOTE ADULT - SUBJECTIVE AND OBJECTIVE BOX
Liban Knox M.D  Resident Physician  Department of Medicine  Available on [employer] Teams    ***********************************************************************  Patient is a 54y old  Male who presents with a chief complaint of Acute renal failure     (22 Dec 2024 15:55)      OVERNIGHT EVENTS:     SUBJECTIVE: Patient seen and examined at bedside.     ADDITIONAL REVIEW OF SYSTEMS:    MEDICATIONS  (STANDING):  cefepime   IVPB 1000 milliGRAM(s) IV Intermittent every 24 hours  chlorhexidine 2% Cloths 1 Application(s) Topical daily  dextrose 5%. 1000 milliLiter(s) (100 mL/Hr) IV Continuous <Continuous>  dextrose 5%. 1000 milliLiter(s) (50 mL/Hr) IV Continuous <Continuous>  dextrose 50% Injectable 25 Gram(s) IV Push once  dextrose 50% Injectable 12.5 Gram(s) IV Push once  dextrose 50% Injectable 25 Gram(s) IV Push once  dextrose Oral Gel 15 Gram(s) Oral once  glucagon  Injectable 1 milliGRAM(s) IntraMuscular once  insulin lispro (ADMELOG) corrective regimen sliding scale   SubCutaneous three times a day before meals  insulin lispro (ADMELOG) corrective regimen sliding scale   SubCutaneous at bedtime  rosuvastatin 10 milliGRAM(s) Oral at bedtime  sodium bicarbonate 1300 milliGRAM(s) Oral three times a day    MEDICATIONS  (PRN):  acetaminophen     Tablet .. 650 milliGRAM(s) Oral every 6 hours PRN Temp greater or equal to 38C (100.4F), Mild Pain (1 - 3)  sodium chloride 0.9% Bolus. 100 milliLiter(s) IV Bolus every 5 minutes PRN SBP LESS THAN or EQUAL to 90 mmHg      CAPILLARY BLOOD GLUCOSE      POCT Blood Glucose.: 160 mg/dL (23 Dec 2024 07:35)  POCT Blood Glucose.: 192 mg/dL (23 Dec 2024 05:37)  POCT Blood Glucose.: 206 mg/dL (22 Dec 2024 22:36)  POCT Blood Glucose.: 137 mg/dL (22 Dec 2024 18:44)  POCT Blood Glucose.: 120 mg/dL (22 Dec 2024 16:31)  POCT Blood Glucose.: 143 mg/dL (22 Dec 2024 11:32)  POCT Blood Glucose.: 131 mg/dL (22 Dec 2024 08:40)    I&O's Summary    22 Dec 2024 07:01  -  23 Dec 2024 07:00  --------------------------------------------------------  IN: 0 mL / OUT: 2000 mL / NET: -2000 mL        PHYSICAL EXAM:    Vital Signs Last 24 Hrs  T(C): 36.9 (23 Dec 2024 07:18), Max: 36.9 (23 Dec 2024 07:18)  T(F): 98.4 (23 Dec 2024 07:18), Max: 98.4 (23 Dec 2024 07:18)  HR: 85 (23 Dec 2024 07:18) (68 - 89)  BP: 102/50 (23 Dec 2024 07:18) (90/48 - 107/47)  BP(mean): --  RR: 18 (23 Dec 2024 07:18) (17 - 18)  SpO2: 95% (23 Dec 2024 05:15) (95% - 100%)    Parameters below as of 23 Dec 2024 07:18  Patient On (Oxygen Delivery Method): room air        CONSTITUTIONAL: NAD, well-developed, well-groomed  EYES: Conjunctiva and sclera clear  ENMT: Moist oral mucosa, no pharyngeal injection or exudates; normal dentition  NECK: Supple, no palpable masses; no thyromegaly  RESPIRATORY: Normal respiratory effort; lungs are clear to auscultation bilaterally  CARDIOVASCULAR: Regular rate and rhythm, normal S1 and S2, no murmur/rub/gallop  ABDOMEN: Soft, nontender to palpation, normoactive bowel sounds, no rebound/guarding  MUSCULOSKELETAL: No clubbing or cyanosis of digits; no joint swelling or tenderness to palpation  EXTREMITIES:  No lower extremity edema; Peripheral pulses are 2+ bilaterally  PSYCH: A+O to person, place, and time; affect appropriate  NEUROLOGY: CN 2-12 are intact and symmetric; no gross sensory deficits   SKIN: No rashes; no palpable lesions    LABS:                        10.5   11.31 )-----------( 217      ( 21 Dec 2024 21:30 )             32.5     12-22    133[L]  |  96[L]  |  119[H]  ----------------------------<  233[H]  4.2   |  17[L]  |  9.21[H]    Ca    9.1      22 Dec 2024 21:34  Phos  7.2     12-22  Mg     2.30     12-22    TPro  6.9  /  Alb  x   /  TBili  x   /  DBili  x   /  AST  x   /  ALT  x   /  AlkPhos  x   12-22    PT/INR - ( 22 Dec 2024 12:05 )   PT: 12.2 sec;   INR: 1.03 ratio         PTT - ( 22 Dec 2024 12:05 )  PTT:38.4 sec      Urinalysis Basic - ( 22 Dec 2024 21:34 )    Color: x / Appearance: x / SG: x / pH: x  Gluc: 233 mg/dL / Ketone: x  / Bili: x / Urobili: x   Blood: x / Protein: x / Nitrite: x   Leuk Esterase: x / RBC: x / WBC x   Sq Epi: x / Non Sq Epi: x / Bacteria: x        Urinalysis with Rflx Culture (collected 21 Dec 2024 21:30)        RADIOLOGY & ADDITIONAL TESTS:   Results Reviewed: Yes     Liban Knox M.D  Resident Physician  Department of Medicine  Available on [employer] Teams    ***********************************************************************  Patient is a 54y old  Male who presents with a chief complaint of Acute renal failure     (22 Dec 2024 15:55)      OVERNIGHT EVENTS: Low pressures during HD    SUBJECTIVE: Patient seen and examined at bedside. Doing well and good appetite. No pain. Denies SOB or N/V    ADDITIONAL REVIEW OF SYSTEMS:    MEDICATIONS  (STANDING):  cefepime   IVPB 1000 milliGRAM(s) IV Intermittent every 24 hours  chlorhexidine 2% Cloths 1 Application(s) Topical daily  dextrose 5%. 1000 milliLiter(s) (100 mL/Hr) IV Continuous <Continuous>  dextrose 5%. 1000 milliLiter(s) (50 mL/Hr) IV Continuous <Continuous>  dextrose 50% Injectable 25 Gram(s) IV Push once  dextrose 50% Injectable 12.5 Gram(s) IV Push once  dextrose 50% Injectable 25 Gram(s) IV Push once  dextrose Oral Gel 15 Gram(s) Oral once  glucagon  Injectable 1 milliGRAM(s) IntraMuscular once  insulin lispro (ADMELOG) corrective regimen sliding scale   SubCutaneous three times a day before meals  insulin lispro (ADMELOG) corrective regimen sliding scale   SubCutaneous at bedtime  rosuvastatin 10 milliGRAM(s) Oral at bedtime  sodium bicarbonate 1300 milliGRAM(s) Oral three times a day    MEDICATIONS  (PRN):  acetaminophen     Tablet .. 650 milliGRAM(s) Oral every 6 hours PRN Temp greater or equal to 38C (100.4F), Mild Pain (1 - 3)  sodium chloride 0.9% Bolus. 100 milliLiter(s) IV Bolus every 5 minutes PRN SBP LESS THAN or EQUAL to 90 mmHg      CAPILLARY BLOOD GLUCOSE      POCT Blood Glucose.: 160 mg/dL (23 Dec 2024 07:35)  POCT Blood Glucose.: 192 mg/dL (23 Dec 2024 05:37)  POCT Blood Glucose.: 206 mg/dL (22 Dec 2024 22:36)  POCT Blood Glucose.: 137 mg/dL (22 Dec 2024 18:44)  POCT Blood Glucose.: 120 mg/dL (22 Dec 2024 16:31)  POCT Blood Glucose.: 143 mg/dL (22 Dec 2024 11:32)  POCT Blood Glucose.: 131 mg/dL (22 Dec 2024 08:40)    I&O's Summary    22 Dec 2024 07:01  -  23 Dec 2024 07:00  --------------------------------------------------------  IN: 0 mL / OUT: 2000 mL / NET: -2000 mL        PHYSICAL EXAM:    Vital Signs Last 24 Hrs  T(C): 36.9 (23 Dec 2024 07:18), Max: 36.9 (23 Dec 2024 07:18)  T(F): 98.4 (23 Dec 2024 07:18), Max: 98.4 (23 Dec 2024 07:18)  HR: 85 (23 Dec 2024 07:18) (68 - 89)  BP: 102/50 (23 Dec 2024 07:18) (90/48 - 107/47)  BP(mean): --  RR: 18 (23 Dec 2024 07:18) (17 - 18)  SpO2: 95% (23 Dec 2024 05:15) (95% - 100%)    Parameters below as of 23 Dec 2024 07:18  Patient On (Oxygen Delivery Method): room air        CONSTITUTIONAL: NAD, well-developed, well-groomed  RESPIRATORY: Normal respiratory effort; lungs are clear to auscultation bilaterally  CARDIOVASCULAR: Regular rate and rhythm, normal S1 and S2, no murmur/rub/gallop  ABDOMEN: Soft, nontender to palpation, normoactive bowel sounds, no rebound/guarding  MUSCULOSKELETAL: No clubbing or cyanosis of digits; no joint swelling or tenderness to palpation  EXTREMITIES:  No lower extremity edema; Peripheral pulses are 2+ bilaterally  PSYCH: A+O to person, place, and time; affect appropriate  NEUROLOGY: CN 2-12 are intact and symmetric; no gross sensory deficits   SKIN: No rashes; no palpable lesions. Femoral [facility] clean    LABS:                        10.5   11.31 )-----------( 217      ( 21 Dec 2024 21:30 )             32.5     12-22    133[L]  |  96[L]  |  119[H]  ----------------------------<  233[H]  4.2   |  17[L]  |  9.21[H]    Ca    9.1      22 Dec 2024 21:34  Phos  7.2     12-22  Mg     2.30     12-22    TPro  6.9  /  Alb  x   /  TBili  x   /  DBili  x   /  AST  x   /  ALT  x   /  AlkPhos  x   12-22    PT/INR - ( 22 Dec 2024 12:05 )   PT: 12.2 sec;   INR: 1.03 ratio         PTT - ( 22 Dec 2024 12:05 )  PTT:38.4 sec      Urinalysis Basic - ( 22 Dec 2024 21:34 )    Color: x / Appearance: x / SG: x / pH: x  Gluc: 233 mg/dL / Ketone: x  / Bili: x / Urobili: x   Blood: x / Protein: x / Nitrite: x   Leuk Esterase: x / RBC: x / WBC x   Sq Epi: x / Non Sq Epi: x / Bacteria: x        Urinalysis with Rflx Culture (collected 21 Dec 2024 21:30)        RADIOLOGY & ADDITIONAL TESTS:   Results Reviewed: Yes

## 2024-12-23 NOTE — PROGRESS NOTE ADULT - PROBLEM SELECTOR PLAN 4
- UA showed large LE and blood  - hx of nephrostomy tubes. Will need ppx pseudomonas coverage  - CXR clear  - Jung draining significant pus    DDx: likely UTI    Plan:  - F/u on urine culture  - Continue cefepime  - ID consult - UA showed large LE and blood  - hx of nephrostomy tubes. Will need ppx pseudomonas coverage  - CXR clear  - Jung draining significant pus on admission. Clearing today    DDx: likely UTI    Plan:  - F/u on urine culture  - Continue cefepime per ID  - ID following. Appreciate recs  - ID consult

## 2024-12-23 NOTE — PROGRESS NOTE ADULT - PROBLEM SELECTOR PLAN 2
VBG showed pH 7.19, lactate 1.0, PCO2 35, HCO3 13  - in setting of ESRD    Plan:  - Continue bicarb gtt  - f/u repeat labs  - ESRD plan as above Resolving  VBG showed pH 7.19, lactate 1.0, PCO2 35, HCO3 13 on admission. Repeat VBG shows improvement.   - in setting of acute renal failure    Plan:  - Continue bicarb   - f/u repeat labs  - HD plan as above

## 2024-12-23 NOTE — PROGRESS NOTE ADULT - ASSESSMENT
54 m with T2DM, prostatitis, chronic cystitis, and nephrolithiasis s/p PCNL 02/21, left nephroureteral tube 03/24 c/b hematuria needing L renal angioembolization of pseudoaneurysms and PCN-U 04/06, was started on Ozempic about 3 weeks ago and noticed that for about a week had nausea and was not eating or drinking, he has chronic bladder pain and urinary frequency also, went to PMD and then was sent to ER for renal failure  Here afebrile,  WBC: 11, CR: 12, BUN   u/a positive  u/s: Moderate to severe left and moderate right hydronephrosis which persists post void..Nonobstructing 1 cm stone in the left midpole. Multiple renal cysts     chronic cystitis (has bladder pain and frequency after PCNL and renal angioembolization) now with renal failure, has mod ot severe L and mod R hydro on u/s, was also started on ozempic 3 weeks ago, s/p R fem shiley and HD but has hypotension during HD  u/a positive but not sure there is acute UTI    * follow the urine cx  * c/w cefepime renally dosed for now if cultures negative will stop, started 12/22, now day 2  * follow with nephro and urology    The above assessment and plan was discussed with the primary team    Halle Alexis MD  contact on teams  After 5pm and on weekends call [phone]

## 2024-12-23 NOTE — PROGRESS NOTE ADULT - ASSESSMENT
54 y.o. M w/ PMHx T2DM, prostatitis, chronic cystitis, and nephrolithiasis s/p PCNL 02/21, left nephroureteral tube 03/24 c/b hematuria needing L renal angioembolization of pseudoaneurysms and PCN-U 04/06 who presented to the ED for abnormal outpatient labs. in acute renal failure and plan for HD today  54 y.o. M w/ PMHx T2DM, prostatitis, chronic cystitis, and nephrolithiasis s/p PCNL 02/21, left nephroureteral tube 03/24 c/b hematuria needing L renal angioembolization of pseudoaneurysms and PCN-U 04/06 who presented to the ED for abnormal outpatient labs in acute renal failure. HD as needed and urology consulted for nephrostomy tube placement.

## 2024-12-23 NOTE — PROGRESS NOTE ADULT - PROBLEM SELECTOR PLAN 1
Pt. 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 On review of [facility], Scr 0.99 on 4/7/22. Scr elevated to 12.52 on admission (12/21/24). UA with proteinuria, hematuria, and bacteria (12/21/24). US Kidneys and bladder significant for moderate to severe B/L hydro with R kidney 15.6cm and L kidney 12.2cm, multiple renal cysts, and nonobstructing 1cm stone in the left midpole (12/21/24). CT A/P also showed mod-severe B/L hydroureteronephrosis, bladder decompressed with sierra. Pt had uremic s/s on presentation and was urgently dialyzed on 12/22/24 via R fem cath placed by Vascular team. His 2nd HD session today was c/b hypotension. Although pt was asymptomatic, his SBP was persistently in the 80s despite NS bolus, therefore HD terminated early. Femoral cath was functioning ok. Labs reviewed. Acidosis and hyperkalemia improved.    Pt to be evaluated by urology for etiology of obstruction and intervention. Unclear if pt has underlying CKD, no SCr between 2022 and present. However, pt may not require long-term HD. Hold off on IR or vascular consults for permanent access.    UPCR 1.1g. Check SOLO, Anti-dsDNA, Anti-GBM ab, anti-PLA2R, C3, C4, ANCA w/ reflex, SPEP, serum immunofixation, free light chains, acute Hep panel, HIV, Syphilis screen. Monitor labs and urine output. Avoid any potential nephrotoxins when possible and dose medications per eGFR.

## 2024-12-23 NOTE — PROGRESS NOTE ADULT - SUBJECTIVE AND OBJECTIVE BOX
Vascular SURGERY DAILY PROGRESS NOTE    SUBJECTIVE: Patient seen and evaluated on AM rounds.    Overnight Events:  No acute events overnight  -----------------------------------------------------------------------------------------------------------------------------------------------------------------------------------------------------------  OBJECTIVE:  Vital Signs Last 24 Hrs  T(C): 36.9 (23 Dec 2024 08:04), Max: 36.9 (23 Dec 2024 07:18)  T(F): 98.4 (23 Dec 2024 08:04), Max: 98.4 (23 Dec 2024 07:18)  HR: 77 (23 Dec 2024 08:04) (68 - 89)  BP: 93/58 (23 Dec 2024 08:04) (90/48 - 107/47)  BP(mean): --  RR: 18 (23 Dec 2024 08:04) (17 - 18)  SpO2: 95% (23 Dec 2024 05:15) (95% - 100%)    Parameters below as of 23 Dec 2024 08:04  Patient On (Oxygen Delivery Method): room air      I&O's Detail    22 Dec 2024 07:01  -  23 Dec 2024 07:00  --------------------------------------------------------  IN:  Total IN: 0 mL    OUT:    Indwelling Catheter - Urethral (mL): 2000 mL  Total OUT: 2000 mL    Total NET: -2000 mL      23 Dec 2024 07:01  -  23 Dec 2024 08:12  --------------------------------------------------------  IN:    Other (mL): 700 mL  Total IN: 700 mL    OUT:    Other (mL): 130 mL  Total OUT: 130 mL    Total NET: 570 mL        Daily     Daily Weight in k (23 Dec 2024 08:04)  MEDICATIONS  (STANDING):  cefepime   IVPB 1000 milliGRAM(s) IV Intermittent every 24 hours  chlorhexidine 2% Cloths 1 Application(s) Topical daily  dextrose 5%. 1000 milliLiter(s) (100 mL/Hr) IV Continuous <Continuous>  dextrose 5%. 1000 milliLiter(s) (50 mL/Hr) IV Continuous <Continuous>  dextrose 50% Injectable 25 Gram(s) IV Push once  dextrose 50% Injectable 12.5 Gram(s) IV Push once  dextrose 50% Injectable 25 Gram(s) IV Push once  dextrose Oral Gel 15 Gram(s) Oral once  glucagon  Injectable 1 milliGRAM(s) IntraMuscular once  insulin lispro (ADMELOG) corrective regimen sliding scale   SubCutaneous three times a day before meals  insulin lispro (ADMELOG) corrective regimen sliding scale   SubCutaneous at bedtime  rosuvastatin 10 milliGRAM(s) Oral at bedtime  sodium bicarbonate 1300 milliGRAM(s) Oral three times a day    MEDICATIONS  (PRN):  acetaminophen     Tablet .. 650 milliGRAM(s) Oral every 6 hours PRN Temp greater or equal to 38C (100.4F), Mild Pain (1 - 3)  sodium chloride 0.9% Bolus. 100 milliLiter(s) IV Bolus every 5 minutes PRN SBP LESS THAN or EQUAL to 90 mmHg      LABS:                        10.5   11.31 )-----------( 217      ( 21 Dec 2024 21:30 )             32.5     12-22    133[L]  |  96[L]  |  119[H]  ----------------------------<  233[H]  4.2   |  17[L]  |  9.21[H]    Ca    9.1      22 Dec 2024 21:34  Phos  7.2       Mg     2.30         TPro  6.9  /  Alb  x   /  TBili  x   /  DBili  x   /  AST  x   /  ALT  x   /  AlkPhos  x       PT/INR - ( 22 Dec 2024 12:05 )   PT: 12.2 sec;   INR: 1.03 ratio         PTT - ( 22 Dec 2024 12:05 )  PTT:38.4 sec  Urinalysis Basic - ( 22 Dec 2024 21:34 )    Color: x / Appearance: x / SG: x / pH: x  Gluc: 233 mg/dL / Ketone: x  / Bili: x / Urobili: x   Blood: x / Protein: x / Nitrite: x   Leuk Esterase: x / RBC: x / WBC x   Sq Epi: x / Non Sq Epi: x / Bacteria: x      PHYSICAL EXAM:    General: WN/WD NAD  Neurology: nonfocal, follows commands  Respiratory: nonlabored breathing on RA  CV: HDS  Extremities: WWP. R groin shiley in place. No hematoma.

## 2024-12-23 NOTE — CONSULT NOTE ADULT - SUBJECTIVE AND OBJECTIVE BOX
HPI  Patient is a 54y old  Male who presents with a chief complaint of Abnormal Labs  HPI: 54M with past medical history of T2DM, prostatitis, chronic cystitis, and nephrolithiasis s/p PCNL 02/21, left nephroureteral tube 03/24 c/b hematuria needing L renal angioembolization of pseudoaneurysms and PCN-U 04/06 who presented to the ED for abnormal outpatient labs  He visited his PCP on Thursday (12/19) and received a phone from them yesterday asking him to present to  the ED as his "kidney's were shutting down". Per patient he, has been having issues with prostatitis, cystitis and nephrolithiasis for many years now and follows with urologist Dr. Hoenig. He states his since 03/24 after he had the nephroureteral tube placement for nephrolithiasis his "kidney numbers" have been worsening. He states his urologist is aware and he is following with [facility] nephrologist Dr. Chung Burgos. For the last week he has been having dark colored urine, nausea, vomiting, fatigue, with mild left sided flank pain that radiates to the left lower quadrant for the past month. Pt. had kidney biopsies done but he is unsure why and thought it may be to rule out cancer.  Pt. denies fever, chills, CP, SOB, testicular pain or LE swelling. He also denies any family hx of renal cysts, or kidney disease.    Patient is a 54M admitted to medicine for acute renal failure noted to have mod-severe L and mod R hydro persisting postvoid on RBUS and redemonstrated on CTAP. Urology was consulted for hydronephrosis.     Patient has downtrending Cr 8.71 from 9.21. Foleydraining appropriate urine.    Patient to be seen and examined    PAST MEDICAL & SURGICAL HISTORY:  Esophageal achalasia  s/p esophageal repair about 5 years ago      Type 2 diabetes mellitus      Calculus of kidney  passed on own in the past; CT scan showing a 3.5 cm and 1.2 cm left mid-upper stones in December 2021      Former smoker  1 PPD x 16 years; Quit in 2003      Obesity  BMI 34.4      Kidney stones      History of esophageal surgery  esophageal repair about 5 years ago for esopheal achalasia      Other injury of spleen  fall on ice s/p spleen embolization at [facility] IR over 10 years ago      Nephrostomy tube bleed          MEDICATIONS  (STANDING):  cefepime   IVPB 1000 milliGRAM(s) IV Intermittent every 24 hours  chlorhexidine 2% Cloths 1 Application(s) Topical daily  dextrose 5%. 1000 milliLiter(s) (100 mL/Hr) IV Continuous <Continuous>  dextrose 5%. 1000 milliLiter(s) (50 mL/Hr) IV Continuous <Continuous>  dextrose 50% Injectable 25 Gram(s) IV Push once  dextrose 50% Injectable 12.5 Gram(s) IV Push once  dextrose 50% Injectable 25 Gram(s) IV Push once  dextrose Oral Gel 15 Gram(s) Oral once  glucagon  Injectable 1 milliGRAM(s) IntraMuscular once  insulin lispro (ADMELOG) corrective regimen sliding scale   SubCutaneous three times a day before meals  insulin lispro (ADMELOG) corrective regimen sliding scale   SubCutaneous at bedtime  midodrine. 10 milliGRAM(s) Oral three times a day  polyethylene glycol 3350 17 Gram(s) Oral daily  rosuvastatin 10 milliGRAM(s) Oral at bedtime  senna 2 Tablet(s) Oral at bedtime  sodium bicarbonate 1300 milliGRAM(s) Oral three times a day  sodium chloride 0.9%. 1000 milliLiter(s) (100 mL/Hr) IV Continuous <Continuous>    MEDICATIONS  (PRN):  acetaminophen     Tablet .. 650 milliGRAM(s) Oral every 6 hours PRN Temp greater or equal to 38C (100.4F), Mild Pain (1 - 3)  sodium chloride 0.9% Bolus. 100 milliLiter(s) IV Bolus every 5 minutes PRN SBP LESS THAN or EQUAL to 90 mmHg      FAMILY HISTORY:      Allergies    penicillins (Unknown)    Intolerances        SOCIAL HISTORY:    REVIEW OF SYSTEMS:   Otherwise negative as stated in HPI    Physical Exam  Vital signs  T(C): 36.8 (12-23-24 @ 12:04), Max: 36.9 (12-23-24 @ 07:18)  HR: 72 (12-23-24 @ 12:04)  BP: 86/51 (12-23-24 @ 12:04)  SpO2: 97% (12-23-24 @ 12:04)  Wt(kg): --    Output    OUT:    Indwelling Catheter - Urethral (mL): 2000 mL  Total OUT: 2000 mL    Total NET: -2000 mL      OUT:    Indwelling Catheter - Urethral (mL): 300 mL    Other (mL): 130 mL  Total OUT: 430 mL    Total NET: 270 mL          Gen:NAD  Pulm:No respiratory distress  :  MSK:    LABS:      12-23 @ 06:34    WBC 8.66  / Hct 28.5  / SCr 8.71     12-22 @ 21:34    WBC --    / Hct --    / SCr 9.21     12-23    134[L]  |  96[L]  |  123[H]  ----------------------------<  168[H]  4.4   |  20[L]  |  8.71[H]    Ca    9.3      23 Dec 2024 06:34  Phos  7.6     12-23  Mg     2.50     12-23    TPro  6.9  /  Alb  x   /  TBili  x   /  DBili  x   /  AST  x   /  ALT  x   /  AlkPhos  x   12-22    PT/INR - ( 22 Dec 2024 12:05 )   PT: 12.2 sec;   INR: 1.03 ratio         PTT - ( 22 Dec 2024 12:05 )  PTT:38.4 sec  Urinalysis Basic - ( 23 Dec 2024 06:34 )    Color: x / Appearance: x / SG: x / pH: x  Gluc: 168 mg/dL / Ketone: x  / Bili: x / Urobili: x   Blood: x / Protein: x / Nitrite: x   Leuk Esterase: x / RBC: x / WBC x   Sq Epi: x / Non Sq Epi: x / Bacteria: x        Urine Cx:  Blood Cx:    RADIOLOGY:  INTERPRETATION:  CLINICAL INFORMATION: Hydronephrosis with acute renal   failure. History of stones    COMPARISON: Noncontrast CT abdomen pelvis November 16, 2021.    CONTRAST/COMPLICATIONS:  IV Contrast: None  Oral Contrast: None      PROCEDURE:  CT of the Abdomen and Pelvis was performed.  Sagittal and coronal reformats were performed.    FINDINGS:  LOWER CHEST: Trace bibasilar subsegmental atelectasis.  Coronary artery calcification present.  LIVER: Within normal limits.  BILE DUCTS: Normal caliber.  GALLBLADDER: 1.6 cm calcified gallstone region of gallbladder neck. No   pericholecystic inflammatory changes..  SPLEEN: Capsular calcification involving the spleen is unchanged.  PANCREAS: Within normal limits.  ADRENALS: Within normal limits.  KIDNEYS/URETERS: Moderate/severe bilateral hydronephrosis. 11 mm   nonobstructing stone midportion left kidney. Moderately severe bilateral   hydroureter. No obstructing stones. Left renal cysts unchanged from prior   exam largest measuring 7 cm arising lower pole. Small exophytic right   renal cyst present as well.    BLADDER: Jung catheter within urinary bladder which is decompressed.  REPRODUCTIVE ORGANS: Prostate within normal limits.    BOWEL: No bowel obstruction. Appendix is normal.  PERITONEUM/RETROPERITONEUM: Within normal limits.  No free air or abscess.  VESSELS: Atherosclerotic changes. Right femoral venous catheter extending   into the right common iliac vein. Small amount of gas in the soft tissues   right groin. No focal fluid collection or abscess.  LYMPH NODES: No lymphadenopathy.  ABDOMINAL WALL: Rectus diastases. No focal hernia..  BONES: Degenerative changes. No lytic or blastic process.    IMPRESSION:  1.6 cm calcified gallstone region of gallbladder neck. No pericholecystic   inflammatory changes..    Moderate/severe bilateral hydronephrosis. 11 mm nonobstructing stone   midportion left kidney. Moderately severe bilateral hydroureter. No   obstructing stones.    Jung catheter within urinary bladder which is decompressed.    Please refer to detailed findings otherwise described above.    --- End of Report ---    INTERPRETATION:  CLINICAL INFORMATION: Abnormal labs, left flank pain    COMPARISON: Abdominal ultrasound performed 11/17/2023.    TECHNIQUE: Sonography of the kidneys and bladder.    FINDINGS:  Right kidney: 15.6 cm. Moderate hydronephrosis. Multiple simple cysts   measuring up to 1.7 cm in the upper pole and 2.3 cm in the lower pole.   Hydronephrosis persists post void.    Left kidney: 12.2 cm. moderate to severe hydronephrosis. 1.2 cm stone in   the midpole. Simple appearing 4.1 cm simple cyst in the upper pole. 5.6   cm predominantly simple cyst with a single septation in the lower pole,   stable from prior. Hydronephrosis persists post void.    Urinary bladder: Within normal limits.    IMPRESSION:  Moderate to severe left and moderate right hydronephrosis which persists   post void. CT could evaluate for underlying etiology.    Nonobstructing 1 cm stone in the leftmidpole.    Multiple renal cysts as above.    --- End of Report ---   HPI  Patient is a 54y old  Male who presents with a chief complaint of Abnormal Labs  HPI: 54M with past medical history of T2DM, prostatitis, chronic cystitis, and nephrolithiasis s/p PCNL 02/21, left nephroureteral tube 03/24 c/b hematuria needing L renal angioembolization of pseudoaneurysms and PCN-U 04/06 who presented to the ED for abnormal outpatient labs  He visited his PCP on Thursday (12/19) and received a phone from them yesterday asking him to present to  the ED as his "kidney's were shutting down". Per patient he, has been having issues with prostatitis, cystitis and nephrolithiasis for many years now and follows with urologist Dr. Hoenig. He states his since 03/24 after he had the nephroureteral tube placement for nephrolithiasis his "kidney numbers" have been worsening. He states his urologist is aware and he is following with [facility] nephrologist Dr. Chung Burgos. For the last week he has been having dark colored urine, nausea, vomiting, fatigue, with mild left sided flank pain that radiates to the left lower quadrant for the past month. Pt. had kidney biopsies done but he is unsure why and thought it may be to rule out cancer.  Pt. denies fever, chills, CP, SOB, testicular pain or LE swelling. He also denies any family hx of renal cysts, or kidney disease.    Patient is a 54M admitted to medicine for acute renal failure noted to have mod-severe L and mod R hydro persisting postvoid on RBUS and redemonstrated on CTAP. Urology was consulted for hydronephrosis.     Patient has h/o cysto biopsy fulguration demonstrating acute cystitis with denudation, hematuria followed by Dr. Hoenig with previous RBUS  demonstrating no R hydro and mild L hydro. Baseline in 2022 was ~1, today his Cr after HD Cr 8.71 from 11.4 on admission. FENa 411% indicating likely intrinsic etiology. Sierra draining appropriate urine.    Patient on exam is NAD, sitting in bed, endorses some stinging at penile meatus related to sierra and bladder spasms. Underwent HD this AM.   Denies fever, chills, nausea, vomiting, chest pain, SOB, dizziness/lightheadedness. Denies urinary urgency, frequency, dysuria, bladder spasm, abdominal pain, flank pain.      PAST MEDICAL & SURGICAL HISTORY:  Esophageal achalasia  s/p esophageal repair about 5 years ago      Type 2 diabetes mellitus      Calculus of kidney  passed on own in the past; CT scan showing a 3.5 cm and 1.2 cm left mid-upper stones in December 2021      Former smoker  1 PPD x 16 years; Quit in 2003      Obesity  BMI 34.4      Kidney stones      History of esophageal surgery  esophageal repair about 5 years ago for esopheal achalasia      Other injury of spleen  fall on ice s/p spleen embolization at [facility] IR over 10 years ago      Nephrostomy tube bleed          MEDICATIONS  (STANDING):  cefepime   IVPB 1000 milliGRAM(s) IV Intermittent every 24 hours  chlorhexidine 2% Cloths 1 Application(s) Topical daily  dextrose 5%. 1000 milliLiter(s) (100 mL/Hr) IV Continuous <Continuous>  dextrose 5%. 1000 milliLiter(s) (50 mL/Hr) IV Continuous <Continuous>  dextrose 50% Injectable 25 Gram(s) IV Push once  dextrose 50% Injectable 12.5 Gram(s) IV Push once  dextrose 50% Injectable 25 Gram(s) IV Push once  dextrose Oral Gel 15 Gram(s) Oral once  glucagon  Injectable 1 milliGRAM(s) IntraMuscular once  insulin lispro (ADMELOG) corrective regimen sliding scale   SubCutaneous three times a day before meals  insulin lispro (ADMELOG) corrective regimen sliding scale   SubCutaneous at bedtime  midodrine. 10 milliGRAM(s) Oral three times a day  polyethylene glycol 3350 17 Gram(s) Oral daily  rosuvastatin 10 milliGRAM(s) Oral at bedtime  senna 2 Tablet(s) Oral at bedtime  sodium bicarbonate 1300 milliGRAM(s) Oral three times a day  sodium chloride 0.9%. 1000 milliLiter(s) (100 mL/Hr) IV Continuous <Continuous>    MEDICATIONS  (PRN):  acetaminophen     Tablet .. 650 milliGRAM(s) Oral every 6 hours PRN Temp greater or equal to 38C (100.4F), Mild Pain (1 - 3)  sodium chloride 0.9% Bolus. 100 milliLiter(s) IV Bolus every 5 minutes PRN SBP LESS THAN or EQUAL to 90 mmHg      FAMILY HISTORY:      Allergies    penicillins (Unknown)    Intolerances        SOCIAL HISTORY:    REVIEW OF SYSTEMS:   Otherwise negative as stated in HPI    Physical Exam  Vital signs  T(C): 36.8 (12-23-24 @ 12:04), Max: 36.9 (12-23-24 @ 07:18)  HR: 72 (12-23-24 @ 12:04)  BP: 86/51 (12-23-24 @ 12:04)  SpO2: 97% (12-23-24 @ 12:04)  Wt(kg): --    Output    OUT:    Indwelling Catheter - Urethral (mL): 2000 mL  Total OUT: 2000 mL    Total NET: -2000 mL      OUT:    Indwelling Catheter - Urethral (mL): 300 mL    Other (mL): 130 mL  Total OUT: 430 mL    Total NET: 270 mL          Gen:NAD  Pulm:No respiratory distress  :no CVA tenderness      LABS:      12-23 @ 06:34    WBC 8.66  / Hct 28.5  / SCr 8.71     12-22 @ 21:34    WBC --    / Hct --    / SCr 9.21     12-23    134[L]  |  96[L]  |  123[H]  ----------------------------<  168[H]  4.4   |  20[L]  |  8.71[H]    Ca    9.3      23 Dec 2024 06:34  Phos  7.6     12-23  Mg     2.50     12-23    TPro  6.9  /  Alb  x   /  TBili  x   /  DBili  x   /  AST  x   /  ALT  x   /  AlkPhos  x   12-22    PT/INR - ( 22 Dec 2024 12:05 )   PT: 12.2 sec;   INR: 1.03 ratio         PTT - ( 22 Dec 2024 12:05 )  PTT:38.4 sec  Urinalysis Basic - ( 23 Dec 2024 06:34 )    Color: x / Appearance: x / SG: x / pH: x  Gluc: 168 mg/dL / Ketone: x  / Bili: x / Urobili: x   Blood: x / Protein: x / Nitrite: x   Leuk Esterase: x / RBC: x / WBC x   Sq Epi: x / Non Sq Epi: x / Bacteria: x        Urine Cx:  Blood Cx:    RADIOLOGY:  INTERPRETATION:  CLINICAL INFORMATION: Hydronephrosis with acute renal   failure. History of stones    COMPARISON: Noncontrast CT abdomen pelvis November 16, 2021.    CONTRAST/COMPLICATIONS:  IV Contrast: None  Oral Contrast: None      PROCEDURE:  CT of the Abdomen and Pelvis was performed.  Sagittal and coronal reformats were performed.    FINDINGS:  LOWER CHEST: Trace bibasilar subsegmental atelectasis.  Coronary artery calcification present.  LIVER: Within normal limits.  BILE DUCTS: Normal caliber.  GALLBLADDER: 1.6 cm calcified gallstone region of gallbladder neck. No   pericholecystic inflammatory changes..  SPLEEN: Capsular calcification involving the spleen is unchanged.  PANCREAS: Within normal limits.  ADRENALS: Within normal limits.  KIDNEYS/URETERS: Moderate/severe bilateral hydronephrosis. 11 mm   nonobstructing stone midportion left kidney. Moderately severe bilateral   hydroureter. No obstructing stones. Left renal cysts unchanged from prior   exam largest measuring 7 cm arising lower pole. Small exophytic right   renal cyst present as well.    BLADDER: Sierra catheter within urinary bladder which is decompressed.  REPRODUCTIVE ORGANS: Prostate within normal limits.    BOWEL: No bowel obstruction. Appendix is normal.  PERITONEUM/RETROPERITONEUM: Within normal limits.  No free air or abscess.  VESSELS: Atherosclerotic changes. Right femoral venous catheter extending   into the right common iliac vein. Small amount of gas in the soft tissues   right groin. No focal fluid collection or abscess.  LYMPH NODES: No lymphadenopathy.  ABDOMINAL WALL: Rectus diastases. No focal hernia..  BONES: Degenerative changes. No lytic or blastic process.    IMPRESSION:  1.6 cm calcified gallstone region of gallbladder neck. No pericholecystic   inflammatory changes..    Moderate/severe bilateral hydronephrosis. 11 mm nonobstructing stone   midportion left kidney. Moderately severe bilateral hydroureter. No   obstructing stones.    Sierra catheter within urinary bladder which is decompressed.    Please refer to detailed findings otherwise described above.    --- End of Report ---    INTERPRETATION:  CLINICAL INFORMATION: Abnormal labs, left flank pain    COMPARISON: Abdominal ultrasound performed 11/17/2023.    TECHNIQUE: Sonography of the kidneys and bladder.    FINDINGS:  Right kidney: 15.6 cm. Moderate hydronephrosis. Multiple simple cysts   measuring up to 1.7 cm in the upper pole and 2.3 cm in the lower pole.   Hydronephrosis persists post void.    Left kidney: 12.2 cm. moderate to severe hydronephrosis. 1.2 cm stone in   the midpole. Simple appearing 4.1 cm simple cyst in the upper pole. 5.6   cm predominantly simple cyst with a single septation in the lower pole,   stable from prior. Hydronephrosis persists post void.    Urinary bladder: Within normal limits.    IMPRESSION:  Moderate to severe left and moderate right hydronephrosis which persists   post void. CT could evaluate for underlying etiology.    Nonobstructing 1 cm stone in the leftmidpole.    Multiple renal cysts as above.    --- End of Report ---

## 2024-12-23 NOTE — PROGRESS NOTE ADULT - PROBLEM SELECTOR PLAN 1
- [facility] Sc[facility].99 on 4/7/22. Scr elevated to 12.52 on admission (12/21/24). UA with proteinuria, hematuria, and bacteria (12/21/24). Urine Na 77.   - US Kidneys and bladder significant for moderate to severe left and moderate right hydronephrosis, multiple renal cysts, and nonobstructing 1cm stone in the left midpole (12/21/24).  - outpatient nephrologist  Dr. Chugn Burgos [facility]    Plan:  - nephrology following. HD today   - f/u on UPCR SOLO, Anti-dsDNA, Anti-GBM ab, anti-PLA2R, C3, C4, ANCA w/ reflex, SPEP, serum immunofixation, free light chains, acute Hep panel, HIV, Syphilis screen.  - renally dose meds  - Medical release form in chart. Will follow up with outpatient nephrologist on Monday - On review of [facility], Scr 0.99 on 4/7/22. Scr elevated to 12.52 on admission (12/21/24). UA with proteinuria, hematuria, and bacteria (12/21/24). Urine Na 77.   - US Kidneys and bladder significant for moderate to severe left and moderate right hydronephrosis, multiple renal cysts, and nonobstructing 1cm stone in the left midpole (12/21/24). --> likely cause of renal failure  - Acute hep panel neg, MRSA neg, RPR neg  - outpatient nephrologist  Dr. Chung Burgos [facility]    Plan:  - nephrology following. HD today am patient became hypotensive and stopped early  - Start fluids  - Midodrine 10mg q8h and wean as tolerated  - IR consulted to change to jugular shiley  - f/u on UPCR SOLO, Anti-dsDNA, Anti-GBM ab, anti-PLA2R, C3, C4, ANCA w/ reflex, SPEP, serum immunofixation, free light chains, HIV  - renally dose meds  - Medical release form in chart. Will follow up with outpatient nephrologist  -

## 2024-12-23 NOTE — CONSULT NOTE ADULT - ASSESSMENT
Patient to be seen and examined Patient is a 54M admitted to medicine for acute renal failure s/p HD noted to have mod-severe L and mod R hydro persisting postvoid on RBUS and redemonstrated on CTAP with nonobstructing 10mm L renal stone. FENa >400%    Recs  -no acute urologic intervention  -continue HD per primary  pending final recs per attd Patient is a 54M admitted to medicine for acute renal failure s/p HD noted to have mod-severe L and mod R hydro persisting postvoid on RBUS and redemonstrated on CTAP with nonobstructing 10mm L renal stone. FENa >400%    Recs  -no acute urologic intervention  -low likelihood of obstructive uropathy  -continue HD per primary  Discussed with Dr. Woody Patient is a 54M admitted to medicine for acute renal failure s/p HD noted to have mod-severe L and mod R hydro persisting postvoid on RBUS and redemonstrated on CTAP with nonobstructing 10mm L renal stone.    Recs  -unclear etiology of acute renal failure. CT scan demonstrates b/l hydroureteronephrosis down to the level of the bladder with poor visualization of bladder 2/2 sierra decompression.   -continue HD per primary  -will continue to follow  -Discussed with interventional radiology anterograde diversion vs retrograde ureteral stenting  pending discussion with Dr. Woody Patient is a 54M admitted to medicine for acute renal failure s/p HD noted to have mod-severe L and mod R hydro persisting postvoid on RBUS and redemonstrated on CTAP with nonobstructing 10mm L renal stone.    Recs  -unclear etiology of acute renal failure. CT scan demonstrates b/l hydroureteronephrosis down to the level of the bladder with poor visualization of bladder 2/2 sierra decompression.   -continue HD per primary  -will continue to follow  -Discussed with interventional radiology and nephrology  -continue to monitor electrolytes  Discussed with Dr. Woody

## 2024-12-23 NOTE — PROGRESS NOTE ADULT - ASSESSMENT
A 54 year old male with PMHx of prostatitis, kidney stones, DM presents with abnormal laboratory results concerning for renal failure. Vascular surgery consulted for shiley placement.     Recommendations:  - IR consult for perm cath placement  - Please document if patient require long term dialysis access, for AVF planning     Vascular Surgery  [id number] A 54 year old male with PMHx of prostatitis, kidney stones, DM presents with abnormal laboratory results concerning for renal failure. Vascular surgery consulted for shiley placement.     Recommendations:  - IR consult for perm cath placement  - Please document if patient require long term dialysis access, for AVF planning       Addendum: 1700    Nephro not anticipating long term HD need.  Kindly remove Shiley within 72 hrs  Please consult IR for HD access.  Vascular will sign off, kindly reconsult vascular if need for AVF access is anticipated.    Vascular Surgery  [id number]

## 2024-12-23 NOTE — PROGRESS NOTE ADULT - SUBJECTIVE AND OBJECTIVE BOX
[facility] DIVISION OF KIDNEY DISEASES AND HYPERTENSION --    Reason for consult: BASHIR    24 hour events/subjective: Patient seen and examined while on dialysis. Pt hypotensive but asymptomatic. HD terminated early due to persistent SBP in the 80s despite IVF fluid bolus. Pt states n/v improved, denied HA or dizziness.      PAST HISTORY  --------------------------------------------------------------------------------  No significant changes to PMH, PSH, FHx, SHx, unless otherwise noted    ALLERGIES & MEDICATIONS  --------------------------------------------------------------------------------  Allergies    penicillins (Unknown)      Standing Inpatient Medications  cefepime   IVPB 1000 milliGRAM(s) IV Intermittent every 24 hours  chlorhexidine 2% Cloths 1 Application(s) Topical daily  dextrose 5%. 1000 milliLiter(s) IV Continuous <Continuous>  dextrose 5%. 1000 milliLiter(s) IV Continuous <Continuous>  dextrose 50% Injectable 25 Gram(s) IV Push once  dextrose 50% Injectable 12.5 Gram(s) IV Push once  dextrose 50% Injectable 25 Gram(s) IV Push once  dextrose Oral Gel 15 Gram(s) Oral once  glucagon  Injectable 1 milliGRAM(s) IntraMuscular once  insulin lispro (ADMELOG) corrective regimen sliding scale   SubCutaneous three times a day before meals  insulin lispro (ADMELOG) corrective regimen sliding scale   SubCutaneous at bedtime  polyethylene glycol 3350 17 Gram(s) Oral daily  rosuvastatin 10 milliGRAM(s) Oral at bedtime  senna 2 Tablet(s) Oral at bedtime  sodium bicarbonate 1300 milliGRAM(s) Oral three times a day    PRN Inpatient Medications  acetaminophen     Tablet .. 650 milliGRAM(s) Oral every 6 hours PRN  sodium chloride 0.9% Bolus. 100 milliLiter(s) IV Bolus every 5 minutes PRN      REVIEW OF SYSTEMS  --------------------------------------------------------------------------------  Gen: No fever  Respiratory: No dyspnea  CV: No chest pain  GI: No abdominal pain, diarrhea, constipation, nausea, vomiting  : +sierra  Skin: No rashes  MSK: No joint pain/swelling; no back pain, no edema  Neuro: No dizziness/lightheadedness    All other systems were reviewed and are negative, except as noted.    VITALS/PHYSICAL EXAM  --------------------------------------------------------------------------------  T(C): 36.9 (12-23-24 @ 08:04), Max: 36.9 (12-23-24 @ 07:18)  HR: 77 (12-23-24 @ 08:04) (68 - 89)  BP: 93/58 (12-23-24 @ 08:04) (90/48 - 107/47)  RR: 18 (12-23-24 @ 08:04) (17 - 18)  SpO2: 95% (12-23-24 @ 05:15) (95% - 100%)  Wt(kg): --  Height (cm): 180.3 (12-22-24 @ 04:47)  Weight (kg): 94 (12-22-24 @ 04:47)  BMI (kg/m2): 28.9 (12-22-24 @ 04:47)  BSA (m2): 2.14 (12-22-24 @ 04:47)      12-22-24 @ 07:01  -  12-23-24 @ 07:00  --------------------------------------------------------  IN: 0 mL / OUT: 2000 mL / NET: -2000 mL    12-23-24 @ 07:01  -  12-23-24 @ 09:44  --------------------------------------------------------  IN: 940 mL / OUT: 130 mL / NET: 810 mL      PHYSICAL EXAM:  Gen: NAD  Neuro: non-focal  HEENT: Anicteric, MMM  Pulm: CTA B/L  CV: +S1S2  Abd: soft, non-tender/non-distended  : +sierra  Extremities: no edema  Skin: Warm  Dialysis access: Right femoral non-tunneled dialysis cath    LABS/STUDIES  --------------------------------------------------------------------------------              9.8    8.66  >-----------<  197      [12-23-24 @ 06:34]              28.5     134  |  96  |  123  ----------------------------<  168      [12-23-24 @ 06:34]  4.4   |  20  |  8.71        Ca     9.3     [12-23-24 @ 06:34]      Mg     2.50     [12-23-24 @ 06:34]      Phos  7.6     [12-23-24 @ 06:34]    TPro  6.9  /  Alb  x   /  TBili  x   /  DBili  x   /  AST  x   /  ALT  x   /  AlkPhos  x   [12-22-24 @ 12:05]    PT/INR: PT 12.2 , INR 1.03       [12-22-24 @ 12:05]  PTT: 38.4       [12-22-24 @ 12:05]      Creatinine Trend:  SCr 8.71 [12-23 @ 06:34]  SCr 9.21 [12-22 @ 21:34]  SCr 11.81 [12-22 @ 12:05]  SCr 12.08 [12-22 @ 03:10]  SCr 12.52 [12-21 @ 21:30]    Urine Creatinine 88      [12-22-24 @ 12:05]  Urine Protein 98      [12-22-24 @ 12:05]  Urine Sodium 51      [12-22-24 @ 12:05]  Urine Urea Nitrogen 439.7      [12-22-24 @ 12:05]  Urine Potassium 16.1      [12-21-24 @ 22:30]  Urine Chloride 51      [12-21-24 @ 22:30]  Urine Osmolality 329      [12-22-24 @ 12:05]    HBsAg Nonreact      [12-22-24 @ 12:05]  HCV 0.38, Nonreact      [12-22-24 @ 12:05]  HIV Nonreact      [12-22-24 @ 12:05]    C3 Complement 109      [12-22-24 @ 12:05]  C4 Complement 28      [12-22-24 @ 12:05]  Syphilis Screen (Treponema Pallidum Ab) Negative      [12-22-24 @ 12:05]

## 2024-12-23 NOTE — PROGRESS NOTE ADULT - SUBJECTIVE AND OBJECTIVE BOX
Follow Up:  acute renal failure and b/l hydro, ?UTI    Interval History/ROS: pt afebrile was started on HD but had hypotension during HD today, no vomiting or SOB            Allergies  penicillins (Unknown)        ANTIMICROBIALS:  cefepime   IVPB 1000 every 24 hours      OTHER MEDS:  acetaminophen     Tablet .. 650 milliGRAM(s) Oral every 6 hours PRN  chlorhexidine 2% Cloths 1 Application(s) Topical daily  dextrose 5%. 1000 milliLiter(s) IV Continuous <Continuous>  dextrose 5%. 1000 milliLiter(s) IV Continuous <Continuous>  dextrose 50% Injectable 25 Gram(s) IV Push once  dextrose 50% Injectable 12.5 Gram(s) IV Push once  dextrose 50% Injectable 25 Gram(s) IV Push once  dextrose Oral Gel 15 Gram(s) Oral once  glucagon  Injectable 1 milliGRAM(s) IntraMuscular once  insulin lispro (ADMELOG) corrective regimen sliding scale   SubCutaneous three times a day before meals  insulin lispro (ADMELOG) corrective regimen sliding scale   SubCutaneous at bedtime  midodrine. 10 milliGRAM(s) Oral three times a day  polyethylene glycol 3350 17 Gram(s) Oral daily  rosuvastatin 10 milliGRAM(s) Oral at bedtime  senna 2 Tablet(s) Oral at bedtime  sodium bicarbonate 1300 milliGRAM(s) Oral three times a day  sodium chloride 0.9% Bolus. 100 milliLiter(s) IV Bolus every 5 minutes PRN  sodium chloride 0.9%. 1000 milliLiter(s) IV Continuous <Continuous>      Vital Signs Last 24 Hrs  T(C): 36.8 (23 Dec 2024 12:04), Max: 36.9 (23 Dec 2024 07:18)  T(F): 98.3 (23 Dec 2024 12:04), Max: 98.4 (23 Dec 2024 07:18)  HR: 72 (23 Dec 2024 12:04) (68 - 89)  BP: 86/51 (23 Dec 2024 12:04) (86/51 - 107/47)  BP(mean): --  RR: 18 (23 Dec 2024 12:04) (17 - 18)  SpO2: 97% (23 Dec 2024 12:04) (95% - 100%)    Parameters below as of 23 Dec 2024 12:04  Patient On (Oxygen Delivery Method): room air        Physical Exam:  General:    NAD,  non toxic  Respiratory:  comfortable on RA  abd:     soft,   no tenderness  :    sierra  Musculoskeletal:   no joint swelling  vascular: R fem shiley  Skin:    no rash                            9.8    8.66  )-----------( 197      ( 23 Dec 2024 06:34 )             28.5       12-23    134[L]  |  96[L]  |  123[H]  ----------------------------<  168[H]  4.4   |  20[L]  |  8.71[H]    Ca    9.3      23 Dec 2024 06:34  Phos  7.6     12-23  Mg     2.50     12-23    TPro  6.9  /  Alb  x   /  TBili  x   /  DBili  x   /  AST  x   /  ALT  x   /  AlkPhos  x   12-22      Urinalysis Basic - ( 23 Dec 2024 06:34 )    Color: x / Appearance: x / SG: x / pH: x  Gluc: 168 mg/dL / Ketone: x  / Bili: x / Urobili: x   Blood: x / Protein: x / Nitrite: x   Leuk Esterase: x / RBC: x / WBC x   Sq Epi: x / Non Sq Epi: x / Bacteria: x        MICROBIOLOGY:  v            RADIOLOGY:  Images independently visualized and reviewed personally, findings as below  < from: CT Abdomen and Pelvis No Cont (12.22.24 @ 22:13) >  IMPRESSION:  1.6 cm calcified gallstone region of gallbladder neck. No pericholecystic   inflammatory changes..    Moderate/severe bilateral hydronephrosis. 11 mm nonobstructing stone   midportion left kidney. Moderately severe bilateral hydroureter. No   obstructing stones.    Sierra catheter within urinary bladder which is decompressed.      < end of copied text >

## 2024-12-23 NOTE — PROGRESS NOTE ADULT - ASSESSMENT
_________________________________________________________________________________________  ========>>  M E D I C A L   A T T E N D I N G    F O L L O W  U P  N O T E  <<=========  -----------------------------------------------------------------------------------------------------    - Patient seen and examined by me earlier today.   - In summary,  ANNMARIE RICK is a 54y year old man admitted with   - Patient today overall doing ok, comfortable, eating OK.     ==================>> REVIEW OF SYSTEM <<=================    GEN: no fever, no chills, no pain  RESP: no SOB, no cough, no sputum  CVS: no chest pain, no palpitations  GI: no abdominal pain, no nausea, no constipation, no diarrhea  : no dysuria, no frequency  Neuro: no headache, no dizziness    ==================>> PHYSICAL EXAM <<=================    GEN: A&O X 3 , NAD , comfortable, pleasant, calm   HEENT: NCAT, PERRL, MMM, hearing intact  CVS: S1S2 , regular , No M/R/G appreciated  PULM: CTA B/L,  no W/R/R appreciated  ABD.: soft. non tender, non distended,  bowel sounds present  Extrem: intact pulses , no edema             ( Note written / Date of service 12-23-24 ( This is certified to be the same as "ENTERED" date above ( for billing purposes)))    ==================>> MEDICATIONS <<====================    MEDICATIONS  (STANDING):  cefepime   IVPB 1000 milliGRAM(s) IV Intermittent every 24 hours  chlorhexidine 2% Cloths 1 Application(s) Topical daily  dextrose 5%. 1000 milliLiter(s) (100 mL/Hr) IV Continuous <Continuous>  dextrose 5%. 1000 milliLiter(s) (50 mL/Hr) IV Continuous <Continuous>  dextrose 50% Injectable 25 Gram(s) IV Push once  dextrose 50% Injectable 12.5 Gram(s) IV Push once  dextrose 50% Injectable 25 Gram(s) IV Push once  dextrose Oral Gel 15 Gram(s) Oral once  glucagon  Injectable 1 milliGRAM(s) IntraMuscular once  insulin lispro (ADMELOG) corrective regimen sliding scale   SubCutaneous three times a day before meals  insulin lispro (ADMELOG) corrective regimen sliding scale   SubCutaneous at bedtime  midodrine. 10 milliGRAM(s) Oral three times a day  polyethylene glycol 3350 17 Gram(s) Oral daily  rosuvastatin 10 milliGRAM(s) Oral at bedtime  senna 2 Tablet(s) Oral at bedtime  sodium bicarbonate 1300 milliGRAM(s) Oral three times a day  sodium chloride 0.9%. 1000 milliLiter(s) (100 mL/Hr) IV Continuous <Continuous>    MEDICATIONS  (PRN):  acetaminophen     Tablet .. 650 milliGRAM(s) Oral every 6 hours PRN Temp greater or equal to 38C (100.4F), Mild Pain (1 - 3)  sodium chloride 0.9% Bolus. 100 milliLiter(s) IV Bolus every 5 minutes PRN SBP LESS THAN or EQUAL to 90 mmHg    ___________  Active diet:  Diet, Regular:   Consistent Carbohydrate Evening Snack (CSTCHOSN)  For patients receiving Renal Replacement - No Protein Restr, No Conc K, No Conc Phos, Low Sodium (RENAL)  ___________________    ==================>> VITAL SIGNS <<==================    T(C): 36.9 (12-23-24 @ 08:04), Max: 36.9 (12-23-24 @ 07:18)  HR: 77 (12-23-24 @ 08:04) (68 - 89)  BP: 93/58 (12-23-24 @ 08:04) (90/48 - 107/47)  BP(mean): --  RR: 18 (12-23-24 @ 08:04) (17 - 18)  SpO2: 95% (12-23-24 @ 05:15) (95% - 100%)     CAPILLARY BLOOD GLUCOSE      POCT Blood Glucose.: 215 mg/dL (23 Dec 2024 11:33)  POCT Blood Glucose.: 160 mg/dL (23 Dec 2024 07:35)  POCT Blood Glucose.: 192 mg/dL (23 Dec 2024 05:37)  POCT Blood Glucose.: 206 mg/dL (22 Dec 2024 22:36)  POCT Blood Glucose.: 137 mg/dL (22 Dec 2024 18:44)  POCT Blood Glucose.: 120 mg/dL (22 Dec 2024 16:31)    I&O's Summary    22 Dec 2024 07:01  -  23 Dec 2024 07:00  --------------------------------------------------------  IN: 0 mL / OUT: 2000 mL / NET: -2000 mL    23 Dec 2024 07:01  -  23 Dec 2024 12:34  --------------------------------------------------------  IN: 940 mL / OUT: 430 mL / NET: 510 mL         ==================>> LAB AND IMAGING <<==================                        9.8    8.66  )-----------( 197      ( 23 Dec 2024 06:34 )             28.5        12-23    134[L]  |  96[L]  |  123[H]  ----------------------------<  168[H]  4.4   |  20[L]  |  8.71[H]    Ca    9.3      23 Dec 2024 06:34  Phos  7.6     12-23  Mg     2.50     12-23    TPro  6.9  /  Alb  x   /  TBili  x   /  DBili  x   /  AST  x   /  ALT  x   /  AlkPhos  x   12-22    PT/INR - ( 22 Dec 2024 12:05 )   PT: 12.2 sec;   INR: 1.03 ratio         PTT - ( 22 Dec 2024 12:05 )  PTT:38.4 sec                 Urinalysis:  12-21-24 @ 21:30  Leuk. Est.: Large  Nirite: Negative  WBC: Too Numerous to count  Blood: Large     salt Crystal: --     calcium crystal: --     Bili: Negative     cast: --  color: Orange  Bacteria: Many  Epith. cell: --  Yeast: --     Ketone: Trace     Protein: 300     Glucose: Negative     sperm: --     Spec.Gravity: 1.017    TSH:      Lipid profile:    HgA1C:   (12-21-24)          (12-21-24)      8.3    < from: CT Abdomen and Pelvis No Cont (12.22.24 @ 22:13) >  IMPRESSION:  1.6 cm calcified gallstone region of gallbladder neck. No pericholecystic   inflammatory changes..    Moderate/severe bilateral hydronephrosis. 11 mm nonobstructing stone   midportion left kidney. Moderately severe bilateral hydroureter. No   obstructing stones.    Sierra catheter within urinary bladder which is decompressed.    Please refer to detailed findings otherwise described above.    < end of copied text >    < from: US Kidney and Bladder (12.21.24 @ 22:04) >  IMPRESSION:  Moderate to severe left and moderate right hydronephrosis which persists   post void. CT could evaluate for underlying etiology.    Nonobstructing 1 cm stone in the leftmidpole.    Multiple renal cysts as above.    < end of copied text >      ___________________________________________________________________________________  ===============>>  A S S E S S M E N T   A N D   P L A N <<===============  ------------------------------------------------------------------------------------------      · Assessment	   54 y.o. M w/ PMHx T2DM, prostatitis, chronic cystitis, and nephrolithiasis s/p PCNL 02/21, left nephroureteral tube 03/24 c/b hematuria needing L renal angioembolization of pseudoaneurysms and PCN-U 04/06 who presented to the ED for abnormal outpatient labs. in acute renal failure and plan for HD today      Problem/Plan - 1:  ·  Problem: Acute renal failure.   ·  Plan: - On review of [facility], Scr 0.99 on 4/7/22. Scr elevated to 12.52 on admission (12/21/24). UA with proteinuria, hematuria, and bacteria (12/21/24). Urine Na 77.   - US Kidneys and bladder significant for moderate to severe left and moderate right hydronephrosis, multiple renal cysts, and nonobstructing 1cm stone in the left midpole (12/21/24).  - outpatient nephrologist  Dr. Chung Burgos [facility]    Plan:  - nephrology following. HD today   - f/u on UPCR SOLO, Anti-dsDNA, Anti-GBM ab, anti-PLA2R, C3, C4, ANCA w/ reflex, SPEP, serum immunofixation, free light chains, acute Hep panel, HIV, Syphilis screen.  - renally dose meds  - Medical release form in chart. Will follow up with outpatient nephrologist on Monday.    Problem/Plan - 2:  ·  Problem: Acidosis, metabolic.   ·  Plan: VBG showed pH 7.19, lactate 1.0, PCO2 35, HCO3 13  - in setting of ESRD    Plan:  - Continue bicarb gtt  - f/u repeat labs  - ESRD plan as above.    Problem/Plan - 3:  ·  Problem: Hydronephrosis, bilateral.   ·  Plan: - renal US showed moderate to severe left and moderate right hydronephrosis which persists post void. Non obstructing 1 cm stone in the left midpole and multiple renal cysts as above  - Likely in setting of nephrolithiasis   - outpatient urologist Dr. Hoenig    Plan:  - Continue sierra  - urology consult.    Problem/Plan - 4:  ·  Problem: Leukocytosis.   ·  Plan: - UA showed large LE and blood  - hx of nephrostomy tubes. Will need ppx pseudomonas coverage  - CXR clear  - Sierra draining significant pus    DDx: likely UTI    Plan:  - F/u on urine culture  - Continue cefepime  - ID consult.    Problem/Plan - 5:  ·  Problem: Diabetes mellitus.   ·  Plan: A1c on admission 8.3%  - Home meds of alglipitin, jardiance. Need to confirm doses.    Plan:  - ISS premeal and bedtime.    Problem/Plan - 6:  ·  Problem: Need for prophylactic measure.   ·  Plan: - Fluids: None  - Electrolytes: Will replete to maintain K>4, Phos>3, and Mag>2  - Nutrition: NPO for now  - Bowel Regiment: Hold  - Activity: As tolerated. PT not indicated  - DVT Prophylaxis: Hold for now  - Stress Ulcer/GI Prophylaxis: None  - Disposition: Admit for HD.    -GI/DVT Prophylaxis per protocol.    --------------------------------------------  Case discussed with   Education given on findings and plan of care  ___________________________  HSherine Stephens D.O.  Pager: [phone]       _________________________________________________________________________________________  ========>>  M E D I C A L   A T T E N D I N G    F O L L O W  U P  N O T E  <<=========  -----------------------------------------------------------------------------------------------------    - Patient seen and examined by me earlier today.   - In summary,  ANNMARIE RICK is a 54y year old man admitted with BASHIR  - Patient today overall doing ok, comfortable, eating OK. overall felling better post dialysis, no nausea, appetite better.    per report another HD session was ongoing and stopped early due to low BP     ==================>> REVIEW OF SYSTEM <<=================    GEN: no fever, no chills, no pain  RESP: no SOB, no cough, no sputum  CVS: no chest pain, no palpitations  GI: no abdominal pain, no nausea, as above   : no dysuria, no hematuria reported   Neuro: no headache, no dizziness    ==================>> PHYSICAL EXAM <<=================    GEN: A&O X 3 , NAD , comfortable, pleasant, calm in bed   HEENT: NCAT, PERRL, MMM, hearing intact  CVS: S1S2 , regular , No M/R/G appreciated  PULM: CTA B/L,  no W/R/R appreciated  ABD.: soft. non tender, non distended,  bowel sounds present  Extrem: intact pulses , no edema .. + right groin Brooke in place , + sierra           ( Note written / Date of service 12-23-24 ( This is certified to be the same as "ENTERED" date above ( for billing purposes)))    ==================>> MEDICATIONS <<====================    MEDICATIONS  (STANDING):  cefepime   IVPB 1000 milliGRAM(s) IV Intermittent every 24 hours  chlorhexidine 2% Cloths 1 Application(s) Topical daily  dextrose 5%. 1000 milliLiter(s) (100 mL/Hr) IV Continuous <Continuous>  dextrose 5%. 1000 milliLiter(s) (50 mL/Hr) IV Continuous <Continuous>  dextrose 50% Injectable 25 Gram(s) IV Push once  dextrose 50% Injectable 12.5 Gram(s) IV Push once  dextrose 50% Injectable 25 Gram(s) IV Push once  dextrose Oral Gel 15 Gram(s) Oral once  glucagon  Injectable 1 milliGRAM(s) IntraMuscular once  insulin lispro (ADMELOG) corrective regimen sliding scale   SubCutaneous three times a day before meals  insulin lispro (ADMELOG) corrective regimen sliding scale   SubCutaneous at bedtime  midodrine. 10 milliGRAM(s) Oral three times a day  polyethylene glycol 3350 17 Gram(s) Oral daily  rosuvastatin 10 milliGRAM(s) Oral at bedtime  senna 2 Tablet(s) Oral at bedtime  sodium bicarbonate 1300 milliGRAM(s) Oral three times a day  sodium chloride 0.9%. 1000 milliLiter(s) (100 mL/Hr) IV Continuous <Continuous>    MEDICATIONS  (PRN):  acetaminophen     Tablet .. 650 milliGRAM(s) Oral every 6 hours PRN Temp greater or equal to 38C (100.4F), Mild Pain (1 - 3)  sodium chloride 0.9% Bolus. 100 milliLiter(s) IV Bolus every 5 minutes PRN SBP LESS THAN or EQUAL to 90 mmHg    ___________  Active diet:  Diet, Regular:   Consistent Carbohydrate Evening Snack (CSTCHOSN)  For patients receiving Renal Replacement - No Protein Restr, No Conc K, No Conc Phos, Low Sodium (RENAL)  ___________________    ==================>> VITAL SIGNS <<==================    T(C): 36.9 (12-23-24 @ 08:04), Max: 36.9 (12-23-24 @ 07:18)  HR: 77 (12-23-24 @ 08:04) (68 - 89)  BP: 93/58 (12-23-24 @ 08:04) (90/48 - 107/47)  RR: 18 (12-23-24 @ 08:04) (17 - 18)  SpO2: 95% (12-23-24 @ 05:15) (95% - 100%)     POCT Blood Glucose.: 215 mg/dL (23 Dec 2024 11:33)  POCT Blood Glucose.: 160 mg/dL (23 Dec 2024 07:35)  POCT Blood Glucose.: 192 mg/dL (23 Dec 2024 05:37)  POCT Blood Glucose.: 206 mg/dL (22 Dec 2024 22:36)  POCT Blood Glucose.: 137 mg/dL (22 Dec 2024 18:44)  POCT Blood Glucose.: 120 mg/dL (22 Dec 2024 16:31)    I&O's Summary    22 Dec 2024 07:01  -  23 Dec 2024 07:00  --------------------------------------------------------  IN: 0 mL / OUT: 2000 mL / NET: -2000 mL    23 Dec 2024 07:01  -  23 Dec 2024 12:34  --------------------------------------------------------  IN: 940 mL / OUT: 430 mL / NET: 510 mL     ==================>> LAB AND IMAGING <<==================                        9.8    8.66  )-----------( 197      ( 23 Dec 2024 06:34 )             28.5        12-23    134[L]  |  96[L]  |  123[H]  ----------------------------<  168[H]  4.4   |  20[L]  |  8.71[H]    Creatinine:  8.71  <<==, 9.21  <<==, 11.81  <<==, 12.08  <<==, 12.52  <<==    Ca    9.3      23 Dec 2024 06:34  Phos  7.6     12-23  Mg     2.50     12-23    TPro  6.9  /  Alb  x   /  TBili  x   /  DBili  x   /  AST  x   /  ALT  x   /  AlkPhos  x   12-22    PT/INR - ( 22 Dec 2024 12:05 )   PT: 12.2 sec;   INR: 1.03 ratio    PTT - ( 22 Dec 2024 12:05 )  PTT:38.4 sec               Urinalysis:  12-21-24 @ 21:30  Leuk. Est.: Large  Nirite: Negative  WBC: Too Numerous to count  Blood: Large     salt Crystal: --     calcium crystal: --     Bili: Negative     cast: --  color: Orange  Bacteria: Many  Epith. cell: --  Yeast: --     Ketone: Trace     Protein: 300     Glucose: Negative     sperm: --     Spec.Gravity: 1.017    HgA1C:   (12-21-24)          (12-21-24)      8.3    < from: CT Abdomen and Pelvis No Cont (12.22.24 @ 22:13) >  IMPRESSION:  1.6 cm calcified gallstone region of gallbladder neck. No pericholecystic   inflammatory changes..  Moderate/severe bilateral hydronephrosis. 11 mm nonobstructing stone   midportion left kidney. Moderately severe bilateral hydroureter. No  obstructing stones.  Sierra catheter within urinary bladder which is decompressed.  Please refer to detailed findings otherwise described above.  < end of copied text >    < from: US Kidney and Bladder (12.21.24 @ 22:04) >  IMPRESSION:  Moderate to severe left and moderate right hydronephrosis which persists   post void. CT could evaluate for underlying etiology.  Nonobstructing 1 cm stone in the leftmidpole.  Multiple renal cysts as above.  < end of copied text >    ___________________________________________________________________________________  ===============>>  A S S E S S M E N T   A N D   P L A N <<===============  ------------------------------------------------------------------------------------------    · Assessment	   54 y.o. M w/ PMHx T2DM, prostatitis, chronic cystitis, and nephrolithiasis s/p PCNL 02/21, left nephroureteral tube 03/24 c/b hematuria needing L renal angioembolization of pseudoaneurysms and PCN-U 04/06 who presented to the ED for abnormal outpatient labs. in acute renal failure and plan for HD today      Problem/Plan - 1:  ·  Problem: Acute renal failure.   BASHIR, hydronephrosis , metabolic acidosis, hyperkalemia   - nephrology following, appreciated   HD yesterday and today ( limited as above)   workup in process: f/u labs   urology follow up for ? nephrostomy  ?   - renally dose meds  monitor electrolytes .. improved     Problem/Plan - 2:  ·  Problem: hypotension   midodrine started already >> taper down as able with PRN dosing prior to dialysis   monitor vitals closely    Problem/Plan - 3:  ·  Problem: Hydronephrosis, bilateral.   ·  Plan: - renal US showed moderate to severe left and moderate right hydronephrosis which persists post void. Non obstructing 1 cm stone in the left midpole and multiple renal cysts as above  - Likely in setting of nephrolithiasis history ( thogh none seen this time as obstructing )   - Continue sierra  - urology follow up for possible nephrostomy    Problem/Plan - 4:  ·  Problem: Leukocytosis.   ·  Plan: - UA showed large LE and blood  - hx of nephrostomy tubes. Will need ppx pseudomonas coverage  - CXR clear  - Sierra drained significant pus  - F/u on urine culture  - Continue cefepime  - ID follow up and management     Problem/Plan - 5:  ·  Problem: Diabetes mellitus.  poorly controlled   ·  Plan: A1c on admission 8.3%  - Home meds of alglipitin, jardiance. Need to confirm doses.  - ISS premeal and bedtime.  endo as needed    Problem/Plan - 6:  ·  Problem: Need for prophylactic measure.   - Nutrition: tolerating better, no nausea post HD   - Bowel Regiment: as needed   - Activity: limited due to groin brooke   - DVT Prophylaxis: venodynes for now     --------------------------------------------  Case discussed with patient, nephrology and medical team   Education given on findings and plan of care  ___________________________  SRIRAM Stephens D.O.  Pager: [phone]

## 2024-12-23 NOTE — PROGRESS NOTE ADULT - PROBLEM SELECTOR PLAN 3
- renal US showed moderate to severe left and moderate right hydronephrosis which persists post void. Non obstructing 1 cm stone in the left midpole and multiple renal cysts as above  - Likely in setting of nephrolithiasis   - outpatient urologist Dr. Hoenig    Plan:  - Continue sierra  - urology consult - renal US showed moderate to severe left and moderate right hydronephrosis which persists post void. Non obstructing 1 cm stone in the left midpole and multiple renal cysts as above  - Likely in setting of nephrolithiasis   - outpatient urologist Dr. Hoenig    Plan:  - Continue sierra  - F/u CTAP non con  - urology consulted for possible nephrostomy tube placement

## 2024-12-23 NOTE — PROGRESS NOTE ADULT - PROBLEM SELECTOR PLAN 5
A1c on admission 8.3%  - Home meds of alglipitin, jardiance. Need to confirm doses.    Plan:  - ISS premeal and bedtime A1c on admission 8.3%  - Home meds of alglipitin, jardiance    Plan:  - ISS premeal and bedtime  - Hold home meds

## 2024-12-23 NOTE — PROGRESS NOTE ADULT - PROBLEM SELECTOR PLAN 6
- Fluids: None  - Electrolytes: Will replete to maintain K>4, Phos>3, and Mag>2  - Nutrition: NPO for now  - Bowel Regiment: Hold  - Activity: As tolerated. PT not indicated  - DVT Prophylaxis: Hold for now  - Stress Ulcer/GI Prophylaxis: None  - Disposition: Admit for HD - Fluids: None  - Electrolytes: Will replete to maintain K>4, Phos>3, and Mag>2  - Nutrition: Renal diet  - Bowel Regiment: miralax and senna  - Activity: As tolerated. PT not indicated  - DVT Prophylaxis: ICD  - Stress Ulcer/GI Prophylaxis: None  - Disposition: Admit for HD

## 2024-12-24 ENCOUNTER — RESULT REVIEW (OUTPATIENT)
Age: 54
End: 2024-12-24

## 2024-12-24 LAB
% ALBUMIN: 41.4 % — SIGNIFICANT CHANGE UP
% ALPHA 1: 5.8 % — SIGNIFICANT CHANGE UP
% ALPHA 2: 18.7 % — SIGNIFICANT CHANGE UP
% BETA: 10.7 % — SIGNIFICANT CHANGE UP
% GAMMA: 23.5 % — SIGNIFICANT CHANGE UP
% M SPIKE: 2.3 % — SIGNIFICANT CHANGE UP
ALBUMIN SERPL ELPH-MCNC: 2.86 G/DL — LOW (ref 3.3–4.4)
ALBUMIN SERPL ELPH-MCNC: 3.2 G/DL — LOW (ref 3.3–5)
ALBUMIN/GLOB SERPL ELPH: 0.7 RATIO — SIGNIFICANT CHANGE UP
ALP SERPL-CCNC: 51 U/L — SIGNIFICANT CHANGE UP (ref 40–120)
ALPHA1 GLOB SERPL ELPH-MCNC: 0.4 G/DL — HIGH (ref 0.1–0.3)
ALPHA2 GLOB SERPL ELPH-MCNC: 1.3 G/DL — HIGH (ref 0.6–1)
ALT FLD-CCNC: 8 U/L — SIGNIFICANT CHANGE UP (ref 4–41)
ANA TITR SER: NEGATIVE — SIGNIFICANT CHANGE UP
ANION GAP SERPL CALC-SCNC: 19 MMOL/L — HIGH (ref 7–14)
AST SERPL-CCNC: 7 U/L — SIGNIFICANT CHANGE UP (ref 4–40)
AUTO DIFF PNL BLD: NEGATIVE — SIGNIFICANT CHANGE UP
B-GLOBULIN SERPL ELPH-MCNC: 0.74 G/DL — SIGNIFICANT CHANGE UP (ref 0.6–1.1)
BILIRUB SERPL-MCNC: 0.2 MG/DL — SIGNIFICANT CHANGE UP (ref 0.2–1.2)
BUN SERPL-MCNC: 104 MG/DL — HIGH (ref 7–23)
C-ANCA SER-ACNC: NEGATIVE — SIGNIFICANT CHANGE UP
CALCIUM SERPL-MCNC: 9 MG/DL — SIGNIFICANT CHANGE UP (ref 8.4–10.5)
CHLORIDE SERPL-SCNC: 98 MMOL/L — SIGNIFICANT CHANGE UP (ref 98–107)
CO2 SERPL-SCNC: 21 MMOL/L — LOW (ref 22–31)
CREAT SERPL-MCNC: 7.06 MG/DL — HIGH (ref 0.5–1.3)
CULTURE RESULTS: ABNORMAL
EGFR: 8 ML/MIN/1.73M2 — LOW
GAMMA GLOBULIN: 1.62 G/DL — SIGNIFICANT CHANGE UP (ref 0.7–1.7)
GAS PNL BLDV: SIGNIFICANT CHANGE UP
GBM IGG SER-ACNC: <0.2 — SIGNIFICANT CHANGE UP (ref 0–0.9)
GLUCOSE BLDC GLUCOMTR-MCNC: 155 MG/DL — HIGH (ref 70–99)
GLUCOSE BLDC GLUCOMTR-MCNC: 156 MG/DL — HIGH (ref 70–99)
GLUCOSE BLDC GLUCOMTR-MCNC: 174 MG/DL — HIGH (ref 70–99)
GLUCOSE SERPL-MCNC: 117 MG/DL — HIGH (ref 70–99)
HCT VFR BLD CALC: 27.4 % — LOW (ref 39–50)
HGB BLD-MCNC: 9.1 G/DL — LOW (ref 13–17)
LACTATE SERPL-SCNC: 1.2 MMOL/L — SIGNIFICANT CHANGE UP (ref 0.5–2)
M-SPIKE: 0.2 G/DL — HIGH (ref 0–0)
MAGNESIUM SERPL-MCNC: 2.3 MG/DL — SIGNIFICANT CHANGE UP (ref 1.6–2.6)
MCHC RBC-ENTMCNC: 27 PG — SIGNIFICANT CHANGE UP (ref 27–34)
MCHC RBC-ENTMCNC: 33.2 G/DL — SIGNIFICANT CHANGE UP (ref 32–36)
MCV RBC AUTO: 81.3 FL — SIGNIFICANT CHANGE UP (ref 80–100)
MPO AB + PR3 PNL SER: SIGNIFICANT CHANGE UP
NRBC # BLD: 0 /100 WBCS — SIGNIFICANT CHANGE UP (ref 0–0)
NRBC # FLD: 0 K/UL — SIGNIFICANT CHANGE UP (ref 0–0)
P-ANCA SER-ACNC: NEGATIVE — SIGNIFICANT CHANGE UP
PHOSPHATE SERPL-MCNC: 6.3 MG/DL — HIGH (ref 2.5–4.5)
PLATELET # BLD AUTO: 184 K/UL — SIGNIFICANT CHANGE UP (ref 150–400)
POTASSIUM SERPL-MCNC: 4.1 MMOL/L — SIGNIFICANT CHANGE UP (ref 3.5–5.3)
POTASSIUM SERPL-SCNC: 4.1 MMOL/L — SIGNIFICANT CHANGE UP (ref 3.5–5.3)
PROT PATTERN SERPL ELPH-IMP: SIGNIFICANT CHANGE UP
PROT SERPL-MCNC: 6.7 G/DL — SIGNIFICANT CHANGE UP (ref 6–8.3)
PROT SERPL-MCNC: 6.9 G/DL — SIGNIFICANT CHANGE UP (ref 6–8.3)
RBC # BLD: 3.37 M/UL — LOW (ref 4.2–5.8)
RBC # FLD: 13.6 % — SIGNIFICANT CHANGE UP (ref 10.3–14.5)
SODIUM SERPL-SCNC: 138 MMOL/L — SIGNIFICANT CHANGE UP (ref 135–145)
SPECIMEN SOURCE: SIGNIFICANT CHANGE UP
WBC # BLD: 9.38 K/UL — SIGNIFICANT CHANGE UP (ref 3.8–10.5)
WBC # FLD AUTO: 9.38 K/UL — SIGNIFICANT CHANGE UP (ref 3.8–10.5)

## 2024-12-24 PROCEDURE — 99232 SBSQ HOSP IP/OBS MODERATE 35: CPT

## 2024-12-24 PROCEDURE — 76770 US EXAM ABDO BACK WALL COMP: CPT | Mod: 26

## 2024-12-24 PROCEDURE — 99233 SBSQ HOSP IP/OBS HIGH 50: CPT | Mod: GC

## 2024-12-24 PROCEDURE — 93306 TTE W/DOPPLER COMPLETE: CPT | Mod: 26

## 2024-12-24 RX ORDER — HYDROMORPHONE HCL 4 MG
0.2 TABLET ORAL EVERY 6 HOURS
Refills: 0 | Status: DISCONTINUED | OUTPATIENT
Start: 2024-12-24 | End: 2024-12-25

## 2024-12-24 RX ORDER — OXYBUTYNIN CHLORIDE 5 MG/1
5 TABLET ORAL
Refills: 0 | Status: DISCONTINUED | OUTPATIENT
Start: 2024-12-24 | End: 2024-12-25

## 2024-12-24 RX ORDER — ACETAMINOPHEN 80 MG/.8ML
1000 SOLUTION/ DROPS ORAL ONCE
Refills: 0 | Status: COMPLETED | OUTPATIENT
Start: 2024-12-24 | End: 2024-12-24

## 2024-12-24 RX ORDER — HYDROMORPHONE HCL 4 MG
0.2 TABLET ORAL ONCE
Refills: 0 | Status: DISCONTINUED | OUTPATIENT
Start: 2024-12-24 | End: 2024-12-24

## 2024-12-24 RX ORDER — HYDROMORPHONE HCL 4 MG
2 TABLET ORAL ONCE
Refills: 0 | Status: DISCONTINUED | OUTPATIENT
Start: 2024-12-24 | End: 2024-12-24

## 2024-12-24 RX ORDER — PHENAZOPYRIDINE HCL 200 MG
100 TABLET ORAL EVERY 8 HOURS
Refills: 0 | Status: DISCONTINUED | OUTPATIENT
Start: 2024-12-24 | End: 2024-12-24

## 2024-12-24 RX ADMIN — Medication 0.2 MILLIGRAM(S): at 07:32

## 2024-12-24 RX ADMIN — Medication 1 APPLICATION(S): at 11:27

## 2024-12-24 RX ADMIN — MIDODRINE HYDROCHLORIDE 10 MILLIGRAM(S): 5 TABLET ORAL at 05:37

## 2024-12-24 RX ADMIN — Medication 0.2 MILLIGRAM(S): at 15:00

## 2024-12-24 RX ADMIN — SODIUM BICARBONATE 1300 MILLIGRAM(S): 84 INJECTION, SOLUTION INTRAVENOUS at 13:05

## 2024-12-24 RX ADMIN — Medication 1: at 07:04

## 2024-12-24 RX ADMIN — Medication 0.2 MILLIGRAM(S): at 14:13

## 2024-12-24 RX ADMIN — ACETAMINOPHEN 400 MILLIGRAM(S): 80 SOLUTION/ DROPS ORAL at 23:23

## 2024-12-24 RX ADMIN — ROSUVASTATIN 10 MILLIGRAM(S): 40 TABLET, FILM COATED ORAL at 21:42

## 2024-12-24 RX ADMIN — SODIUM BICARBONATE 1300 MILLIGRAM(S): 84 INJECTION, SOLUTION INTRAVENOUS at 21:42

## 2024-12-24 RX ADMIN — CHLORHEXIDINE GLUCONATE 1 APPLICATION(S): 1.2 RINSE ORAL at 11:30

## 2024-12-24 RX ADMIN — OXYBUTYNIN CHLORIDE 5 MILLIGRAM(S): 5 TABLET ORAL at 19:01

## 2024-12-24 RX ADMIN — SODIUM BICARBONATE 1300 MILLIGRAM(S): 84 INJECTION, SOLUTION INTRAVENOUS at 05:38

## 2024-12-24 RX ADMIN — Medication 1: at 17:00

## 2024-12-24 RX ADMIN — Medication 2 MILLIGRAM(S): at 12:24

## 2024-12-24 RX ADMIN — ACETAMINOPHEN 650 MILLIGRAM(S): 80 SOLUTION/ DROPS ORAL at 05:37

## 2024-12-24 RX ADMIN — Medication 100 MILLIGRAM(S): at 17:44

## 2024-12-24 RX ADMIN — ACETAMINOPHEN 650 MILLIGRAM(S): 80 SOLUTION/ DROPS ORAL at 06:37

## 2024-12-24 RX ADMIN — Medication 0.2 MILLIGRAM(S): at 19:13

## 2024-12-24 RX ADMIN — Medication 1: at 11:25

## 2024-12-24 RX ADMIN — ACETAMINOPHEN 1000 MILLIGRAM(S): 80 SOLUTION/ DROPS ORAL at 23:53

## 2024-12-24 RX ADMIN — Medication 0.2 MILLIGRAM(S): at 07:02

## 2024-12-24 RX ADMIN — MIDODRINE HYDROCHLORIDE 10 MILLIGRAM(S): 5 TABLET ORAL at 11:25

## 2024-12-24 RX ADMIN — MIDODRINE HYDROCHLORIDE 10 MILLIGRAM(S): 5 TABLET ORAL at 17:01

## 2024-12-24 NOTE — CONSULT NOTE ADULT - ASSESSMENT
Interventional Radiology    Evaluate for Procedure: bilateral PCN    HPI: 54y Male with T2DM, prostatitis, chronic cystitis, nephrolithiasis s/p PCNL 2/21 and left nephroureteral tube on 3/24. Patient initially presenting for kidney pain for multiple months. IR consulted for placement of bilateral PCN secondary to bilateral moderate to severe hydronephrosis.     Allergies: penicillins (Unknown)    Medications (Abx/Cardiac/Anticoagulation/Blood Products)  cefepime   IVPB: 100 mL/Hr IV Intermittent (12-23 @ 22:24)  midodrine.: 10 milliGRAM(s) Oral (12-24 @ 11:25)    Data:    T(C): 36.8  HR: 76  BP: 118/61  RR: 18  SpO2: 99%    -WBC 9.38 / HgB 9.1 / Hct 27.4 / Plt 184  -Na 138 / Cl 98 /  / Glucose 117  -K 4.1 / CO2 21 / Cr 7.06  -ALT 8 / Alk Phos 51 / T.Bili 0.2  -INR 1.03 / PTT 38.4      Radiology:   reviewed     Assessment/Plan:   54y Male with T2DM, prostatitis, chronic cystitis, nephrolithiasis s/p PCNL 2/21 and left nephroureteral tube on 3/24. Patient initially presenting for kidney pain for multiple months. IR consulted for placement of bilateral PCN secondary to bilateral moderate to severe hydronephrosis.     After review of 12/22/24 CT abdomen pelvis imaging, there is bilateral hydroureteronephrosis to the level of the UVJ without clear obstructing stone or mass. Patient is currently with elevated BASHIR and BUN and is mildly hypotensive. Patient currently on HD, previously aborted due to decreased bp.     After discussion with Dr. Baum (urology) and Dr. Azul ( nephrology), bilateral PCN pending evaluation by urology for stent placement. Current urology plan for bedside cystoscopy on 12/25/24. If patient is not appropriate candidate for stent placement for treatment of obstructive uropathy, will plan for bilateral PCN to decompress.     -- IR will tentatively plan to perform bilateral PCN on 12/26/24  -- NPO at midnight on 12/26/24  -- hold a.m. anticoagulation  -- please complete IR pre-procedure note  -- please place IR procedure request order under Dr. Merida    --  Bryson Nielsen, PGY3   Vascular and Interventional Radiology   Available on [employer] Teams    - Non-emergent consults: Place IR consult order in [place]  - Emergent issues (pager): [facility] [phone]; [place] [facility] -[phone]; [phone]  - Scheduling questions: [facility] [phone]; [place] [facility] -[phone]  - Clinic/outpatient booking: [facility] [phone]; [place] [facility] -[phone]

## 2024-12-24 NOTE — PROGRESS NOTE ADULT - PROBLEM SELECTOR PLAN 1
- On review of [facility], Scr 0.99 on 4/7/22. Scr elevated to 12.52 on admission (12/21/24). UA with proteinuria, hematuria, and bacteria (12/21/24). Urine Na 77.   - US Kidneys and bladder significant for moderate to severe left and moderate right hydronephrosis, multiple renal cysts, and nonobstructing 1cm stone in the left midpole (12/21/24). --> likely cause of renal failure  - Acute hep panel neg, MRSA neg, RPR neg  - outpatient nephrologist  Dr. Chung Burgos [facility]  - Will use femoral shiley as patient's pressure not controlled for new shiley insertion per IR    Plan:  - nephrology following. HD today with no fluid removal in setting of hypotension  - Continue fluids  - Midodrine 10mg q8h and wean as tolerated  - f/u on UPCR SOLO, Anti-dsDNA, Anti-GBM ab, anti-PLA2R, C3, C4, ANCA w/ reflex, SPEP, serum immunofixation, free light chains, HIV  - renally dose meds  - Medical release form in chart. Will follow up with outpatient nephrologist - On review of [facility], Scr 0.99 on 4/7/22. Scr elevated to 12.52 on admission (12/21/24). UA with proteinuria, hematuria, and bacteria (12/21/24). Urine Na 77.   - US Kidneys and bladder significant for moderate to severe left and moderate right hydronephrosis, multiple renal cysts, and nonobstructing 1cm stone in the left midpole (12/21/24). --> likely cause of renal failure  - Acute hep panel neg, MRSA neg, RPR neg  - outpatient nephrologist  Dr. Chung Burgos [facility]  - Will use femoral shiley as patient's pressure not controlled for new shiley insertion per IR    Plan:  - nephrology following. HD today with no fluid removal in setting of hypotension  - Plan for nephrostomy tube per urology for hydronephrosis   - Continue fluids  - Midodrine 10mg q8h and wean as tolerated  - f/u on UPCR SOLO, Anti-dsDNA, Anti-GBM ab, anti-PLA2R, C3, C4, ANCA w/ reflex, SPEP, serum immunofixation, free light chains, HIV  - renally dose meds  - Medical release form in chart. Will follow up with outpatient nephrologist

## 2024-12-24 NOTE — PROGRESS NOTE ADULT - SUBJECTIVE AND OBJECTIVE BOX
[facility] DIVISION OF KIDNEY DISEASES AND HYPERTENSION --    Reason for consult: BASHIR on CKD    24 hour events/subjective: Patient seen and examined at bedside. Attempted HD yesterday but aborted due to hypotension, although pt remained asymptomatic. This AM c/o severe pain at sierra insertion site and received IV Dilaudid with improvement prior to dialysis.       PAST HISTORY  --------------------------------------------------------------------------------  No significant changes to PMH, PSH, FHx, SHx, unless otherwise noted    ALLERGIES & MEDICATIONS  --------------------------------------------------------------------------------  Allergies    penicillins (Unknown)      Standing Inpatient Medications  cefepime   IVPB 1000 milliGRAM(s) IV Intermittent every 24 hours  chlorhexidine 2% Cloths 1 Application(s) Topical daily  dextrose 5%. 1000 milliLiter(s) IV Continuous <Continuous>  dextrose 5%. 1000 milliLiter(s) IV Continuous <Continuous>  dextrose 50% Injectable 25 Gram(s) IV Push once  dextrose 50% Injectable 12.5 Gram(s) IV Push once  dextrose 50% Injectable 25 Gram(s) IV Push once  dextrose Oral Gel 15 Gram(s) Oral once  glucagon  Injectable 1 milliGRAM(s) IntraMuscular once  insulin lispro (ADMELOG) corrective regimen sliding scale   SubCutaneous three times a day before meals  insulin lispro (ADMELOG) corrective regimen sliding scale   SubCutaneous at bedtime  lidocaine/prilocaine Cream 1 Application(s) Topical daily  midodrine. 10 milliGRAM(s) Oral three times a day  polyethylene glycol 3350 17 Gram(s) Oral daily  rosuvastatin 10 milliGRAM(s) Oral at bedtime  senna 2 Tablet(s) Oral at bedtime  sodium bicarbonate 1300 milliGRAM(s) Oral three times a day    PRN Inpatient Medications  acetaminophen     Tablet .. 650 milliGRAM(s) Oral every 6 hours PRN  sodium chloride 0.9% Bolus. 100 milliLiter(s) IV Bolus every 5 minutes PRN      REVIEW OF SYSTEMS  --------------------------------------------------------------------------------  Gen: No fever  Respiratory: No dyspnea  CV: No chest pain  GI: No abdominal pain, diarrhea, nausea, vomiting  : +sierra with purulent drainage noted, +severe pain at sierra insertion site  Skin: No rashes  MSK: No edema  Neuro: No dizziness/lightheadedness    All other systems were reviewed and are negative, except as noted.    VITALS/PHYSICAL EXAM  --------------------------------------------------------------------------------  T(C): 36.8 (12-24-24 @ 11:00), Max: 37.4 (12-24-24 @ 07:40)  HR: 76 (12-24-24 @ 11:00) (63 - 82)  BP: 118/61 (12-24-24 @ 11:00) (91/51 - 138/101)  RR: 18 (12-24-24 @ 11:00) (16 - 18)  SpO2: 99% (12-24-24 @ 11:00) (96% - 99%)  Wt(kg): --        12-23-24 @ 07:01  -  12-24-24 @ 07:00  --------------------------------------------------------  IN: 2445 mL / OUT: 930 mL / NET: 1515 mL    12-24-24 @ 07:01  -  12-24-24 @ 12:49  --------------------------------------------------------  IN: 740 mL / OUT: 800 mL / NET: -60 mL      PHYSICAL EXAM:  Gen: NAD  Neuro: non-focal  HEENT: Anicteric, MMM  Pulm: CTA B/L  CV: +S1S2  Abd: soft, non-tender/non-distended  : +sierra with purulent drainage noted  Extremities: no edema  Skin: Warm  Dialysis access: R femoral non-tunneled catheter    LABS/STUDIES  --------------------------------------------------------------------------------              9.8    8.66  >-----------<  197      [12-23-24 @ 06:34]              28.5     134  |  96  |  123  ----------------------------<  168      [12-23-24 @ 06:34]  4.4   |  20  |  8.71        Ca     9.3     [12-23-24 @ 06:34]      Mg     2.50     [12-23-24 @ 06:34]      Phos  7.6     [12-23-24 @ 06:34]      Creatinine Trend:  SCr 8.71 [12-23 @ 06:34]  SCr 9.21 [12-22 @ 21:34]  SCr 11.81 [12-22 @ 12:05]  SCr 12.08 [12-22 @ 03:10]  SCr 12.52 [12-21 @ 21:30]    Urine Creatinine 88      [12-22-24 @ 12:05]  Urine Protein 98      [12-22-24 @ 12:05]  Urine Sodium 51      [12-22-24 @ 12:05]  Urine Urea Nitrogen 439.7      [12-22-24 @ 12:05]  Urine Potassium 16.1      [12-21-24 @ 22:30]  Urine Chloride 51      [12-21-24 @ 22:30]  Urine Osmolality 329      [12-22-24 @ 12:05]    Iron 27, TIBC 154, %sat 18      [12-23-24 @ 06:34]  Ferritin 612      [12-23-24 @ 06:34]    HBsAb <3.0      [12-22-24 @ 17:00]  HBsAg Nonreact      [12-22-24 @ 17:00]  HBcAb Nonreact      [12-22-24 @ 17:00]  HCV 0.38, Nonreact      [12-22-24 @ 17:00]  HIV Nonreact      [12-22-24 @ 12:05]    dsDNA <1      [12-22-24 @ 12:05]  C3 Complement 109      [12-22-24 @ 12:05]  C4 Complement 28      [12-22-24 @ 12:05]  ANCA: cANCA Negative, pANCA Negative, atypical ANCA Negative      [12-22-24 @ 12:05]  Syphilis Screen (Treponema Pallidum Ab) Negative      [12-22-24 @ 12:05]  Free Light Chains: kappa 9.86, lambda 5.97, ratio = 1.65      [12-22 @ 12:05]

## 2024-12-24 NOTE — PROGRESS NOTE ADULT - PROBLEM SELECTOR PLAN 5
A1c on admission 8.3%  - Home meds of alglipitin, jardiance    Plan:  - ISS premeal and bedtime  - Hold home meds

## 2024-12-24 NOTE — PROGRESS NOTE ADULT - ASSESSMENT
Patient is a 54M admitted to medicine for acute renal failure s/p HD noted to have mod-severe L and mod R hydro persisting postvoid on RBUS and redemonstrated on CTAP with nonobstructing 10mm L renal stone.    Recs  -unclear etiology of acute renal failure. CT scan demonstrates b/l hydroureteronephrosis down to the level of the bladder with poor visualization of bladder 2/2 sierra decompression.   -continue HD per primary  -will continue to follow  -RBUS to reassess hydro after sierra decompression and prior CT  -plan for bedside cystoscopy to evaluate bladder/UOs tomorrow, possible procedural/operative intervention  -npo at midnight  -preop labs, type and screen, coags  ***PATIENT TO BE SEEN AND EXAMINED, RECS NOT FINAL Patient is a 54M admitted to medicine for acute renal failure s/p HD noted to have mod-severe L and mod R hydro persisting postvoid on RBUS and redemonstrated on CTAP with nonobstructing 10mm L renal stone.    Recs  -unclear etiology of acute renal failure. CT scan demonstrates b/l hydroureteronephrosis down to the level of the bladder with poor visualization of bladder 2/2 sierra decompression.   -continue HD per primary  -will continue to follow  -ditropan for bladder spasm, can consider valium if very uncomfortable  -RBUS to reassess hydro after sierra decompression and prior CT  -plan for bedside cystoscopy to evaluate bladder/UOs tomorrow, possible procedural/operative intervention  -npo at midnight  -preop labs, type and screen, coags  Discussed with dr. Woody

## 2024-12-24 NOTE — PROGRESS NOTE ADULT - PROBLEM SELECTOR PLAN 1
Pt. with renal failure in the setting of b/l hydronephrosis with hx of chronic nephrolithiasis (per [facility], stone composition combined calcium oxalate and uric acid stones). On review of [facility], Scr 0.99 on 4/7/22. Scr elevated to 12.52 on admission (12/21/24). UA with proteinuria, hematuria, and bacteria (12/21/24). US Kidneys and bladder significant for moderate to severe B/L hydro with R kidney 15.6cm and L kidney 12.2cm, multiple renal cysts, and nonobstructing 1cm stone in the left midpole (12/21/24). CT A/P also showed mod-severe B/L hydroureteronephrosis, bladder decompressed with sierra. UPCR 1.1g. Serologies negative. Unclear if pt has underlying CKD due to chronic obstruction or other etiology, no SCr between 2022 and present. Pt had uremic s/s on presentation and was urgently dialyzed on 12/22/24 via R fem cath placed by Vascular team. His 2nd HD session on 12/23/24 was c/b hypotension. Although pt was asymptomatic, his SBP was persistently in the 80s despite NS bolus, therefore HD terminated early. Pt given IVF and started on Midodrine.     -Recommend to replace sierra given purulent drainage and pain and repeat UA with UCx and obtain blood cultures given persistent hypotension. Also, TTE pending for cardiac eval.    -Ongoing eval and discussion with both urology and interventional radiology for possible intervention. Pt is currently dialysis-dependent. Would recommend NT placement to see if pt has some renal recovery and then may not require long-term HD. Hold off on IR or vascular consults for permanent dialysis access, but can exchange femoral cath to non-tunneled RIJ if needed.    Monitor labs and urine output. Avoid any potential nephrotoxins when possible and dose medications per eGFR. Pt. with renal failure in the setting of b/l hydronephrosis with hx of chronic nephrolithiasis (per [facility], stone composition combined calcium oxalate and uric acid stones). On review of [facility], Scr 0.99 on 4/7/22. Scr elevated to 12.52 on admission (12/21/24). UA with proteinuria, hematuria, and bacteria (12/21/24). US Kidneys and bladder significant for moderate to severe B/L hydro with R kidney 15.6cm and L kidney 12.2cm, multiple renal cysts, and nonobstructing 1cm stone in the left midpole (12/21/24). CT A/P also showed mod-severe B/L hydroureteronephrosis, bladder decompressed with sierra. UPCR 1.1g. Serologies negative. Unclear if pt has underlying CKD due to chronic obstruction or other etiology, no SCr between 2022 and present. Pt had uremic s/s on presentation and was urgently dialyzed on 12/22/24 via R fem cath placed by Vascular team. His 2nd HD session on 12/23/24 was c/b hypotension. Although pt was asymptomatic, his SBP was persistently in the 80s despite NS bolus, therefore HD terminated early. Pt given IVF and started on Midodrine.     -Recommend to replace sierra given purulent drainage and pain and repeat UA with UCx and obtain blood cultures given persistent hypotension. Pt is on Cefepime, but may need to be broadened pending cultures. F/U with ID. Also, TTE pending for cardiac eval.    -Ongoing eval and discussion with both urology and interventional radiology for possible intervention. Pt is currently dialysis-dependent. Would recommend NT placement to see if pt has some renal recovery and then may not require long-term HD. Hold off on IR or vascular consults for permanent dialysis access, but can exchange femoral cath to non-tunneled RIJ if needed.    Monitor labs and urine output. Avoid any potential nephrotoxins when possible and dose medications per eGFR. Pt. with renal failure in the setting of b/l hydronephrosis with hx of chronic nephrolithiasis (per [facility], stone composition combined calcium oxalate and uric acid stones). On review of [facility], Scr 0.99 on 4/7/22. Scr elevated to 12.52 on admission (12/21/24). UA with proteinuria, hematuria, and bacteria (12/21/24). US Kidneys and bladder significant for moderate to severe B/L hydro with R kidney 15.6cm and L kidney 12.2cm, multiple renal cysts, and nonobstructing 1cm stone in the left midpole (12/21/24). CT A/P also showed mod-severe B/L hydroureteronephrosis, bladder decompressed with sierra. UPCR 1.1g. Serologies negative. Unclear if pt has underlying CKD due to chronic obstruction or other etiology, no SCr between 2022 and present. Pt had uremic s/s on presentation and was urgently dialyzed on 12/22/24 via R fem cath placed by Vascular team. His 2nd HD session on 12/23/24 was c/b hypotension. Although pt was asymptomatic, his SBP was persistently in the 80s despite NS bolus, therefore HD terminated early. Pt given IVF and started on Midodrine.     -Recommend to replace sierra given purulent drainage and pain and repeat UA with UCx and obtain blood cultures given persistent hypotension. Pt is on Cefepime, but may need to be broadened pending cultures. F/U with ID. Also, TTE pending for cardiac eval.    -Ongoing eval and discussion with both urology and interventional radiology for possible intervention. Pt is currently dialysis-dependent. Would recommend intervention(  or IR) to give a chance for pt for renal recovery and then may not require long-term HD.  exchange femoral cath to non-tunneled RIJ if needed.    Monitor labs and urine output. Avoid any potential nephrotoxins when possible and dose medications per eGFR.

## 2024-12-24 NOTE — PROGRESS NOTE ADULT - SUBJECTIVE AND OBJECTIVE BOX
Follow Up:  acute renal failure and b/l hydro, ?UTI    Interval History/ROS: pt afebrile, feels better after HD but has pain at the groin with shiley, no vomiting or SOB          Allergies  penicillins (Unknown)        ANTIMICROBIALS:  cefepime   IVPB 1000 every 24 hours      OTHER MEDS:  acetaminophen     Tablet .. 650 milliGRAM(s) Oral every 6 hours PRN  chlorhexidine 2% Cloths 1 Application(s) Topical daily  dextrose 5%. 1000 milliLiter(s) IV Continuous <Continuous>  dextrose 5%. 1000 milliLiter(s) IV Continuous <Continuous>  dextrose 50% Injectable 25 Gram(s) IV Push once  dextrose 50% Injectable 12.5 Gram(s) IV Push once  dextrose 50% Injectable 25 Gram(s) IV Push once  dextrose Oral Gel 15 Gram(s) Oral once  glucagon  Injectable 1 milliGRAM(s) IntraMuscular once  HYDROmorphone  Injectable 0.2 milliGRAM(s) IV Push every 6 hours PRN  insulin lispro (ADMELOG) corrective regimen sliding scale   SubCutaneous three times a day before meals  insulin lispro (ADMELOG) corrective regimen sliding scale   SubCutaneous at bedtime  lidocaine/prilocaine Cream 1 Application(s) Topical daily  midodrine. 10 milliGRAM(s) Oral three times a day  phenazopyridine 100 milliGRAM(s) Oral every 8 hours  polyethylene glycol 3350 17 Gram(s) Oral daily  rosuvastatin 10 milliGRAM(s) Oral at bedtime  senna 2 Tablet(s) Oral at bedtime  sodium bicarbonate 1300 milliGRAM(s) Oral three times a day  sodium chloride 0.9% Bolus. 100 milliLiter(s) IV Bolus every 5 minutes PRN      Vital Signs Last 24 Hrs  T(C): 36.8 (24 Dec 2024 11:00), Max: 37.4 (24 Dec 2024 07:40)  T(F): 98.2 (24 Dec 2024 11:00), Max: 99.4 (24 Dec 2024 07:40)  HR: 76 (24 Dec 2024 11:00) (63 - 82)  BP: 118/61 (24 Dec 2024 11:00) (91/51 - 138/101)  BP(mean): --  RR: 18 (24 Dec 2024 11:00) (16 - 18)  SpO2: 99% (24 Dec 2024 11:00) (96% - 99%)    Parameters below as of 24 Dec 2024 11:00  Patient On (Oxygen Delivery Method): room air        Physical Exam:  General:    NAD,  non toxic  Respiratory:  comfortable on RA  abd:     soft,   no tenderness  :    sirera  Musculoskeletal:   no joint swelling  vascular: R fem shiley  Skin:    no rash                          9.1    9.38  )-----------( 184      ( 24 Dec 2024 06:45 )             27.4       12-24    138  |  98  |  104[H]  ----------------------------<  117[H]  4.1   |  21[L]  |  7.06[H]    Ca    9.0      24 Dec 2024 06:45  Phos  6.3     12-24  Mg     2.30     12-24    TPro  6.7  /  Alb  3.2[L]  /  TBili  0.2  /  DBili  x   /  AST  7   /  ALT  8   /  AlkPhos  51  12-24      Urinalysis Basic - ( 24 Dec 2024 06:45 )    Color: x / Appearance: x / SG: x / pH: x  Gluc: 117 mg/dL / Ketone: x  / Bili: x / Urobili: x   Blood: x / Protein: x / Nitrite: x   Leuk Esterase: x / RBC: x / WBC x   Sq Epi: x / Non Sq Epi: x / Bacteria: x        MICROBIOLOGY:  v            RADIOLOGY:  Images independently visualized and reviewed personally, findings as below  < from: CT Abdomen and Pelvis No Cont (12.22.24 @ 22:13) >  IMPRESSION:  1.6 cm calcified gallstone region of gallbladder neck. No pericholecystic   inflammatory changes..    Moderate/severe bilateral hydronephrosis. 11 mm nonobstructing stone   midportion left kidney. Moderately severe bilateral hydroureter. No   obstructing stones.    Sierra catheter within urinary bladder which is decompressed.    Please refer to detailed findings otherwise described above.    < end of copied text >

## 2024-12-24 NOTE — PROGRESS NOTE ADULT - PROBLEM SELECTOR PLAN 3
- renal US showed moderate to severe left and moderate right hydronephrosis which persists post void. Non obstructing 1 cm stone in the left midpole and multiple renal cysts as above  - Likely in setting of nephrolithiasis   - outpatient urologist Dr. Hoenig  - CTAP: 1.6 cm calcified gallstone region of gallbladder neck. Moderate/severe bilateral hydronephrosis. 11 mm non-obstructing stone midportion left kidney. Moderately severe bilateral hydroureter. No obstructing stones.      Plan:  - Continue sierra  - urology recommended no intervention as low concern for obstruction  - Unknown cause for hydronephrosis? - renal US showed moderate to severe left and moderate right hydronephrosis which persists post void. Non obstructing 1 cm stone in the left midpole and multiple renal cysts as above  - Likely in setting of nephrolithiasis   - outpatient urologist Dr. Hoenig  - CTAP: 1.6 cm calcified gallstone region of gallbladder neck. Moderate/severe bilateral hydronephrosis. 11 mm non-obstructing stone midportion left kidney. Moderately severe bilateral hydroureter. No obstructing stones.      Plan:  - Continue sierra  - urology planning for intervention per nephrology chart note  - Unknown cause for hydronephrosis

## 2024-12-24 NOTE — PROGRESS NOTE ADULT - PROBLEM SELECTOR PLAN 4
- UA showed large LE and blood  - hx of nephrostomy tubes. Will need ppx pseudomonas coverage  - CXR clear  - Jung draining significant pus on admission. Clearing today    DDx: likely UTI    Plan:  - F/u on urine culture  - Continue cefepime per ID (12/22 - )  - ID following. Appreciate recs  - ID consult

## 2024-12-24 NOTE — PROGRESS NOTE ADULT - ASSESSMENT
_________________________________________________________________________________________  ========>>  M E D I C A L   A T T E N D I N G    F O L L O W  U P  N O T E  <<=========  -----------------------------------------------------------------------------------------------------    - Patient seen and examined by me earlier today.   - Patient today with complains of significant pain and discomfort at the sierra site and up his penis and bladder, feeling urgency..         already received PO and IV dilaudid ( only IV helped per patient ) , adding Pyridium as well    otherwise comfortable, eating OK. no nausea, appetite ok    tolerated another HD session     in discussion with specialists, medical team and patient >> plan for decompression of bilateral hydronephrosis ( IR in discussion  / collaboration with )     ==================>> REVIEW OF SYSTEM <<=================    GEN: no fever, no chills, as above   RESP: no SOB, no cough, no sputum  CVS: no chest pain, no palpitations  GI: no abdominal pain, no nausea, as above   : as above   Neuro: no headache, no dizziness    ==================>> PHYSICAL EXAM <<=================    GEN: A&O X 3 , NAD , comfortable, pleasant, calm in bed   HEENT: NCAT, PERRL, MMM, hearing intact  CVS: S1S2 , regular , No M/R/G appreciated  PULM: CTA B/L,  no W/R/R appreciated  ABD.: soft. non tender, non distended,  bowel sounds present  Extrem: intact pulses , no edema .. + right groin Brooke in place , + sierra with puss and debris still coming . !        ( Note written / Date of service 12-24-24 ( This is certified to be the same as "ENTERED" date above ( for billing purposes)))    ==================>> MEDICATIONS <<====================    cefepime   IVPB 1000 milliGRAM(s) IV Intermittent every 24 hours  chlorhexidine 2% Cloths 1 Application(s) Topical daily  dextrose 5%. 1000 milliLiter(s) IV Continuous <Continuous>  dextrose 5%. 1000 milliLiter(s) IV Continuous <Continuous>  dextrose 50% Injectable 25 Gram(s) IV Push once  dextrose 50% Injectable 12.5 Gram(s) IV Push once  dextrose 50% Injectable 25 Gram(s) IV Push once  dextrose Oral Gel 15 Gram(s) Oral once  glucagon  Injectable 1 milliGRAM(s) IntraMuscular once  insulin lispro (ADMELOG) corrective regimen sliding scale   SubCutaneous three times a day before meals  insulin lispro (ADMELOG) corrective regimen sliding scale   SubCutaneous at bedtime  lidocaine/prilocaine Cream 1 Application(s) Topical daily  midodrine. 10 milliGRAM(s) Oral three times a day  phenazopyridine 100 milliGRAM(s) Oral every 8 hours  polyethylene glycol 3350 17 Gram(s) Oral daily  rosuvastatin 10 milliGRAM(s) Oral at bedtime  senna 2 Tablet(s) Oral at bedtime  sodium bicarbonate 1300 milliGRAM(s) Oral three times a day    MEDICATIONS  (PRN):  acetaminophen     Tablet .. 650 milliGRAM(s) Oral every 6 hours PRN Temp greater or equal to 38C (100.4F), Mild Pain (1 - 3)  HYDROmorphone  Injectable 0.2 milliGRAM(s) IV Push every 6 hours PRN Moderate Pain (4 - 6)  sodium chloride 0.9% Bolus. 100 milliLiter(s) IV Bolus every 5 minutes PRN SBP LESS THAN or EQUAL to 90 mmHg    ___________  Active diet:  Diet, Regular:   Consistent Carbohydrate Evening Snack (CSTCHOSN)  For patients receiving Renal Replacement - No Protein Restr, No Conc K, No Conc Phos, Low Sodium (RENAL)  ___________________    ==================>> VITAL SIGNS <<==================    Height (cm): 180.3  Weight (kg): 94  BMI (kg/m2): 28.9  Vital Signs Last 24 HrsT(C): 36.8 (12-24-24 @ 11:00)  T(F): 98.2 (12-24-24 @ 11:00), Max: 99.4 (12-24-24 @ 07:40)  HR: 76 (12-24-24 @ 11:00) (63 - 82)  BP: 118/61 (12-24-24 @ 11:00)  RR: 18 (12-24-24 @ 11:00) (16 - 18)  SpO2: 99% (12-24-24 @ 11:00) (96% - 99%)      CAPILLARY BLOOD GLUCOSE  POCT Blood Glucose.: 155 mg/dL (24 Dec 2024 11:23)  POCT Blood Glucose.: 174 mg/dL (24 Dec 2024 06:30)  POCT Blood Glucose.: 143 mg/dL (23 Dec 2024 22:20)  POCT Blood Glucose.: 164 mg/dL (23 Dec 2024 16:48)     ==================>> LAB AND IMAGING <<==================                        9.1    9.38  )-----------( 184      ( 24 Dec 2024 06:45 )             27.4        12-24    138  |  98  |  104[H]  ----------------------------<  117[H]  4.1   |  21[L]  |  7.06[H]    Ca    9.0      24 Dec 2024 06:45  Phos  6.3     12-24  Mg     2.30     12-24    TPro  6.7  /  Alb  3.2[L]  /  TBili  0.2  /  DBili  x   /  AST  7   /  ALT  8   /  AlkPhos  51  12-24    WBC count:   9.38 <<== ,  8.66 <<== ,  11.31 <<==   Hemoglobin:   9.1 <<==,  9.8 <<==,  10.5 <<==  platelets:  184 <==, 197 <==, 217 <==    Creatinine:  7.06  <<==, 8.71  <<==, 9.21  <<==, 11.81  <<==, 12.08  <<==, 12.52  <<==  Sodium:   138  <==, 134  <==, 133  <==, 133  <==, 130  <==, 130  <==       AST:          7(12-24) <== , 6(12-21) <==      ALT:        8(12-24)  <== , 10(12-21)  <==      AP:        51(12-24)  <=, 72(12-21)  <=     Bili:        0.2(12-24)  <=, 0.3(12-21)  <=    HgA1C:   (12-21-24)          (12-21-24)      8.3    ____________________________    M I C R O B I O L O G Y :    Urinalysis with Rflx Culture (collected 21 Dec 2024 21:30)      < from: CT Abdomen and Pelvis No Cont (12.22.24 @ 22:13) >  IMPRESSION:  1.6 cm calcified gallstone region of gallbladder neck. No pericholecystic   inflammatory changes..  Moderate/severe bilateral hydronephrosis. 11 mm nonobstructing stone   midportion left kidney. Moderately severe bilateral hydroureter. No  obstructing stones.  Sierra catheter within urinary bladder which is decompressed.  Please refer to detailed findings otherwise described above.  < end of copied text >    < from: US Kidney and Bladder (12.21.24 @ 22:04) >  IMPRESSION:  Moderate to severe left and moderate right hydronephrosis which persists   post void. CT could evaluate for underlying etiology.  Nonobstructing 1 cm stone in the leftmidpole.  Multiple renal cysts as above.  < end of copied text >    ___________________________________________________________________________________  ===============>>  A S S E S S M E N T   A N D   P L A N <<===============  ------------------------------------------------------------------------------------------    · Assessment	   54 y.o. M w/ PMHx T2DM, prostatitis, chronic cystitis, and nephrolithiasis s/p PCNL 02/21, left nephroureteral tube 03/24 c/b hematuria needing L renal angioembolization of pseudoaneurysms and PCN-U 04/06 who presented to the ED for abnormal outpatient labs. in acute renal failure and plan for HD today      Problem/Plan - 1:  ·  Problem: Acute renal failure.   BASHIR, hydronephrosis , metabolic acidosis, hyperkalemia   - nephrology following, appreciated and discussed   HD per renal team   workup in process: f/u labs   urology follow up for cystoscopy and nephrostomy    renally dose meds  monitor electrolytes .. improved   pain management   treat infection    Problem/Plan - 2:  ·  Problem: hypotension   midodrine started already >> taper down as able with PRN dosing prior to dialysis   monitor vitals closely    Problem/Plan - 3:  ·  Problem: Hydronephrosis, bilateral.   ·  Plan: - renal US showed moderate to severe left and moderate right hydronephrosis which persists post void. Non obstructing 1 cm stone in the left midpole and multiple renal cysts as above  - Continue sierra  - urology follow up for nephrostomy as above     Problem/Plan - 4:  ·  Problem: Leukocytosis/ UTI / pyuria    - hx of nephrostomy tubes. Will need ppx pseudomonas coverage  - CXR clear  - Sierra draining significant puss  - F/u on urine culture  - Continue cefepime  - ID follow up and management     Problem/Plan - 5:  ·  Problem: Diabetes mellitus.  poorly controlled   ·  Plan: A1c on admission 8.3%  - Home meds of alglipitin, jardiance. Need to confirm doses.  - ISS premeal and bedtime.  endo as needed    Problem/Plan - 6:  ·  Problem: anemia  monitor closely for now       12-23-24 @ 06:34  Iron:     27 ug/dL  Iron %:   18 %  TIBC:     154 ug/dL    Problem/Plan - 7:  ·  Problem: Need for prophylactic measure.   - Nutrition: tolerating better, no nausea post HD   - Bowel Regiment: as needed   - Activity: limited due to groin brooke   - DVT Prophylaxis: venodynes for now     --------------------------------------------  Case discussed with patient, nephrology and medical team   Education given on findings and plan of care  ___________________________  SRIRAM Stephens D.O.  Pager: [phone]

## 2024-12-24 NOTE — PROGRESS NOTE ADULT - SUBJECTIVE AND OBJECTIVE BOX
Liban Knox M.D  Resident Physician  Department of Medicine  Available on [employer] Teams    ***********************************************************************  Patient is a 54y old  Male who presents with a chief complaint of Abnormal Labs (23 Dec 2024 15:23)      OVERNIGHT EVENTS:     SUBJECTIVE: Patient seen and examined at bedside.     ADDITIONAL REVIEW OF SYSTEMS:    MEDICATIONS  (STANDING):  cefepime   IVPB 1000 milliGRAM(s) IV Intermittent every 24 hours  chlorhexidine 2% Cloths 1 Application(s) Topical daily  dextrose 5%. 1000 milliLiter(s) (100 mL/Hr) IV Continuous <Continuous>  dextrose 5%. 1000 milliLiter(s) (50 mL/Hr) IV Continuous <Continuous>  dextrose 50% Injectable 25 Gram(s) IV Push once  dextrose 50% Injectable 12.5 Gram(s) IV Push once  dextrose 50% Injectable 25 Gram(s) IV Push once  dextrose Oral Gel 15 Gram(s) Oral once  glucagon  Injectable 1 milliGRAM(s) IntraMuscular once  insulin lispro (ADMELOG) corrective regimen sliding scale   SubCutaneous three times a day before meals  insulin lispro (ADMELOG) corrective regimen sliding scale   SubCutaneous at bedtime  lidocaine/prilocaine Cream 1 Application(s) Topical daily  midodrine. 10 milliGRAM(s) Oral three times a day  polyethylene glycol 3350 17 Gram(s) Oral daily  rosuvastatin 10 milliGRAM(s) Oral at bedtime  senna 2 Tablet(s) Oral at bedtime  sodium bicarbonate 1300 milliGRAM(s) Oral three times a day  sodium chloride 0.9%. 1000 milliLiter(s) (100 mL/Hr) IV Continuous <Continuous>    MEDICATIONS  (PRN):  acetaminophen     Tablet .. 650 milliGRAM(s) Oral every 6 hours PRN Temp greater or equal to 38C (100.4F), Mild Pain (1 - 3)  sodium chloride 0.9% Bolus. 100 milliLiter(s) IV Bolus every 5 minutes PRN SBP LESS THAN or EQUAL to 90 mmHg      CAPILLARY BLOOD GLUCOSE      POCT Blood Glucose.: 174 mg/dL (24 Dec 2024 06:30)  POCT Blood Glucose.: 143 mg/dL (23 Dec 2024 22:20)  POCT Blood Glucose.: 164 mg/dL (23 Dec 2024 16:48)  POCT Blood Glucose.: 215 mg/dL (23 Dec 2024 11:33)  POCT Blood Glucose.: 160 mg/dL (23 Dec 2024 07:35)    I&O's Summary    23 Dec 2024 07:01  -  24 Dec 2024 07:00  --------------------------------------------------------  IN: 2445 mL / OUT: 930 mL / NET: 1515 mL        PHYSICAL EXAM:    Vital Signs Last 24 Hrs  T(C): 36.8 (24 Dec 2024 07:18), Max: 36.9 (23 Dec 2024 08:04)  T(F): 98.3 (24 Dec 2024 07:18), Max: 98.4 (23 Dec 2024 08:04)  HR: 75 (24 Dec 2024 07:18) (65 - 82)  BP: 92/45 (24 Dec 2024 07:18) (86/51 - 138/101)  BP(mean): --  RR: 18 (24 Dec 2024 05:00) (18 - 18)  SpO2: 98% (24 Dec 2024 07:18) (96% - 98%)    Parameters below as of 24 Dec 2024 07:18  Patient On (Oxygen Delivery Method): room air        CONSTITUTIONAL: NAD, well-developed, well-groomed  EYES: Conjunctiva and sclera clear  ENMT: Moist oral mucosa, no pharyngeal injection or exudates; normal dentition  NECK: Supple, no palpable masses; no thyromegaly  RESPIRATORY: Normal respiratory effort; lungs are clear to auscultation bilaterally  CARDIOVASCULAR: Regular rate and rhythm, normal S1 and S2, no murmur/rub/gallop  ABDOMEN: Soft, nontender to palpation, normoactive bowel sounds, no rebound/guarding  MUSCULOSKELETAL: No clubbing or cyanosis of digits; no joint swelling or tenderness to palpation  EXTREMITIES:  No lower extremity edema; Peripheral pulses are 2+ bilaterally  PSYCH: A+O to person, place, and time; affect appropriate  NEUROLOGY: CN 2-12 are intact and symmetric; no gross sensory deficits   SKIN: No rashes; no palpable lesions    LABS:                        9.8    8.66  )-----------( 197      ( 23 Dec 2024 06:34 )             28.5     12-23    134[L]  |  96[L]  |  123[H]  ----------------------------<  168[H]  4.4   |  20[L]  |  8.71[H]    Ca    9.3      23 Dec 2024 06:34  Phos  7.6     12-23  Mg     2.50     12-23    TPro  6.9  /  Alb  x   /  TBili  x   /  DBili  x   /  AST  x   /  ALT  x   /  AlkPhos  x   12-22    PT/INR - ( 22 Dec 2024 12:05 )   PT: 12.2 sec;   INR: 1.03 ratio         PTT - ( 22 Dec 2024 12:05 )  PTT:38.4 sec      Urinalysis Basic - ( 23 Dec 2024 06:34 )    Color: x / Appearance: x / SG: x / pH: x  Gluc: 168 mg/dL / Ketone: x  / Bili: x / Urobili: x   Blood: x / Protein: x / Nitrite: x   Leuk Esterase: x / RBC: x / WBC x   Sq Epi: x / Non Sq Epi: x / Bacteria: x        Urinalysis with Rflx Culture (collected 21 Dec 2024 21:30)        RADIOLOGY & ADDITIONAL TESTS:   Results Reviewed: Yes     Liban Knox M.D  Resident Physician  Department of Medicine  Available on [employer] Teams    ***********************************************************************  Patient is a 54y old  Male who presents with a chief complaint of Abnormal Labs (23 Dec 2024 15:23)      OVERNIGHT EVENTS: Received IV dilaudid overnight for shiley site pain    SUBJECTIVE: Patient seen and examined at . This am complained of mild shiley site pain.     ADDITIONAL REVIEW OF SYSTEMS:    MEDICATIONS  (STANDING):  cefepime   IVPB 1000 milliGRAM(s) IV Intermittent every 24 hours  chlorhexidine 2% Cloths 1 Application(s) Topical daily  dextrose 5%. 1000 milliLiter(s) (100 mL/Hr) IV Continuous <Continuous>  dextrose 5%. 1000 milliLiter(s) (50 mL/Hr) IV Continuous <Continuous>  dextrose 50% Injectable 25 Gram(s) IV Push once  dextrose 50% Injectable 12.5 Gram(s) IV Push once  dextrose 50% Injectable 25 Gram(s) IV Push once  dextrose Oral Gel 15 Gram(s) Oral once  glucagon  Injectable 1 milliGRAM(s) IntraMuscular once  insulin lispro (ADMELOG) corrective regimen sliding scale   SubCutaneous three times a day before meals  insulin lispro (ADMELOG) corrective regimen sliding scale   SubCutaneous at bedtime  lidocaine/prilocaine Cream 1 Application(s) Topical daily  midodrine. 10 milliGRAM(s) Oral three times a day  polyethylene glycol 3350 17 Gram(s) Oral daily  rosuvastatin 10 milliGRAM(s) Oral at bedtime  senna 2 Tablet(s) Oral at bedtime  sodium bicarbonate 1300 milliGRAM(s) Oral three times a day  sodium chloride 0.9%. 1000 milliLiter(s) (100 mL/Hr) IV Continuous <Continuous>    MEDICATIONS  (PRN):  acetaminophen     Tablet .. 650 milliGRAM(s) Oral every 6 hours PRN Temp greater or equal to 38C (100.4F), Mild Pain (1 - 3)  sodium chloride 0.9% Bolus. 100 milliLiter(s) IV Bolus every 5 minutes PRN SBP LESS THAN or EQUAL to 90 mmHg      CAPILLARY BLOOD GLUCOSE      POCT Blood Glucose.: 174 mg/dL (24 Dec 2024 06:30)  POCT Blood Glucose.: 143 mg/dL (23 Dec 2024 22:20)  POCT Blood Glucose.: 164 mg/dL (23 Dec 2024 16:48)  POCT Blood Glucose.: 215 mg/dL (23 Dec 2024 11:33)  POCT Blood Glucose.: 160 mg/dL (23 Dec 2024 07:35)    I&O's Summary    23 Dec 2024 07:01  -  24 Dec 2024 07:00  --------------------------------------------------------  IN: 2445 mL / OUT: 930 mL / NET: 1515 mL        PHYSICAL EXAM:    Vital Signs Last 24 Hrs  T(C): 36.8 (24 Dec 2024 07:18), Max: 36.9 (23 Dec 2024 08:04)  T(F): 98.3 (24 Dec 2024 07:18), Max: 98.4 (23 Dec 2024 08:04)  HR: 75 (24 Dec 2024 07:18) (65 - 82)  BP: 92/45 (24 Dec 2024 07:18) (86/51 - 138/101)  BP(mean): --  RR: 18 (24 Dec 2024 05:00) (18 - 18)  SpO2: 98% (24 Dec 2024 07:18) (96% - 98%)    Parameters below as of 24 Dec 2024 07:18  Patient On (Oxygen Delivery Method): room air        CONSTITUTIONAL: NAD, well-developed, well-groomed  RESPIRATORY: Normal respiratory effort; lungs are clear to auscultation bilaterally  CARDIOVASCULAR: Regular rate and rhythm, normal S1 and S2, no murmur/rub/gallop  ABDOMEN: Soft, nontender to palpation, normoactive bowel sounds, no rebound/guarding  MUSCULOSKELETAL: No clubbing or cyanosis of digits; no joint swelling or tenderness to palpation  EXTREMITIES:  No lower extremity edema; Peripheral pulses are 2+ bilaterally  PSYCH: A+O to person, place, and time; affect appropriate  NEUROLOGY: no gross sensory deficits   SKIN: No rashes; no palpable lesions. Shiley site clean    LABS:                        9.8    8.66  )-----------( 197      ( 23 Dec 2024 06:34 )             28.5     12-23    134[L]  |  96[L]  |  123[H]  ----------------------------<  168[H]  4.4   |  20[L]  |  8.71[H]    Ca    9.3      23 Dec 2024 06:34  Phos  7.6     12-23  Mg     2.50     12-23    TPro  6.9  /  Alb  x   /  TBili  x   /  DBili  x   /  AST  x   /  ALT  x   /  AlkPhos  x   12-22    PT/INR - ( 22 Dec 2024 12:05 )   PT: 12.2 sec;   INR: 1.03 ratio         PTT - ( 22 Dec 2024 12:05 )  PTT:38.4 sec      Urinalysis Basic - ( 23 Dec 2024 06:34 )    Color: x / Appearance: x / SG: x / pH: x  Gluc: 168 mg/dL / Ketone: x  / Bili: x / Urobili: x   Blood: x / Protein: x / Nitrite: x   Leuk Esterase: x / RBC: x / WBC x   Sq Epi: x / Non Sq Epi: x / Bacteria: x        Urinalysis with Rflx Culture (collected 21 Dec 2024 21:30)        RADIOLOGY & ADDITIONAL TESTS:   Results Reviewed: Yes

## 2024-12-24 NOTE — PROGRESS NOTE ADULT - PROBLEM SELECTOR PLAN 2
Resolving  VBG showed pH 7.19, lactate 1.0, PCO2 35, HCO3 13 on admission. Repeat VBG shows improvement.   - in setting of acute renal failure    Plan:  - Continue bicarb   - f/u repeat labs  - HD plan as above

## 2024-12-24 NOTE — PROGRESS NOTE ADULT - SUBJECTIVE AND OBJECTIVE BOX
Subjective  ***PATIENT TO BE SEEN AND EXAMINED***  Denies fevers, chills, nausea, vomiting, SOB, CP.  Tolerating diet.    Objective    Vital signs  T(F): , Max: 99.4 (12-24-24 @ 07:40)  HR: 76 (12-24-24 @ 11:00)  BP: 118/61 (12-24-24 @ 11:00)  SpO2: 99% (12-24-24 @ 11:00)  Wt(kg): --    Output     OUT:    Indwelling Catheter - Urethral (mL): 800 mL  Total OUT: 800 mL    Total NET: -800 mL      OUT:    Indwelling Catheter - Urethral (mL): 400 mL  Total OUT: 400 mL    Total NET: -400 mL          Gen: NAD  Abd: soft, nontender, nondistended  : sierra secured in place, draining CYU    Labs      12-24 @ 06:45    WBC 9.38  / Hct 27.4  / SCr 7.06     12-23 @ 06:34    WBC 8.66  / Hct 28.5  / SCr 8.71         Urinalysis with Rflx Culture (collected 12-21-24 @ 21:30)        Urine Cx: ?  Blood Cx: ?    Imaging Subjective  Denies fevers, chills, nausea, vomiting, SOB, CP.  Tolerating diet. Endorses bladder spasms    Objective    Vital signs  T(F): , Max: 99.4 (12-24-24 @ 07:40)  HR: 76 (12-24-24 @ 11:00)  BP: 118/61 (12-24-24 @ 11:00)  SpO2: 99% (12-24-24 @ 11:00)  Wt(kg): --    Output     OUT:    Indwelling Catheter - Urethral (mL): 800 mL  Total OUT: 800 mL    Total NET: -800 mL      OUT:    Indwelling Catheter - Urethral (mL): 400 mL  Total OUT: 400 mL    Total NET: -400 mL          Gen: NAD  Abd: soft, nontender, nondistended  : sierra secured in place, draining CYU    Labs      12-24 @ 06:45    WBC 9.38  / Hct 27.4  / SCr 7.06     12-23 @ 06:34    WBC 8.66  / Hct 28.5  / SCr 8.71         Urinalysis with Rflx Culture (collected 12-21-24 @ 21:30)        Urine Cx: ?  Blood Cx: ?    Imaging

## 2024-12-24 NOTE — PROGRESS NOTE ADULT - ASSESSMENT
54 m with T2DM, prostatitis, chronic cystitis, and nephrolithiasis s/p PCNL 02/21, left nephroureteral tube 03/24 c/b hematuria needing L renal angioembolization of pseudoaneurysms and PCN-U 04/06, was started on Ozempic about 3 weeks ago and noticed that for about a week had nausea and was not eating or drinking, he has chronic bladder pain and urinary frequency also, went to PMD and then was sent to ER for renal failure  Here afebrile,  WBC: 11, CR: 12, BUN   u/a positive  u/s: Moderate to severe left and moderate right hydronephrosis which persists post void..Nonobstructing 1 cm stone in the left midpole. Multiple renal cysts     chronic cystitis (has bladder pain and frequency after PCNL and renal angioembolization) now with renal failure, has mod ot severe L and mod R hydro on u/s, was also started on ozempic 3 weeks ago, s/p R fem shiley and HD   u/a positive but not sure there is acute UTI    * follow the urine cx  * c/w cefepime renally dosed for now if cultures negative will stop, started 12/22, now day 3  * follow with nephro and urology, now plan for cystoscopy and stent and if not a candidate the IR nephrostomy     The above assessment and plan was discussed with the primary team    Halle Alexis MD  contact on teams  After 5pm and on weekends call [phone]

## 2024-12-24 NOTE — PROGRESS NOTE ADULT - ASSESSMENT
54 y.o. M w/ PMHx T2DM, prostatitis, chronic cystitis, and nephrolithiasis s/p PCNL 02/21, left nephroureteral tube 03/24 c/b hematuria needing L renal angioembolization of pseudoaneurysms and PCN-U 04/06 who presented to the ED for abnormal outpatient labs in acute renal failure. HD as needed and urology consulted for nephrostomy tube placement.

## 2024-12-24 NOTE — PROGRESS NOTE ADULT - PROBLEM SELECTOR PLAN 6
- Fluids: None  - Electrolytes: Will replete to maintain K>4, Phos>3, and Mag>2  - Nutrition: Renal diet  - Bowel Regiment: miralax and senna  - Activity: As tolerated. PT not indicated  - DVT Prophylaxis: ICD  - Stress Ulcer/GI Prophylaxis: None  - Disposition: Admit for HD - Fluids: None  - Electrolytes: Will replete to maintain K>4, Phos>3, and Mag>2  - Nutrition: Renal diet  - Bowel Regiment: miralax and senna  - Activity: As tolerated. PT not indicated  - DVT Prophylaxis: ICD  - Stress Ulcer/GI Prophylaxis: None  - Disposition: Admit for HD and further workup

## 2024-12-25 DIAGNOSIS — R30.1 VESICAL TENESMUS: ICD-10-CM

## 2024-12-25 LAB
ALBUMIN SERPL ELPH-MCNC: 3.2 G/DL — LOW (ref 3.3–5)
ALP SERPL-CCNC: 51 U/L — SIGNIFICANT CHANGE UP (ref 40–120)
ALT FLD-CCNC: 11 U/L — SIGNIFICANT CHANGE UP (ref 4–41)
ANION GAP SERPL CALC-SCNC: 13 MMOL/L — SIGNIFICANT CHANGE UP (ref 7–14)
APTT BLD: 35.9 SEC — HIGH (ref 24.5–35.6)
AST SERPL-CCNC: 14 U/L — SIGNIFICANT CHANGE UP (ref 4–40)
BASOPHILS # BLD AUTO: 0.03 K/UL — SIGNIFICANT CHANGE UP (ref 0–0.2)
BASOPHILS NFR BLD AUTO: 0.5 % — SIGNIFICANT CHANGE UP (ref 0–2)
BILIRUB SERPL-MCNC: 0.3 MG/DL — SIGNIFICANT CHANGE UP (ref 0.2–1.2)
BLD GP AB SCN SERPL QL: NEGATIVE — SIGNIFICANT CHANGE UP
BUN SERPL-MCNC: 72 MG/DL — HIGH (ref 7–23)
CALCIUM SERPL-MCNC: 9 MG/DL — SIGNIFICANT CHANGE UP (ref 8.4–10.5)
CHLORIDE SERPL-SCNC: 102 MMOL/L — SIGNIFICANT CHANGE UP (ref 98–107)
CO2 SERPL-SCNC: 23 MMOL/L — SIGNIFICANT CHANGE UP (ref 22–31)
CREAT SERPL-MCNC: 4.78 MG/DL — HIGH (ref 0.5–1.3)
EGFR: 14 ML/MIN/1.73M2 — LOW
EOSINOPHIL # BLD AUTO: 0.59 K/UL — HIGH (ref 0–0.5)
EOSINOPHIL NFR BLD AUTO: 8.9 % — HIGH (ref 0–6)
GLUCOSE BLDC GLUCOMTR-MCNC: 132 MG/DL — HIGH (ref 70–99)
GLUCOSE BLDC GLUCOMTR-MCNC: 132 MG/DL — HIGH (ref 70–99)
GLUCOSE BLDC GLUCOMTR-MCNC: 151 MG/DL — HIGH (ref 70–99)
GLUCOSE BLDC GLUCOMTR-MCNC: 165 MG/DL — HIGH (ref 70–99)
GLUCOSE BLDC GLUCOMTR-MCNC: 214 MG/DL — HIGH (ref 70–99)
GLUCOSE SERPL-MCNC: 128 MG/DL — HIGH (ref 70–99)
HCT VFR BLD CALC: 27.9 % — LOW (ref 39–50)
HGB BLD-MCNC: 9.2 G/DL — LOW (ref 13–17)
IANC: 4 K/UL — SIGNIFICANT CHANGE UP (ref 1.8–7.4)
IMM GRANULOCYTES NFR BLD AUTO: 0.5 % — SIGNIFICANT CHANGE UP (ref 0–0.9)
INR BLD: 1.07 RATIO — SIGNIFICANT CHANGE UP (ref 0.85–1.16)
LYMPHOCYTES # BLD AUTO: 0.82 K/UL — LOW (ref 1–3.3)
LYMPHOCYTES # BLD AUTO: 12.3 % — LOW (ref 13–44)
MAGNESIUM SERPL-MCNC: 2.1 MG/DL — SIGNIFICANT CHANGE UP (ref 1.6–2.6)
MCHC RBC-ENTMCNC: 26.7 PG — LOW (ref 27–34)
MCHC RBC-ENTMCNC: 33 G/DL — SIGNIFICANT CHANGE UP (ref 32–36)
MCV RBC AUTO: 81.1 FL — SIGNIFICANT CHANGE UP (ref 80–100)
MONOCYTES # BLD AUTO: 1.19 K/UL — HIGH (ref 0–0.9)
MONOCYTES NFR BLD AUTO: 17.9 % — HIGH (ref 2–14)
NEUTROPHILS # BLD AUTO: 4 K/UL — SIGNIFICANT CHANGE UP (ref 1.8–7.4)
NEUTROPHILS NFR BLD AUTO: 59.9 % — SIGNIFICANT CHANGE UP (ref 43–77)
NRBC # BLD: 0 /100 WBCS — SIGNIFICANT CHANGE UP (ref 0–0)
NRBC # FLD: 0 K/UL — SIGNIFICANT CHANGE UP (ref 0–0)
PHOSPHATE SERPL-MCNC: 4.9 MG/DL — HIGH (ref 2.5–4.5)
PLATELET # BLD AUTO: 160 K/UL — SIGNIFICANT CHANGE UP (ref 150–400)
POTASSIUM SERPL-MCNC: 4.1 MMOL/L — SIGNIFICANT CHANGE UP (ref 3.5–5.3)
POTASSIUM SERPL-SCNC: 4.1 MMOL/L — SIGNIFICANT CHANGE UP (ref 3.5–5.3)
PROT SERPL-MCNC: 6.9 G/DL — SIGNIFICANT CHANGE UP (ref 6–8.3)
PROTHROM AB SERPL-ACNC: 12.4 SEC — SIGNIFICANT CHANGE UP (ref 9.9–13.4)
RBC # BLD: 3.44 M/UL — LOW (ref 4.2–5.8)
RBC # FLD: 13.3 % — SIGNIFICANT CHANGE UP (ref 10.3–14.5)
RH IG SCN BLD-IMP: POSITIVE — SIGNIFICANT CHANGE UP
SODIUM SERPL-SCNC: 138 MMOL/L — SIGNIFICANT CHANGE UP (ref 135–145)
WBC # BLD: 6.66 K/UL — SIGNIFICANT CHANGE UP (ref 3.8–10.5)
WBC # FLD AUTO: 6.66 K/UL — SIGNIFICANT CHANGE UP (ref 3.8–10.5)

## 2024-12-25 PROCEDURE — 93010 ELECTROCARDIOGRAM REPORT: CPT

## 2024-12-25 PROCEDURE — 99232 SBSQ HOSP IP/OBS MODERATE 35: CPT

## 2024-12-25 PROCEDURE — 99233 SBSQ HOSP IP/OBS HIGH 50: CPT | Mod: GC

## 2024-12-25 RX ORDER — GINKGO BILOBA 40 MG
3 CAPSULE ORAL AT BEDTIME
Refills: 0 | Status: DISCONTINUED | OUTPATIENT
Start: 2024-12-25 | End: 2024-12-30

## 2024-12-25 RX ORDER — HYDROMORPHONE HCL 4 MG
0.2 TABLET ORAL EVERY 4 HOURS
Refills: 0 | Status: DISCONTINUED | OUTPATIENT
Start: 2024-12-25 | End: 2024-12-27

## 2024-12-25 RX ORDER — ONDANSETRON 4 MG/1
4 TABLET ORAL EVERY 8 HOURS
Refills: 0 | Status: DISCONTINUED | OUTPATIENT
Start: 2024-12-25 | End: 2024-12-30

## 2024-12-25 RX ORDER — ACETAMINOPHEN 80 MG/.8ML
1000 SOLUTION/ DROPS ORAL ONCE
Refills: 0 | Status: DISCONTINUED | OUTPATIENT
Start: 2024-12-25 | End: 2024-12-25

## 2024-12-25 RX ORDER — DIAZEPAM 5 MG
2 TABLET ORAL
Refills: 0 | Status: DISCONTINUED | OUTPATIENT
Start: 2024-12-25 | End: 2024-12-26

## 2024-12-25 RX ORDER — OXYBUTYNIN CHLORIDE 5 MG/1
5 TABLET ORAL EVERY 8 HOURS
Refills: 0 | Status: DISCONTINUED | OUTPATIENT
Start: 2024-12-25 | End: 2024-12-27

## 2024-12-25 RX ADMIN — ONDANSETRON 4 MILLIGRAM(S): 4 TABLET ORAL at 15:48

## 2024-12-25 RX ADMIN — Medication 0.2 MILLIGRAM(S): at 18:11

## 2024-12-25 RX ADMIN — Medication 1: at 08:08

## 2024-12-25 RX ADMIN — Medication 3 MILLIGRAM(S): at 21:18

## 2024-12-25 RX ADMIN — ROSUVASTATIN 10 MILLIGRAM(S): 40 TABLET, FILM COATED ORAL at 21:18

## 2024-12-25 RX ADMIN — Medication 2 MILLIGRAM(S): at 19:14

## 2024-12-25 RX ADMIN — SODIUM BICARBONATE 1300 MILLIGRAM(S): 84 INJECTION, SOLUTION INTRAVENOUS at 05:37

## 2024-12-25 RX ADMIN — ACETAMINOPHEN 650 MILLIGRAM(S): 80 SOLUTION/ DROPS ORAL at 10:19

## 2024-12-25 RX ADMIN — Medication 0.2 MILLIGRAM(S): at 13:41

## 2024-12-25 RX ADMIN — Medication 2 MILLIGRAM(S): at 11:10

## 2024-12-25 RX ADMIN — CHLORHEXIDINE GLUCONATE 1 APPLICATION(S): 1.2 RINSE ORAL at 12:02

## 2024-12-25 RX ADMIN — ACETAMINOPHEN 650 MILLIGRAM(S): 80 SOLUTION/ DROPS ORAL at 09:34

## 2024-12-25 RX ADMIN — Medication 1 APPLICATION(S): at 12:02

## 2024-12-25 RX ADMIN — MIDODRINE HYDROCHLORIDE 10 MILLIGRAM(S): 5 TABLET ORAL at 17:02

## 2024-12-25 RX ADMIN — Medication 2: at 17:03

## 2024-12-25 RX ADMIN — OXYBUTYNIN CHLORIDE 5 MILLIGRAM(S): 5 TABLET ORAL at 21:18

## 2024-12-25 RX ADMIN — Medication 100 MILLIGRAM(S): at 23:08

## 2024-12-25 RX ADMIN — SODIUM BICARBONATE 1300 MILLIGRAM(S): 84 INJECTION, SOLUTION INTRAVENOUS at 14:00

## 2024-12-25 RX ADMIN — Medication 0.2 MILLIGRAM(S): at 12:50

## 2024-12-25 RX ADMIN — OXYBUTYNIN CHLORIDE 5 MILLIGRAM(S): 5 TABLET ORAL at 14:00

## 2024-12-25 RX ADMIN — Medication 0.2 MILLIGRAM(S): at 17:43

## 2024-12-25 RX ADMIN — OXYBUTYNIN CHLORIDE 5 MILLIGRAM(S): 5 TABLET ORAL at 05:37

## 2024-12-25 RX ADMIN — Medication 100 MILLIGRAM(S): at 01:24

## 2024-12-25 RX ADMIN — Medication 0.2 MILLIGRAM(S): at 07:18

## 2024-12-25 RX ADMIN — MIDODRINE HYDROCHLORIDE 10 MILLIGRAM(S): 5 TABLET ORAL at 12:03

## 2024-12-25 RX ADMIN — Medication 0.2 MILLIGRAM(S): at 08:06

## 2024-12-25 RX ADMIN — SODIUM BICARBONATE 1300 MILLIGRAM(S): 84 INJECTION, SOLUTION INTRAVENOUS at 21:18

## 2024-12-25 RX ADMIN — MIDODRINE HYDROCHLORIDE 10 MILLIGRAM(S): 5 TABLET ORAL at 05:37

## 2024-12-25 NOTE — PROGRESS NOTE ADULT - SUBJECTIVE AND OBJECTIVE BOX
Liban Knox M.D  Resident Physician  Department of Medicine  Available on [employer] Teams    ***********************************************************************  Patient is a 54y old  Male who presents with a chief complaint of Abnormal Labs (24 Dec 2024 15:30)      OVERNIGHT EVENTS:     SUBJECTIVE: Patient seen and examined at bedside.     ADDITIONAL REVIEW OF SYSTEMS:    MEDICATIONS  (STANDING):  cefepime   IVPB 1000 milliGRAM(s) IV Intermittent every 24 hours  chlorhexidine 2% Cloths 1 Application(s) Topical daily  dextrose 5%. 1000 milliLiter(s) (100 mL/Hr) IV Continuous <Continuous>  dextrose 5%. 1000 milliLiter(s) (50 mL/Hr) IV Continuous <Continuous>  dextrose 50% Injectable 25 Gram(s) IV Push once  dextrose 50% Injectable 12.5 Gram(s) IV Push once  dextrose 50% Injectable 25 Gram(s) IV Push once  dextrose Oral Gel 15 Gram(s) Oral once  glucagon  Injectable 1 milliGRAM(s) IntraMuscular once  insulin lispro (ADMELOG) corrective regimen sliding scale   SubCutaneous three times a day before meals  insulin lispro (ADMELOG) corrective regimen sliding scale   SubCutaneous at bedtime  lidocaine/prilocaine Cream 1 Application(s) Topical daily  midodrine. 10 milliGRAM(s) Oral three times a day  oxybutynin 5 milliGRAM(s) Oral two times a day  polyethylene glycol 3350 17 Gram(s) Oral daily  rosuvastatin 10 milliGRAM(s) Oral at bedtime  senna 2 Tablet(s) Oral at bedtime  sodium bicarbonate 1300 milliGRAM(s) Oral three times a day    MEDICATIONS  (PRN):  acetaminophen     Tablet .. 650 milliGRAM(s) Oral every 6 hours PRN Temp greater or equal to 38C (100.4F), Mild Pain (1 - 3)  HYDROmorphone  Injectable 0.2 milliGRAM(s) IV Push every 6 hours PRN Moderate Pain (4 - 6)  melatonin 3 milliGRAM(s) Oral at bedtime PRN Insomnia  sodium chloride 0.9% Bolus. 100 milliLiter(s) IV Bolus every 5 minutes PRN SBP LESS THAN or EQUAL to 90 mmHg      CAPILLARY BLOOD GLUCOSE      POCT Blood Glucose.: 165 mg/dL (25 Dec 2024 07:27)  POCT Blood Glucose.: 132 mg/dL (25 Dec 2024 00:30)  POCT Blood Glucose.: 156 mg/dL (24 Dec 2024 16:52)  POCT Blood Glucose.: 155 mg/dL (24 Dec 2024 11:23)    I&O's Summary    24 Dec 2024 07:01  -  25 Dec 2024 07:00  --------------------------------------------------------  IN: 840 mL / OUT: 1100 mL / NET: -260 mL        PHYSICAL EXAM:    Vital Signs Last 24 Hrs  T(C): 36.7 (24 Dec 2024 21:00), Max: 37.4 (24 Dec 2024 07:40)  T(F): 98 (24 Dec 2024 21:00), Max: 99.4 (24 Dec 2024 07:40)  HR: 63 (24 Dec 2024 21:00) (63 - 76)  BP: 108/62 (24 Dec 2024 21:00) (98/63 - 118/61)  BP(mean): --  RR: 18 (24 Dec 2024 21:00) (16 - 18)  SpO2: 100% (24 Dec 2024 21:00) (95% - 100%)    Parameters below as of 24 Dec 2024 21:00  Patient On (Oxygen Delivery Method): room air        CONSTITUTIONAL: NAD, well-developed, well-groomed  EYES: Conjunctiva and sclera clear  ENMT: Moist oral mucosa, no pharyngeal injection or exudates; normal dentition  NECK: Supple, no palpable masses; no thyromegaly  RESPIRATORY: Normal respiratory effort; lungs are clear to auscultation bilaterally  CARDIOVASCULAR: Regular rate and rhythm, normal S1 and S2, no murmur/rub/gallop  ABDOMEN: Soft, nontender to palpation, normoactive bowel sounds, no rebound/guarding  MUSCULOSKELETAL: No clubbing or cyanosis of digits; no joint swelling or tenderness to palpation  EXTREMITIES:  No lower extremity edema; Peripheral pulses are 2+ bilaterally  PSYCH: A+O to person, place, and time; affect appropriate  NEUROLOGY: CN 2-12 are intact and symmetric; no gross sensory deficits   SKIN: No rashes; no palpable lesions    LABS:                        9.2    6.66  )-----------( 160      ( 25 Dec 2024 04:35 )             27.9     12-25    138  |  102  |  72[H]  ----------------------------<  128[H]  4.1   |  23  |  4.78[H]    Ca    9.0      25 Dec 2024 04:35  Phos  4.9     12-25  Mg     2.10     12-25    TPro  6.9  /  Alb  3.2[L]  /  TBili  0.3  /  DBili  x   /  AST  14  /  ALT  11  /  AlkPhos  51  12-25    PT/INR - ( 25 Dec 2024 04:35 )   PT: 12.4 sec;   INR: 1.07 ratio         PTT - ( 25 Dec 2024 04:35 )  PTT:35.9 sec      Urinalysis Basic - ( 25 Dec 2024 04:35 )    Color: x / Appearance: x / SG: x / pH: x  Gluc: 128 mg/dL / Ketone: x  / Bili: x / Urobili: x   Blood: x / Protein: x / Nitrite: x   Leuk Esterase: x / RBC: x / WBC x   Sq Epi: x / Non Sq Epi: x / Bacteria: x          RADIOLOGY & ADDITIONAL TESTS:   Results Reviewed: Yes     Liban Knox M.D  Resident Physician  Department of Medicine  Available on [employer] Teams    ***********************************************************************  Patient is a 54y old  Male who presents with a chief complaint of Abnormal Labs (24 Dec 2024 15:30)      OVERNIGHT EVENTS: Complained of bladder scans and used dilaudid twice    SUBJECTIVE: Patient seen and examined at bedside. States he has some pain in his penis when he is urinating. Also complains of intermittent bladder spasm. Updated his wife this am.     ADDITIONAL REVIEW OF SYSTEMS:    MEDICATIONS  (STANDING):  cefepime   IVPB 1000 milliGRAM(s) IV Intermittent every 24 hours  chlorhexidine 2% Cloths 1 Application(s) Topical daily  dextrose 5%. 1000 milliLiter(s) (100 mL/Hr) IV Continuous <Continuous>  dextrose 5%. 1000 milliLiter(s) (50 mL/Hr) IV Continuous <Continuous>  dextrose 50% Injectable 25 Gram(s) IV Push once  dextrose 50% Injectable 12.5 Gram(s) IV Push once  dextrose 50% Injectable 25 Gram(s) IV Push once  dextrose Oral Gel 15 Gram(s) Oral once  glucagon  Injectable 1 milliGRAM(s) IntraMuscular once  insulin lispro (ADMELOG) corrective regimen sliding scale   SubCutaneous three times a day before meals  insulin lispro (ADMELOG) corrective regimen sliding scale   SubCutaneous at bedtime  lidocaine/prilocaine Cream 1 Application(s) Topical daily  midodrine. 10 milliGRAM(s) Oral three times a day  oxybutynin 5 milliGRAM(s) Oral two times a day  polyethylene glycol 3350 17 Gram(s) Oral daily  rosuvastatin 10 milliGRAM(s) Oral at bedtime  senna 2 Tablet(s) Oral at bedtime  sodium bicarbonate 1300 milliGRAM(s) Oral three times a day    MEDICATIONS  (PRN):  acetaminophen     Tablet .. 650 milliGRAM(s) Oral every 6 hours PRN Temp greater or equal to 38C (100.4F), Mild Pain (1 - 3)  HYDROmorphone  Injectable 0.2 milliGRAM(s) IV Push every 6 hours PRN Moderate Pain (4 - 6)  melatonin 3 milliGRAM(s) Oral at bedtime PRN Insomnia  sodium chloride 0.9% Bolus. 100 milliLiter(s) IV Bolus every 5 minutes PRN SBP LESS THAN or EQUAL to 90 mmHg      CAPILLARY BLOOD GLUCOSE      POCT Blood Glucose.: 165 mg/dL (25 Dec 2024 07:27)  POCT Blood Glucose.: 132 mg/dL (25 Dec 2024 00:30)  POCT Blood Glucose.: 156 mg/dL (24 Dec 2024 16:52)  POCT Blood Glucose.: 155 mg/dL (24 Dec 2024 11:23)    I&O's Summary    24 Dec 2024 07:01  -  25 Dec 2024 07:00  --------------------------------------------------------  IN: 840 mL / OUT: 1100 mL / NET: -260 mL        PHYSICAL EXAM:    Vital Signs Last 24 Hrs  T(C): 36.7 (24 Dec 2024 21:00), Max: 37.4 (24 Dec 2024 07:40)  T(F): 98 (24 Dec 2024 21:00), Max: 99.4 (24 Dec 2024 07:40)  HR: 63 (24 Dec 2024 21:00) (63 - 76)  BP: 108/62 (24 Dec 2024 21:00) (98/63 - 118/61)  BP(mean): --  RR: 18 (24 Dec 2024 21:00) (16 - 18)  SpO2: 100% (24 Dec 2024 21:00) (95% - 100%)    Parameters below as of 24 Dec 2024 21:00  Patient On (Oxygen Delivery Method): room air        CONSTITUTIONAL: NAD, well-developed, well-groomed  RESPIRATORY: Normal respiratory effort; lungs are clear to auscultation bilaterally  CARDIOVASCULAR: Regular rate and rhythm, normal S1 and S2, no murmur/rub/gallop  ABDOMEN: Soft, nontender to palpation, normoactive bowel sounds, no rebound/guarding  MUSCULOSKELETAL: No clubbing or cyanosis of digits; no joint swelling or tenderness to palpation  EXTREMITIES:  No lower extremity edema; Peripheral pulses are 2+ bilaterally  PSYCH: A+O to person, place, and time; affect appropriate  NEUROLOGY: no gross sensory deficits   SKIN: No rashes; no palpable lesions. RACHELLE sutton has some old blood but no erythema or pain with palpation of surrounding skin.   GENITOURINARY: no pain with palpation of penis shaft or scrotum. No discharge. Jung draining yellow urine.     LABS:                        9.2    6.66  )-----------( 160      ( 25 Dec 2024 04:35 )             27.9     12-25    138  |  102  |  72[H]  ----------------------------<  128[H]  4.1   |  23  |  4.78[H]    Ca    9.0      25 Dec 2024 04:35  Phos  4.9     12-25  Mg     2.10     12-25    TPro  6.9  /  Alb  3.2[L]  /  TBili  0.3  /  DBili  x   /  AST  14  /  ALT  11  /  AlkPhos  51  12-25    PT/INR - ( 25 Dec 2024 04:35 )   PT: 12.4 sec;   INR: 1.07 ratio         PTT - ( 25 Dec 2024 04:35 )  PTT:35.9 sec      Urinalysis Basic - ( 25 Dec 2024 04:35 )    Color: x / Appearance: x / SG: x / pH: x  Gluc: 128 mg/dL / Ketone: x  / Bili: x / Urobili: x   Blood: x / Protein: x / Nitrite: x   Leuk Esterase: x / RBC: x / WBC x   Sq Epi: x / Non Sq Epi: x / Bacteria: x          RADIOLOGY & ADDITIONAL TESTS:   Results Reviewed: Yes

## 2024-12-25 NOTE — PROGRESS NOTE ADULT - ASSESSMENT
_________________________________________________________________________________________  ========>>  M E D I C A L   A T T E N D I N G    F O L L O W  U P  N O T E  <<=========  -----------------------------------------------------------------------------------------------------    - Patient seen and examined by me earlier today.   - Patient today post bedside cytoscopy with no intervention.. + still with significant pain and discomfort at the sierra site and up his penis and bladder, feeling urgency.. >> benzo and Dilaudid helping            otherwise comfortable, eating OK. though reports one episode of vomiting today and one BM with some blood ( described  hard BM and + history of hemorrhoids and bright blood )     >> NPO post MN for IR nephrostomy tomorrow     ==================>> REVIEW OF SYSTEM <<=================    GEN: no fever, no chills, as above   RESP: no SOB, no cough, no sputum  CVS: no chest pain, no palpitations  GI: no abdominal pain, no nausea now , as above   : as above   Neuro: no headache, no dizziness    ==================>> PHYSICAL EXAM <<=================    GEN: A&O X 3 , NAD , comfortable, pleasant, calm in bed   HEENT: NCAT, PERRL, MMM, hearing intact  CVS: S1S2 , regular , No M/R/G appreciated  PULM: CTA B/L,  no W/R/R appreciated  ABD.: soft. non tender, non distended,  bowel sounds present  Extrem: intact pulses , no edema .. + right groin Brooke in place , + sierra with less puss and debris        ( Note written / Date of service 12-25-24 ( This is certified to be the same as "ENTERED" date above ( for billing purposes)))    ==================>> MEDICATIONS <<====================    cefepime   IVPB 1000 milliGRAM(s) IV Intermittent every 24 hours  chlorhexidine 2% Cloths 1 Application(s) Topical daily  dextrose 5%. 1000 milliLiter(s) IV Continuous <Continuous>  dextrose 5%. 1000 milliLiter(s) IV Continuous <Continuous>  dextrose 50% Injectable 25 Gram(s) IV Push once  dextrose 50% Injectable 12.5 Gram(s) IV Push once  dextrose 50% Injectable 25 Gram(s) IV Push once  dextrose Oral Gel 15 Gram(s) Oral once  glucagon  Injectable 1 milliGRAM(s) IntraMuscular once  insulin lispro (ADMELOG) corrective regimen sliding scale   SubCutaneous three times a day before meals  insulin lispro (ADMELOG) corrective regimen sliding scale   SubCutaneous at bedtime  lidocaine/prilocaine Cream 1 Application(s) Topical daily  midodrine. 10 milliGRAM(s) Oral three times a day  oxybutynin 5 milliGRAM(s) Oral every 8 hours  polyethylene glycol 3350 17 Gram(s) Oral daily  rosuvastatin 10 milliGRAM(s) Oral at bedtime  senna 2 Tablet(s) Oral at bedtime  sodium bicarbonate 1300 milliGRAM(s) Oral three times a day    MEDICATIONS  (PRN):  acetaminophen     Tablet .. 650 milliGRAM(s) Oral every 6 hours PRN Temp greater or equal to 38C (100.4F), Mild Pain (1 - 3)  diazepam    Tablet 2 milliGRAM(s) Oral two times a day PRN Bladder Spasm Pain  HYDROmorphone  Injectable 0.2 milliGRAM(s) IV Push every 4 hours PRN Moderate Pain (4 - 6)  melatonin 3 milliGRAM(s) Oral at bedtime PRN Insomnia  ondansetron Injectable 4 milliGRAM(s) IV Push every 8 hours PRN Nausea and/or Vomiting  sodium chloride 0.9% Bolus. 100 milliLiter(s) IV Bolus every 5 minutes PRN SBP LESS THAN or EQUAL to 90 mmHg    ___________  Active diet:  Diet, NPO after Midnight:      NPO Start Date: 25-Dec-2024,   NPO Start Time: 23:59  Except Medications  ___________________    ==================>> VITAL SIGNS <<==================    Vital Signs Last 24 HrsT(C): 36.7 (12-25-24 @ 12:12)  T(F): 98 (12-25-24 @ 12:12), Max: 98 (12-24-24 @ 21:00)  HR: 60 (12-25-24 @ 12:12) (60 - 70)  BP: 102/50 (12-25-24 @ 12:12)  RR: 18 (12-25-24 @ 12:12) (18 - 18)  SpO2: 98% (12-25-24 @ 12:12) (97% - 100%)      CAPILLARY BLOOD GLUCOSE  POCT Blood Glucose.: 214 mg/dL (25 Dec 2024 16:49)  POCT Blood Glucose.: 132 mg/dL (25 Dec 2024 11:50)  POCT Blood Glucose.: 165 mg/dL (25 Dec 2024 07:27)  POCT Blood Glucose.: 132 mg/dL (25 Dec 2024 00:30)     ==================>> LAB AND IMAGING <<==================                        9.2    6.66  )-----------( 160      ( 25 Dec 2024 04:35 )             27.9        12-25    138  |  102  |  72[H]  ----------------------------<  128[H]  4.1   |  23  |  4.78[H]    Ca    9.0      25 Dec 2024 04:35  Phos  4.9     12-25  Mg     2.10     12-25    TPro  6.9  /  Alb  3.2[L]  /  TBili  0.3  /  DBili  x   /  AST  14  /  ALT  11  /  AlkPhos  51  12-25    WBC count:   6.66 <<== ,  9.38 <<== ,  8.66 <<== ,  11.31 <<==   Hemoglobin:   9.2 <<==,  9.1 <<==,  9.8 <<==,  10.5 <<==  platelets:  160 <==, 184 <==, 197 <==, 217 <==    Creatinine:  4.78  <<==, 7.06  <<==, 8.71  <<==, 9.21  <<==, 11.81  <<==, 12.08  <<==  Sodium:   138  <==, 138  <==, 134  <==, 133  <==, 133  <==, 130  <==, 130  <==       AST:          14(12-25) <== , 7(12-24) <== , 6(12-21) <==      ALT:        11(12-25)  <== , 8(12-24)  <== , 10(12-21)  <==      AP:        51(12-25)  <=, 51(12-24)  <=, 72(12-21)  <=     Bili:        0.3(12-25)  <=, 0.2(12-24)  <=, 0.3(12-21)  <=    ____________________________    M I C R O B I O L O G Y :    Culture - Urine (collected 21 Dec 2024 21:30)  Source: Clean Catch  Final Report (24 Dec 2024 23:57):    50,000 - 99,000 CFU/mL Candida glabrata "Susceptibilities not performed"      < from: CT Abdomen and Pelvis No Cont (12.22.24 @ 22:13) >  IMPRESSION:  1.6 cm calcified gallstone region of gallbladder neck. No pericholecystic   inflammatory changes..  Moderate/severe bilateral hydronephrosis. 11 mm nonobstructing stone   midportion left kidney. Moderately severe bilateral hydroureter. No  obstructing stones.  Sierra catheter within urinary bladder which is decompressed.  Please refer to detailed findings otherwise described above.  < end of copied text >    < from: US Kidney and Bladder (12.21.24 @ 22:04) >  IMPRESSION:  Moderate to severe left and moderate right hydronephrosis which persists   post void. CT could evaluate for underlying etiology.  Nonobstructing 1 cm stone in the leftmidpole.  Multiple renal cysts as above.  < end of copied text >    ___________________________________________________________________________________  ===============>>  A S S E S S M E N T   A N D   P L A N <<===============  ------------------------------------------------------------------------------------------    · Assessment	   54 y.o. M w/ PMHx T2DM, prostatitis, chronic cystitis, and nephrolithiasis s/p PCNL 02/21, left nephroureteral tube 03/24 c/b hematuria needing L renal angioembolization of pseudoaneurysms and PCN-U 04/06 who presented to the ED for abnormal outpatient labs. in acute renal failure and plan for HD today      Problem/Plan - 1:  ·  Problem: Acute renal failure.   BASHIR, hydronephrosis , metabolic acidosis, hyperkalemia   - nephrology following, appreciated and discussed   HD per renal team   workup per renal   urology follow up for cystoscopy and nephrostomy by IR tomorrow   renally dose meds  monitor electrolytes .. improved   pain management + antispasmotics per   treat infection    Problem/Plan - 2:  ·  Problem: hypotension   midodrine started already >> taper down as able with PRN dosing prior to dialysis   monitor vitals closely    Problem/Plan - 3:  ·  Problem: Hydronephrosis, bilateral.   ·  Plan: - renal US showed moderate to severe left and moderate right hydronephrosis which persists post void. Non obstructing 1 cm stone in the left midpole and multiple renal cysts as above  - Continue sierra  - urology follow up for nephrostomy as above     Problem/Plan - 4:  ·  Problem: Leukocytosis/ UTI / pyuria    - hx of nephrostomy tubes. Will need ppx pseudomonas coverage  - CXR clear  - Sierra draining significant puss  - F/u on urine culture  - Continue cefepime  - ID follow up and management     Problem/Plan - 5:  ·  Problem: Diabetes mellitus.  poorly controlled   ·  Plan: A1c on admission 8.3%  - Home meds of alglipitin, jardiance. Need to confirm doses.  - ISS premeal and bedtime.  endo as needed    Problem/Plan - 6:  ·  Problem: anemia  monitor closely for now       12-23-24 @ 06:34  Iron:     27 ug/dL  Iron %:   18 %  TIBC:     154 ug/dL    Problem/Plan - 7:  ·  Problem: Need for prophylactic measure.   - Nutrition: tolerating better, no nausea post HD   - Bowel Regiment: as needed   - Activity: limited due to groin brooke   - DVT Prophylaxis: venodynes for now     --------------------------------------------  Case discussed with patient, medical team , PMD updated   Education given on findings and plan of care  ___________________________  HSherine Stephens D.O.  Pager: [phone]

## 2024-12-25 NOTE — PROGRESS NOTE ADULT - PROBLEM SELECTOR PLAN 1
- On review of [facility], Scr 0.99 on 4/7/22. Scr elevated to 12.52 on admission (12/21/24). UA with proteinuria, hematuria, and bacteria (12/21/24). Urine Na 77.   - US Kidneys and bladder significant for moderate to severe left and moderate right hydronephrosis, multiple renal cysts, and nonobstructing 1cm stone in the left midpole (12/21/24). --> likely cause of renal failure  - Acute hep panel neg, MRSA neg, RPR neg  - Dr. Chung MONTIEL is a urologist, patient unsure if he has been seeing a nephrologist  - Will use femoral shiley as patient's pressure not controlled for new shiley insertion per IR    Plan:  - nephrology following.   - Urology bedside urogram today with possible intervention. If not, b/l PCN placement by UR 12/26  - Midodrine 10mg q8h and wean as tolerated  - f/u on UPCR SOLO, Anti-dsDNA, Anti-GBM ab, anti-PLA2R, C3, C4, ANCA w/ reflex, SPEP, serum immunofixation, free light chains, HIV  - renally dose meds - On review of [facility], Scr 0.99 on 4/7/22. Scr elevated to 12.52 on admission (12/21/24). UA with proteinuria, hematuria, and bacteria (12/21/24). Urine Na 77.   - US Kidneys and bladder significant for moderate to severe left and moderate right hydronephrosis, multiple renal cysts, and nonobstructing 1cm stone in the left midpole (12/21/24). --> likely cause of renal failure  - Acute hep panel neg, MRSA neg, RPR neg  - Dr. Chung MONTIEL is a urologist, patient unsure if he has been seeing a nephrologist  - Will use femoral shiley as patient's pressure not controlled for new shiley insertion per IR    Plan:  - nephrology following.   - Urology bedside cystoscopy today with possible stent placement. If not, b/l PCN placement by UR 12/26  - Midodrine 10mg q8h and wean as tolerated  - f/u on UPCR SOLO, Anti-dsDNA, Anti-GBM ab, anti-PLA2R, C3, C4, ANCA w/ reflex, SPEP, serum immunofixation, free light chains, HIV  - renally dose meds - On review of [facility], Scr 0.99 on 4/7/22. Scr elevated to 12.52 on admission (12/21/24). UA with proteinuria, hematuria, and bacteria (12/21/24). Urine Na 77.   - US Kidneys and bladder significant for moderate to severe left and moderate right hydronephrosis, multiple renal cysts, and non-obstructing 1cm stone in the left midpole (12/21/24). --> likely cause of renal failure  - Acute hep panel neg, MRSA neg, RPR neg  - TTE: 60-65% with no regional wall motion abnormalities and mild mitral regurge  - Will use femoral shiley as patient's pressure not controlled for new shiley insertion per IR  - Net negative 2110 over the last day  Plan:  - nephrology following.   - Urology bedside cystoscopy today with possible stent placement. If not, b/l PCN placement by UR 12/26  - Midodrine 10mg q8h and wean as tolerated  - f/u on UPCR SOLO, Anti-dsDNA, Anti-GBM ab, anti-PLA2R, C3, C4, ANCA w/ reflex, SPEP, serum immunofixation, free light chains, HIV  - renally dose meds - On review of [facility], Scr 0.99 on 4/7/22. Scr elevated to 12.52 on admission (12/21/24). UA with proteinuria, hematuria, and bacteria (12/21/24). Urine Na 77.   - US Kidneys and bladder significant for moderate to severe left and moderate right hydronephrosis, multiple renal cysts, and non-obstructing 1cm stone in the left midpole (12/21/24). --> likely cause of renal failure  - Acute hep panel neg, MRSA neg, RPR neg  - TTE: 60-65% with no regional wall motion abnormalities and mild mitral regurge  - Will use femoral shiley as patient's pressure not controlled for new shiley insertion per IR  - Net negative 2110 over the last day  Plan:  - nephrology following.   - Urology bedside cystoscopy today with possible stent placement. If not, b/l PCN placement by UR 12/26  - Midodrine 10mg q8h and wean as tolerated  - f/u on UPCR SOLO, Anti-dsDNA, Anti-GBM ab, anti-PLA2R, C3, C4, ANCA w/ reflex, SPEP, serum immunofixation, free light chains, HIV  - renally dose meds  - PRN Zofran 4mg q8h ordered. (QTc 12/25: 402)

## 2024-12-25 NOTE — PROGRESS NOTE ADULT - PROBLEM SELECTOR PLAN 1
Pt. with renal failure in the setting of b/l hydronephrosis with hx of chronic nephrolithiasis (per [facility], stone composition combined calcium oxalate and uric acid stones). [facility], Scr 0.99 on 4/7/22. Scr elevated to 12.52 on admission (12/21/24). UA with proteinuria, hematuria, and bacteria (12/21/24). US Kidneys and bladder significant for moderate to severe B/L hydro with R kidney 15.6cm and L kidney 12.2cm, multiple renal cysts, and nonobstructing 1cm stone in the left midpole (12/21/24). CT A/P also showed mod-severe B/L hydroureteronephrosis, bladder decompressed with sierra. UPCR 1.1g. Serologies negative. Unclear if pt has underlying CKD due to chronic obstruction or other etiology, no SCr between 2022 and present. Pt had uremic s/s on presentation and was urgently dialyzed on 12/22/24 via R fem cath placed by Vascular team. His 2nd HD session on 12/23/24 was c/b hypotension. Although pt was asymptomatic, his SBP was persistently in the 80s despite NS bolus, therefore HD terminated early. Pt given IVF and started on Midodrine. Pt's last HD session was on 12/24, tolerated well. Post-dialysis labs have improved (reviewed 12/25/24). However, pt still remains mildly hypotensive. TTE unremarkable. UCx positive for candida. Pt not overtly septic. He is on Cefepime per ID.     Pt planned for bedside cystoscopy with urology  to evaluate bladder/UOs today, but if unable to perform any intervention, then possible IR intervention tomorrow. Will decide next HD session based on labs and clinical course.    Monitor labs and urine output. Avoid any potential nephrotoxins when possible and dose medications per eGFR.

## 2024-12-25 NOTE — PROCEDURE NOTE - NSICDXPROCEDURE_GEN_ALL_CORE_FT
PROCEDURES:  Flexible cystoscopy using local anesthetic in male patient 25-Dec-2024 14:25:47  Mike Cruz  
PROCEDURES:  Insertion, catheter, venous, hemodialysis 22-Dec-2024 15:58:40  Cristiano Drake

## 2024-12-25 NOTE — PROGRESS NOTE ADULT - ASSESSMENT
Patient is a 54M admitted to medicine for acute renal failure s/p HD noted to have mod-severe L and mod R hydro persisting postvoid on RBUS and redemonstrated on CTAP with nonobstructing 10mm L renal stone.    Recs  -unclear etiology of acute renal failure. CT scan demonstrates b/l hydroureteronephrosis down to the level of the bladder with poor visualization of bladder 2/2 sierra decompression.   -continue HD per primary  -will continue to follow  -ditropan for bladder spasm, can consider valium if very uncomfortable  -RBUS unable to assess for ureteral jets  -plan for bedside cystoscopy to evaluate bladder/UOs today  -npo at midnight for possible IR intervention tomorrow  -preop labs, type and screen, coags    Discussed with dr. Woody Patient is a 54M admitted to medicine for acute renal failure s/p HD noted to have mod-severe L and mod R hydro persisting postvoid on RBUS and redemonstrated on CTAP with nonobstructing 10mm L renal stone.    Recs  -unclear etiology of acute renal failure. CT scan demonstrates b/l hydroureteronephrosis down to the level of the bladder with poor visualization of bladder 2/2 sierra decompression.   -continue HD per primary  -will continue to follow  -ditropan for bladder spasm, can consider valium if very uncomfortable  -RBUS unable to assess for ureteral jets  -s/p bedside cystoscopy to evaluate bladder/UOs today > left UO unable to be identified, right UO in very lateral position with no efflux appreciated, patient unlikely to tolerate ureteral stent placement given extreme sensitivity of bladder mucosa and prefers IR nephrostomy tube placement  -npo at midnight for possible IR intervention tomorrow  -preop labs, type and screen, coags    Discussed with dr. Woody

## 2024-12-25 NOTE — PROGRESS NOTE ADULT - PROBLEM SELECTOR PLAN 6
- Fluids: None  - Electrolytes: Will replete to maintain K>4, Phos>3, and Mag>2  - Nutrition: Renal diet  - Bowel Regiment: miralax and senna  - Activity: As tolerated. PT not indicated  - DVT Prophylaxis: ICD  - Stress Ulcer/GI Prophylaxis: None  - Disposition: Admit for HD and further workup A1c on admission 8.3%  - Home meds of alglipitin, jardiance  - usually mayito 1u premeal     Plan:  - ISS premeal and bedtime  - Hold home meds

## 2024-12-25 NOTE — PROGRESS NOTE ADULT - PROBLEM SELECTOR PLAN 5
A1c on admission 8.3%  - Home meds of alglipitin, jardiance    Plan:  - ISS premeal and bedtime  - Hold home meds A1c on admission 8.3%  - Home meds of alglipitin, jardiance  - usually mayito 1u premeal     Plan:  - ISS premeal and bedtime  - Hold home meds - UA showed large LE and blood  - hx of nephrostomy tubes. Will need ppx pseudomonas coverage  - CXR clear  - Jung draining significant pus on admission. Clearing today  - Urine Cx growing 50-99k candida     DDx: likely UTI    Plan:  - F/u on urine culture sensitivities. Follow-up with ID   - Continue cefepime per ID (12/22 - )

## 2024-12-25 NOTE — PROVIDER CONTACT NOTE (OTHER) - ACTION/TREATMENT ORDERED:
65 Tushar Laughlin MD  Pager [phone]  After 5pm/weekends call [phone]    Discussed plan with primary team.   Paged provider Provider made aware of [facility]

## 2024-12-25 NOTE — PROGRESS NOTE ADULT - PROBLEM SELECTOR PLAN 4
- UA showed large LE and blood  - hx of nephrostomy tubes. Will need ppx pseudomonas coverage  - CXR clear  - Jung draining significant pus on admission. Clearing today    DDx: likely UTI    Plan:  - F/u on urine culture  - Continue cefepime per ID (12/22 - )  - ID following. Appreciate recs  - ID consult - UA showed large LE and blood  - hx of nephrostomy tubes. Will need ppx pseudomonas coverage  - CXR clear  - Jung draining significant pus on admission. Clearing today  - Urine Cx growing 50-99k candida     DDx: likely UTI    Plan:  - F/u on urine culture sensitivities. Follow-up with ID   - Continue cefepime per ID (12/22 - ) - Complains of intermittent bladder spasm and some pain with urination  - Has been using the Dilaudid regularly     - Oxybutynin 5mg q8h  - Valium 2mg PO BID PRN  - Dilaudid 0.2mg IV q6h PRN

## 2024-12-25 NOTE — PROCEDURE NOTE - ADDITIONAL PROCEDURE DETAILS
flexible cystoscope inserted into bladder, right UO was identified in very lateral position but no efflux was observed, left UO unable to be identified, bladder was very friable and sensitive, patient did not tolerate prolonged cystoscopy given extreme sensitivity of bladder mucosa.

## 2024-12-25 NOTE — PROGRESS NOTE ADULT - SUBJECTIVE AND OBJECTIVE BOX
Subjective:  Resting comfortably, patient indicates he would prefer to not have ureteral stent as he has chronic bladder pain and does not think he can tolerate ureteral stent in bladder    Objective:    Vital signs  T(C): , Max: 36.9 (12-24-24 @ 17:00)  HR: 62 (12-25-24 @ 08:04)  BP: 100/49 (12-25-24 @ 08:04)  SpO2: 97% (12-25-24 @ 08:04)  Wt(kg): --    Output     12-24 @ 07:01  -  12-25 @ 07:00  --------------------------------------------------------  IN: 840 mL / OUT: 2950 mL / NET: -2110 mL        Gen: NAD  Abd: soft, nontender, nondistended  : sierra secured in place, draining yellow urine with sediment    Labs                        9.2    6.66  )-----------( 160      ( 25 Dec 2024 04:35 )             27.9     25 Dec 2024 04:35    138    |  102    |  72     ----------------------------<  128    4.1     |  23     |  4.78     Ca    9.0        25 Dec 2024 04:35  Phos  4.9       25 Dec 2024 04:35  Mg     2.10      25 Dec 2024 04:35        Urine Cx: ?  Blood Cx: ?      Imaging

## 2024-12-25 NOTE — PROGRESS NOTE ADULT - PROBLEM SELECTOR PLAN 7
- Fluids: None  - Electrolytes: Will replete to maintain K>4, Phos>3, and Mag>2  - Nutrition: Renal diet  - Bowel Regiment: miralax and senna  - Activity: As tolerated. PT not indicated  - DVT Prophylaxis: ICD  - Stress Ulcer/GI Prophylaxis: None  - Disposition: Admit for HD and further workup

## 2024-12-25 NOTE — PROGRESS NOTE ADULT - PROBLEM SELECTOR PLAN 3
- renal US showed moderate to severe left and moderate right hydronephrosis which persists post void. Non obstructing 1 cm stone in the left midpole and multiple renal cysts as above  - Likely in setting of nephrolithiasis   - outpatient urologist Dr. Hoenig  - CTAP: 1.6 cm calcified gallstone region of gallbladder neck. Moderate/severe bilateral hydronephrosis. 11 mm non-obstructing stone midportion left kidney. Moderately severe bilateral hydroureter. No obstructing stones.      Plan:  - Continue sierra  - urology planning for intervention per nephrology chart note  - Unknown cause for hydronephrosis - renal US showed moderate to severe left and moderate right hydronephrosis which persists post void. Non obstructing 1 cm stone in the left midpole and multiple renal cysts as above  - Likely in setting of nephrolithiasis   - outpatient urologist Dr. Hoenig. Also briefly saw Dr. Guerrero in [facility]  - CTAP: 1.6 cm calcified gallstone region of gallbladder neck. Moderate/severe bilateral hydronephrosis. 11 mm non-obstructing stone midportion left kidney. Moderately severe bilateral hydroureter. No obstructing stones.      Plan:  - Continue sierra  - urology planning for intervention today. Plan for IR b/l PCN 12/26 if urology has no interventions  - Unknown cause for hydronephrosis.

## 2024-12-25 NOTE — PROGRESS NOTE ADULT - SUBJECTIVE AND OBJECTIVE BOX
[facility] DIVISION OF KIDNEY DISEASES AND HYPERTENSION --    Reason for consult: BASHIR on CKD    24 hour events/subjective: Patient seen and examined at bedside. Last HD yesterday, tolerated well. This AM still has pain at sierra insertion site, otherwise feels ok.       PAST HISTORY  --------------------------------------------------------------------------------  No significant changes to PMH, PSH, FHx, SHx, unless otherwise noted    ALLERGIES & MEDICATIONS  --------------------------------------------------------------------------------  Allergies    penicillins (Unknown)      Standing Inpatient Medications  cefepime   IVPB 1000 milliGRAM(s) IV Intermittent every 24 hours  chlorhexidine 2% Cloths 1 Application(s) Topical daily  dextrose 5%. 1000 milliLiter(s) IV Continuous <Continuous>  dextrose 5%. 1000 milliLiter(s) IV Continuous <Continuous>  dextrose 50% Injectable 25 Gram(s) IV Push once  dextrose 50% Injectable 12.5 Gram(s) IV Push once  dextrose 50% Injectable 25 Gram(s) IV Push once  dextrose Oral Gel 15 Gram(s) Oral once  glucagon  Injectable 1 milliGRAM(s) IntraMuscular once  insulin lispro (ADMELOG) corrective regimen sliding scale   SubCutaneous three times a day before meals  insulin lispro (ADMELOG) corrective regimen sliding scale   SubCutaneous at bedtime  lidocaine/prilocaine Cream 1 Application(s) Topical daily  midodrine. 10 milliGRAM(s) Oral three times a day  oxybutynin 5 milliGRAM(s) Oral every 8 hours  polyethylene glycol 3350 17 Gram(s) Oral daily  rosuvastatin 10 milliGRAM(s) Oral at bedtime  senna 2 Tablet(s) Oral at bedtime  sodium bicarbonate 1300 milliGRAM(s) Oral three times a day    PRN Inpatient Medications  acetaminophen     Tablet .. 650 milliGRAM(s) Oral every 6 hours PRN  diazepam    Tablet 2 milliGRAM(s) Oral two times a day PRN  HYDROmorphone  Injectable 0.2 milliGRAM(s) IV Push every 6 hours PRN  melatonin 3 milliGRAM(s) Oral at bedtime PRN  ondansetron Injectable 4 milliGRAM(s) IV Push every 8 hours PRN  sodium chloride 0.9% Bolus. 100 milliLiter(s) IV Bolus every 5 minutes PRN      REVIEW OF SYSTEMS  --------------------------------------------------------------------------------  Gen: No fever  Respiratory: No dyspnea  CV: No chest pain  GI: No abdominal pain, diarrhea, nausea, vomiting  : +sierra with purulent drainage noted, +severe pain at sierra insertion site  Skin: No rashes  MSK: No edema  Neuro: No dizziness/lightheadedness    All other systems were reviewed and are negative, except as noted.    VITALS/PHYSICAL EXAM  --------------------------------------------------------------------------------  T(C): 36.6 (12-25-24 @ 08:04), Max: 36.9 (12-24-24 @ 17:00)  HR: 62 (12-25-24 @ 08:04) (62 - 70)  BP: 100/49 (12-25-24 @ 08:04) (93/53 - 108/62)  RR: 18 (12-25-24 @ 08:04) (18 - 18)  SpO2: 97% (12-25-24 @ 08:04) (95% - 100%)  Wt(kg): --        12-24-24 @ 07:01  -  12-25-24 @ 07:00  --------------------------------------------------------  IN: 840 mL / OUT: 2950 mL / NET: -2110 mL      PHYSICAL EXAM:  Gen: NAD  Neuro: non-focal  HEENT: Anicteric, MMM  Pulm: CTA B/L  CV: +S1S2  Abd: soft, non-tender/non-distended  : +sierra with purulent drainage noted  Extremities: no edema  Skin: Warm  Dialysis access: R femoral non-tunneled catheter    LABS/STUDIES  --------------------------------------------------------------------------------              9.2    6.66  >-----------<  160      [12-25-24 @ 04:35]              27.9     138  |  102  |  72  ----------------------------<  128      [12-25-24 @ 04:35]  4.1   |  23  |  4.78        Ca     9.0     [12-25-24 @ 04:35]      Mg     2.10     [12-25-24 @ 04:35]      Phos  4.9     [12-25-24 @ 04:35]    TPro  6.9  /  Alb  3.2  /  TBili  0.3  /  DBili  x   /  AST  14  /  ALT  11  /  AlkPhos  51  [12-25-24 @ 04:35]    PT/INR: PT 12.4 , INR 1.07       [12-25-24 @ 04:35]  PTT: 35.9       [12-25-24 @ 04:35]      Creatinine Trend:  SCr 4.78 [12-25 @ 04:35]  SCr 7.06 [12-24 @ 06:45]  SCr 8.71 [12-23 @ 06:34]  SCr 9.21 [12-22 @ 21:34]  SCr 11.81 [12-22 @ 12:05]    Urine Creatinine 88      [12-22-24 @ 12:05]  Urine Protein 98      [12-22-24 @ 12:05]  Urine Sodium 51      [12-22-24 @ 12:05]  Urine Urea Nitrogen 439.7      [12-22-24 @ 12:05]  Urine Potassium 16.1      [12-21-24 @ 22:30]  Urine Chloride 51      [12-21-24 @ 22:30]  Urine Osmolality 329      [12-22-24 @ 12:05]    Iron 27, TIBC 154, %sat 18      [12-23-24 @ 06:34]  Ferritin 612      [12-23-24 @ 06:34]    HBsAb <3.0      [12-22-24 @ 17:00]  HBsAg Nonreact      [12-22-24 @ 17:00]  HBcAb Nonreact      [12-22-24 @ 17:00]  HCV 0.38, Nonreact      [12-22-24 @ 17:00]  HIV Nonreact      [12-22-24 @ 12:05]    SOLO: titer Negative, pattern --      [12-22-24 @ 12:05]  dsDNA <1      [12-22-24 @ 12:05]  C3 Complement 109      [12-22-24 @ 12:05]  C4 Complement 28      [12-22-24 @ 12:05]  ANCA: cANCA Negative, pANCA Negative, atypical ANCA Negative      [12-22-24 @ 12:05]  anti-GBM <0.2      [12-22-24 @ 12:05]  Syphilis Screen (Treponema Pallidum Ab) Negative      [12-22-24 @ 12:05]  Free Light Chains: kappa 9.86, lambda 5.97, ratio = 1.65      [12-22 @ 12:05]  SPEP Interpretation: Weak band in Gamma region suggestive of monoclonal gammopathy. Serum and  urine immunofixation electrophoresis are recommended if not previously  performed.  PATY Headley M.D.      [12-22-24 @ 12:05]

## 2024-12-25 NOTE — PROGRESS NOTE ADULT - SUBJECTIVE AND OBJECTIVE BOX
IR Follow-up 12/26    54y Male with T2DM, prostatitis, chronic cystitis, nephrolithiasis s/p PCNL 2/21 and left nephroureteral tube on 3/24. Patient initially presenting for kidney pain for multiple months. IR consulted for placement of bilateral PCN secondary to bilateral moderate to severe hydronephrosis.     After discussion with Dr. Cruz (urology) and Dr. Azul ( nephrology), bilateral PCN pending evaluation by urology for stent placement. Urology attempted bedside cystoscopy to evaluate bladder/UOs today > left UO unable to be identified, right UO in very lateral position with no efflux appreciated thus no further plans for retrograde stent placement. IR  now reconsulted for bilateral PCN placement.    -Case reviewed with Dr. Russell  -Plan for bilateral nephrostomy on 12/26  -Please place IR pre-procedure note  -Please place IR procedure order under Dr. Russell  -Keep NPO  -Hold anticoagulation  -Obtain AM labs  -Rest of care per primary    Kiet Bishop MD PGY-4  Interventional Radiology  IR Pager: [phone]  Available on Teams

## 2024-12-25 NOTE — PROCEDURE NOTE - GENERAL PROCEDURE DETAILS
flexible cystoscope inserted into bladder, right UO was identified but no efflux was observed, left UO unable to be identified, bladder was very friable and sensitive, patient did not tolerate prolonged cystoscopy give extreme sensitivity of bladder mucosa

## 2024-12-26 LAB
ALBUMIN SERPL ELPH-MCNC: 3.3 G/DL — SIGNIFICANT CHANGE UP (ref 3.3–5)
ALP SERPL-CCNC: 53 U/L — SIGNIFICANT CHANGE UP (ref 40–120)
ALT FLD-CCNC: 9 U/L — SIGNIFICANT CHANGE UP (ref 4–41)
ANION GAP SERPL CALC-SCNC: 16 MMOL/L — HIGH (ref 7–14)
ANION GAP SERPL CALC-SCNC: 16 MMOL/L — HIGH (ref 7–14)
APTT BLD: 35.4 SEC — SIGNIFICANT CHANGE UP (ref 24.5–35.6)
AST SERPL-CCNC: 12 U/L — SIGNIFICANT CHANGE UP (ref 4–40)
BASOPHILS # BLD AUTO: 0.02 K/UL — SIGNIFICANT CHANGE UP (ref 0–0.2)
BASOPHILS NFR BLD AUTO: 0.2 % — SIGNIFICANT CHANGE UP (ref 0–2)
BILIRUB SERPL-MCNC: 0.5 MG/DL — SIGNIFICANT CHANGE UP (ref 0.2–1.2)
BUN SERPL-MCNC: 67 MG/DL — HIGH (ref 7–23)
BUN SERPL-MCNC: 68 MG/DL — HIGH (ref 7–23)
CALCIUM SERPL-MCNC: 8.9 MG/DL — SIGNIFICANT CHANGE UP (ref 8.4–10.5)
CALCIUM SERPL-MCNC: 9.1 MG/DL — SIGNIFICANT CHANGE UP (ref 8.4–10.5)
CHLORIDE SERPL-SCNC: 96 MMOL/L — LOW (ref 98–107)
CHLORIDE SERPL-SCNC: 96 MMOL/L — LOW (ref 98–107)
CO2 SERPL-SCNC: 22 MMOL/L — SIGNIFICANT CHANGE UP (ref 22–31)
CO2 SERPL-SCNC: 23 MMOL/L — SIGNIFICANT CHANGE UP (ref 22–31)
CREAT SERPL-MCNC: 4.31 MG/DL — HIGH (ref 0.5–1.3)
CREAT SERPL-MCNC: 4.41 MG/DL — HIGH (ref 0.5–1.3)
EGFR: 15 ML/MIN/1.73M2 — LOW
EGFR: 15 ML/MIN/1.73M2 — LOW
EOSINOPHIL # BLD AUTO: 0.1 K/UL — SIGNIFICANT CHANGE UP (ref 0–0.5)
EOSINOPHIL NFR BLD AUTO: 1 % — SIGNIFICANT CHANGE UP (ref 0–6)
GLUCOSE BLDC GLUCOMTR-MCNC: 142 MG/DL — HIGH (ref 70–99)
GLUCOSE BLDC GLUCOMTR-MCNC: 151 MG/DL — HIGH (ref 70–99)
GLUCOSE BLDC GLUCOMTR-MCNC: 157 MG/DL — HIGH (ref 70–99)
GLUCOSE BLDC GLUCOMTR-MCNC: 218 MG/DL — HIGH (ref 70–99)
GLUCOSE BLDC GLUCOMTR-MCNC: 290 MG/DL — HIGH (ref 70–99)
GLUCOSE SERPL-MCNC: 144 MG/DL — HIGH (ref 70–99)
GLUCOSE SERPL-MCNC: 180 MG/DL — HIGH (ref 70–99)
HCT VFR BLD CALC: 30.6 % — LOW (ref 39–50)
HGB BLD-MCNC: 10.1 G/DL — LOW (ref 13–17)
IANC: 7.98 K/UL — HIGH (ref 1.8–7.4)
IMM GRANULOCYTES NFR BLD AUTO: 0.3 % — SIGNIFICANT CHANGE UP (ref 0–0.9)
INR BLD: 1.09 RATIO — SIGNIFICANT CHANGE UP (ref 0.85–1.16)
INTERPRETATION SERPL IFE-IMP: SIGNIFICANT CHANGE UP
LYMPHOCYTES # BLD AUTO: 0.36 K/UL — LOW (ref 1–3.3)
LYMPHOCYTES # BLD AUTO: 3.7 % — LOW (ref 13–44)
MAGNESIUM SERPL-MCNC: 1.9 MG/DL — SIGNIFICANT CHANGE UP (ref 1.6–2.6)
MCHC RBC-ENTMCNC: 27 PG — SIGNIFICANT CHANGE UP (ref 27–34)
MCHC RBC-ENTMCNC: 33 G/DL — SIGNIFICANT CHANGE UP (ref 32–36)
MCV RBC AUTO: 81.8 FL — SIGNIFICANT CHANGE UP (ref 80–100)
MONOCYTES # BLD AUTO: 1.16 K/UL — HIGH (ref 0–0.9)
MONOCYTES NFR BLD AUTO: 12 % — SIGNIFICANT CHANGE UP (ref 2–14)
NEUTROPHILS # BLD AUTO: 7.98 K/UL — HIGH (ref 1.8–7.4)
NEUTROPHILS NFR BLD AUTO: 82.8 % — HIGH (ref 43–77)
NRBC # BLD: 0 /100 WBCS — SIGNIFICANT CHANGE UP (ref 0–0)
NRBC # FLD: 0 K/UL — SIGNIFICANT CHANGE UP (ref 0–0)
PHOSPHATE SERPL-MCNC: 3.8 MG/DL — SIGNIFICANT CHANGE UP (ref 2.5–4.5)
PLATELET # BLD AUTO: 169 K/UL — SIGNIFICANT CHANGE UP (ref 150–400)
POTASSIUM SERPL-MCNC: 4.2 MMOL/L — SIGNIFICANT CHANGE UP (ref 3.5–5.3)
POTASSIUM SERPL-MCNC: 4.5 MMOL/L — SIGNIFICANT CHANGE UP (ref 3.5–5.3)
POTASSIUM SERPL-SCNC: 4.2 MMOL/L — SIGNIFICANT CHANGE UP (ref 3.5–5.3)
POTASSIUM SERPL-SCNC: 4.5 MMOL/L — SIGNIFICANT CHANGE UP (ref 3.5–5.3)
PROT SERPL-MCNC: 7.4 G/DL — SIGNIFICANT CHANGE UP (ref 6–8.3)
PROTHROM AB SERPL-ACNC: 13 SEC — SIGNIFICANT CHANGE UP (ref 9.9–13.4)
RBC # BLD: 3.74 M/UL — LOW (ref 4.2–5.8)
RBC # FLD: 13.1 % — SIGNIFICANT CHANGE UP (ref 10.3–14.5)
SODIUM SERPL-SCNC: 134 MMOL/L — LOW (ref 135–145)
SODIUM SERPL-SCNC: 135 MMOL/L — SIGNIFICANT CHANGE UP (ref 135–145)
WBC # BLD: 9.65 K/UL — SIGNIFICANT CHANGE UP (ref 3.8–10.5)
WBC # FLD AUTO: 9.65 K/UL — SIGNIFICANT CHANGE UP (ref 3.8–10.5)

## 2024-12-26 PROCEDURE — 99232 SBSQ HOSP IP/OBS MODERATE 35: CPT

## 2024-12-26 PROCEDURE — 99233 SBSQ HOSP IP/OBS HIGH 50: CPT | Mod: GC

## 2024-12-26 PROCEDURE — 76770 US EXAM ABDO BACK WALL COMP: CPT | Mod: 26

## 2024-12-26 RX ORDER — MIDODRINE HYDROCHLORIDE 5 MG/1
20 TABLET ORAL THREE TIMES A DAY
Refills: 0 | Status: DISCONTINUED | OUTPATIENT
Start: 2024-12-26 | End: 2024-12-30

## 2024-12-26 RX ORDER — HYDROMORPHONE HCL 4 MG
0.2 TABLET ORAL ONCE
Refills: 0 | Status: DISCONTINUED | OUTPATIENT
Start: 2024-12-26 | End: 2024-12-26

## 2024-12-26 RX ORDER — SODIUM CHLORIDE 9 MG/ML
1000 INJECTION, SOLUTION INTRAMUSCULAR; INTRAVENOUS; SUBCUTANEOUS
Refills: 0 | Status: DISCONTINUED | OUTPATIENT
Start: 2024-12-26 | End: 2024-12-30

## 2024-12-26 RX ORDER — ACETAMINOPHEN 80 MG/.8ML
1000 SOLUTION/ DROPS ORAL ONCE
Refills: 0 | Status: COMPLETED | OUTPATIENT
Start: 2024-12-26 | End: 2024-12-26

## 2024-12-26 RX ORDER — DIAZEPAM 5 MG
2 TABLET ORAL
Refills: 0 | Status: DISCONTINUED | OUTPATIENT
Start: 2024-12-26 | End: 2024-12-30

## 2024-12-26 RX ORDER — FLUCONAZOLE 200 MG/1
200 TABLET ORAL ONCE
Refills: 0 | Status: COMPLETED | OUTPATIENT
Start: 2024-12-26 | End: 2024-12-26

## 2024-12-26 RX ORDER — FLUCONAZOLE 200 MG/1
200 TABLET ORAL EVERY 24 HOURS
Refills: 0 | Status: DISCONTINUED | OUTPATIENT
Start: 2024-12-27 | End: 2024-12-30

## 2024-12-26 RX ADMIN — Medication 0.2 MILLIGRAM(S): at 15:23

## 2024-12-26 RX ADMIN — Medication 2 MILLIGRAM(S): at 19:10

## 2024-12-26 RX ADMIN — Medication 0.2 MILLIGRAM(S): at 22:54

## 2024-12-26 RX ADMIN — MIDODRINE HYDROCHLORIDE 20 MILLIGRAM(S): 5 TABLET ORAL at 13:20

## 2024-12-26 RX ADMIN — Medication 0.2 MILLIGRAM(S): at 18:41

## 2024-12-26 RX ADMIN — CHLORHEXIDINE GLUCONATE 1 APPLICATION(S): 1.2 RINSE ORAL at 13:20

## 2024-12-26 RX ADMIN — Medication 2 MILLIGRAM(S): at 01:19

## 2024-12-26 RX ADMIN — Medication 3: at 16:58

## 2024-12-26 RX ADMIN — Medication 1 APPLICATION(S): at 14:57

## 2024-12-26 RX ADMIN — SODIUM BICARBONATE 1300 MILLIGRAM(S): 84 INJECTION, SOLUTION INTRAVENOUS at 21:59

## 2024-12-26 RX ADMIN — Medication 0.2 MILLIGRAM(S): at 14:38

## 2024-12-26 RX ADMIN — FLUCONAZOLE 100 MILLIGRAM(S): 200 TABLET ORAL at 13:21

## 2024-12-26 RX ADMIN — SODIUM CHLORIDE 100 MILLILITER(S): 9 INJECTION, SOLUTION INTRAMUSCULAR; INTRAVENOUS; SUBCUTANEOUS at 18:56

## 2024-12-26 RX ADMIN — Medication 0.2 MILLIGRAM(S): at 11:20

## 2024-12-26 RX ADMIN — OXYBUTYNIN CHLORIDE 5 MILLIGRAM(S): 5 TABLET ORAL at 13:41

## 2024-12-26 RX ADMIN — OXYBUTYNIN CHLORIDE 5 MILLIGRAM(S): 5 TABLET ORAL at 05:11

## 2024-12-26 RX ADMIN — Medication 0.2 MILLIGRAM(S): at 10:37

## 2024-12-26 RX ADMIN — Medication 0.2 MILLIGRAM(S): at 06:17

## 2024-12-26 RX ADMIN — SODIUM BICARBONATE 1300 MILLIGRAM(S): 84 INJECTION, SOLUTION INTRAVENOUS at 13:41

## 2024-12-26 RX ADMIN — Medication 0.2 MILLIGRAM(S): at 19:13

## 2024-12-26 RX ADMIN — SODIUM BICARBONATE 1300 MILLIGRAM(S): 84 INJECTION, SOLUTION INTRAVENOUS at 05:11

## 2024-12-26 RX ADMIN — MIDODRINE HYDROCHLORIDE 20 MILLIGRAM(S): 5 TABLET ORAL at 16:57

## 2024-12-26 RX ADMIN — ACETAMINOPHEN 400 MILLIGRAM(S): 80 SOLUTION/ DROPS ORAL at 19:08

## 2024-12-26 RX ADMIN — ROSUVASTATIN 10 MILLIGRAM(S): 40 TABLET, FILM COATED ORAL at 22:05

## 2024-12-26 RX ADMIN — Medication 2 MILLIGRAM(S): at 07:17

## 2024-12-26 RX ADMIN — Medication 0.2 MILLIGRAM(S): at 06:30

## 2024-12-26 RX ADMIN — OXYBUTYNIN CHLORIDE 5 MILLIGRAM(S): 5 TABLET ORAL at 22:03

## 2024-12-26 RX ADMIN — Medication 1: at 11:57

## 2024-12-26 RX ADMIN — MIDODRINE HYDROCHLORIDE 10 MILLIGRAM(S): 5 TABLET ORAL at 05:11

## 2024-12-26 NOTE — PROGRESS NOTE ADULT - NSPROGADDITIONALINFOA_GEN_ALL_CORE
discussed with Medicine and IR attending increase midodrine 20mg TID   discussed with Medicine and IR attending

## 2024-12-26 NOTE — PROGRESS NOTE ADULT - SUBJECTIVE AND OBJECTIVE BOX
[facility] DIVISION OF KIDNEY DISEASES AND HYPERTENSION -- FOLLOW UP NOTE  JIN Fellow pager # [phone]  Nephrology office # [phone]  Available on [employer] teams--> Farrukh Azul  -----------------------------------------------------------------------------  Chief Complaint:/subjective:  s/p Cystoscopy but could not pass through the ureters   urine output increasing. last HD 12/24  still with low BP     ALLERGIES & MEDICATIONS  --------------------------------------------------------------------------------  Allergies    penicillins (Unknown)    Intolerances      Standing Inpatient Medications  chlorhexidine 2% Cloths 1 Application(s) Topical daily  dextrose 5%. 1000 milliLiter(s) IV Continuous <Continuous>  dextrose 5%. 1000 milliLiter(s) IV Continuous <Continuous>  dextrose 50% Injectable 25 Gram(s) IV Push once  dextrose 50% Injectable 12.5 Gram(s) IV Push once  dextrose 50% Injectable 25 Gram(s) IV Push once  dextrose Oral Gel 15 Gram(s) Oral once  fluconAZOLE IVPB 200 milliGRAM(s) IV Intermittent once  glucagon  Injectable 1 milliGRAM(s) IntraMuscular once  insulin lispro (ADMELOG) corrective regimen sliding scale   SubCutaneous three times a day before meals  insulin lispro (ADMELOG) corrective regimen sliding scale   SubCutaneous at bedtime  lidocaine/prilocaine Cream 1 Application(s) Topical daily  midodrine. 20 milliGRAM(s) Oral three times a day  oxybutynin 5 milliGRAM(s) Oral every 8 hours  polyethylene glycol 3350 17 Gram(s) Oral daily  rosuvastatin 10 milliGRAM(s) Oral at bedtime  senna 2 Tablet(s) Oral at bedtime  sodium bicarbonate 1300 milliGRAM(s) Oral three times a day    PRN Inpatient Medications  acetaminophen     Tablet .. 650 milliGRAM(s) Oral every 6 hours PRN  diazepam    Tablet 2 milliGRAM(s) Oral two times a day PRN  HYDROmorphone  Injectable 0.2 milliGRAM(s) IV Push every 4 hours PRN  melatonin 3 milliGRAM(s) Oral at bedtime PRN  ondansetron Injectable 4 milliGRAM(s) IV Push every 8 hours PRN  sodium chloride 0.9% Bolus. 100 milliLiter(s) IV Bolus every 5 minutes PRN      REVIEW OF SYSTEMS  --------------------------------------------------------------------------------    Gen: No  fevers/chills  Skin: No rashes  Respiratory: no SOB  CV: No chest pain,   GI: No abdominal pain  :   -oliguria   MSK: No joint pain/   -swelling; . R femoral catheter     All other systems were reviewed and are negative, except as noted.      VITALS/PHYSICAL EXAM  --------------------------------------------------------------------------------  T(C): 36.1 (12-26-24 @ 12:20), Max: 37.2 (12-25-24 @ 21:18)  HR: 80 (12-26-24 @ 12:20) (79 - 86)  BP: 95/60 (12-26-24 @ 12:20) (95/60 - 126/63)  RR: 18 (12-26-24 @ 12:20) (17 - 20)  SpO2: 98% (12-26-24 @ 12:20) (96% - 100%)  Wt(kg): --        12-25-24 @ 07:01  -  12-26-24 @ 07:00  --------------------------------------------------------  IN: 100 mL / OUT: 2500 mL / NET: -2400 mL    12-26-24 @ 07:01  -  12-26-24 @ 12:44  --------------------------------------------------------  IN: 0 mL / OUT: 900 mL / NET: -900 mL      Physical Exam:  	Gen NAD  	HEENT: no JVD  	Pulm: CTABL  	CV: S1S2,  	Abd: Soft,   	Ext:   - edema B/L LE   	Neuro: Awake and alert  	Skin: Warm and dry               LABS/STUDIES  --------------------------------------------------------------------------------              10.1   9.65  >-----------<  169      [12-26-24 @ 03:45]              30.6     Hemoglobin: 10.1 g/dL (12-26-24 @ 03:45)  Hemoglobin: 9.2 g/dL (12-25-24 @ 04:35)    Platelet Count - Automated: 169 K/uL (12-26-24 @ 03:45)  Platelet Count - Automated: 160 K/uL (12-25-24 @ 04:35)    134  |  96  |  68  ----------------------------<  144      [12-26-24 @ 03:45]  4.2   |  22  |  4.41        Ca     9.1     [12-26-24 @ 03:45]      Mg     1.90     [12-26-24 @ 03:45]      Phos  3.8     [12-26-24 @ 03:45]    TPro  7.4  /  Alb  3.3  /  TBili  0.5  /  DBili  x   /  AST  12  /  ALT  9   /  AlkPhos  53  [12-26-24 @ 03:45]    PT/INR: PT 13.0 , INR 1.09       [12-26-24 @ 03:45]  PTT: 35.4       [12-26-24 @ 03:45]      Creatinine: 4.41 mg/dL (12-26-24 @ 03:45)  Creatinine: 4.78 mg/dL (12-25-24 @ 04:35)  Creatinine: 7.06 mg/dL (12-24-24 @ 06:45)  Creatinine: 8.71 mg/dL (12-23-24 @ 06:34)  Creatinine: 9.21 mg/dL (12-22-24 @ 21:34)  Creatinine: 9.36 mg/dL (12-22-24 @ 21:34)  Creatinine: 11.81 mg/dL (12-22-24 @ 12:05)  Creatinine: 12.08 mg/dL (12-22-24 @ 03:10)  Creatinine: 12.52 mg/dL (12-21-24 @ 21:30)    SODIUM TREND:  Sodium 134 [12-26 @ 03:45]  Sodium 138 [12-25 @ 04:35]  Sodium 138 [12-24 @ 06:45]  Sodium 134 [12-23 @ 06:34]  Sodium 133 [12-22 @ 21:34]  Sodium 133 [12-22 @ 12:05]  Sodium 130 [12-22 @ 03:10]  Sodium 130 [12-21 @ 21:30]        SCr 4.41 [12-26 @ 03:45]  SCr 4.78 [12-25 @ 04:35]  SCr 7.06 [12-24 @ 06:45]  SCr 8.71 [12-23 @ 06:34]  SCr 9.21 [12-22 @ 21:34]    Urinalysis - [12-26-24 @ 03:45]      Color  / Appearance  / SG  / pH       Gluc 144 / Ketone   / Bili  / Urobili        Blood  / Protein  / Leuk Est  / Nitrite       RBC  / WBC  / Hyaline  / Gran  / Sq Epi  / Non Sq Epi  / Bacteria     Urine Creatinine 88      [12-22-24 @ 12:05]  Urine Protein 98      [12-22-24 @ 12:05]  Urine Sodium 51      [12-22-24 @ 12:05]  Urine Urea Nitrogen 439.7      [12-22-24 @ 12:05]  Urine Potassium 16.1      [12-21-24 @ 22:30]  Urine Chloride 51      [12-21-24 @ 22:30]  Urine Osmolality 329      [12-22-24 @ 12:05]    Iron 27, TIBC 154, %sat 18      [12-23-24 @ 06:34]  Ferritin 612      [12-23-24 @ 06:34]    HBsAb <3.0      [12-22-24 @ 17:00]  HBsAg Nonreact      [12-22-24 @ 17:00]  HBcAb Nonreact      [12-22-24 @ 17:00]  HCV 0.38, Nonreact      [12-22-24 @ 17:00]  HIV Nonreact      [12-22-24 @ 12:05]    SOLO: titer Negative, pattern --      [12-22-24 @ 12:05]  dsDNA <1      [12-22-24 @ 12:05]  C3 Complement 109      [12-22-24 @ 12:05]  C4 Complement 28      [12-22-24 @ 12:05]  ANCA: cANCA Negative, pANCA Negative, atypical ANCA Negative      [12-22-24 @ 12:05]  anti-GBM <0.2      [12-22-24 @ 12:05]  Syphilis Screen (Treponema Pallidum Ab) Negative      [12-22-24 @ 12:05]  Free Light Chains: kappa 9.86, lambda 5.97, ratio = 1.65      [12-22 @ 12:05]  SPEP Interpretation: Weak band in Gamma region suggestive of monoclonal gammopathy. Serum and  urine immunofixation electrophoresis are recommended if not previously  performed.  PATY Headley M.D.      [12-22-24 @ 12:05]

## 2024-12-26 NOTE — PROVIDER CONTACT NOTE (OTHER) - ACTION/TREATMENT ORDERED:
Provider made aware- will continue to monitor- Possible able to remove shiley and Jung once procedure is finished

## 2024-12-26 NOTE — PROGRESS NOTE ADULT - PROBLEM SELECTOR PLAN 6
A1c on admission 8.3%  - Home meds of alglipitin, jardiance  - usually mayito 1u premeal     Plan:  - ISS premeal and bedtime  - Hold home meds

## 2024-12-26 NOTE — PROGRESS NOTE ADULT - PROBLEM SELECTOR PLAN 3
- renal US showed moderate to severe left and moderate right hydronephrosis which persists post void. Non obstructing 1 cm stone in the left midpole and multiple renal cysts as above  - Likely in setting of nephrolithiasis   - outpatient urologist Dr. Hoenig. Also briefly saw Dr. Guerrero in [facility]  - CTAP: 1.6 cm calcified gallstone region of gallbladder neck. Moderate/severe bilateral hydronephrosis. 11 mm non-obstructing stone midportion left kidney. Moderately severe bilateral hydroureter. No obstructing stones.      Plan:  - Continue sierra  - urology planning for intervention today. Plan for IR b/l PCN 12/26 if urology has no interventions  - Unknown cause for hydronephrosis. - renal US showed moderate to severe left and moderate right hydronephrosis which persists post void. Non obstructing 1 cm stone in the left midpole and multiple renal cysts as above  - Likely in setting of nephrolithiasis   - outpatient urologist Dr. Hoenig. Also briefly saw Dr. Guerrero in [facility]  - CTAP: 1.6 cm calcified gallstone region of gallbladder neck. Moderate/severe bilateral hydronephrosis. 11 mm non-obstructing stone midportion left kidney. Moderately severe bilateral hydroureter. No obstructing stones.      Plan:  - Continue sierra  - Unknown cause for hydronephrosis.

## 2024-12-26 NOTE — PROGRESS NOTE ADULT - SUBJECTIVE AND OBJECTIVE BOX
Follow Up:  acute renal failure and b/l hydro, ?UTI    Interval History/ROS: pt afebrile, no vomiting or SOB, s/p cystoscopy 12/24 , right UO was identified in very lateral position but no efflux was observed, left UO unable to be identified, bladder was very friable and sensitive, patient did not tolerate prolonged cystoscopy given extreme sensitivity of bladder mucosa.          Allergies  penicillins (Unknown)        ANTIMICROBIALS:      OTHER MEDS:  acetaminophen     Tablet .. 650 milliGRAM(s) Oral every 6 hours PRN  chlorhexidine 2% Cloths 1 Application(s) Topical daily  dextrose 5%. 1000 milliLiter(s) IV Continuous <Continuous>  dextrose 5%. 1000 milliLiter(s) IV Continuous <Continuous>  dextrose 50% Injectable 25 Gram(s) IV Push once  dextrose 50% Injectable 12.5 Gram(s) IV Push once  dextrose 50% Injectable 25 Gram(s) IV Push once  dextrose Oral Gel 15 Gram(s) Oral once  diazepam    Tablet 2 milliGRAM(s) Oral two times a day PRN  glucagon  Injectable 1 milliGRAM(s) IntraMuscular once  HYDROmorphone  Injectable 0.2 milliGRAM(s) IV Push every 4 hours PRN  insulin lispro (ADMELOG) corrective regimen sliding scale   SubCutaneous three times a day before meals  insulin lispro (ADMELOG) corrective regimen sliding scale   SubCutaneous at bedtime  lidocaine/prilocaine Cream 1 Application(s) Topical daily  melatonin 3 milliGRAM(s) Oral at bedtime PRN  midodrine. 20 milliGRAM(s) Oral three times a day  ondansetron Injectable 4 milliGRAM(s) IV Push every 8 hours PRN  oxybutynin 5 milliGRAM(s) Oral every 8 hours  polyethylene glycol 3350 17 Gram(s) Oral daily  rosuvastatin 10 milliGRAM(s) Oral at bedtime  senna 2 Tablet(s) Oral at bedtime  sodium bicarbonate 1300 milliGRAM(s) Oral three times a day  sodium chloride 0.9% Bolus. 100 milliLiter(s) IV Bolus every 5 minutes PRN      Vital Signs Last 24 Hrs  T(C): 36.1 (26 Dec 2024 12:20), Max: 37.2 (25 Dec 2024 21:18)  T(F): 97 (26 Dec 2024 12:20), Max: 99 (25 Dec 2024 21:18)  HR: 70 (26 Dec 2024 13:49) (70 - 86)  BP: 108/68 (26 Dec 2024 13:49) (95/60 - 126/63)  BP(mean): 68 (26 Dec 2024 07:44) (68 - 68)  RR: 18 (26 Dec 2024 12:20) (17 - 20)  SpO2: 98% (26 Dec 2024 12:20) (96% - 100%)    Parameters below as of 26 Dec 2024 12:20  Patient On (Oxygen Delivery Method): room air        Physical Exam:  General:    NAD,  non toxic  Respiratory:  comfortable on RA  abd:     soft,   no tenderness  :    sierra  Musculoskeletal:   no joint swelling  vascular: R fem shiley  Skin:    no rash                        10.1   9.65  )-----------( 169      ( 26 Dec 2024 03:45 )             30.6       12-26    134[L]  |  96[L]  |  68[H]  ----------------------------<  144[H]  4.2   |  22  |  4.41[H]    Ca    9.1      26 Dec 2024 03:45  Phos  3.8     12-26  Mg     1.90     12-26    TPro  7.4  /  Alb  3.3  /  TBili  0.5  /  DBili  x   /  AST  12  /  ALT  9   /  AlkPhos  53  12-26      Urinalysis Basic - ( 26 Dec 2024 03:45 )    Color: x / Appearance: x / SG: x / pH: x  Gluc: 144 mg/dL / Ketone: x  / Bili: x / Urobili: x   Blood: x / Protein: x / Nitrite: x   Leuk Esterase: x / RBC: x / WBC x   Sq Epi: x / Non Sq Epi: x / Bacteria: x        MICROBIOLOGY:  v  Clean Catch  12-21-24   50,000 - 99,000 CFU/mL Candida glabrata "Susceptibilities not performed"  --  --                RADIOLOGY:  Images independently visualized and reviewed personally, findings as below  < from: US Kidney and Bladder (12.24.24 @ 16:48) >  IMPRESSION:  Moderate bilateral hydronephrosis.  Urinary bladder collapsed around Sierra catheter.    < end of copied text >  < from: CT Abdomen and Pelvis No Cont (12.22.24 @ 22:13) >  IMPRESSION:  1.6 cm calcified gallstone region of gallbladder neck. No pericholecystic   inflammatory changes..    Moderate/severe bilateral hydronephrosis. 11 mm nonobstructing stone   midportion left kidney. Moderately severe bilateral hydroureter. No   obstructing stones.    Sierra catheter within urinary bladder which is decompressed.    Please refer to detailed findings otherwise described above.    < end of copied text >

## 2024-12-26 NOTE — PROGRESS NOTE ADULT - PROBLEM SELECTOR PLAN 4
- Complains of intermittent bladder spasm and some pain with urination  - Has been using the Dilaudid regularly     - Oxybutynin 5mg q8h  - Valium 2mg PO BID PRN  - Dilaudid 0.2mg IV q6h PRN

## 2024-12-26 NOTE — PROGRESS NOTE ADULT - PROBLEM SELECTOR PLAN 1
- On review of [facility], Scr 0.99 on 4/7/22. Scr elevated to 12.52 on admission (12/21/24). UA with proteinuria, hematuria, and bacteria (12/21/24). Urine Na 77.   - US Kidneys and bladder significant for moderate to severe left and moderate right hydronephrosis, multiple renal cysts, and non-obstructing 1cm stone in the left midpole (12/21/24). --> likely cause of renal failure  - Acute hep panel neg, MRSA neg, RPR neg  - TTE: 60-65% with no regional wall motion abnormalities and mild mitral regurge  - Will use femoral shiley as patient's pressure not controlled for new shiley insertion per IR  - Net negative 2110 over the last day  Plan:  - nephrology following.   - Urology bedside cystoscopy today with possible stent placement. If not, b/l PCN placement by UR 12/26  - Midodrine 10mg q8h and wean as tolerated  - f/u on UPCR SOLO, Anti-dsDNA, Anti-GBM ab, anti-PLA2R, C3, C4, ANCA w/ reflex, SPEP, serum immunofixation, free light chains, HIV  - renally dose meds  - PRN Zofran 4mg q8h ordered. (QTc 12/25: 402) - On review of [facility], Scr 0.99 on 4/7/22. Scr elevated to 12.52 on admission (12/21/24). UA with proteinuria, hematuria, and bacteria (12/21/24). Urine Na 77.   - US Kidneys and bladder significant for moderate to severe left and moderate right hydronephrosis, multiple renal cysts, and non-obstructing 1cm stone in the left midpole (12/21/24). --> likely cause of renal failure  - Acute hep panel neg, MRSA neg, RPR neg  - TTE: 60-65% with no regional wall motion abnormalities and mild mitral regurge  - Will use femoral shiley as patient's pressure not controlled for new shiley insertion per IR  - Net negative 2110 over the last day  Plan:  - nephrology following.   - IR said no PCN tube on 12/26, want repeat ultrasound   - Increase midodrine to 20 TID   - F/u 3PM BMP     -f/u on UPCR SOLO, Anti-dsDNA, Anti-GBM ab, anti-PLA2R, C3, C4, ANCA w/ reflex, SPEP, serum immunofixation, free light chains, HIV  - renally dose meds  - PRN Zofran 4mg q8h ordered. (QTc 12/25: 402)

## 2024-12-26 NOTE — PROGRESS NOTE ADULT - PROBLEM SELECTOR PLAN 1
Pt. 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 On review of [facility], Scr 0.99 on 4/7/22. Scr elevated to 12.52 on admission (12/21/24). UA with proteinuria, hematuria, and bacteria (12/21/24). US Kidneys and bladder significant for moderate to severe B/L hydro with R kidney 15.6cm and L kidney 12.2cm, multiple renal cysts, and nonobstructing 1cm stone in the left midpole (12/21/24). CT A/P also showed mod-severe B/L hydroureteronephrosis, bladder decompressed with sierra. UPCR 1.1g. Serologies negative. Unclear if pt has underlying CKD due to chronic obstruction or other etiology, no SCr between 2022 and present. Pt had uremic s/s on presentation and was urgently dialyzed on 12/22/24 via R fem cath placed by Vascular team. His 3nd HD session on 12/24/24 was c/b hypotension. Although pt was asymptomatic, his SBP was persistently in the 80s despite NS bolus, therefore HD terminated early. Pt given IVF and started on Midodrine. Pt's last HD session was on 12/24, tolerated well. Post-dialysis labs have improved (reviewed 12/25/24). However, pt still remains mildly hypotensive. TTE unremarkable. UCx positive for candida. Pt not overtly septic. He is on Cefepime per ID.  and Diflucan     Pt s/p  cystoscopy with urology  to evaluate bladder/UOs 12/25 but could not pass scope beyond bladder,  possible IR intervention but postponed today since pt had water and mild improvement in renal function and UOP. will decide next HD session based on labs and clinical course.  check labs at 3pm toay to see if needs HD. potential NT per IR tomorrow per repeat US and Labs   Monitor labs and urine output. Avoid any potential nephrotoxins when possible and dose medications per eGFR.

## 2024-12-26 NOTE — PROGRESS NOTE ADULT - SUBJECTIVE AND OBJECTIVE BOX
***************************************************************  Sanju Floyd  (PGY1) Internal Medicine  On TEAMS  ***************************************************************    PROGRESS NOTE:     Patient is a 54y old  Male who presents with a chief complaint of Abnormal Labs (25 Dec 2024 20:51)        INTERVAL EVENTS:   SUBJECTIVE / OVERNIGHT EVENTS: No acute overnight events. Patient was seen and examined by the bedside this AM.   OXYGEN:   TELEMETRY:       MEDICATIONS  (STANDING):  cefepime   IVPB 1000 milliGRAM(s) IV Intermittent every 24 hours  chlorhexidine 2% Cloths 1 Application(s) Topical daily  dextrose 5%. 1000 milliLiter(s) (100 mL/Hr) IV Continuous <Continuous>  dextrose 5%. 1000 milliLiter(s) (50 mL/Hr) IV Continuous <Continuous>  dextrose 50% Injectable 25 Gram(s) IV Push once  dextrose 50% Injectable 12.5 Gram(s) IV Push once  dextrose 50% Injectable 25 Gram(s) IV Push once  dextrose Oral Gel 15 Gram(s) Oral once  glucagon  Injectable 1 milliGRAM(s) IntraMuscular once  insulin lispro (ADMELOG) corrective regimen sliding scale   SubCutaneous three times a day before meals  insulin lispro (ADMELOG) corrective regimen sliding scale   SubCutaneous at bedtime  lidocaine/prilocaine Cream 1 Application(s) Topical daily  midodrine. 10 milliGRAM(s) Oral three times a day  oxybutynin 5 milliGRAM(s) Oral every 8 hours  polyethylene glycol 3350 17 Gram(s) Oral daily  rosuvastatin 10 milliGRAM(s) Oral at bedtime  senna 2 Tablet(s) Oral at bedtime  sodium bicarbonate 1300 milliGRAM(s) Oral three times a day    MEDICATIONS  (PRN):  acetaminophen     Tablet .. 650 milliGRAM(s) Oral every 6 hours PRN Temp greater or equal to 38C (100.4F), Mild Pain (1 - 3)  diazepam    Tablet 2 milliGRAM(s) Oral two times a day PRN Bladder Spasm Pain  HYDROmorphone  Injectable 0.2 milliGRAM(s) IV Push every 4 hours PRN Moderate Pain (4 - 6)  melatonin 3 milliGRAM(s) Oral at bedtime PRN Insomnia  ondansetron Injectable 4 milliGRAM(s) IV Push every 8 hours PRN Nausea and/or Vomiting  sodium chloride 0.9% Bolus. 100 milliLiter(s) IV Bolus every 5 minutes PRN SBP LESS THAN or EQUAL to 90 mmHg      CAPILLARY BLOOD GLUCOSE      POCT Blood Glucose.: 151 mg/dL (26 Dec 2024 06:26)  POCT Blood Glucose.: 151 mg/dL (25 Dec 2024 21:21)  POCT Blood Glucose.: 214 mg/dL (25 Dec 2024 16:49)  POCT Blood Glucose.: 132 mg/dL (25 Dec 2024 11:50)  POCT Blood Glucose.: 165 mg/dL (25 Dec 2024 07:27)    I&O's Summary    25 Dec 2024 07:01  -  26 Dec 2024 07:00  --------------------------------------------------------  IN: 100 mL / OUT: 2500 mL / NET: -2400 mL        PHYSICAL EXAM:  Vital Signs Last 24 Hrs  T(C): 37 (26 Dec 2024 05:09), Max: 37.2 (25 Dec 2024 21:18)  T(F): 98.6 (26 Dec 2024 05:09), Max: 99 (25 Dec 2024 21:18)  HR: 81 (26 Dec 2024 05:09) (60 - 86)  BP: 95/60 (26 Dec 2024 05:09) (95/60 - 126/63)  BP(mean): 63 (25 Dec 2024 08:04) (63 - 63)  RR: 18 (26 Dec 2024 05:09) (17 - 20)  SpO2: 98% (26 Dec 2024 05:09) (96% - 100%)    Parameters below as of 26 Dec 2024 05:09  Patient On (Oxygen Delivery Method): room air        PHYSICAL EXAM:  GENERAL: NAD, well-groomed, well-developed  HEAD:  Atraumatic, Normocephalic  EYES: EOMI, PERRLA, conjunctiva and sclera clear  ENMT: No tonsillar erythema, exudates, or enlargement; Moist mucous membranes, Good dentition, No lesions  NECK: Supple, No JVD, Normal thyroid  NERVOUS SYSTEM:  Alert & Oriented X3, Good concentration; Motor Strength 5/5 B/L upper and lower extremities; DTRs 2+ intact and symmetric  CHEST/LUNG: Clear to auscultation bilaterally; No rales, rhonchi, wheezing, or rubs  HEART: Regular rate and rhythm; No murmurs, rubs, or gallops  ABDOMEN: Soft, Nontender, Nondistended; Bowel sounds present  EXTREMITIES:  2+ Peripheral Pulses, No clubbing, cyanosis, or edema  LYMPH: No lymphadenopathy noted  SKIN: No rashes or lesions    LABS:                        10.1   9.65  )-----------( 169      ( 26 Dec 2024 03:45 )             30.6     12-26    134[L]  |  96[L]  |  68[H]  ----------------------------<  144[H]  4.2   |  22  |  4.41[H]    Ca    9.1      26 Dec 2024 03:45  Phos  3.8     12-26  Mg     1.90     12-26    TPro  7.4  /  Alb  3.3  /  TBili  0.5  /  DBili  x   /  AST  12  /  ALT  9   /  AlkPhos  53  12-26    PT/INR - ( 26 Dec 2024 03:45 )   PT: 13.0 sec;   INR: 1.09 ratio         PTT - ( 26 Dec 2024 03:45 )  PTT:35.4 sec      Urinalysis Basic - ( 26 Dec 2024 03:45 )    Color: x / Appearance: x / SG: x / pH: x  Gluc: 144 mg/dL / Ketone: x  / Bili: x / Urobili: x   Blood: x / Protein: x / Nitrite: x   Leuk Esterase: x / RBC: x / WBC x   Sq Epi: x / Non Sq Epi: x / Bacteria: x          COORDINATION OF CARE:  Care Discussed with Consultants/Other Providers [Y/N]:  Prior or Outpatient Records Reviewed [Y/N]: ***************************************************************  Sanju Floyd  (PGY1) Internal Medicine  On TEAMS  ***************************************************************    PROGRESS NOTE:     Patient is a 54y old  Male who presents with a chief complaint of Abnormal Labs (25 Dec 2024 20:51)        INTERVAL EVENTS:   SUBJECTIVE / OVERNIGHT EVENTS: No acute overnight events. Patient was seen and examined by the bedside this AM. Patient feeling well this morning, no acute medical complaints.       MEDICATIONS  (STANDING):  cefepime   IVPB 1000 milliGRAM(s) IV Intermittent every 24 hours  chlorhexidine 2% Cloths 1 Application(s) Topical daily  dextrose 5%. 1000 milliLiter(s) (100 mL/Hr) IV Continuous <Continuous>  dextrose 5%. 1000 milliLiter(s) (50 mL/Hr) IV Continuous <Continuous>  dextrose 50% Injectable 25 Gram(s) IV Push once  dextrose 50% Injectable 12.5 Gram(s) IV Push once  dextrose 50% Injectable 25 Gram(s) IV Push once  dextrose Oral Gel 15 Gram(s) Oral once  glucagon  Injectable 1 milliGRAM(s) IntraMuscular once  insulin lispro (ADMELOG) corrective regimen sliding scale   SubCutaneous three times a day before meals  insulin lispro (ADMELOG) corrective regimen sliding scale   SubCutaneous at bedtime  lidocaine/prilocaine Cream 1 Application(s) Topical daily  midodrine. 10 milliGRAM(s) Oral three times a day  oxybutynin 5 milliGRAM(s) Oral every 8 hours  polyethylene glycol 3350 17 Gram(s) Oral daily  rosuvastatin 10 milliGRAM(s) Oral at bedtime  senna 2 Tablet(s) Oral at bedtime  sodium bicarbonate 1300 milliGRAM(s) Oral three times a day    MEDICATIONS  (PRN):  acetaminophen     Tablet .. 650 milliGRAM(s) Oral every 6 hours PRN Temp greater or equal to 38C (100.4F), Mild Pain (1 - 3)  diazepam    Tablet 2 milliGRAM(s) Oral two times a day PRN Bladder Spasm Pain  HYDROmorphone  Injectable 0.2 milliGRAM(s) IV Push every 4 hours PRN Moderate Pain (4 - 6)  melatonin 3 milliGRAM(s) Oral at bedtime PRN Insomnia  ondansetron Injectable 4 milliGRAM(s) IV Push every 8 hours PRN Nausea and/or Vomiting  sodium chloride 0.9% Bolus. 100 milliLiter(s) IV Bolus every 5 minutes PRN SBP LESS THAN or EQUAL to 90 mmHg      CAPILLARY BLOOD GLUCOSE      POCT Blood Glucose.: 151 mg/dL (26 Dec 2024 06:26)  POCT Blood Glucose.: 151 mg/dL (25 Dec 2024 21:21)  POCT Blood Glucose.: 214 mg/dL (25 Dec 2024 16:49)  POCT Blood Glucose.: 132 mg/dL (25 Dec 2024 11:50)  POCT Blood Glucose.: 165 mg/dL (25 Dec 2024 07:27)    I&O's Summary    25 Dec 2024 07:01  -  26 Dec 2024 07:00  --------------------------------------------------------  IN: 100 mL / OUT: 2500 mL / NET: -2400 mL        PHYSICAL EXAM:  Vital Signs Last 24 Hrs  T(C): 37 (26 Dec 2024 05:09), Max: 37.2 (25 Dec 2024 21:18)  T(F): 98.6 (26 Dec 2024 05:09), Max: 99 (25 Dec 2024 21:18)  HR: 81 (26 Dec 2024 05:09) (60 - 86)  BP: 95/60 (26 Dec 2024 05:09) (95/60 - 126/63)  BP(mean): 63 (25 Dec 2024 08:04) (63 - 63)  RR: 18 (26 Dec 2024 05:09) (17 - 20)  SpO2: 98% (26 Dec 2024 05:09) (96% - 100%)    Parameters below as of 26 Dec 2024 05:09  Patient On (Oxygen Delivery Method): room air        PHYSICAL EXAM:  GENERAL: NAD  HEAD:  Atraumatic, Normocephalic  EYES: EOMI, PERRLA, conjunctiva and sclera clear  ENMT: No tonsillar erythema, exudates, or enlargement  NECK: Supple, No JVD, Normal thyroid  NERVOUS SYSTEM:  Alert & Oriented X3, Good concentration  CHEST/LUNG: Clear to auscultation bilaterally; No rales, rhonchi, wheezing, or rubs  HEART: Regular rate and rhythm; No murmurs, rubs, or gallops  ABDOMEN: Soft, Nontender, Nondistended; Bowel sounds present  EXTREMITIES:  2+ Peripheral Pulses, No clubbing, cyanosis, or edema  LYMPH: No lymphadenopathy noted  SKIN: No rashes or lesions    LABS:                        10.1   9.65  )-----------( 169      ( 26 Dec 2024 03:45 )             30.6     12-26    134[L]  |  96[L]  |  68[H]  ----------------------------<  144[H]  4.2   |  22  |  4.41[H]    Ca    9.1      26 Dec 2024 03:45  Phos  3.8     12-26  Mg     1.90     12-26    TPro  7.4  /  Alb  3.3  /  TBili  0.5  /  DBili  x   /  AST  12  /  ALT  9   /  AlkPhos  53  12-26    PT/INR - ( 26 Dec 2024 03:45 )   PT: 13.0 sec;   INR: 1.09 ratio         PTT - ( 26 Dec 2024 03:45 )  PTT:35.4 sec      Urinalysis Basic - ( 26 Dec 2024 03:45 )    Color: x / Appearance: x / SG: x / pH: x  Gluc: 144 mg/dL / Ketone: x  / Bili: x / Urobili: x   Blood: x / Protein: x / Nitrite: x   Leuk Esterase: x / RBC: x / WBC x   Sq Epi: x / Non Sq Epi: x / Bacteria: x          COORDINATION OF CARE:  Care Discussed with Consultants/Other Providers [Y/N]:  Prior or Outpatient Records Reviewed [Y/N]:

## 2024-12-26 NOTE — PROGRESS NOTE ADULT - PROBLEM SELECTOR PLAN 5
- UA showed large LE and blood  - hx of nephrostomy tubes. Will need ppx pseudomonas coverage  - CXR clear  - Jung draining significant pus on admission. Clearing today  - Urine Cx growing 50-99k candida     DDx: likely UTI    Plan:  - F/u on urine culture sensitivities. Follow-up with ID   - Continue cefepime per ID (12/22 - )

## 2024-12-26 NOTE — PROGRESS NOTE ADULT - ASSESSMENT
_________________________________________________________________________________________  ========>>  M E D I C A L   A T T E N D I N G    F O L L O W  U P  N O T E  <<=========  -----------------------------------------------------------------------------------------------------    - Patient seen and examined by me earlier today.   - Patient post bedside cytoscopy yesterday with no intervention..       this morning patient went to IR for nephrostomy >> discussed with attending just short time ago >> requesting repeat Sono to re evaluate for hydro improvement given urine output >ordered     ==================>> REVIEW OF SYSTEM <<=================    GEN: no fever, no chills, as above   RESP: no SOB, no cough, no sputum  CVS: no chest pain, no palpitations  GI: no abdominal pain, no nausea   : as above > pain controlled with current meds.   Neuro: no headache, no dizziness    ==================>> PHYSICAL EXAM <<=================    GEN: A&O X 3 , NAD , comfortable, pleasant, calm in bed   HEENT: NCAT, PERRL, MMM, hearing intact  CVS: S1S2 , regular , No M/R/G appreciated  PULM: CTA B/L,  no W/R/R appreciated  ABD.: soft. non tender, non distended,  bowel sounds present  Extrem: intact pulses , no edema .. + right groin Brooke in place , + sierra with puss and debris        ( Note written / Date of service 12-26-24 ( This is certified to be the same as "ENTERED" date above ( for billing purposes)))    ==================>> MEDICATIONS <<====================    chlorhexidine 2% Cloths 1 Application(s) Topical daily  dextrose 5%. 1000 milliLiter(s) IV Continuous <Continuous>  dextrose 5%. 1000 milliLiter(s) IV Continuous <Continuous>  dextrose 50% Injectable 25 Gram(s) IV Push once  dextrose 50% Injectable 12.5 Gram(s) IV Push once  dextrose 50% Injectable 25 Gram(s) IV Push once  dextrose Oral Gel 15 Gram(s) Oral once  fluconAZOLE IVPB 200 milliGRAM(s) IV Intermittent once  glucagon  Injectable 1 milliGRAM(s) IntraMuscular once  insulin lispro (ADMELOG) corrective regimen sliding scale   SubCutaneous three times a day before meals  insulin lispro (ADMELOG) corrective regimen sliding scale   SubCutaneous at bedtime  lidocaine/prilocaine Cream 1 Application(s) Topical daily  midodrine. 20 milliGRAM(s) Oral three times a day  oxybutynin 5 milliGRAM(s) Oral every 8 hours  polyethylene glycol 3350 17 Gram(s) Oral daily  rosuvastatin 10 milliGRAM(s) Oral at bedtime  senna 2 Tablet(s) Oral at bedtime  sodium bicarbonate 1300 milliGRAM(s) Oral three times a day    MEDICATIONS  (PRN):  acetaminophen     Tablet .. 650 milliGRAM(s) Oral every 6 hours PRN Temp greater or equal to 38C (100.4F), Mild Pain (1 - 3)  diazepam    Tablet 2 milliGRAM(s) Oral two times a day PRN Bladder Spasm Pain  HYDROmorphone  Injectable 0.2 milliGRAM(s) IV Push every 4 hours PRN Moderate Pain (4 - 6)  melatonin 3 milliGRAM(s) Oral at bedtime PRN Insomnia  ondansetron Injectable 4 milliGRAM(s) IV Push every 8 hours PRN Nausea and/or Vomiting  sodium chloride 0.9% Bolus. 100 milliLiter(s) IV Bolus every 5 minutes PRN SBP LESS THAN or EQUAL to 90 mmHg    ___________  Active diet:  Diet, NPO after Midnight:      NPO Start Date: 25-Dec-2024,   NPO Start Time: 23:59  Diet, Consistent Carbohydrate w/Evening Snack  Diet, NPO after Midnight:      NPO Start Date: 25-Dec-2024,   NPO Start Time: 23:59  Except Medications  ___________________    ==================>> VITAL SIGNS <<==================    Vital Signs Last 24 HrsT(C): 37.1 (12-26-24 @ 07:44)  T(F): 98.7 (12-26-24 @ 07:44), Max: 99 (12-25-24 @ 21:18)  HR: 85 (12-26-24 @ 07:44) (60 - 86)  BP: 101/56 (12-26-24 @ 07:44)  RR: 18 (12-26-24 @ 07:44) (17 - 20)  SpO2: 98% (12-26-24 @ 07:44) (96% - 100%)      CAPILLARY BLOOD GLUCOSE      POCT Blood Glucose.: 142 mg/dL (26 Dec 2024 07:50)  POCT Blood Glucose.: 151 mg/dL (26 Dec 2024 06:26)  POCT Blood Glucose.: 151 mg/dL (25 Dec 2024 21:21)  POCT Blood Glucose.: 214 mg/dL (25 Dec 2024 16:49)  POCT Blood Glucose.: 132 mg/dL (25 Dec 2024 11:50)     ==================>> LAB AND IMAGING <<==================                        10.1   9.65  )-----------( 169      ( 26 Dec 2024 03:45 )             30.6        12-26    134[L]  |  96[L]  |  68[H]  ----------------------------<  144[H]  4.2   |  22  |  4.41[H]    Ca    9.1      26 Dec 2024 03:45  Phos  3.8     12-26  Mg     1.90     12-26    TPro  7.4  /  Alb  3.3  /  TBili  0.5  /  DBili  x   /  AST  12  /  ALT  9   /  AlkPhos  53  12-26    WBC count:   9.65 <<== ,  6.66 <<== ,  9.38 <<== ,  8.66 <<== ,  11.31 <<==   Hemoglobin:   10.1 <<==,  9.2 <<==,  9.1 <<==,  9.8 <<==,  10.5 <<==  platelets:  169 <==, 160 <==, 184 <==, 197 <==, 217 <==    Creatinine:  4.41  <<==, 4.78  <<==, 7.06  <<==, 8.71  <<==, 9.21  <<==, 11.81  <<==  Sodium:   134  <==, 138  <==, 138  <==, 134  <==, 133  <==, 133  <==, 130  <==       AST:          12(12-26) <== , 14(12-25) <== , 7(12-24) <== , 6(12-21) <==      ALT:        9(12-26)  <== , 11(12-25)  <== , 8(12-24)  <== , 10(12-21)  <==      AP:        53(12-26)  <=, 51(12-25)  <=, 51(12-24)  <=, 72(12-21)  <=     Bili:        0.5(12-26)  <=, 0.3(12-25)  <=, 0.2(12-24)  <=, 0.3(12-21)  <=    ____________________________    M I C R O B I O L O G Y :    Culture - Urine (collected 21 Dec 2024 21:30)  Source: Clean Catch  Final Report (24 Dec 2024 23:57):    50,000 - 99,000 CFU/mL Candida glabrata "Susceptibilities not performed"    Urinalysis with Rflx Culture (collected 21 Dec 2024 21:30)      < from: CT Abdomen and Pelvis No Cont (12.22.24 @ 22:13) >  IMPRESSION:  1.6 cm calcified gallstone region of gallbladder neck. No pericholecystic   inflammatory changes..  Moderate/severe bilateral hydronephrosis. 11 mm nonobstructing stone   midportion left kidney. Moderately severe bilateral hydroureter. No  obstructing stones.  Sierra catheter within urinary bladder which is decompressed.  Please refer to detailed findings otherwise described above.  < end of copied text >    < from: US Kidney and Bladder (12.21.24 @ 22:04) >  IMPRESSION:  Moderate to severe left and moderate right hydronephrosis which persists   post void. CT could evaluate for underlying etiology.  Nonobstructing 1 cm stone in the leftmidpole.  Multiple renal cysts as above.  < end of copied text >    ___________________________________________________________________________________  ===============>>  A S S E S S M E N T   A N D   P L A N <<===============  ------------------------------------------------------------------------------------------    · Assessment	   54 y.o. M w/ PMHx T2DM, prostatitis, chronic cystitis, and nephrolithiasis s/p PCNL 02/21, left nephroureteral tube 03/24 c/b hematuria needing L renal angioembolization of pseudoaneurysms and PCN-U 04/06 who presented to the ED for abnormal outpatient labs. in acute renal failure and plan for HD today      Problem/Plan - 1:  ·  Problem: Acute renal failure.   BASHIR, hydronephrosis , metabolic acidosis, hyperkalemia   - nephrology following, appreciated and discussed   HD per renal team   workup per renal   urology follow up for cystoscopy and nephrostomy by IR >> repeat sono as noted   renally dose meds  monitor electrolytes .. improved   pain management + antispasmotics per   treat infection    Problem/Plan - 2:  ·  Problem: hypotension   midodrine started already >> taper down as able with PRN dosing prior to dialysis   monitor vitals closely    Problem/Plan - 3:  ·  Problem: Hydronephrosis, bilateral.   ·  Plan: - renal US showed moderate to severe left and moderate right hydronephrosis which persists post void. Non obstructing 1 cm stone in the left midpole and multiple renal cysts as above  - Continue sierra  - urology follow up for nephrostomy as above     Problem/Plan - 4:  ·  Problem: Leukocytosis/ UTI / pyuria  / funguria   - hx of nephrostomy tubes. Will need ppx pseudomonas coverage  - CXR clear  - Sierra draining significant puss  - F/u on urine culture  - Continue cefepime  - ID follow up and management     Problem/Plan - 5:  ·  Problem: Diabetes mellitus.  poorly controlled   ·  Plan: A1c on admission 8.3%  - Home meds of alglipitin, jardiance. Need to confirm doses.  - ISS premeal and bedtime.  endo as needed    Problem/Plan - 6:  ·  Problem: anemia  monitor closely for now       12-23-24 @ 06:34  Iron:     27 ug/dL  Iron %:   18 %  TIBC:     154 ug/dL    Problem/Plan - 7:  ·  Problem: Need for prophylactic measure.   - Nutrition: tolerating better, no nausea post HD   - Bowel Regiment: as needed   - Activity: limited due to groin brooke   - DVT Prophylaxis: venodynes for now     --------------------------------------------  Case discussed with patient, medical team , IR, renal..   Education given on findings and plan of care  ___________________________  H. JOSE ANGEL Stephens.  Pager: [phone]

## 2024-12-26 NOTE — PROGRESS NOTE ADULT - ASSESSMENT
54 m with T2DM, prostatitis, chronic cystitis, and nephrolithiasis s/p PCNL 02/21, left nephroureteral tube 03/24 c/b hematuria needing L renal angioembolization of pseudoaneurysms and PCN-U 04/06, was started on Ozempic about 3 weeks ago and noticed that for about a week had nausea and was not eating or drinking, he has chronic bladder pain and urinary frequency also, went to PMD and then was sent to ER for renal failure  Here afebrile,  WBC: 11, CR: 12, BUN   u/a positive  u/s: Moderate to severe left and moderate right hydronephrosis which persists post void..Nonobstructing 1 cm stone in the left midpole. Multiple renal cysts     chronic cystitis (has bladder pain and frequency after PCNL and renal angioembolization) now with renal failure, has mod ot severe L and mod R hydro on u/s, was also started on ozempic 3 weeks ago, s/p R fem shiley and HD   u/a positive and urine cx with candida glabrata  s/p cysto 12/25, R UO was identified in very lateral position but no efflux was observed, left UO unable to be identified, bladder was very friable and sensitive, patient did not tolerate prolonged cystoscopy given extreme sensitivity of bladder mucosa.  there was plan for IR nephrostomy but now canceled as urine output improved and pt had water?    * s/p 12/22-12/26 stopped as urine cx showed candida glabrata, start fluconazole 200 in view of urologic procedure  * follow with nephro and urology, possible nephrostomy tommorrow    The above assessment and plan was discussed with the primary team    Halle Alexis MD  contact on teams  After 5pm and on weekends call [phone]

## 2024-12-27 LAB
ALBUMIN SERPL ELPH-MCNC: 3.1 G/DL — LOW (ref 3.3–5)
ALP SERPL-CCNC: 47 U/L — SIGNIFICANT CHANGE UP (ref 40–120)
ALT FLD-CCNC: 21 U/L — SIGNIFICANT CHANGE UP (ref 4–41)
ANION GAP SERPL CALC-SCNC: 17 MMOL/L — HIGH (ref 7–14)
ANISOCYTOSIS BLD QL: SLIGHT — SIGNIFICANT CHANGE UP
APTT BLD: 36.2 SEC — HIGH (ref 24.5–35.6)
AST SERPL-CCNC: 18 U/L — SIGNIFICANT CHANGE UP (ref 4–40)
BASOPHILS # BLD AUTO: 0 K/UL — SIGNIFICANT CHANGE UP (ref 0–0.2)
BASOPHILS NFR BLD AUTO: 0 % — SIGNIFICANT CHANGE UP (ref 0–2)
BILIRUB SERPL-MCNC: 0.4 MG/DL — SIGNIFICANT CHANGE UP (ref 0.2–1.2)
BUN SERPL-MCNC: 66 MG/DL — HIGH (ref 7–23)
CALCIUM SERPL-MCNC: 8.5 MG/DL — SIGNIFICANT CHANGE UP (ref 8.4–10.5)
CHLORIDE SERPL-SCNC: 96 MMOL/L — LOW (ref 98–107)
CO2 SERPL-SCNC: 22 MMOL/L — SIGNIFICANT CHANGE UP (ref 22–31)
CREAT SERPL-MCNC: 4.2 MG/DL — HIGH (ref 0.5–1.3)
EGFR: 16 ML/MIN/1.73M2 — LOW
EOSINOPHIL # BLD AUTO: 0.4 K/UL — SIGNIFICANT CHANGE UP (ref 0–0.5)
EOSINOPHIL NFR BLD AUTO: 5.4 % — SIGNIFICANT CHANGE UP (ref 0–6)
GLUCOSE BLDC GLUCOMTR-MCNC: 173 MG/DL — HIGH (ref 70–99)
GLUCOSE BLDC GLUCOMTR-MCNC: 175 MG/DL — HIGH (ref 70–99)
GLUCOSE BLDC GLUCOMTR-MCNC: 192 MG/DL — HIGH (ref 70–99)
GLUCOSE BLDC GLUCOMTR-MCNC: 217 MG/DL — HIGH (ref 70–99)
GLUCOSE SERPL-MCNC: 176 MG/DL — HIGH (ref 70–99)
HCT VFR BLD CALC: 26 % — LOW (ref 39–50)
HGB BLD-MCNC: 8.6 G/DL — LOW (ref 13–17)
IANC: 5.84 K/UL — SIGNIFICANT CHANGE UP (ref 1.8–7.4)
INR BLD: 1.1 RATIO — SIGNIFICANT CHANGE UP (ref 0.85–1.16)
LYMPHOCYTES # BLD AUTO: 0.2 K/UL — LOW (ref 1–3.3)
LYMPHOCYTES # BLD AUTO: 2.7 % — LOW (ref 13–44)
MAGNESIUM SERPL-MCNC: 1.8 MG/DL — SIGNIFICANT CHANGE UP (ref 1.6–2.6)
MCHC RBC-ENTMCNC: 26.6 PG — LOW (ref 27–34)
MCHC RBC-ENTMCNC: 33.1 G/DL — SIGNIFICANT CHANGE UP (ref 32–36)
MCV RBC AUTO: 80.5 FL — SIGNIFICANT CHANGE UP (ref 80–100)
MICROCYTES BLD QL: SLIGHT — SIGNIFICANT CHANGE UP
MONOCYTES # BLD AUTO: 0.46 K/UL — SIGNIFICANT CHANGE UP (ref 0–0.9)
MONOCYTES NFR BLD AUTO: 6.2 % — SIGNIFICANT CHANGE UP (ref 2–14)
NEUTROPHILS # BLD AUTO: 6.38 K/UL — SIGNIFICANT CHANGE UP (ref 1.8–7.4)
NEUTROPHILS NFR BLD AUTO: 82.1 % — HIGH (ref 43–77)
NEUTS BAND # BLD: 3.6 % — SIGNIFICANT CHANGE UP (ref 0–6)
OVALOCYTES BLD QL SMEAR: SLIGHT — SIGNIFICANT CHANGE UP
PHOSPHATE SERPL-MCNC: 3.7 MG/DL — SIGNIFICANT CHANGE UP (ref 2.5–4.5)
PHOSPHOLIPASE A2 RECEPTOR ELISA: <1.8 RU/ML — SIGNIFICANT CHANGE UP (ref 0–19.9)
PLAT MORPH BLD: NORMAL — SIGNIFICANT CHANGE UP
PLATELET # BLD AUTO: 156 K/UL — SIGNIFICANT CHANGE UP (ref 150–400)
PLATELET COUNT - ESTIMATE: NORMAL — SIGNIFICANT CHANGE UP
POIKILOCYTOSIS BLD QL AUTO: SLIGHT — SIGNIFICANT CHANGE UP
POTASSIUM SERPL-MCNC: 4 MMOL/L — SIGNIFICANT CHANGE UP (ref 3.5–5.3)
POTASSIUM SERPL-SCNC: 4 MMOL/L — SIGNIFICANT CHANGE UP (ref 3.5–5.3)
PROT SERPL-MCNC: 6.8 G/DL — SIGNIFICANT CHANGE UP (ref 6–8.3)
PROTHROM AB SERPL-ACNC: 12.8 SEC — SIGNIFICANT CHANGE UP (ref 9.9–13.4)
RBC # BLD: 3.23 M/UL — LOW (ref 4.2–5.8)
RBC # FLD: 13.2 % — SIGNIFICANT CHANGE UP (ref 10.3–14.5)
RBC BLD AUTO: ABNORMAL
SODIUM SERPL-SCNC: 135 MMOL/L — SIGNIFICANT CHANGE UP (ref 135–145)
WBC # BLD: 7.45 K/UL — SIGNIFICANT CHANGE UP (ref 3.8–10.5)
WBC # FLD AUTO: 7.45 K/UL — SIGNIFICANT CHANGE UP (ref 3.8–10.5)

## 2024-12-27 PROCEDURE — 99233 SBSQ HOSP IP/OBS HIGH 50: CPT | Mod: GC

## 2024-12-27 PROCEDURE — 99232 SBSQ HOSP IP/OBS MODERATE 35: CPT | Mod: GC

## 2024-12-27 RX ORDER — HYDROMORPHONE HCL 4 MG
0.4 TABLET ORAL EVERY 4 HOURS
Refills: 0 | Status: DISCONTINUED | OUTPATIENT
Start: 2024-12-27 | End: 2024-12-27

## 2024-12-27 RX ORDER — HYDROMORPHONE HCL 4 MG
0.4 TABLET ORAL ONCE
Refills: 0 | Status: DISCONTINUED | OUTPATIENT
Start: 2024-12-27 | End: 2024-12-27

## 2024-12-27 RX ORDER — DIAZEPAM 5 MG
2 TABLET ORAL ONCE
Refills: 0 | Status: DISCONTINUED | OUTPATIENT
Start: 2024-12-27 | End: 2024-12-27

## 2024-12-27 RX ORDER — ACETAMINOPHEN 80 MG/.8ML
650 SOLUTION/ DROPS ORAL EVERY 6 HOURS
Refills: 0 | Status: COMPLETED | OUTPATIENT
Start: 2024-12-27 | End: 2024-12-29

## 2024-12-27 RX ORDER — GABAPENTIN 300 MG/1
300 CAPSULE ORAL
Refills: 0 | Status: DISCONTINUED | OUTPATIENT
Start: 2024-12-27 | End: 2024-12-27

## 2024-12-27 RX ORDER — HYDROMORPHONE HCL 4 MG
0.4 TABLET ORAL EVERY 4 HOURS
Refills: 0 | Status: DISCONTINUED | OUTPATIENT
Start: 2024-12-27 | End: 2024-12-30

## 2024-12-27 RX ORDER — HYDROMORPHONE HCL 4 MG
0.2 TABLET ORAL EVERY 4 HOURS
Refills: 0 | Status: DISCONTINUED | OUTPATIENT
Start: 2024-12-27 | End: 2024-12-27

## 2024-12-27 RX ORDER — HYDROMORPHONE HCL 4 MG
1 TABLET ORAL EVERY 4 HOURS
Refills: 0 | Status: DISCONTINUED | OUTPATIENT
Start: 2024-12-27 | End: 2024-12-29

## 2024-12-27 RX ORDER — GABAPENTIN 300 MG/1
300 CAPSULE ORAL
Refills: 0 | Status: DISCONTINUED | OUTPATIENT
Start: 2024-12-27 | End: 2024-12-30

## 2024-12-27 RX ADMIN — SODIUM BICARBONATE 1300 MILLIGRAM(S): 84 INJECTION, SOLUTION INTRAVENOUS at 15:15

## 2024-12-27 RX ADMIN — ACETAMINOPHEN 650 MILLIGRAM(S): 80 SOLUTION/ DROPS ORAL at 15:15

## 2024-12-27 RX ADMIN — Medication 2: at 17:11

## 2024-12-27 RX ADMIN — GABAPENTIN 300 MILLIGRAM(S): 300 CAPSULE ORAL at 09:38

## 2024-12-27 RX ADMIN — Medication 0.4 MILLIGRAM(S): at 14:29

## 2024-12-27 RX ADMIN — Medication 0.4 MILLIGRAM(S): at 21:54

## 2024-12-27 RX ADMIN — FLUCONAZOLE 200 MILLIGRAM(S): 200 TABLET ORAL at 17:11

## 2024-12-27 RX ADMIN — Medication 0.4 MILLIGRAM(S): at 15:02

## 2024-12-27 RX ADMIN — OXYBUTYNIN CHLORIDE 5 MILLIGRAM(S): 5 TABLET ORAL at 06:29

## 2024-12-27 RX ADMIN — Medication 1: at 12:23

## 2024-12-27 RX ADMIN — ROSUVASTATIN 10 MILLIGRAM(S): 40 TABLET, FILM COATED ORAL at 21:38

## 2024-12-27 RX ADMIN — Medication 0.2 MILLIGRAM(S): at 11:34

## 2024-12-27 RX ADMIN — Medication 0.2 MILLIGRAM(S): at 12:34

## 2024-12-27 RX ADMIN — CHLORHEXIDINE GLUCONATE 1 APPLICATION(S): 1.2 RINSE ORAL at 12:21

## 2024-12-27 RX ADMIN — MIDODRINE HYDROCHLORIDE 20 MILLIGRAM(S): 5 TABLET ORAL at 17:11

## 2024-12-27 RX ADMIN — Medication 1: at 07:59

## 2024-12-27 RX ADMIN — SODIUM BICARBONATE 1300 MILLIGRAM(S): 84 INJECTION, SOLUTION INTRAVENOUS at 06:28

## 2024-12-27 RX ADMIN — ACETAMINOPHEN 650 MILLIGRAM(S): 80 SOLUTION/ DROPS ORAL at 21:36

## 2024-12-27 RX ADMIN — Medication 1 MILLIGRAM(S): at 15:02

## 2024-12-27 RX ADMIN — Medication 0.4 MILLIGRAM(S): at 15:36

## 2024-12-27 RX ADMIN — GABAPENTIN 300 MILLIGRAM(S): 300 CAPSULE ORAL at 17:21

## 2024-12-27 RX ADMIN — MIDODRINE HYDROCHLORIDE 20 MILLIGRAM(S): 5 TABLET ORAL at 12:22

## 2024-12-27 RX ADMIN — Medication 0.2 MILLIGRAM(S): at 07:21

## 2024-12-27 RX ADMIN — Medication 2 MILLIGRAM(S): at 07:56

## 2024-12-27 RX ADMIN — SODIUM BICARBONATE 1300 MILLIGRAM(S): 84 INJECTION, SOLUTION INTRAVENOUS at 21:37

## 2024-12-27 RX ADMIN — Medication 1 MILLIGRAM(S): at 13:13

## 2024-12-27 RX ADMIN — ACETAMINOPHEN 650 MILLIGRAM(S): 80 SOLUTION/ DROPS ORAL at 17:26

## 2024-12-27 RX ADMIN — Medication 1 APPLICATION(S): at 12:23

## 2024-12-27 RX ADMIN — Medication 0.4 MILLIGRAM(S): at 17:26

## 2024-12-27 RX ADMIN — Medication 0.2 MILLIGRAM(S): at 10:24

## 2024-12-27 RX ADMIN — Medication 2 MILLIGRAM(S): at 17:44

## 2024-12-27 RX ADMIN — ACETAMINOPHEN 650 MILLIGRAM(S): 80 SOLUTION/ DROPS ORAL at 23:55

## 2024-12-27 RX ADMIN — MIDODRINE HYDROCHLORIDE 20 MILLIGRAM(S): 5 TABLET ORAL at 06:29

## 2024-12-27 NOTE — PROGRESS NOTE ADULT - PROBLEM SELECTOR PLAN 3
- renal US showed moderate to severe left and moderate right hydronephrosis which persists post void. Non obstructing 1 cm stone in the left midpole and multiple renal cysts as above  - Likely in setting of nephrolithiasis   - outpatient urologist Dr. Hoenig. Also briefly saw Dr. Guerrero in [facility]  - CTAP: 1.6 cm calcified gallstone region of gallbladder neck. Moderate/severe bilateral hydronephrosis. 11 mm non-obstructing stone midportion left kidney. Moderately severe bilateral hydroureter. No obstructing stones.      Plan:  - Continue sierra  - Unknown cause for hydronephrosis. - renal US showed moderate to severe left and moderate right hydronephrosis which persists post void. Non obstructing 1 cm stone in the left midpole and multiple renal cysts as above  - Likely in setting of nephrolithiasis   - outpatient urologist Dr. Hoenig. Also briefly saw Dr. Guerrero in [facility]  - CTAP: 1.6 cm calcified gallstone region of gallbladder neck. Moderate/severe bilateral hydronephrosis. 11 mm non-obstructing stone midportion left kidney. Moderately severe bilateral hydroureter. No obstructing stones.  - cystoscopy with urology to evaluate bladder/UOs 12/25 but could not pass scope beyond bladder. Per IR hydronephrosis mildly improved    Plan:  - Continue sierra - renal US showed moderate to severe left and moderate right hydronephrosis which persists post void. Non obstructing 1 cm stone in the left midpole and multiple renal cysts as above  - Likely in setting of nephrolithiasis   - outpatient urologist Dr. Hoenig. Also briefly saw Dr. Guerrero in [facility]  - CTAP: 1.6 cm calcified gallstone region of gallbladder neck. Moderate/severe bilateral hydronephrosis. 11 mm non-obstructing stone midportion left kidney. Moderately severe bilateral hydroureter. No obstructing stones.  - cystoscopy with urology to evaluate bladder/UOs 12/25 but could not pass scope beyond bladder. Per IR hydronephrosis mildly improved, however, US KUB 12/26 showed unchanged    Plan:  - Continue sierra

## 2024-12-27 NOTE — PROGRESS NOTE ADULT - PROBLEM SELECTOR PLAN 1
- On review of [facility], Scr 0.99 on 4/7/22. Scr elevated to 12.52 on admission (12/21/24). UA with proteinuria, hematuria, and bacteria (12/21/24). Urine Na 77.   - US Kidneys and bladder significant for moderate to severe left and moderate right hydronephrosis, multiple renal cysts, and non-obstructing 1cm stone in the left midpole (12/21/24). --> likely cause of renal failure  - Acute hep panel neg, MRSA neg, RPR neg  - TTE: 60-65% with no regional wall motion abnormalities and mild mitral regurge  - Will use femoral shiley as patient's pressure not controlled for new shiley insertion per IR    Plan:  - nephrology following.   - IR planning for PCN tube on 12/27, repeat ultrasound showed unchanged hydronephrosis  - Continue midodrine to 20 TID   -f/u on UPCR SOLO, Anti-dsDNA, Anti-GBM ab, anti-PLA2R, C3, C4, ANCA w/ reflex, SPEP, serum immunofixation, free light chains, HIV  - renally dose meds  - PRN Zofran 4mg q8h ordered. (QTc 12/25: 402) - On review of [facility], Scr 0.99 on 4/7/22. Scr elevated to 12.52 on admission (12/21/24). UA with proteinuria, hematuria, and bacteria (12/21/24). Urine Na 77.   - US Kidneys and bladder significant for moderate to severe left and moderate right hydronephrosis, multiple renal cysts, and non-obstructing 1cm stone in the left midpole (12/21/24). --> likely cause of renal failure  - Acute hep panel neg, MRSA neg, RPR neg  - TTE: 60-65% with no regional wall motion abnormalities and mild mitral regurge  - Initial obstruction likely in setting of cystitis    Plan:  - nephrology following. Holding off on HD --> ALBIN sutton pulled 12/27  - IR holding off on PCN. Per conversation with Nephro, patient clinically improving  - Continue midodrine to 20 TID   - f/u on UPCR SOLO, Anti-dsDNA, Anti-GBM ab, anti-PLA2R, C3, C4, ANCA w/ reflex, SPEP, serum immunofixation, free light chains, HIV  - renally dose meds  - PRN Zofran 4mg q8h ordered. (QTc 12/25: 402) - On review of [facility], Scr 0.99 on 4/7/22. Scr elevated to 12.52 on admission (12/21/24). UA with proteinuria, hematuria, and bacteria (12/21/24). Urine Na 77.   - US Kidneys and bladder significant for moderate to severe left and moderate right hydronephrosis, multiple renal cysts, and non-obstructing 1cm stone in the left midpole (12/21/24). --> likely cause of renal failure  - Acute hep panel neg, MRSA neg, RPR neg  - TTE: 60-65% with no regional wall motion abnormalities and mild mitral regurge  - Initial obstruction likely in setting of cystitis    Plan:  - nephrology following. Holding off on HD --> ALBIN sutton pulled 12/27  - IR holding off on PCN. Per conversation with Nephro, patient clinically improving  - Continue midodrine 20 TID   - f/u on UPCR SOLO, Anti-dsDNA, Anti-GBM ab, anti-PLA2R, C3, C4, ANCA w/ reflex, SPEP, serum immunofixation, free light chains, HIV  - renally dose meds  - PRN Zofran 4mg q8h ordered. (QTc 12/25: 402)

## 2024-12-27 NOTE — PROGRESS NOTE ADULT - ASSESSMENT
_________________________________________________________________________________________  ========>>  M E D I C A L   A T T E N D I N G    F O L L O W  U P  N O T E  <<=========  -----------------------------------------------------------------------------------------------------    - Patient seen and examined by me earlier today.   many discussions were had about the care of  this patient..  patient post removal of Brooke from Rt groin today.. no nephrostomy or dialysis a patient having good urine output and  jennifer has been stable..     pain medications adjusted for better control     otherwise stable     ==================>> REVIEW OF SYSTEM <<=================    GEN: no fever, no chills, as above   RESP: no SOB, no cough, no sputum  CVS: no chest pain, no palpitations  GI: no abdominal pain, no nausea   : as above > pain in penis / bladder somewhat controlled   Neuro: no headache, no dizziness    ==================>> PHYSICAL EXAM <<=================    GEN: A&O X 3 , NAD , comfortable, pleasant, calm in bed >> encouraged out of bed to chair as able post removal of line ..   HEENT: NCAT, PERRL, MMM, hearing intact  CVS: S1S2 , regular , No M/R/G appreciated  PULM: CTA B/L,  no W/R/R appreciated  ABD.: soft. non tender, non distended,  bowel sounds present  Extrem: intact pulses , no edema .. + right groin Brooke in place , + sierra with less puss and debris        ( Note written / Date of service 12-27-24 ( This is certified to be the same as "ENTERED" date above ( for billing purposes)))    ==================>> MEDICATIONS <<====================    acetaminophen     Tablet .. 650 milliGRAM(s) Oral every 6 hours  chlorhexidine 2% Cloths 1 Application(s) Topical daily  dextrose 5%. 1000 milliLiter(s) IV Continuous <Continuous>  dextrose 5%. 1000 milliLiter(s) IV Continuous <Continuous>  dextrose 50% Injectable 25 Gram(s) IV Push once  dextrose 50% Injectable 12.5 Gram(s) IV Push once  dextrose 50% Injectable 25 Gram(s) IV Push once  dextrose Oral Gel 15 Gram(s) Oral once  fluconAZOLE   Tablet 200 milliGRAM(s) Oral every 24 hours  gabapentin 300 milliGRAM(s) Oral two times a day  glucagon  Injectable 1 milliGRAM(s) IntraMuscular once  insulin lispro (ADMELOG) corrective regimen sliding scale   SubCutaneous three times a day before meals  insulin lispro (ADMELOG) corrective regimen sliding scale   SubCutaneous at bedtime  lidocaine/prilocaine Cream 1 Application(s) Topical daily  midodrine. 20 milliGRAM(s) Oral three times a day  polyethylene glycol 3350 17 Gram(s) Oral daily  rosuvastatin 10 milliGRAM(s) Oral at bedtime  senna 2 Tablet(s) Oral at bedtime  sodium bicarbonate 1300 milliGRAM(s) Oral three times a day  sodium chloride 0.9%. 1000 milliLiter(s) IV Continuous <Continuous>    MEDICATIONS  (PRN):  diazepam    Tablet 2 milliGRAM(s) Oral two times a day PRN bladder spasm  HYDROmorphone   Tablet 1 milliGRAM(s) Oral every 4 hours PRN Severe Pain (7 - 10)  HYDROmorphone  Injectable 0.4 milliGRAM(s) IV Push every 4 hours PRN Breakthrough Pain  melatonin 3 milliGRAM(s) Oral at bedtime PRN Insomnia  ondansetron Injectable 4 milliGRAM(s) IV Push every 8 hours PRN Nausea and/or Vomiting  sodium chloride 0.9% Bolus. 100 milliLiter(s) IV Bolus every 5 minutes PRN SBP LESS THAN or EQUAL to 90 mmHg    ___________  Active diet:  Diet, Consistent Carbohydrate w/Evening Snack  ___________________    ==================>> VITAL SIGNS <<==================    Vital Signs Last 24 HrsT(C): 36.9 (12-27-24 @ 12:10)  T(F): 98.4 (12-27-24 @ 12:10), Max: 98.4 (12-27-24 @ 12:10)  HR: 60 (12-27-24 @ 12:10) (60 - 88)  BP: 95/62 (12-27-24 @ 12:10)  RR: 18 (12-27-24 @ 12:10) (18 - 18)  SpO2: 100% (12-27-24 @ 12:10) (97% - 100%)      CAPILLARY BLOOD GLUCOSE  POCT Blood Glucose.: 175 mg/dL (27 Dec 2024 12:18)  POCT Blood Glucose.: 173 mg/dL (27 Dec 2024 07:29)  POCT Blood Glucose.: 218 mg/dL (26 Dec 2024 21:57)  POCT Blood Glucose.: 290 mg/dL (26 Dec 2024 16:42)     ==================>> LAB AND IMAGING <<==================                        8.6    7.45  )-----------( 156      ( 27 Dec 2024 04:02 )             26.0        12-27    135  |  96[L]  |  66[H]  ----------------------------<  176[H]  4.0   |  22  |  4.20[H]    Ca    8.5      27 Dec 2024 04:02  Phos  3.7     12-27  Mg     1.80     12-27    TPro  6.8  /  Alb  3.1[L]  /  TBili  0.4  /  DBili  x   /  AST  18  /  ALT  21  /  AlkPhos  47  12-27    WBC count:   7.45 <<== ,  9.65 <<== ,  6.66 <<== ,  9.38 <<== ,  8.66 <<==   Hemoglobin:   8.6 <<==,  10.1 <<==,  9.2 <<==,  9.1 <<==,  9.8 <<==  platelets:  156 <==, 169 <==, 160 <==, 184 <==, 197 <==, 217 <==    Creatinine:  4.20  <<==, 4.31  <<==, 4.41  <<==, 4.78  <<==, 7.06  <<==, 8.71  <<==  Sodium:   135  <==, 135  <==, 134  <==, 138  <==, 138  <==, 134  <==, 133  <==       AST:          18(12-27) <== , 12(12-26) <== , 14(12-25) <== , 7(12-24) <== , 6(12-21) <==      ALT:        21(12-27)  <== , 9(12-26)  <== , 11(12-25)  <== , 8(12-24)  <== , 10(12-21)  <==      AP:        47(12-27)  <=, 53(12-26)  <=, 51(12-25)  <=, 51(12-24)  <=, 72(12-21)  <=     Bili:        0.4(12-27)  <=, 0.5(12-26)  <=, 0.3(12-25)  <=, 0.2(12-24)  <=, 0.3(12-21)  <=    ____________________________    M I C R O B I O L O G Y :    Culture - Urine (collected 21 Dec 2024 21:30)  Source: Clean Catch  Final Report (24 Dec 2024 23:57):    50,000 - 99,000 CFU/mL Candida glabrata "Susceptibilities not performed"      < from: CT Abdomen and Pelvis No Cont (12.22.24 @ 22:13) >  IMPRESSION:  1.6 cm calcified gallstone region of gallbladder neck. No pericholecystic   inflammatory changes..  Moderate/severe bilateral hydronephrosis. 11 mm nonobstructing stone   midportion left kidney. Moderately severe bilateral hydroureter. No  obstructing stones.  Sierra catheter within urinary bladder which is decompressed.  Please refer to detailed findings otherwise described above.  < end of copied text >    < from: US Kidney and Bladder (12.21.24 @ 22:04) >  IMPRESSION:  Moderate to severe left and moderate right hydronephrosis which persists   post void. CT could evaluate for underlying etiology.  Nonobstructing 1 cm stone in the leftmidpole.  Multiple renal cysts as above.  < end of copied text >    ___________________________________________________________________________________  ===============>>  A S S E S S M E N T   A N D   P L A N <<===============  ------------------------------------------------------------------------------------------    · Assessment	   54 y.o. M w/ PMHx T2DM, prostatitis, chronic cystitis, and nephrolithiasis s/p PCNL 02/21, left nephroureteral tube 03/24 c/b hematuria needing L renal angioembolization of pseudoaneurysms and PCN-U 04/06 who presented to the ED for abnormal outpatient labs. in acute renal failure and plan for HD today      Problem/Plan - 1:  ·  Problem: Acute renal failure.   BASHIR, hydronephrosis / obstructive uropathy, metabolic acidosis, hyperkalemia   - nephrology following, appreciated and discussed   HD per renal team ( on hold for now, brooke out)   nephrostomy onhold as noted >> repeat sono as noted >> monitoring clinically   renally dose meds  monitor electrolytes .. improved   pain management + antispasmotics per   NO TRIAL OF VOID  OOB to chair, , PT    Problem/Plan - 2:  ·  Problem: hypotension   midodrine started already >> taper down as able with PRN dosing prior to dialysis   monitor vitals closely    Problem/Plan - 3:  ·  Problem: Hydronephrosis, bilateral.   ·  Plan: - renal US showed moderate to severe left and moderate right hydronephrosis which persists post void. Non obstructing 1 cm stone in the left midpole and multiple renal cysts as above  - Continue sierra  - urology follow up for nephrostomy as above     Problem/Plan - 4:  ·  Problem: Leukocytosis/ UTI / pyuria  / funguria   - hx of nephrostomy tubes. Will need ppx pseudomonas coverage  - CXR clear  - Sierra draining significant puss  - F/u on urine culture  - Continue cefepime  - ID follow up and management     Problem/Plan - 5:  ·  Problem: Diabetes mellitus.  poorly controlled   ·  Plan: A1c on admission 8.3%  - Home meds of alglipitin, jardiance. Need to confirm doses.  - ISS premeal and bedtime.  endo as needed    Problem/Plan - 6:  ·  Problem: anemia  monitor closely for now       12-23-24 @ 06:34  Iron:     27 ug/dL  Iron %:   18 %  TIBC:     154 ug/dL    Problem/Plan - 7:  ·  Problem: Need for prophylactic measure.   - Nutrition: tolerating better, no nausea post HD   - Bowel Regiment: as needed   - Activity: limited due to groin brooke   - DVT Prophylaxis: venodynes for now     --------------------------------------------  Case discussed with patient, medical team , renal..   Education given on findings and plan of care  ___________________________  H. JOSE ANGEL Stephens.  Pager: [phone]

## 2024-12-27 NOTE — PROGRESS NOTE ADULT - NSPROGADDITIONALINFOA_GEN_ALL_CORE
ok with holding off on Perc NT as pt clinically improving   hold off dialysis as well. remove femoral Shiley today   needs better pain management   discussed with Medicine and IR attending again today

## 2024-12-27 NOTE — PROGRESS NOTE ADULT - PROBLEM SELECTOR PLAN 1
Pt. with renal failure in the setting of b/l hydronephrosis with hx of chronic nephrolithiasis [facility] stone composition combined calcium oxalate and uric acid stones). On review of [facility], Scr 0.99 on 4/7/22. Scr elevated to 12.52 on admission (12/21/24). UA with proteinuria, hematuria, and bacteria (12/21/24). US Kidneys and bladder significant for moderate to severe B/L hydro with R kidney 15.6cm and L kidney 12.2cm, multiple renal cysts, and nonobstructing 1cm stone in the left midpole (12/21/24). CT A/P also showed mod-severe B/L hydroureteronephrosis, bladder decompressed with sierra. UPCR 1.1g. Serologies negative. Unclear if pt has underlying CKD due to chronic obstruction or other etiology, no SCr between 2022 and present. Pt had uremic s/s on presentation and was urgently dialyzed on 12/22/24 via R fem cath placed by Vascular team. His 3nd HD session on 12/24/24 was c/b hypotension. Although pt was asymptomatic, his SBP was persistently in the 80s despite NS bolus, therefore HD terminated early. Pt given IVF and started on Midodrine. Pt's last HD session was on 12/24, tolerated well. Post-dialysis labs have improved (reviewed 12/25/24). However, pt still remains mildly hypotensive. but better. TTE unremarkable. UCx positive for candida. Pt not overtly septic. He is on Cefepime per ID.  and Diflucan     Pt s/p  cystoscopy with urology  to evaluate bladder/UOs 12/25 but could not pass scope beyond bladder,  possible IR intervention but postponed today again since pt had water and mild improvement in renal function and UOP.  hold off on HD today. can remove femoral cathter as labs are improving slightly   continue with IVF   c/w midodrine 20mg TID   Monitor labs and urine output. Avoid any potential nephrotoxins when possible and dose medications per eGFR.

## 2024-12-27 NOTE — PROGRESS NOTE ADULT - PROBLEM SELECTOR PLAN 4
- Complains of intermittent bladder spasm and some pain with urination  - Has been using the Dilaudid regularly     - Oxybutynin 5mg q8h  - Valium 2mg PO BID PRN  - Dilaudid 0.2mg IV q4h PRN - Complains of intermittent bladder spasm and some pain with urination  - Has been using the Dilaudid regularly   - Oxybutynin 5mg q8h held as patient refused    Plan:  - Valium 2mg PO BID PRN  - Dilaudid 0.2mg IV q4h PRN  - Dilaudid 1mg PO q4h PRN for sever pain  - Gabapentin 300mg BID - Complains of intermittent bladder spasm and some pain with urination  - Has been using the Dilaudid regularly   - Oxybutynin 5mg q8h held as patient refused    Plan:  - Valium 2mg PO BID PRN  - Dilaudid 0.2mg IV q4h PRN  - Dilaudid 1mg PO q4h PRN for severe pain  - Gabapentin 300mg BID

## 2024-12-27 NOTE — PROGRESS NOTE ADULT - ASSESSMENT
54 m with T2DM, prostatitis, chronic cystitis, and nephrolithiasis s/p PCNL 02/21, left nephroureteral tube 03/24 c/b hematuria needing L renal angioembolization of pseudoaneurysms and PCN-U 04/06, was started on Ozempic about 3 weeks ago and noticed that for about a week had nausea and was not eating or drinking, he has chronic bladder pain and urinary frequency also, went to PMD and then was sent to ER for renal failure  Here afebrile,  WBC: 11, CR: 12, BUN   u/a positive  u/s: Moderate to severe left and moderate right hydronephrosis which persists post void..Nonobstructing 1 cm stone in the left midpole. Multiple renal cysts     chronic cystitis (has bladder pain and frequency after PCNL and renal angioembolization) now with renal failure, has mod ot severe L and mod R hydro on u/s, was also started on ozempic 3 weeks ago, s/p R fem shiley and HD   u/a positive and urine cx with candida glabrata  s/p cysto 12/25, R UO was identified in very lateral position but no efflux was observed, left UO unable to be identified, bladder was very friable and sensitive, patient did not tolerate prolonged cystoscopy given extreme sensitivity of bladder mucosa.  there was plan for IR nephrostomy but now canceled as urine output improved, nephro also recommended to remove shiley, ?obstruction due to inflammation and ?infec tion    * s/p 12/22-12/26 stopped as urine cx showed candida glabrata, started fluconazole 200 12/26, now day 2  * follow with nephro and urology  * monitor  CMP    The above assessment and plan was discussed with the primary team    Halle Alexis MD  contact on teams  After 5pm and on weekends call [phone]

## 2024-12-27 NOTE — PROGRESS NOTE ADULT - PROBLEM SELECTOR PLAN 7
- Fluids: None  - Electrolytes: Will replete to maintain K>4, Phos>3, and Mag>2  - Nutrition: Renal diet  - Bowel Regiment: miralax and senna  - Activity: As tolerated. PT not indicated  - DVT Prophylaxis: ICD  - Stress Ulcer/GI Prophylaxis: None  - Disposition: Admit for HD and further workup - Fluids: None  - Electrolytes: Will replete to maintain K>4, Phos>3, and Mag>2  - Nutrition: Renal diet  - Bowel Regiment: miralax and senna  - Activity: As tolerated. PT not indicated  - DVT Prophylaxis: ICD  - Stress Ulcer/GI Prophylaxis: None  - Disposition: Monitor labs and further workup

## 2024-12-27 NOTE — PROGRESS NOTE ADULT - PROBLEM SELECTOR PLAN 5
- UA showed large LE and blood  - hx of nephrostomy tubes. Will need ppx pseudomonas coverage  - CXR clear  - Jung draining significant pus on admission. Clearing today  - Urine Cx growing 50-99k candida     DDx: likely UTI    Plan:  - F/u on urine culture sensitivities. Follow-up with ID   - Continue cefepime per ID (12/22 - ) - UA showed large LE and blood  - hx of nephrostomy tubes. Will need ppx pseudomonas coverage  - CXR clear. Previously on Cefepime 12/22 - 12/25  - Jung draining significant pus on admission. Clearing today  - Urine Cx growing 50-99k candida     DDx: likely UTI    Plan:  - F/u on urine culture sensitivities. Follow-up with ID   - Continue Fluconazole per ID (12/26 - )

## 2024-12-27 NOTE — PROGRESS NOTE ADULT - SUBJECTIVE AND OBJECTIVE BOX
***********************************************************************  Liban Knox M.D  Resident Physician  Department of Medicine  Available on [employer] Teams  ***********************************************************************  Patient is a 54y old  Male who presents with a chief complaint of Abnormal Labs (26 Dec 2024 14:14)      OVERNIGHT EVENTS:     SUBJECTIVE: Patient seen and examined at bedside.     ADDITIONAL REVIEW OF SYSTEMS:    MEDICATIONS  (STANDING):  chlorhexidine 2% Cloths 1 Application(s) Topical daily  dextrose 5%. 1000 milliLiter(s) (100 mL/Hr) IV Continuous <Continuous>  dextrose 5%. 1000 milliLiter(s) (50 mL/Hr) IV Continuous <Continuous>  dextrose 50% Injectable 25 Gram(s) IV Push once  dextrose 50% Injectable 12.5 Gram(s) IV Push once  dextrose 50% Injectable 25 Gram(s) IV Push once  dextrose Oral Gel 15 Gram(s) Oral once  fluconAZOLE   Tablet 200 milliGRAM(s) Oral every 24 hours  glucagon  Injectable 1 milliGRAM(s) IntraMuscular once  insulin lispro (ADMELOG) corrective regimen sliding scale   SubCutaneous three times a day before meals  insulin lispro (ADMELOG) corrective regimen sliding scale   SubCutaneous at bedtime  lidocaine/prilocaine Cream 1 Application(s) Topical daily  midodrine. 20 milliGRAM(s) Oral three times a day  oxybutynin 5 milliGRAM(s) Oral every 8 hours  polyethylene glycol 3350 17 Gram(s) Oral daily  rosuvastatin 10 milliGRAM(s) Oral at bedtime  senna 2 Tablet(s) Oral at bedtime  sodium bicarbonate 1300 milliGRAM(s) Oral three times a day  sodium chloride 0.9%. 1000 milliLiter(s) (100 mL/Hr) IV Continuous <Continuous>    MEDICATIONS  (PRN):  acetaminophen     Tablet .. 650 milliGRAM(s) Oral every 6 hours PRN Temp greater or equal to 38C (100.4F), Mild Pain (1 - 3)  diazepam    Tablet 2 milliGRAM(s) Oral two times a day PRN bladder spasm  HYDROmorphone  Injectable 0.2 milliGRAM(s) IV Push every 4 hours PRN Moderate Pain (4 - 6)  melatonin 3 milliGRAM(s) Oral at bedtime PRN Insomnia  ondansetron Injectable 4 milliGRAM(s) IV Push every 8 hours PRN Nausea and/or Vomiting  sodium chloride 0.9% Bolus. 100 milliLiter(s) IV Bolus every 5 minutes PRN SBP LESS THAN or EQUAL to 90 mmHg      CAPILLARY BLOOD GLUCOSE      POCT Blood Glucose.: 218 mg/dL (26 Dec 2024 21:57)  POCT Blood Glucose.: 290 mg/dL (26 Dec 2024 16:42)  POCT Blood Glucose.: 157 mg/dL (26 Dec 2024 11:55)  POCT Blood Glucose.: 142 mg/dL (26 Dec 2024 07:50)    I&O's Summary    26 Dec 2024 07:01  -  27 Dec 2024 07:00  --------------------------------------------------------  IN: 0 mL / OUT: 3200 mL / NET: -3200 mL        PHYSICAL EXAM:    Vital Signs Last 24 Hrs  T(C): 36.7 (27 Dec 2024 07:24), Max: 37.1 (26 Dec 2024 07:44)  T(F): 98 (27 Dec 2024 07:24), Max: 98.7 (26 Dec 2024 07:44)  HR: 88 (27 Dec 2024 07:24) (66 - 88)  BP: 124/70 (27 Dec 2024 07:24) (95/60 - 124/70)  BP(mean): 68 (26 Dec 2024 07:44) (68 - 68)  RR: 18 (27 Dec 2024 07:24) (18 - 18)  SpO2: 98% (27 Dec 2024 07:24) (97% - 100%)    Parameters below as of 27 Dec 2024 07:24  Patient On (Oxygen Delivery Method): room air        CONSTITUTIONAL: NAD, well-developed, well-groomed  EYES: Conjunctiva and sclera clear  ENMT: Moist oral mucosa, no pharyngeal injection or exudates; normal dentition  NECK: Supple, no palpable masses; no thyromegaly  RESPIRATORY: Normal respiratory effort; lungs are clear to auscultation bilaterally  CARDIOVASCULAR: Regular rate and rhythm, normal S1 and S2, no murmur/rub/gallop  ABDOMEN: Soft, nontender to palpation, normoactive bowel sounds, no rebound/guarding  MUSCULOSKELETAL: No clubbing or cyanosis of digits; no joint swelling or tenderness to palpation  EXTREMITIES:  No lower extremity edema; Peripheral pulses are 2+ bilaterally  PSYCH: A+O to person, place, and time; affect appropriate  NEUROLOGY: no gross sensory deficits   SKIN: No rashes; no palpable lesions    LABS:                        8.6    7.45  )-----------( 156      ( 27 Dec 2024 04:02 )             26.0     12-27    135  |  96[L]  |  66[H]  ----------------------------<  176[H]  4.0   |  22  |  4.20[H]    Ca    8.5      27 Dec 2024 04:02  Phos  3.7     12-27  Mg     1.80     12-27    TPro  6.8  /  Alb  3.1[L]  /  TBili  0.4  /  DBili  x   /  AST  18  /  ALT  21  /  AlkPhos  47  12-27    PT/INR - ( 27 Dec 2024 04:02 )   PT: 12.8 sec;   INR: 1.10 ratio         PTT - ( 27 Dec 2024 04:02 )  PTT:36.2 sec      Urinalysis Basic - ( 27 Dec 2024 04:02 )    Color: x / Appearance: x / SG: x / pH: x  Gluc: 176 mg/dL / Ketone: x  / Bili: x / Urobili: x   Blood: x / Protein: x / Nitrite: x   Leuk Esterase: x / RBC: x / WBC x   Sq Epi: x / Non Sq Epi: x / Bacteria: x          RADIOLOGY & ADDITIONAL TESTS:   Results Reviewed: Yes     ***********************************************************************  Liban Knox M.D  Resident Physician  Department of Medicine  Available on [employer] Teams  ***********************************************************************  Patient is a 54y old  Male who presents with a chief complaint of Abnormal Labs (26 Dec 2024 14:14)      OVERNIGHT EVENTS: Complained of pain and received IV dilaudid pRN    SUBJECTIVE: Patient seen and examined at bedside. Still having significant pain at penis. States is feels like spasms, and is almost constant. Did not want to get oxybutynin anymore as he thinks it makes his pain worse. States he had a BM yesterday.     ADDITIONAL REVIEW OF SYSTEMS:    MEDICATIONS  (STANDING):  chlorhexidine 2% Cloths 1 Application(s) Topical daily  dextrose 5%. 1000 milliLiter(s) (100 mL/Hr) IV Continuous <Continuous>  dextrose 5%. 1000 milliLiter(s) (50 mL/Hr) IV Continuous <Continuous>  dextrose 50% Injectable 25 Gram(s) IV Push once  dextrose 50% Injectable 12.5 Gram(s) IV Push once  dextrose 50% Injectable 25 Gram(s) IV Push once  dextrose Oral Gel 15 Gram(s) Oral once  fluconAZOLE   Tablet 200 milliGRAM(s) Oral every 24 hours  glucagon  Injectable 1 milliGRAM(s) IntraMuscular once  insulin lispro (ADMELOG) corrective regimen sliding scale   SubCutaneous three times a day before meals  insulin lispro (ADMELOG) corrective regimen sliding scale   SubCutaneous at bedtime  lidocaine/prilocaine Cream 1 Application(s) Topical daily  midodrine. 20 milliGRAM(s) Oral three times a day  oxybutynin 5 milliGRAM(s) Oral every 8 hours  polyethylene glycol 3350 17 Gram(s) Oral daily  rosuvastatin 10 milliGRAM(s) Oral at bedtime  senna 2 Tablet(s) Oral at bedtime  sodium bicarbonate 1300 milliGRAM(s) Oral three times a day  sodium chloride 0.9%. 1000 milliLiter(s) (100 mL/Hr) IV Continuous <Continuous>    MEDICATIONS  (PRN):  acetaminophen     Tablet .. 650 milliGRAM(s) Oral every 6 hours PRN Temp greater or equal to 38C (100.4F), Mild Pain (1 - 3)  diazepam    Tablet 2 milliGRAM(s) Oral two times a day PRN bladder spasm  HYDROmorphone  Injectable 0.2 milliGRAM(s) IV Push every 4 hours PRN Moderate Pain (4 - 6)  melatonin 3 milliGRAM(s) Oral at bedtime PRN Insomnia  ondansetron Injectable 4 milliGRAM(s) IV Push every 8 hours PRN Nausea and/or Vomiting  sodium chloride 0.9% Bolus. 100 milliLiter(s) IV Bolus every 5 minutes PRN SBP LESS THAN or EQUAL to 90 mmHg      CAPILLARY BLOOD GLUCOSE      POCT Blood Glucose.: 218 mg/dL (26 Dec 2024 21:57)  POCT Blood Glucose.: 290 mg/dL (26 Dec 2024 16:42)  POCT Blood Glucose.: 157 mg/dL (26 Dec 2024 11:55)  POCT Blood Glucose.: 142 mg/dL (26 Dec 2024 07:50)    I&O's Summary    26 Dec 2024 07:01  -  27 Dec 2024 07:00  --------------------------------------------------------  IN: 0 mL / OUT: 3200 mL / NET: -3200 mL        PHYSICAL EXAM:    Vital Signs Last 24 Hrs  T(C): 36.7 (27 Dec 2024 07:24), Max: 37.1 (26 Dec 2024 07:44)  T(F): 98 (27 Dec 2024 07:24), Max: 98.7 (26 Dec 2024 07:44)  HR: 88 (27 Dec 2024 07:24) (66 - 88)  BP: 124/70 (27 Dec 2024 07:24) (95/60 - 124/70)  BP(mean): 68 (26 Dec 2024 07:44) (68 - 68)  RR: 18 (27 Dec 2024 07:24) (18 - 18)  SpO2: 98% (27 Dec 2024 07:24) (97% - 100%)    Parameters below as of 27 Dec 2024 07:24  Patient On (Oxygen Delivery Method): room air        CONSTITUTIONAL: NAD, well-developed, well-groomed  EYES: Conjunctiva and sclera clear  ENMT: Moist oral mucosa, no pharyngeal injection or exudates; normal dentition  NECK: Supple, no palpable masses; no thyromegaly  RESPIRATORY: Normal respiratory effort; lungs are clear to auscultation bilaterally  CARDIOVASCULAR: Regular rate and rhythm, normal S1 and S2, no murmur/rub/gallop  ABDOMEN: Soft, nontender to palpation, normoactive bowel sounds, no rebound/guarding  MUSCULOSKELETAL: No clubbing or cyanosis of digits; no joint swelling or tenderness to palpation  EXTREMITIES:  No lower extremity edema; Peripheral pulses are 2+ bilaterally  PSYCH: A+O to person, place, and time; affect appropriate  NEUROLOGY: no gross sensory deficits   SKIN: No rashes; no palpable lesions    LABS:                        8.6    7.45  )-----------( 156      ( 27 Dec 2024 04:02 )             26.0     12-27    135  |  96[L]  |  66[H]  ----------------------------<  176[H]  4.0   |  22  |  4.20[H]    Ca    8.5      27 Dec 2024 04:02  Phos  3.7     12-27  Mg     1.80     12-27    TPro  6.8  /  Alb  3.1[L]  /  TBili  0.4  /  DBili  x   /  AST  18  /  ALT  21  /  AlkPhos  47  12-27    PT/INR - ( 27 Dec 2024 04:02 )   PT: 12.8 sec;   INR: 1.10 ratio         PTT - ( 27 Dec 2024 04:02 )  PTT:36.2 sec      Urinalysis Basic - ( 27 Dec 2024 04:02 )    Color: x / Appearance: x / SG: x / pH: x  Gluc: 176 mg/dL / Ketone: x  / Bili: x / Urobili: x   Blood: x / Protein: x / Nitrite: x   Leuk Esterase: x / RBC: x / WBC x   Sq Epi: x / Non Sq Epi: x / Bacteria: x          RADIOLOGY & ADDITIONAL TESTS:   Results Reviewed: Yes     ***********************************************************************  Liban Knox M.D  Resident Physician  Department of Medicine  Available on [employer] Teams  ***********************************************************************  Patient is a 54y old  Male who presents with a chief complaint of Abnormal Labs (26 Dec 2024 14:14)      OVERNIGHT EVENTS: Complained of pain and received IV dilaudid pRN    SUBJECTIVE: Patient seen and examined at bedside. Still having significant pain at penis. States is feels like spasms, and is almost constant. Did not want to get oxybutynin anymore as he thinks it makes his pain worse. States he had a BM yesterday.     ADDITIONAL REVIEW OF SYSTEMS:    MEDICATIONS  (STANDING):  chlorhexidine 2% Cloths 1 Application(s) Topical daily  dextrose 5%. 1000 milliLiter(s) (100 mL/Hr) IV Continuous <Continuous>  dextrose 5%. 1000 milliLiter(s) (50 mL/Hr) IV Continuous <Continuous>  dextrose 50% Injectable 25 Gram(s) IV Push once  dextrose 50% Injectable 12.5 Gram(s) IV Push once  dextrose 50% Injectable 25 Gram(s) IV Push once  dextrose Oral Gel 15 Gram(s) Oral once  fluconAZOLE   Tablet 200 milliGRAM(s) Oral every 24 hours  glucagon  Injectable 1 milliGRAM(s) IntraMuscular once  insulin lispro (ADMELOG) corrective regimen sliding scale   SubCutaneous three times a day before meals  insulin lispro (ADMELOG) corrective regimen sliding scale   SubCutaneous at bedtime  lidocaine/prilocaine Cream 1 Application(s) Topical daily  midodrine. 20 milliGRAM(s) Oral three times a day  oxybutynin 5 milliGRAM(s) Oral every 8 hours  polyethylene glycol 3350 17 Gram(s) Oral daily  rosuvastatin 10 milliGRAM(s) Oral at bedtime  senna 2 Tablet(s) Oral at bedtime  sodium bicarbonate 1300 milliGRAM(s) Oral three times a day  sodium chloride 0.9%. 1000 milliLiter(s) (100 mL/Hr) IV Continuous <Continuous>    MEDICATIONS  (PRN):  acetaminophen     Tablet .. 650 milliGRAM(s) Oral every 6 hours PRN Temp greater or equal to 38C (100.4F), Mild Pain (1 - 3)  diazepam    Tablet 2 milliGRAM(s) Oral two times a day PRN bladder spasm  HYDROmorphone  Injectable 0.2 milliGRAM(s) IV Push every 4 hours PRN Moderate Pain (4 - 6)  melatonin 3 milliGRAM(s) Oral at bedtime PRN Insomnia  ondansetron Injectable 4 milliGRAM(s) IV Push every 8 hours PRN Nausea and/or Vomiting  sodium chloride 0.9% Bolus. 100 milliLiter(s) IV Bolus every 5 minutes PRN SBP LESS THAN or EQUAL to 90 mmHg      CAPILLARY BLOOD GLUCOSE      POCT Blood Glucose.: 218 mg/dL (26 Dec 2024 21:57)  POCT Blood Glucose.: 290 mg/dL (26 Dec 2024 16:42)  POCT Blood Glucose.: 157 mg/dL (26 Dec 2024 11:55)  POCT Blood Glucose.: 142 mg/dL (26 Dec 2024 07:50)    I&O's Summary    26 Dec 2024 07:01  -  27 Dec 2024 07:00  --------------------------------------------------------  IN: 0 mL / OUT: 3200 mL / NET: -3200 mL        PHYSICAL EXAM:    Vital Signs Last 24 Hrs  T(C): 36.7 (27 Dec 2024 07:24), Max: 37.1 (26 Dec 2024 07:44)  T(F): 98 (27 Dec 2024 07:24), Max: 98.7 (26 Dec 2024 07:44)  HR: 88 (27 Dec 2024 07:24) (66 - 88)  BP: 124/70 (27 Dec 2024 07:24) (95/60 - 124/70)  BP(mean): 68 (26 Dec 2024 07:44) (68 - 68)  RR: 18 (27 Dec 2024 07:24) (18 - 18)  SpO2: 98% (27 Dec 2024 07:24) (97% - 100%)    Parameters below as of 27 Dec 2024 07:24  Patient On (Oxygen Delivery Method): room air        CONSTITUTIONAL: NAD, well-developed, well-groomed. Looked like he was in discomfort  RESPIRATORY: Normal respiratory effort; lungs are clear to auscultation bilaterally  CARDIOVASCULAR: Regular rate and rhythm, normal S1 and S2, no murmur/rub/gallop  ABDOMEN: Soft, nontender to palpation, normoactive bowel sounds, no rebound/guarding  MUSCULOSKELETAL: No clubbing or cyanosis of digits; no joint swelling or tenderness to palpation  EXTREMITIES:  No lower extremity edema; Peripheral pulses are 2+ bilaterally  PSYCH: A+O to person, place, and time; affect appropriate  NEUROLOGY: no gross sensory deficits   SKIN: No rashes; no palpable lesions    LABS:                        8.6    7.45  )-----------( 156      ( 27 Dec 2024 04:02 )             26.0     12-27    135  |  96[L]  |  66[H]  ----------------------------<  176[H]  4.0   |  22  |  4.20[H]    Ca    8.5      27 Dec 2024 04:02  Phos  3.7     12-27  Mg     1.80     12-27    TPro  6.8  /  Alb  3.1[L]  /  TBili  0.4  /  DBili  x   /  AST  18  /  ALT  21  /  AlkPhos  47  12-27    PT/INR - ( 27 Dec 2024 04:02 )   PT: 12.8 sec;   INR: 1.10 ratio         PTT - ( 27 Dec 2024 04:02 )  PTT:36.2 sec      Urinalysis Basic - ( 27 Dec 2024 04:02 )    Color: x / Appearance: x / SG: x / pH: x  Gluc: 176 mg/dL / Ketone: x  / Bili: x / Urobili: x   Blood: x / Protein: x / Nitrite: x   Leuk Esterase: x / RBC: x / WBC x   Sq Epi: x / Non Sq Epi: x / Bacteria: x          RADIOLOGY & ADDITIONAL TESTS:   Results Reviewed: Yes

## 2024-12-27 NOTE — PROGRESS NOTE ADULT - SUBJECTIVE AND OBJECTIVE BOX
Follow Up:  acute renal failure and b/l hydro, ?UTI    Interval History/ROS: pt afebrile, no vomiting or SOB, improved renal function and now nephrostomy canceled, has pain in the groin and ?bladder        Allergies  penicillins (Unknown)        ANTIMICROBIALS:  fluconAZOLE   Tablet 200 every 24 hours      OTHER MEDS:  acetaminophen     Tablet .. 650 milliGRAM(s) Oral every 6 hours PRN  chlorhexidine 2% Cloths 1 Application(s) Topical daily  dextrose 5%. 1000 milliLiter(s) IV Continuous <Continuous>  dextrose 5%. 1000 milliLiter(s) IV Continuous <Continuous>  dextrose 50% Injectable 25 Gram(s) IV Push once  dextrose 50% Injectable 12.5 Gram(s) IV Push once  dextrose 50% Injectable 25 Gram(s) IV Push once  dextrose Oral Gel 15 Gram(s) Oral once  diazepam    Tablet 2 milliGRAM(s) Oral two times a day PRN  gabapentin 300 milliGRAM(s) Oral two times a day  glucagon  Injectable 1 milliGRAM(s) IntraMuscular once  HYDROmorphone   Tablet 1 milliGRAM(s) Oral every 4 hours PRN  HYDROmorphone  Injectable 0.2 milliGRAM(s) IV Push every 4 hours PRN  insulin lispro (ADMELOG) corrective regimen sliding scale   SubCutaneous three times a day before meals  insulin lispro (ADMELOG) corrective regimen sliding scale   SubCutaneous at bedtime  lidocaine/prilocaine Cream 1 Application(s) Topical daily  melatonin 3 milliGRAM(s) Oral at bedtime PRN  midodrine. 20 milliGRAM(s) Oral three times a day  ondansetron Injectable 4 milliGRAM(s) IV Push every 8 hours PRN  polyethylene glycol 3350 17 Gram(s) Oral daily  rosuvastatin 10 milliGRAM(s) Oral at bedtime  senna 2 Tablet(s) Oral at bedtime  sodium bicarbonate 1300 milliGRAM(s) Oral three times a day  sodium chloride 0.9% Bolus. 100 milliLiter(s) IV Bolus every 5 minutes PRN  sodium chloride 0.9%. 1000 milliLiter(s) IV Continuous <Continuous>      Vital Signs Last 24 Hrs  T(C): 36.9 (27 Dec 2024 12:10), Max: 36.9 (27 Dec 2024 12:10)  T(F): 98.4 (27 Dec 2024 12:10), Max: 98.4 (27 Dec 2024 12:10)  HR: 60 (27 Dec 2024 12:10) (60 - 88)  BP: 95/62 (27 Dec 2024 12:10) (95/62 - 124/70)  BP(mean): --  RR: 18 (27 Dec 2024 12:10) (18 - 18)  SpO2: 100% (27 Dec 2024 12:10) (97% - 100%)    Parameters below as of 27 Dec 2024 12:10  Patient On (Oxygen Delivery Method): room air        Physical Exam:    General:    NAD,  non toxic  Respiratory:  comfortable on RA  abd:     soft,   no tenderness  :    sierra  Musculoskeletal:   no joint swelling  vascular: R fem shiley  Skin:    no rash                          8.6    7.45  )-----------( 156      ( 27 Dec 2024 04:02 )             26.0       12-27    135  |  96[L]  |  66[H]  ----------------------------<  176[H]  4.0   |  22  |  4.20[H]    Ca    8.5      27 Dec 2024 04:02  Phos  3.7     12-27  Mg     1.80     12-27    TPro  6.8  /  Alb  3.1[L]  /  TBili  0.4  /  DBili  x   /  AST  18  /  ALT  21  /  AlkPhos  47  12-27      Urinalysis Basic - ( 27 Dec 2024 04:02 )    Color: x / Appearance: x / SG: x / pH: x  Gluc: 176 mg/dL / Ketone: x  / Bili: x / Urobili: x   Blood: x / Protein: x / Nitrite: x   Leuk Esterase: x / RBC: x / WBC x   Sq Epi: x / Non Sq Epi: x / Bacteria: x        MICROBIOLOGY:  v  Clean Catch  12-21-24   50,000 - 99,000 CFU/mL Candida glabrata "Susceptibilities not performed"  --  --                RADIOLOGY:  Images independently visualized and reviewed personally, findings as below  < from: US Kidney and Bladder (12.26.24 @ 13:06) >  IMPRESSION:  Moderate bilateral hydronephrosis, unchanged.    Nonspecific hyperechoic focus in the right kidney midpole, not previously   seen.    < end of copied text >  < from: US Kidney and Bladder (12.26.24 @ 13:06) >  IMPRESSION:  Moderate bilateral hydronephrosis, unchanged.    Nonspecific hyperechoic focus in the right kidney midpole, not previously   seen.    < end of copied text >  < from: CT Abdomen and Pelvis No Cont (12.22.24 @ 22:13) >  1.6 cm calcified gallstone region of gallbladder neck. No pericholecystic   inflammatory changes..    Moderate/severe bilateral hydronephrosis. 11 mm nonobstructing stone   midportion left kidney. Moderately severe bilateral hydroureter. No   obstructing stones.    Sierra catheter within urinary bladder which is decompressed.    Please refer to detailed findings otherwise described above.      < end of copied text >

## 2024-12-27 NOTE — PROGRESS NOTE ADULT - SUBJECTIVE AND OBJECTIVE BOX
[facility] DIVISION OF KIDNEY DISEASES AND HYPERTENSION -- HEMODIALYSIS NOTE   Nehrology Office [phone]  available on [employer] teams--> Farrukh Azul   --------------------------------------------------------------------------------  Chief Complaint: ESRD/Ongoing hemodialysis requirement  seen this am. complains of pain.   i discussed with RN at bedside and Dr. Vincent on the phone to address this more effectively   24 hour events/subjective:      ALLERGIES & MEDICATIONS  --------------------------------------------------------------------------------  Allergies    penicillins (Unknown)    Intolerances      Standing Inpatient Medications  chlorhexidine 2% Cloths 1 Application(s) Topical daily  dextrose 5%. 1000 milliLiter(s) IV Continuous <Continuous>  dextrose 5%. 1000 milliLiter(s) IV Continuous <Continuous>  dextrose 50% Injectable 25 Gram(s) IV Push once  dextrose 50% Injectable 12.5 Gram(s) IV Push once  dextrose 50% Injectable 25 Gram(s) IV Push once  dextrose Oral Gel 15 Gram(s) Oral once  fluconAZOLE   Tablet 200 milliGRAM(s) Oral every 24 hours  gabapentin 300 milliGRAM(s) Oral two times a day  glucagon  Injectable 1 milliGRAM(s) IntraMuscular once  insulin lispro (ADMELOG) corrective regimen sliding scale   SubCutaneous three times a day before meals  insulin lispro (ADMELOG) corrective regimen sliding scale   SubCutaneous at bedtime  lidocaine/prilocaine Cream 1 Application(s) Topical daily  midodrine. 20 milliGRAM(s) Oral three times a day  polyethylene glycol 3350 17 Gram(s) Oral daily  rosuvastatin 10 milliGRAM(s) Oral at bedtime  senna 2 Tablet(s) Oral at bedtime  sodium bicarbonate 1300 milliGRAM(s) Oral three times a day  sodium chloride 0.9%. 1000 milliLiter(s) IV Continuous <Continuous>    PRN Inpatient Medications  acetaminophen     Tablet .. 650 milliGRAM(s) Oral every 6 hours PRN  diazepam    Tablet 2 milliGRAM(s) Oral two times a day PRN  HYDROmorphone   Tablet 1 milliGRAM(s) Oral every 4 hours PRN  HYDROmorphone  Injectable 0.2 milliGRAM(s) IV Push every 4 hours PRN  melatonin 3 milliGRAM(s) Oral at bedtime PRN  ondansetron Injectable 4 milliGRAM(s) IV Push every 8 hours PRN  sodium chloride 0.9% Bolus. 100 milliLiter(s) IV Bolus every 5 minutes PRN    VITALS/PHYSICAL EXAM  --------------------------------------------------------------------------------  T(C): 36.7 (12-27-24 @ 07:24), Max: 36.8 (12-26-24 @ 16:57)  HR: 88 (12-27-24 @ 07:24) (66 - 88)  BP: 124/70 (12-27-24 @ 07:24) (95/60 - 124/70)  RR: 18 (12-27-24 @ 07:24) (18 - 18)  SpO2: 98% (12-27-24 @ 07:24) (97% - 100%)  Wt(kg): --        12-26-24 @ 07:01  -  12-27-24 @ 07:00  --------------------------------------------------------  IN: 0 mL / OUT: 3200 mL / NET: -3200 mL      Physical Exam:  	Gen NAD  	HEENT: no JVD  	Pulm: CTABL  	CV: S1S2,  	Abd: Soft,   	Ext:   - edema B/L LE   	Neuro: Awake and alert  	Skin: Warm and dry          Vascular:  femoral non- tunneled HD catheter    LABS/STUDIES  --------------------------------------------------------------------------------              8.6    7.45  >-----------<  156      [12-27-24 @ 04:02]              26.0     135  |  96  |  66  ----------------------------<  176      [12-27-24 @ 04:02]  4.0   |  22  |  4.20        Ca     8.5     [12-27-24 @ 04:02]      Mg     1.80     [12-27-24 @ 04:02]      Phos  3.7     [12-27-24 @ 04:02]    TPro  6.8  /  Alb  3.1  /  TBili  0.4  /  DBili  x   /  AST  18  /  ALT  21  /  AlkPhos  47  [12-27-24 @ 04:02]    PT/INR: PT 12.8 , INR 1.10       [12-27-24 @ 04:02]  PTT: 36.2       [12-27-24 @ 04:02]      Iron 27, TIBC 154, %sat 18      [12-23-24 @ 06:34]  Ferritin 612      [12-23-24 @ 06:34]    HBsAb <3.0      [12-22-24 @ 17:00]  HBsAg Nonreact      [12-22-24 @ 17:00]  HBcAb Nonreact      [12-22-24 @ 17:00]  HCV 0.38, Nonreact      [12-22-24 @ 17:00]  HIV Nonreact      [12-22-24 @ 12:05]

## 2024-12-27 NOTE — PROGRESS NOTE ADULT - PROBLEM SELECTOR PLAN 2
Resolving  VBG showed pH 7.19, lactate 1.0, PCO2 35, HCO3 13 on admission. Repeat VBG shows improvement.   - in setting of acute renal failure    Plan:  - Continue bicarb   - f/u repeat labs  - HD plan as above Resolved  VBG showed pH 7.19, lactate 1.0, PCO2 35, HCO3 13 on admission. Repeat VBG shows improvement.   - in setting of acute renal failure    Plan:  - Continue bicarb   - f/u repeat labs

## 2024-12-28 LAB
ALBUMIN SERPL ELPH-MCNC: 3 G/DL — LOW (ref 3.3–5)
ALP SERPL-CCNC: 56 U/L — SIGNIFICANT CHANGE UP (ref 40–120)
ALT FLD-CCNC: 37 U/L — SIGNIFICANT CHANGE UP (ref 4–41)
ANION GAP SERPL CALC-SCNC: 13 MMOL/L — SIGNIFICANT CHANGE UP (ref 7–14)
AST SERPL-CCNC: 30 U/L — SIGNIFICANT CHANGE UP (ref 4–40)
BASOPHILS # BLD AUTO: 0.02 K/UL — SIGNIFICANT CHANGE UP (ref 0–0.2)
BASOPHILS NFR BLD AUTO: 0.4 % — SIGNIFICANT CHANGE UP (ref 0–2)
BILIRUB SERPL-MCNC: 0.2 MG/DL — SIGNIFICANT CHANGE UP (ref 0.2–1.2)
BLD GP AB SCN SERPL QL: NEGATIVE — SIGNIFICANT CHANGE UP
BUN SERPL-MCNC: 64 MG/DL — HIGH (ref 7–23)
CALCIUM SERPL-MCNC: 8.5 MG/DL — SIGNIFICANT CHANGE UP (ref 8.4–10.5)
CHLORIDE SERPL-SCNC: 99 MMOL/L — SIGNIFICANT CHANGE UP (ref 98–107)
CO2 SERPL-SCNC: 25 MMOL/L — SIGNIFICANT CHANGE UP (ref 22–31)
CREAT SERPL-MCNC: 4.15 MG/DL — HIGH (ref 0.5–1.3)
EGFR: 16 ML/MIN/1.73M2 — LOW
EOSINOPHIL # BLD AUTO: 0.33 K/UL — SIGNIFICANT CHANGE UP (ref 0–0.5)
EOSINOPHIL NFR BLD AUTO: 5.8 % — SIGNIFICANT CHANGE UP (ref 0–6)
GAS PNL BLDV: SIGNIFICANT CHANGE UP
GLUCOSE BLDC GLUCOMTR-MCNC: 166 MG/DL — HIGH (ref 70–99)
GLUCOSE BLDC GLUCOMTR-MCNC: 209 MG/DL — HIGH (ref 70–99)
GLUCOSE BLDC GLUCOMTR-MCNC: 240 MG/DL — HIGH (ref 70–99)
GLUCOSE BLDC GLUCOMTR-MCNC: 281 MG/DL — HIGH (ref 70–99)
GLUCOSE SERPL-MCNC: 234 MG/DL — HIGH (ref 70–99)
HCT VFR BLD CALC: 27.8 % — LOW (ref 39–50)
HGB BLD-MCNC: 8.6 G/DL — LOW (ref 13–17)
IANC: 3.97 K/UL — SIGNIFICANT CHANGE UP (ref 1.8–7.4)
IMM GRANULOCYTES NFR BLD AUTO: 1.1 % — HIGH (ref 0–0.9)
LYMPHOCYTES # BLD AUTO: 0.54 K/UL — LOW (ref 1–3.3)
LYMPHOCYTES # BLD AUTO: 9.5 % — LOW (ref 13–44)
MAGNESIUM SERPL-MCNC: 1.9 MG/DL — SIGNIFICANT CHANGE UP (ref 1.6–2.6)
MCHC RBC-ENTMCNC: 25.8 PG — LOW (ref 27–34)
MCHC RBC-ENTMCNC: 30.9 G/DL — LOW (ref 32–36)
MCV RBC AUTO: 83.5 FL — SIGNIFICANT CHANGE UP (ref 80–100)
MONOCYTES # BLD AUTO: 0.77 K/UL — SIGNIFICANT CHANGE UP (ref 0–0.9)
MONOCYTES NFR BLD AUTO: 13.5 % — SIGNIFICANT CHANGE UP (ref 2–14)
NEUTROPHILS # BLD AUTO: 3.97 K/UL — SIGNIFICANT CHANGE UP (ref 1.8–7.4)
NEUTROPHILS NFR BLD AUTO: 69.7 % — SIGNIFICANT CHANGE UP (ref 43–77)
NRBC # BLD: 0 /100 WBCS — SIGNIFICANT CHANGE UP (ref 0–0)
NRBC # FLD: 0 K/UL — SIGNIFICANT CHANGE UP (ref 0–0)
PHOSPHATE SERPL-MCNC: 3.3 MG/DL — SIGNIFICANT CHANGE UP (ref 2.5–4.5)
PLATELET # BLD AUTO: 153 K/UL — SIGNIFICANT CHANGE UP (ref 150–400)
POTASSIUM SERPL-MCNC: 4 MMOL/L — SIGNIFICANT CHANGE UP (ref 3.5–5.3)
POTASSIUM SERPL-SCNC: 4 MMOL/L — SIGNIFICANT CHANGE UP (ref 3.5–5.3)
PROT SERPL-MCNC: 7.1 G/DL — SIGNIFICANT CHANGE UP (ref 6–8.3)
RBC # BLD: 3.33 M/UL — LOW (ref 4.2–5.8)
RBC # FLD: 13.5 % — SIGNIFICANT CHANGE UP (ref 10.3–14.5)
RH IG SCN BLD-IMP: POSITIVE — SIGNIFICANT CHANGE UP
SODIUM SERPL-SCNC: 137 MMOL/L — SIGNIFICANT CHANGE UP (ref 135–145)
WBC # BLD: 5.69 K/UL — SIGNIFICANT CHANGE UP (ref 3.8–10.5)
WBC # FLD AUTO: 5.69 K/UL — SIGNIFICANT CHANGE UP (ref 3.8–10.5)

## 2024-12-28 RX ADMIN — ACETAMINOPHEN 650 MILLIGRAM(S): 80 SOLUTION/ DROPS ORAL at 18:04

## 2024-12-28 RX ADMIN — Medication 1 MILLIGRAM(S): at 08:23

## 2024-12-28 RX ADMIN — ROSUVASTATIN 10 MILLIGRAM(S): 40 TABLET, FILM COATED ORAL at 21:23

## 2024-12-28 RX ADMIN — ACETAMINOPHEN 650 MILLIGRAM(S): 80 SOLUTION/ DROPS ORAL at 12:14

## 2024-12-28 RX ADMIN — Medication 3: at 12:05

## 2024-12-28 RX ADMIN — Medication 0.4 MILLIGRAM(S): at 02:37

## 2024-12-28 RX ADMIN — SENNOSIDES 2 TABLET(S): 8.6 TABLET, FILM COATED ORAL at 21:24

## 2024-12-28 RX ADMIN — SODIUM BICARBONATE 1300 MILLIGRAM(S): 84 INJECTION, SOLUTION INTRAVENOUS at 12:05

## 2024-12-28 RX ADMIN — MIDODRINE HYDROCHLORIDE 20 MILLIGRAM(S): 5 TABLET ORAL at 11:39

## 2024-12-28 RX ADMIN — MIDODRINE HYDROCHLORIDE 20 MILLIGRAM(S): 5 TABLET ORAL at 06:23

## 2024-12-28 RX ADMIN — Medication 0.4 MILLIGRAM(S): at 14:29

## 2024-12-28 RX ADMIN — GABAPENTIN 300 MILLIGRAM(S): 300 CAPSULE ORAL at 17:04

## 2024-12-28 RX ADMIN — Medication 1 MILLIGRAM(S): at 21:53

## 2024-12-28 RX ADMIN — SODIUM BICARBONATE 1300 MILLIGRAM(S): 84 INJECTION, SOLUTION INTRAVENOUS at 06:24

## 2024-12-28 RX ADMIN — ACETAMINOPHEN 650 MILLIGRAM(S): 80 SOLUTION/ DROPS ORAL at 11:44

## 2024-12-28 RX ADMIN — Medication 1 MILLIGRAM(S): at 13:56

## 2024-12-28 RX ADMIN — MIDODRINE HYDROCHLORIDE 20 MILLIGRAM(S): 5 TABLET ORAL at 17:05

## 2024-12-28 RX ADMIN — Medication 1 MILLIGRAM(S): at 22:30

## 2024-12-28 RX ADMIN — Medication 0.4 MILLIGRAM(S): at 19:50

## 2024-12-28 RX ADMIN — SODIUM BICARBONATE 1300 MILLIGRAM(S): 84 INJECTION, SOLUTION INTRAVENOUS at 21:23

## 2024-12-28 RX ADMIN — Medication 1 MILLIGRAM(S): at 08:59

## 2024-12-28 RX ADMIN — ACETAMINOPHEN 650 MILLIGRAM(S): 80 SOLUTION/ DROPS ORAL at 06:23

## 2024-12-28 RX ADMIN — Medication 2: at 08:02

## 2024-12-28 RX ADMIN — CHLORHEXIDINE GLUCONATE 1 APPLICATION(S): 1.2 RINSE ORAL at 11:40

## 2024-12-28 RX ADMIN — ACETAMINOPHEN 650 MILLIGRAM(S): 80 SOLUTION/ DROPS ORAL at 17:04

## 2024-12-28 RX ADMIN — Medication 1 MILLIGRAM(S): at 12:56

## 2024-12-28 RX ADMIN — Medication 0.4 MILLIGRAM(S): at 10:19

## 2024-12-28 RX ADMIN — Medication 2: at 16:58

## 2024-12-28 RX ADMIN — Medication 3 MILLIGRAM(S): at 21:23

## 2024-12-28 RX ADMIN — Medication 0.4 MILLIGRAM(S): at 10:34

## 2024-12-28 RX ADMIN — Medication 1 MILLIGRAM(S): at 17:48

## 2024-12-28 RX ADMIN — GABAPENTIN 300 MILLIGRAM(S): 300 CAPSULE ORAL at 06:24

## 2024-12-28 RX ADMIN — Medication 0.4 MILLIGRAM(S): at 20:30

## 2024-12-28 RX ADMIN — FLUCONAZOLE 200 MILLIGRAM(S): 200 TABLET ORAL at 17:43

## 2024-12-28 RX ADMIN — Medication 1 MILLIGRAM(S): at 18:18

## 2024-12-28 NOTE — DISCHARGE NOTE PROVIDER - NSDCFUADDAPPT_GEN_ALL_CORE_FT
APPTS ARE READY TO BE MADE: [ ] YES    Best Family or Patient Contact (if needed):    Additional Information about above appointments (if needed):    1: Urology (Dr. Hoenig)  2: PCP (Dr. Shannon)  3: Nephrology, new patient    Other comments or requests:    APPTS ARE READY TO BE MADE: [X] YES    Best Family or Patient Contact (if needed): Patient     Additional Information about above appointments (if needed):    1: Urology (Dr. Hoenig)  2: PCP (Dr. Shannon)  3: Nephrology, new patient    Other comments or requests:

## 2024-12-28 NOTE — PROGRESS NOTE ADULT - ASSESSMENT
Patient is a 54M admitted to medicine for acute renal failure s/p HD noted to have mod-severe L and mod R hydro persisting postvoid on RBUS and redemonstrated on CTAP with nonobstructing 10mm L renal stone.    Recs  -unclear etiology of acute renal failure. CT scan demonstrates b/l hydroureteronephrosis down to the level of the bladder with poor visualization of bladder 2/2 sierra decompression.   -no HD since 12/24, shiley removed 12/27  -ditropan for bladder spasm, can consider valium if very uncomfortable  -RBUS unable to assess for ureteral jets  -s/p bedside cystoscopy to evaluate bladder/UOs 12/25 > left UO unable to be identified, right UO in very lateral position with no efflux appreciated, patient unlikely to tolerate ureteral stent placement given extreme sensitivity of bladder mucosa and prefers IR nephrostomy tube placement > per IR: no plan for IR NT placement as patient has not required HD  -urology signing off at this time, please reconsult prn    **incomplete Patient is a 54M admitted to medicine for acute renal failure s/p HD noted to have mod-severe L and mod R hydro persisting postvoid on RBUS and redemonstrated on CTAP with nonobstructing 10mm L renal stone. RBUS unable to assess for ureteral jets. S/p bedside cystoscopy to evaluate bladder/UOs 12/25 > left UO unable to be identified, right UO in very lateral position with no efflux appreciated, patient unlikely to tolerate ureteral stent placement given extreme sensitivity of bladder mucosa and prefers IR nephrostomy tube placement > per IR: no plan for IR NT placement as patient has not required HD    Bilateral hydronephrosis possibly 2/2 severe cystitis, Cr has been downtrending and has not required HD since 12/24, shiley removed 12/27, diflucan started for +Candida UCx.    Recs  -continue antifungal per primary team for +UCx for Candida  -continue to trend Cr > continues to downtrend  -ditropan for bladder spasm, can consider valium if very uncomfortable  -urology signing off at this time, please reconsult prn  -rest of care per primary team    **incomplete Patient is a 54M admitted to medicine for acute renal failure s/p HD noted to have mod-severe L and mod R hydro persisting postvoid on RBUS and redemonstrated on CTAP with nonobstructing 10mm L renal stone. RBUS unable to assess for ureteral jets. S/p bedside cystoscopy to evaluate bladder/UOs 12/25 > left UO unable to be identified, right UO in very lateral position with no efflux appreciated, patient unlikely to tolerate ureteral stent placement given extreme sensitivity of bladder mucosa and prefers IR nephrostomy tube placement > per IR: no plan for IR NT placement as patient has not required HD    Bilateral hydronephrosis possibly 2/2 severe cystitis, Cr has been downtrending and has not required HD since 12/24, shiley removed 12/27, diflucan started for +Candida UCx, patient's suprapubic discomfort improving    Recs  -continue antifungal per primary team for +UCx for Candida  -continue to trend Cr > continues to downtrend  -ditropan for bladder spasm, can consider valium if very uncomfortable  -urology signing off at this time, please reconsult prn  -rest of care per primary team    D/w Dr. Loy [facility]  [address] [place] [phone]

## 2024-12-28 NOTE — PROGRESS NOTE ADULT - SUBJECTIVE AND OBJECTIVE BOX
Date of Service  : 12-28-24     INTERVAL HPI/OVERNIGHT EVENTS: I need pain medication and get dizzy when standing sometimes.   Vital Signs Last 24 Hrs  T(C): 37.2 (28 Dec 2024 05:45), Max: 37.2 (28 Dec 2024 05:45)  T(F): 98.9 (28 Dec 2024 05:45), Max: 98.9 (28 Dec 2024 05:45)  HR: 55 (28 Dec 2024 05:45) (55 - 68)  BP: 104/55 (28 Dec 2024 05:45) (95/62 - 118/67)  BP(mean): --  RR: 17 (28 Dec 2024 05:45) (17 - 18)  SpO2: 100% (28 Dec 2024 05:45) (97% - 100%)    Parameters below as of 28 Dec 2024 05:45  Patient On (Oxygen Delivery Method): room air      I&O's Summary    27 Dec 2024 07:01  -  28 Dec 2024 07:00  --------------------------------------------------------  IN: 400 mL / OUT: 2600 mL / NET: -2200 mL      MEDICATIONS  (STANDING):  acetaminophen     Tablet .. 650 milliGRAM(s) Oral every 6 hours  chlorhexidine 2% Cloths 1 Application(s) Topical daily  dextrose 5%. 1000 milliLiter(s) (100 mL/Hr) IV Continuous <Continuous>  dextrose 5%. 1000 milliLiter(s) (50 mL/Hr) IV Continuous <Continuous>  dextrose 50% Injectable 25 Gram(s) IV Push once  dextrose 50% Injectable 12.5 Gram(s) IV Push once  dextrose 50% Injectable 25 Gram(s) IV Push once  dextrose Oral Gel 15 Gram(s) Oral once  fluconAZOLE   Tablet 200 milliGRAM(s) Oral every 24 hours  gabapentin 300 milliGRAM(s) Oral two times a day  glucagon  Injectable 1 milliGRAM(s) IntraMuscular once  insulin lispro (ADMELOG) corrective regimen sliding scale   SubCutaneous three times a day before meals  insulin lispro (ADMELOG) corrective regimen sliding scale   SubCutaneous at bedtime  lidocaine/prilocaine Cream 1 Application(s) Topical daily  midodrine. 20 milliGRAM(s) Oral three times a day  polyethylene glycol 3350 17 Gram(s) Oral daily  rosuvastatin 10 milliGRAM(s) Oral at bedtime  senna 2 Tablet(s) Oral at bedtime  sodium bicarbonate 1300 milliGRAM(s) Oral three times a day  sodium chloride 0.9%. 1000 milliLiter(s) (100 mL/Hr) IV Continuous <Continuous>    MEDICATIONS  (PRN):  diazepam    Tablet 2 milliGRAM(s) Oral two times a day PRN bladder spasm  HYDROmorphone   Tablet 1 milliGRAM(s) Oral every 4 hours PRN Severe Pain (7 - 10)  HYDROmorphone  Injectable 0.4 milliGRAM(s) IV Push every 4 hours PRN Breakthrough Pain  melatonin 3 milliGRAM(s) Oral at bedtime PRN Insomnia  ondansetron Injectable 4 milliGRAM(s) IV Push every 8 hours PRN Nausea and/or Vomiting  sodium chloride 0.9% Bolus. 100 milliLiter(s) IV Bolus every 5 minutes PRN SBP LESS THAN or EQUAL to 90 mmHg    LABS:                        8.6    5.69  )-----------( 153      ( 28 Dec 2024 07:20 )             27.8     12-28    137  |  99  |  64[H]  ----------------------------<  234[H]  4.0   |  25  |  4.15[H]    Ca    8.5      28 Dec 2024 07:20  Phos  3.3     12-28  Mg     1.90     12-28    TPro  7.1  /  Alb  3.0[L]  /  TBili  0.2  /  DBili  x   /  AST  30  /  ALT  37  /  AlkPhos  56  12-28    PT/INR - ( 27 Dec 2024 04:02 )   PT: 12.8 sec;   INR: 1.10 ratio         PTT - ( 27 Dec 2024 04:02 )  PTT:36.2 sec  Urinalysis Basic - ( 28 Dec 2024 07:20 )    Color: x / Appearance: x / SG: x / pH: x  Gluc: 234 mg/dL / Ketone: x  / Bili: x / Urobili: x   Blood: x / Protein: x / Nitrite: x   Leuk Esterase: x / RBC: x / WBC x   Sq Epi: x / Non Sq Epi: x / Bacteria: x      CAPILLARY BLOOD GLUCOSE      POCT Blood Glucose.: 240 mg/dL (28 Dec 2024 07:27)  POCT Blood Glucose.: 192 mg/dL (27 Dec 2024 21:46)  POCT Blood Glucose.: 217 mg/dL (27 Dec 2024 17:08)  POCT Blood Glucose.: 175 mg/dL (27 Dec 2024 12:18)        Urinalysis Basic - ( 28 Dec 2024 07:20 )    Color: x / Appearance: x / SG: x / pH: x  Gluc: 234 mg/dL / Ketone: x  / Bili: x / Urobili: x   Blood: x / Protein: x / Nitrite: x   Leuk Esterase: x / RBC: x / WBC x   Sq Epi: x / Non Sq Epi: x / Bacteria: x      REVIEW OF SYSTEMS:  CONSTITUTIONAL: No fever, weight loss, or fatigue  EYES: No eye pain, visual disturbances, or discharge  ENMT:  No difficulty hearing, tinnitus, vertigo; No sinus or throat pain  NECK: No pain or stiffness  RESPIRATORY: No cough, wheezing, chills or hemoptysis; No shortness of breath  CARDIOVASCULAR: No chest pain, palpitations, dizziness, or leg swelling  GASTROINTESTINAL: No abdominal or epigastric pain. No nausea, vomiting, or hematemesis; No diarrhea or constipation. No melena or hematochezia.  GENITOURINARY: No dysuria, frequency, hematuria, or incontinence  NEUROLOGICAL: No headaches, memory loss, loss of strength, numbness, or tremors      Consultant(s) Notes Reviewed:  [x ] YES  [ ] NO    PHYSICAL EXAM:  GENERAL: NAD, well-groomed, well-developed,not in any distress ,  HEAD:  Atraumatic, Normocephalic  NECK: Supple, No JVD, Normal thyroid  NERVOUS SYSTEM:  Alert & Oriented X3, No focal deficit   CHEST/LUNG: Good air entry bilateral with no  rales, rhonchi, wheezing, or rubs  HEART: Regular rate and rhythm; No murmurs, rubs, or gallops  ABDOMEN: Soft, Nontender, Nondistended; Bowel sounds present  EXTREMITIES:  2+ Peripheral Pulses, No clubbing, cyanosis, or edema  Jung +  Care Discussed with Consultants/Other Providers [ x] YES  [ ] NO

## 2024-12-28 NOTE — PROGRESS NOTE ADULT - PROBLEM SELECTOR PLAN 1
- On review of [facility], Scr 0.99 on 4/7/22. Scr elevated to 12.52 on admission (12/21/24). UA with proteinuria, hematuria, and bacteria (12/21/24). Urine Na 77.   - US Kidneys and bladder significant for moderate to severe left and moderate right hydronephrosis, multiple renal cysts, and non-obstructing 1cm stone in the left midpole (12/21/24). --> likely cause of renal failure  - Acute hep panel neg, MRSA neg, RPR neg  - TTE: 60-65% with no regional wall motion abnormalities and mild mitral regurge  - Initial obstruction likely in setting of cystitis    Plan:  - nephrology following. Holding off on HD --> ALBIN sutton pulled 12/27  - IR holding off on PCN. Per conversation with Nephro, patient clinically improving  - Continue midodrine 20 TID   - f/u on UPCR SOLO, Anti-dsDNA, Anti-GBM ab, anti-PLA2R, C3, C4, ANCA w/ reflex, SPEP, serum immunofixation, free light chains, HIV  - renally dose meds  - PRN Zofran 4mg q8h ordered. (QTc 12/25: 402) - On review of [facility], Scr 0.99 on 4/7/22. Scr elevated to 12.52 on admission (12/21/24). UA with proteinuria, hematuria, and bacteria (12/21/24). Urine Na 77.   - US Kidneys and bladder significant for moderate to severe left and moderate right hydronephrosis, multiple renal cysts, and non-obstructing 1cm stone in the left midpole (12/21/24). --> likely cause of renal failure  - Acute hep panel neg, MRSA neg, RPR neg  - TTE: 60-65% with no regional wall motion abnormalities and mild mitral regurge  - Initial obstruction likely in setting of cystitis. Now producing good urine    Plan:  - nephrology following. Holding off on HD --> ALBIN sutton pulled 12/27  - IR holding off on PCN. Per conversation with Nephro, patient clinically improving. CTM labs  - Continue midodrine 20 TID   - f/u on UPCR SOLO, Anti-dsDNA, Anti-GBM ab, anti-PLA2R, C3, C4, ANCA w/ reflex, SPEP, serum immunofixation, free light chains, HIV  - renally dose meds  - PRN Zofran 4mg q8h ordered. (QTc 12/25: 402) - On review of [facility], Scr 0.99 on 4/7/22. Scr elevated to 12.52 on admission (12/21/24). UA with proteinuria, hematuria, and bacteria (12/21/24). Urine Na 77.   - US Kidneys and bladder significant for moderate to severe left and moderate right hydronephrosis, multiple renal cysts, and non-obstructing 1cm stone in the left midpole (12/21/24). --> likely cause of renal failure  - Acute hep panel neg, MRSA neg, RPR neg  - TTE: 60-65% with no regional wall motion abnormalities and mild mitral regurge  - Initial obstruction likely in setting of cystitis. Now producing good urine    Plan:  - f/u orthostatic vitals, Continue midodrine 20 TID (wean as tolerated)  - nephrology following. Holding off on HD --> ALBIN sutton pulled 12/27  - Bicarb TID  - IR holding off on PCN. Per conversation with Nephro, patient clinically improving. CTM labs  - f/u on UPCR SOLO, Anti-dsDNA, Anti-GBM ab, anti-PLA2R, C3, C4, ANCA w/ reflex, SPEP, serum immunofixation, free light chains, HIV  - PRN Zofran 4mg q8h ordered. (QTc 12/25: 402)  - renally dose meds - On review of [facility], Scr 0.99 on 4/7/22. Scr elevated to 12.52 on admission (12/21/24). UA with proteinuria, hematuria, and bacteria (12/21/24). Urine Na 77.   - US Kidneys and bladder significant for moderate to severe left and moderate right hydronephrosis, multiple renal cysts, and non-obstructing 1cm stone in the left midpole (12/21/24). --> likely cause of renal failure  - Acute hep panel neg, MRSA neg, RPR neg  - TTE: 60-65% with no regional wall motion abnormalities and mild mitral regurge  - Initial obstruction likely in setting of cystitis. Now producing good urine    Plan:  - orthostatic negative, Continue midodrine 20 TID (wean as tolerated)  - nephrology following. Holding off on HD --> ALBIN martinez 12/27  - Bicarb TID  - IR holding off on PCN. Per conversation with Nephro, patient clinically improving. CTM labs  - f/u on UPCR SOLO, Anti-dsDNA, Anti-GBM ab, anti-PLA2R, C3, C4, ANCA w/ reflex, SPEP, serum immunofixation, free light chains, HIV  - PRN Zofran 4mg q8h ordered. (QTc 12/25: 402)  - renally dose meds

## 2024-12-28 NOTE — PROGRESS NOTE ADULT - PROBLEM SELECTOR PLAN 4
- Complains of intermittent bladder spasm and some pain with urination  - Has been using the Dilaudid regularly   - Oxybutynin 5mg q8h held as patient refused    Plan:  - Valium 2mg PO BID PRN  - Dilaudid 0.2mg IV q4h PRN for breakthrough  - Dilaudid 1mg PO q4h PRN for severe pain  - Gabapentin 300mg BID - UA showed large LE and blood  - hx of nephrostomy tubes. Will need ppx pseudomonas coverage  - CXR clear. Previously on Cefepime 12/22 - 12/25  - Jung draining significant pus on admission. Clearing today  - Urine Cx growing 50-99k candida     DDx: likely UTI    Plan:  - F/u on urine culture sensitivities. Follow-up with ID   - Continue Fluconazole per ID (12/26 - )

## 2024-12-28 NOTE — PROGRESS NOTE ADULT - PROBLEM SELECTOR PLAN 6
A1c on admission 8.3%  - Home meds of alglipitin, jardiance  - usually mayito 1u premeal     Plan:  - ISS premeal and bedtime  - Hold home meds - Fluids: None  - Electrolytes: Will replete to maintain K>4, Phos>3, and Mag>2  - Nutrition: Renal diet  - Bowel Regiment: miralax and senna  - Activity: As tolerated. PT not indicated  - DVT Prophylaxis: ICD  - Stress Ulcer/GI Prophylaxis: None  - Disposition: Monitor labs and further workup    Plan discussed with Dr. Diego Elias

## 2024-12-28 NOTE — DISCHARGE NOTE PROVIDER - NSDCCPTREATMENT_GEN_ALL_CORE_FT
PRINCIPAL PROCEDURE  Procedure: US kidney complete  Findings and Treatment: FINDINGS:  Right kidney: 14.4 cm. Moderate hydronephrosis, unchanged. Multiple cysts   measuring up to 2.0 cm. Hyperechoic focus in the midpole measuring 2.4 cm.  Left kidney: 12.8 cm. Moderate hydronephrosis, unchanged. Multiple cysts   measuring up to 5.7 cm. Nonobstructing 11 mm calculus  Urinary bladder: Underdistended with Jung catheter.  IMPRESSION:  Moderate bilateral hydronephrosis, unchanged.  Nonspecific hyperechoic focus in the right kidney midpole, not previously   seen.

## 2024-12-28 NOTE — DISCHARGE NOTE PROVIDER - NSDCMRMEDTOKEN_GEN_ALL_CORE_FT
Jardiance 25 mg oral tablet: 1 tab(s) orally once a day (in the morning)  lisinopril 20 mg oral tablet: 1 tab(s) orally once a day  mirabegron 50 mg oral tablet, extended release: 1 tab(s) orally once a day  ondansetron 4 mg oral tablet: 1 tab(s) orally every 6 hours as needed for  nausea  Ozempic 2 mg/1.5 mL (0.25 mg or 0.5 mg dose) subcutaneous solution: 0.25 milligram(s) subcutaneously once a week  rosuvastatin 10 mg oral tablet: 1 tab(s) orally once a day   fluconazole 200 mg oral tablet: 1 tab(s) orally every 24 hours  gabapentin 300 mg oral capsule: 1 cap(s) orally 2 times a day  Jardiance 25 mg oral tablet: 1 tab(s) orally once a day (in the morning)  melatonin 3 mg oral tablet: 1 tab(s) orally once a day (at bedtime) As needed Insomnia  midodrine 10 mg oral tablet: 2 tab(s) orally every 8 hours  ondansetron 4 mg oral tablet: 1 tab(s) orally every 6 hours as needed for  nausea  oxyBUTYnin 5 mg oral tablet: 1 tab(s) orally every 8 hours  oxyCODONE 5 mg oral tablet: 1 tab(s) orally every 8 hours as needed for  severe pain  Ozempic 2 mg/1.5 mL (0.25 mg or 0.5 mg dose) subcutaneous solution: 0.25 milligram(s) subcutaneously once a week  polyethylene glycol 3350 oral powder for reconstitution: 17 gram(s) orally once a day  rosuvastatin 10 mg oral tablet: 1 tab(s) orally once a day  senna leaf extract oral tablet: 2 tab(s) orally once a day (at bedtime)  sodium bicarbonate 650 mg oral tablet: 2 tab(s) orally 3 times a day

## 2024-12-28 NOTE — PROGRESS NOTE ADULT - PROBLEM SELECTOR PLAN 7
- Fluids: None  - Electrolytes: Will replete to maintain K>4, Phos>3, and Mag>2  - Nutrition: Renal diet  - Bowel Regiment: miralax and senna  - Activity: As tolerated. PT not indicated  - DVT Prophylaxis: ICD  - Stress Ulcer/GI Prophylaxis: None  - Disposition: Monitor labs and further workup Resolved  VBG showed pH 7.19, lactate 1.0, PCO2 35, HCO3 13 on admission. Repeat VBG shows improvement.   - in setting of acute renal failure    Plan:  - Continue bicarb TID  - f/u repeat labs

## 2024-12-28 NOTE — PROGRESS NOTE ADULT - ASSESSMENT
54 y.o. M w/ PMHx T2DM, prostatitis, chronic cystitis, and nephrolithiasis s/p PCNL 02/21, left nephroureteral tube 03/24 c/b hematuria needing L renal angioembolization of pseudoaneurysms and PCN-U 04/06 who presented to the ED for abnormal outpatient labs in acute renal failure. HD as needed and urology consulted for nephrostomy tube placement.        Problem/Plan - 1:  ·  Problem: Acute renal failure.   ·  Plan: - On review of [facility], Scr 0.99 on 4/7/22. Scr elevated to 12.52 on admission (12/21/24). UA with proteinuria, hematuria, and bacteria (12/21/24). Urine Na 77.   - US Kidneys and bladder significant for moderate to severe left and moderate right hydronephrosis, multiple renal cysts, and non-obstructing 1cm stone in the left midpole (12/21/24). --> likely cause of renal failure  - Acute hep panel neg, MRSA neg, RPR neg  - TTE: 60-65% with no regional wall motion abnormalities and mild mitral regurge  - Initial obstruction likely in setting of cystitis    Plan:  - nephrology following. Holding off on HD --> RLE mannyley pulled 12/27  - IR holding off on PCN. Per conversation with Nephro, patient clinically improving  - Continue midodrine 20 TID   - f/u on UPCR SOLO, Anti-dsDNA, Anti-GBM ab, anti-PLA2R, C3, C4, ANCA w/ reflex, SPEP, serum immunofixation, free light chains, HIV  - renally dose meds  - PRN Zofran 4mg q8h ordered. (QTc 12/25: 402).     Problem/Plan - 2:  ·  Problem: Acidosis, metabolic.   ·  Plan: Resolved  VBG showed pH 7.19, lactate 1.0, PCO2 35, HCO3 13 on admission. Repeat VBG shows improvement.   - in setting of acute renal failure    Plan:  - Continue bicarb   - f/u repeat labs.     Problem/Plan - 3:  ·  Problem: Hydronephrosis, bilateral.   ·  Plan: - renal US showed moderate to severe left and moderate right hydronephrosis which persists post void. Non obstructing 1 cm stone in the left midpole and multiple renal cysts as above  - Likely in setting of nephrolithiasis   - outpatient urologist Dr. Hoenig. Also briefly saw Dr. Guerrero in [facility]  - CTAP: 1.6 cm calcified gallstone region of gallbladder neck. Moderate/severe bilateral hydronephrosis. 11 mm non-obstructing stone midportion left kidney. Moderately severe bilateral hydroureter. No obstructing stones.  - cystoscopy with urology to evaluate bladder/UOs 12/25 but could not pass scope beyond bladder. Per IR hydronephrosis mildly improved, however, US KUB 12/26 showed unchanged    Plan:  - Continue sierra.     Problem/Plan - 4:  ·  Problem: Painful bladder spasm.   ·  Plan: - Complains of intermittent bladder spasm and some pain with urination  - Has been using the Dilaudid regularly   - Oxybutynin 5mg q8h held as patient refused    Plan:  - Valium 2mg PO BID PRN  - Dilaudid 0.2mg IV q4h PRN for breakthrough  - Dilaudid 1mg PO q4h PRN for severe pain  - Gabapentin 300mg BID.     Problem/Plan - 5:  ·  Problem: Leukocytosis.   ·  Plan: - Resolved.  UA showed large LE and blood  - hx of nephrostomy tubes. Will need ppx pseudomonas coverage  - CXR clear. Previously on Cefepime 12/22 - 12/25  - Sierra draining significant pus on admission. Clearing today  - Urine Cx growing 50-99k candida     DDx: likely UTI    Plan:  - F/u on urine culture sensitivities. Follow-up with ID   - Continue Fluconazole per ID (12/26 - ).     Problem/Plan - 6:  ·  Problem: Diabetes mellitus.   ·  Plan: A1c on admission 8.3%  - Home meds of alglipitin, jardiance  - usually mayito 1u premeal     Plan:  - ISS premeal and bedtime  - Hold home meds.     Problem/Plan - 7:  ·  Problem: Need for prophylactic measure.   ·  Plan: - Fluids: None  - Electrolytes: Will replete to maintain K>4, Phos>3, and Mag>2  - Nutrition: Renal diet  - Bowel Regiment: miralax and senna  - Activity: As tolerated. PT not indicated  - DVT Prophylaxis: ICD  - Stress Ulcer/GI Prophylaxis: None  - Disposition: Monitor labs and further workup.

## 2024-12-28 NOTE — PROGRESS NOTE ADULT - PROBLEM SELECTOR PLAN 5
- UA showed large LE and blood  - hx of nephrostomy tubes. Will need ppx pseudomonas coverage  - CXR clear. Previously on Cefepime 12/22 - 12/25  - Jung draining significant pus on admission. Clearing today  - Urine Cx growing 50-99k candida     DDx: likely UTI    Plan:  - F/u on urine culture sensitivities. Follow-up with ID   - Continue Fluconazole per ID (12/26 - ) A1c on admission 8.3%  - Home meds of alglipitin, jardiance  - usually mayito 1u premeal     Plan:  - ISS premeal and bedtime  - Hold home meds

## 2024-12-28 NOTE — DISCHARGE NOTE PROVIDER - PROVIDER TOKENS
PROVIDER:[TOKEN:[[id number]],FOLLOWUP:[2 weeks],ESTABLISHEDPATIENT:[T]],PROVIDER:[TOKEN:[[id number]],FOLLOWUP:[2 weeks],ESTABLISHEDPATIENT:[T]] PROVIDER:[TOKEN:[[id number]],FOLLOWUP:[2 weeks],ESTABLISHEDPATIENT:[T]],PROVIDER:[TOKEN:[[id number]],FOLLOWUP:[2 weeks],ESTABLISHEDPATIENT:[T]],FREE:[LAST:[Shakil],FIRST:[Sebas A],PHONE:[phone]],FAX:[(   )    -],ADDRESS:[[address] [address]]]

## 2024-12-28 NOTE — DISCHARGE NOTE PROVIDER - NSFOLLOWUPCLINICS_GEN_ALL_ED_FT
[facility]  Nephrology  [address] [place] [address], [address] [place] [address]  Phone: [phone]  Fax:   Follow Up Time: 2 weeks

## 2024-12-28 NOTE — DISCHARGE NOTE PROVIDER - CARE PROVIDERS DIRECT ADDRESSES
[email or web address] ,[email or web address]

## 2024-12-28 NOTE — DISCHARGE NOTE PROVIDER - NSDCCAREPROVSEEN_GEN_ALL_CORE_FT
Damaris Bryan  User ADM  Ordering Physician  Liban Azul  Team [facility] Nephrology House A  Team [facility] Medicine ACP  Team [facility] Interventional Radiology      [ Greater than 35 min spent for discharge services.   SRIRAM Stephens ]       ( Note written / Date of service is the same as last day of patient stay  in the hospital ( for billing purposes)))

## 2024-12-28 NOTE — DISCHARGE NOTE PROVIDER - CARE PROVIDER_API CALL
Jeffery Shannon  Internal Medicine  [address] [facility] [facility], [address]  [place] [address]  Phone: [phone]  Fax: [phone]  Established Patient  Follow Up Time: 2 weeks    Hoenig, David Mayer  Urology  [address] [place] [address]- [place] [address]  Phone: [phone]  Fax: [phone]  Established Patient  Follow Up Time: 2 weeks   Jeffery Shannon  Internal Medicine  [address] [facility], [address]  [place] [address]  Phone: [phone]  Fax: [phone]  Established Patient  Follow Up Time: 2 weeks    Hoenig, David Mayer  Urology  [address] [place] [address]- [place] [address]  Phone: [phone]  Fax: [phone]  Established Patient  Follow Up Time: 2 weeks    Sebas Salguero  [address] [place] [address]  Phone: [phone]  Fax: (   )    -  Follow Up Time:

## 2024-12-28 NOTE — DISCHARGE NOTE PROVIDER - HOSPITAL COURSE
HPI:   54 y.o. M w/ PMHx T2DM, prostatitis, chronic cystitis, and nephrolithiasis s/p PCNL 02/21, left nephroureteral tube 03/24 c/b hematuria needing L renal angioembolization of pseudoaneurysms and PCN-U 04/06 who presented to the ED for abnormal outpatient labs    He visited his PCP on Thursday (12/19) and received a phone from them yesterday asking him to present to  the ED as his "kidney's were shutting down". Per patient he, has been having issues with prostatitis, cystitis and nephrolithiasis for many years now and follows with urologist Dr. Hoenig. He states his since 03/24 after he had the nephroureteral tube placement for nephrolithiasis his "kidney numbers" have been worsening. He states his urologist is aware and he is following with [facility] nephrologist Dr. Chung Burgos. For the last week he has been having dark colored urine, nausea, vomiting, fatigue, with mild left sided flank pain that radiates to the left lower quadrant for the past month. This pain does not feel like kidney stone pain to him and is not colicky. Additionally, he has also been having bladder discomfort and pain for the last few months for which he has been seeing urologist Dr. Hoenig. Pt. had kidney biopsies done but he is unsure why and thought it may be to rule out cancer.  Pt. denies fever, chills, CP, SOB, testicular pain or LE swelling. He also denies any family hx of renal cysts, or kidney disease.    At ED, patient was AOx4, afebrile, hemodynamically stable and on RA. Labs were significant for WBC 11.31 (neutrophil predominant), Hg 10.5, , Na 130, K 5.5, , SCr 12.52, AG 25. VBG showed pH 7.19, lactate 1.0, PCO2 35, HCO3 13. UA showed large LE and blood. Patient got zosyn x1 and then started on cefepime. K was shifted, lokelma x2, received calcium gluconate and EKG significant for mildly peaked T waves. Patient also started on Bicarb gtt, sierra placed, and nephrology consulted. CXR was clear but renal US showed moderate to severe left and moderate right hydronephrosis which persists post void. Non obstructing 1 cm stone in the left midpole and multiple renal cysts          (22 Dec 2024 11:23)    Hospital Course:    The patient experienced renal failure and required urgent hemodialysis (HD) due to uremic acidosis on 12/22. A femoral Shiley catheter was placed by vascular services. Autoimmune and infectious labs grossly negative. Patient started on bicarbonate infusion, IV insulin/D50, calcium gluconate, and Lokelma for hyperkalemia. Infectious Disease was consulted for pus in urine, and a CT abdomen/pelvis was ordered by urology. Interventional radiology (IR) did not proceed with placing an internal jugular (IJ) Shiley catheter due to blood pressure concerns. Urology was involved for potential percutaneous nephrostomy (PCN) placement. The patient could not fully tolerate HD due to hypotension, so midodrine was started (orthostatic pressure wnl), and a lidocaine/prilocaine gel was trialed for Sierra insertion pain. 12/24, the patient underwent HD, but no fluids were removed due to hypotension. Urology intervention continued, and an ultrasound of the kidneys, ureters, and bladder (KUB) showed bilateral hydronephrosis. 12/25, a bedside cystoscopy was performed by urology, and medications were ordered PRN for nausea and bladder spasms. Cystoscopy camera could not be advanced past bladder, so was stopped early, but post procedure patient saw a significant improvement in urine output. As such, IR decided against PCN tube placement and the Shiley catheter was removed as nephrology wanted to hold off on future HD as patient was clinically improving.       The patient is afebrile, hemodynamically stable and medically optimized for discharge to ___ with follow up with ____. On day of discharge, patient is clinically stable with no new exam findings or acute symptoms compared to prior. The patient was seen by the attending physician on the date of discharge and deemed stable and acceptable for discharge. The patient's chronic medical conditions were treated accordingly per the patient's home medication regimen. The patient's medication reconciliation (with changes made to chronic medications), follow up appointments, discharge orders, instructions, and significant lab and diagnostic studies are as noted.      Important Medication Changes and Reason:    Active or Pending Issues Requiring Follow-up:    - Acute Renal Failure: follow up with nephrology  - Hydronephrosis and cystitis: follow up with urology    Advanced Directives:   [X] Full code  [ ] DNR  [ ] Hospice    Discharge Diagnoses:  Acute Renal Failure in setting of obstruction       HPI:   54 y.o. M w/ PMHx T2DM, prostatitis, chronic cystitis, and nephrolithiasis s/p PCNL 02/21, left nephroureteral tube 03/24 c/b hematuria needing L renal angioembolization of pseudoaneurysms and PCN-U 04/06 who presented to the ED for abnormal outpatient labs    He visited his PCP on Thursday (12/19) and received a phone from them yesterday asking him to present to  the ED as his "kidney's were shutting down". Per patient he, has been having issues with prostatitis, cystitis and nephrolithiasis for many years now and follows with urologist Dr. Hoenig. He states his since 03/24 after he had the nephroureteral tube placement for nephrolithiasis his "kidney numbers" have been worsening. He states his urologist is aware and he is following with [facility] nephrologist Dr. Chung Burgos. For the last week he has been having dark colored urine, nausea, vomiting, fatigue, with mild left sided flank pain that radiates to the left lower quadrant for the past month. This pain does not feel like kidney stone pain to him and is not colicky. Additionally, he has also been having bladder discomfort and pain for the last few months for which he has been seeing urologist Dr. Hoenig. Pt. had kidney biopsies done but he is unsure why and thought it may be to rule out cancer.  Pt. denies fever, chills, CP, SOB, testicular pain or LE swelling. He also denies any family hx of renal cysts, or kidney disease.    At ED, patient was AOx4, afebrile, hemodynamically stable and on RA. Labs were significant for WBC 11.31 (neutrophil predominant), Hg 10.5, , Na 130, K 5.5, , SCr 12.52, AG 25. VBG showed pH 7.19, lactate 1.0, PCO2 35, HCO3 13. UA showed large LE and blood. Patient got zosyn x1 and then started on cefepime. K was shifted, lokelma x2, received calcium gluconate and EKG significant for mildly peaked T waves. Patient also started on Bicarb gtt, sierra placed, and nephrology consulted. CXR was clear but renal US showed moderate to severe left and moderate right hydronephrosis which persists post void. Non obstructing 1 cm stone in the left midpole and multiple renal cysts          (22 Dec 2024 11:23)    Hospital Course:    The patient experienced renal failure and required urgent hemodialysis (HD) due to uremic acidosis on 12/22. A femoral Shiley catheter was placed by vascular services. Autoimmune and infectious labs grossly negative. Patient started on bicarbonate infusion, IV insulin/D50, calcium gluconate, and Lokelma for hyperkalemia. Infectious Disease was consulted for pus in urine, and a CT abdomen/pelvis was ordered by urology. Interventional radiology (IR) did not proceed with placing an internal jugular (IJ) Shiley catheter due to blood pressure concerns. Urology was involved for potential percutaneous nephrostomy (PCN) placement. The patient could not fully tolerate HD due to hypotension, so midodrine was started (orthostatic pressure wnl), and a lidocaine/prilocaine gel was trialed for Sierra insertion pain. 12/24, the patient underwent HD, but no fluids were removed due to hypotension. Urology intervention continued, and an ultrasound of the kidneys, ureters, and bladder (KUB) showed bilateral hydronephrosis. 12/25, a bedside cystoscopy was performed by urology, and medications were ordered PRN for nausea and bladder spasms. Cystoscopy camera could not be advanced past bladder, so was stopped early, but post procedure patient saw a significant improvement in urine output. As such, IR decided against PCN tube placement and the Shiley catheter was removed as nephrology wanted to hold off on future HD as patient was clinically improving.       The patient is afebrile, hemodynamically stable and medically optimized for discharge to ___ with follow up with ____. On day of discharge, patient is clinically stable with no new exam findings or acute symptoms compared to prior. The patient was seen by the attending physician on the date of discharge and deemed stable and acceptable for discharge. The patient's chronic medical conditions were treated accordingly per the patient's home medication regimen. The patient's medication reconciliation (with changes made to chronic medications), follow up appointments, discharge orders, instructions, and significant lab and diagnostic studies are as noted.      Important Medication Changes and Reason:    Active or Pending Issues Requiring Follow-up:    - Acute Renal Failure: follow up with nephrology  - Hydronephrosis and cystitis: follow up with urology    Advanced Directives:   [X] Full code  [ ] DNR  [ ] Hospice    Discharge Diagnoses:  Acute Renal Failure in setting of obstruction    Discussed with Dr. Stephens, patient medically cleared to be discharged home on 12/30/2024.

## 2024-12-28 NOTE — DISCHARGE NOTE PROVIDER - NSDCCPCAREPLAN_GEN_ALL_CORE_FT
PRINCIPAL DISCHARGE DIAGNOSIS  Diagnosis: Acute kidney failure  Assessment and Plan of Treatment: During the hospital stay, the patient faced kidney failure and needed urgent dialysis to remove toxins from their blood. A special tube (catheter) was inserted to help with this process. Treatments to balance the body’s chemicals were given, but low blood pressure made it difficult to remove excess fluids, so a medication was started to help raise blood pressure. A planned kidney procedure was postponed after another ultrasound showed no urgent need. The patient underwent a cystoscopy to examine the bladder to address some discomfort, and this helped relieve some of the obstruction. As the patient’s condition improved, including more stable blood pressure and better lab results, they were ready to be discharged, with instructions for follow-up care with PCP, urology, and nephrologist.   Seek immediate medical attention if you experience any of the following:  Severe or worsening pain.  Signs of infection such as fever, chills, or unusual drainage from catheter sites.  Difficulty urinating or changes in urine color.  Shortness of breath, dizziness, or chest pain.  New or unusual symptoms that concern you.

## 2024-12-28 NOTE — PROGRESS NOTE ADULT - SUBJECTIVE AND OBJECTIVE BOX
***********************************************************************  Liban Knox M.D  Resident Physician  Department of Medicine  Available on [employer] Teams  ***********************************************************************  Patient is a 54y old  Male who presents with a chief complaint of Abnormal Labs (27 Dec 2024 15:28)      OVERNIGHT EVENTS:     SUBJECTIVE: Patient seen and examined at bedside.     ADDITIONAL REVIEW OF SYSTEMS:    MEDICATIONS  (STANDING):  acetaminophen     Tablet .. 650 milliGRAM(s) Oral every 6 hours  chlorhexidine 2% Cloths 1 Application(s) Topical daily  dextrose 5%. 1000 milliLiter(s) (100 mL/Hr) IV Continuous <Continuous>  dextrose 5%. 1000 milliLiter(s) (50 mL/Hr) IV Continuous <Continuous>  dextrose 50% Injectable 25 Gram(s) IV Push once  dextrose 50% Injectable 12.5 Gram(s) IV Push once  dextrose 50% Injectable 25 Gram(s) IV Push once  dextrose Oral Gel 15 Gram(s) Oral once  fluconAZOLE   Tablet 200 milliGRAM(s) Oral every 24 hours  gabapentin 300 milliGRAM(s) Oral two times a day  glucagon  Injectable 1 milliGRAM(s) IntraMuscular once  insulin lispro (ADMELOG) corrective regimen sliding scale   SubCutaneous three times a day before meals  insulin lispro (ADMELOG) corrective regimen sliding scale   SubCutaneous at bedtime  lidocaine/prilocaine Cream 1 Application(s) Topical daily  midodrine. 20 milliGRAM(s) Oral three times a day  polyethylene glycol 3350 17 Gram(s) Oral daily  rosuvastatin 10 milliGRAM(s) Oral at bedtime  senna 2 Tablet(s) Oral at bedtime  sodium bicarbonate 1300 milliGRAM(s) Oral three times a day  sodium chloride 0.9%. 1000 milliLiter(s) (100 mL/Hr) IV Continuous <Continuous>    MEDICATIONS  (PRN):  diazepam    Tablet 2 milliGRAM(s) Oral two times a day PRN bladder spasm  HYDROmorphone   Tablet 1 milliGRAM(s) Oral every 4 hours PRN Severe Pain (7 - 10)  HYDROmorphone  Injectable 0.4 milliGRAM(s) IV Push every 4 hours PRN Breakthrough Pain  melatonin 3 milliGRAM(s) Oral at bedtime PRN Insomnia  ondansetron Injectable 4 milliGRAM(s) IV Push every 8 hours PRN Nausea and/or Vomiting  sodium chloride 0.9% Bolus. 100 milliLiter(s) IV Bolus every 5 minutes PRN SBP LESS THAN or EQUAL to 90 mmHg      CAPILLARY BLOOD GLUCOSE      POCT Blood Glucose.: 192 mg/dL (27 Dec 2024 21:46)  POCT Blood Glucose.: 217 mg/dL (27 Dec 2024 17:08)  POCT Blood Glucose.: 175 mg/dL (27 Dec 2024 12:18)  POCT Blood Glucose.: 173 mg/dL (27 Dec 2024 07:29)    I&O's Summary    27 Dec 2024 07:01  -  28 Dec 2024 07:00  --------------------------------------------------------  IN: 400 mL / OUT: 2600 mL / NET: -2200 mL        PHYSICAL EXAM:    Vital Signs Last 24 Hrs  T(C): 37.2 (28 Dec 2024 05:45), Max: 37.2 (28 Dec 2024 05:45)  T(F): 98.9 (28 Dec 2024 05:45), Max: 98.9 (28 Dec 2024 05:45)  HR: 55 (28 Dec 2024 05:45) (55 - 88)  BP: 104/55 (28 Dec 2024 05:45) (95/62 - 124/70)  BP(mean): --  RR: 17 (28 Dec 2024 05:45) (17 - 18)  SpO2: 100% (28 Dec 2024 05:45) (97% - 100%)    Parameters below as of 28 Dec 2024 05:45  Patient On (Oxygen Delivery Method): room air        CONSTITUTIONAL: NAD, well-developed, well-groomed  EYES: Conjunctiva and sclera clear  ENMT: Moist oral mucosa, no pharyngeal injection or exudates; normal dentition  NECK: Supple, no palpable masses; no thyromegaly  RESPIRATORY: Normal respiratory effort; lungs are clear to auscultation bilaterally  CARDIOVASCULAR: Regular rate and rhythm, normal S1 and S2, no murmur/rub/gallop  ABDOMEN: Soft, nontender to palpation, normoactive bowel sounds, no rebound/guarding  MUSCULOSKELETAL: No clubbing or cyanosis of digits; no joint swelling or tenderness to palpation  EXTREMITIES:  No lower extremity edema; Peripheral pulses are 2+ bilaterally  PSYCH: A+O to person, place, and time; affect appropriate  NEUROLOGY: no gross sensory deficits   SKIN: No rashes; no palpable lesions    LABS:                        8.6    7.45  )-----------( 156      ( 27 Dec 2024 04:02 )             26.0     12-27    135  |  96[L]  |  66[H]  ----------------------------<  176[H]  4.0   |  22  |  4.20[H]    Ca    8.5      27 Dec 2024 04:02  Phos  3.7     12-27  Mg     1.80     12-27    TPro  6.8  /  Alb  3.1[L]  /  TBili  0.4  /  DBili  x   /  AST  18  /  ALT  21  /  AlkPhos  47  12-27    PT/INR - ( 27 Dec 2024 04:02 )   PT: 12.8 sec;   INR: 1.10 ratio         PTT - ( 27 Dec 2024 04:02 )  PTT:36.2 sec      Urinalysis Basic - ( 27 Dec 2024 04:02 )    Color: x / Appearance: x / SG: x / pH: x  Gluc: 176 mg/dL / Ketone: x  / Bili: x / Urobili: x   Blood: x / Protein: x / Nitrite: x   Leuk Esterase: x / RBC: x / WBC x   Sq Epi: x / Non Sq Epi: x / Bacteria: x        Culture - Blood (collected 26 Dec 2024 13:48)  Source: .Blood BLOOD  Preliminary Report (27 Dec 2024 17:01):    No growth at 24 hours    Culture - Blood (collected 26 Dec 2024 12:41)  Source: .Blood BLOOD  Preliminary Report (27 Dec 2024 17:01):    No growth at 24 hours        RADIOLOGY & ADDITIONAL TESTS:   Results Reviewed: Yes     ***********************************************************************  Liban Knox M.D  Resident Physician  Department of Medicine  Available on [employer] Teams  ***********************************************************************  Patient is a 54y old  Male who presents with a chief complaint of Abnormal Labs (27 Dec 2024 15:28)      OVERNIGHT EVENTS: Pain well controlled with dilaudid 0.4mg IV q4h. States he became lightheaded on standing    SUBJECTIVE: Patient seen and examined at bedside. Was eating breakfast and denied any CP, SOB or N/V.     ADDITIONAL REVIEW OF SYSTEMS:    MEDICATIONS  (STANDING):  acetaminophen     Tablet .. 650 milliGRAM(s) Oral every 6 hours  chlorhexidine 2% Cloths 1 Application(s) Topical daily  dextrose 5%. 1000 milliLiter(s) (100 mL/Hr) IV Continuous <Continuous>  dextrose 5%. 1000 milliLiter(s) (50 mL/Hr) IV Continuous <Continuous>  dextrose 50% Injectable 25 Gram(s) IV Push once  dextrose 50% Injectable 12.5 Gram(s) IV Push once  dextrose 50% Injectable 25 Gram(s) IV Push once  dextrose Oral Gel 15 Gram(s) Oral once  fluconAZOLE   Tablet 200 milliGRAM(s) Oral every 24 hours  gabapentin 300 milliGRAM(s) Oral two times a day  glucagon  Injectable 1 milliGRAM(s) IntraMuscular once  insulin lispro (ADMELOG) corrective regimen sliding scale   SubCutaneous three times a day before meals  insulin lispro (ADMELOG) corrective regimen sliding scale   SubCutaneous at bedtime  lidocaine/prilocaine Cream 1 Application(s) Topical daily  midodrine. 20 milliGRAM(s) Oral three times a day  polyethylene glycol 3350 17 Gram(s) Oral daily  rosuvastatin 10 milliGRAM(s) Oral at bedtime  senna 2 Tablet(s) Oral at bedtime  sodium bicarbonate 1300 milliGRAM(s) Oral three times a day  sodium chloride 0.9%. 1000 milliLiter(s) (100 mL/Hr) IV Continuous <Continuous>    MEDICATIONS  (PRN):  diazepam    Tablet 2 milliGRAM(s) Oral two times a day PRN bladder spasm  HYDROmorphone   Tablet 1 milliGRAM(s) Oral every 4 hours PRN Severe Pain (7 - 10)  HYDROmorphone  Injectable 0.4 milliGRAM(s) IV Push every 4 hours PRN Breakthrough Pain  melatonin 3 milliGRAM(s) Oral at bedtime PRN Insomnia  ondansetron Injectable 4 milliGRAM(s) IV Push every 8 hours PRN Nausea and/or Vomiting  sodium chloride 0.9% Bolus. 100 milliLiter(s) IV Bolus every 5 minutes PRN SBP LESS THAN or EQUAL to 90 mmHg      CAPILLARY BLOOD GLUCOSE      POCT Blood Glucose.: 192 mg/dL (27 Dec 2024 21:46)  POCT Blood Glucose.: 217 mg/dL (27 Dec 2024 17:08)  POCT Blood Glucose.: 175 mg/dL (27 Dec 2024 12:18)  POCT Blood Glucose.: 173 mg/dL (27 Dec 2024 07:29)    I&O's Summary    27 Dec 2024 07:01  -  28 Dec 2024 07:00  --------------------------------------------------------  IN: 400 mL / OUT: 2600 mL / NET: -2200 mL        PHYSICAL EXAM:    Vital Signs Last 24 Hrs  T(C): 37.2 (28 Dec 2024 05:45), Max: 37.2 (28 Dec 2024 05:45)  T(F): 98.9 (28 Dec 2024 05:45), Max: 98.9 (28 Dec 2024 05:45)  HR: 55 (28 Dec 2024 05:45) (55 - 88)  BP: 104/55 (28 Dec 2024 05:45) (95/62 - 124/70)  BP(mean): --  RR: 17 (28 Dec 2024 05:45) (17 - 18)  SpO2: 100% (28 Dec 2024 05:45) (97% - 100%)    Parameters below as of 28 Dec 2024 05:45  Patient On (Oxygen Delivery Method): room air        CONSTITUTIONAL: NAD, well-developed, well-groomed  RESPIRATORY: Normal respiratory effort; lungs are clear to auscultation bilaterally  CARDIOVASCULAR: Regular rate and rhythm, normal S1 and S2, no murmur/rub/gallop  ABDOMEN: Soft, nontender to palpation, normoactive bowel sounds, no rebound/guarding  MUSCULOSKELETAL: No clubbing or cyanosis of digits; no joint swelling or tenderness to palpation  EXTREMITIES:  No lower extremity edema; Peripheral pulses are 2+ bilaterally  PSYCH: A+O to person, place, and time; affect appropriate  NEUROLOGY: no gross sensory deficits   : Urine was cloudy but yellow.   SKIN: No rashes; no palpable lesions    LABS:                        8.6    7.45  )-----------( 156      ( 27 Dec 2024 04:02 )             26.0     12-27    135  |  96[L]  |  66[H]  ----------------------------<  176[H]  4.0   |  22  |  4.20[H]    Ca    8.5      27 Dec 2024 04:02  Phos  3.7     12-27  Mg     1.80     12-27    TPro  6.8  /  Alb  3.1[L]  /  TBili  0.4  /  DBili  x   /  AST  18  /  ALT  21  /  AlkPhos  47  12-27    PT/INR - ( 27 Dec 2024 04:02 )   PT: 12.8 sec;   INR: 1.10 ratio         PTT - ( 27 Dec 2024 04:02 )  PTT:36.2 sec      Urinalysis Basic - ( 27 Dec 2024 04:02 )    Color: x / Appearance: x / SG: x / pH: x  Gluc: 176 mg/dL / Ketone: x  / Bili: x / Urobili: x   Blood: x / Protein: x / Nitrite: x   Leuk Esterase: x / RBC: x / WBC x   Sq Epi: x / Non Sq Epi: x / Bacteria: x        Culture - Blood (collected 26 Dec 2024 13:48)  Source: .Blood BLOOD  Preliminary Report (27 Dec 2024 17:01):    No growth at 24 hours    Culture - Blood (collected 26 Dec 2024 12:41)  Source: .Blood BLOOD  Preliminary Report (27 Dec 2024 17:01):    No growth at 24 hours        RADIOLOGY & ADDITIONAL TESTS:   Results Reviewed: Yes

## 2024-12-28 NOTE — DISCHARGE NOTE PROVIDER - NSDCFUSCHEDAPPT_GEN_ALL_CORE_FT
[facility]  [facility]  Scheduled Appointment: 01/18/2025    [facility]  [facility]  Scheduled Appointment: 01/18/2025    Hoenig, David  [facility]  [facility]OLOGY [address]  Scheduled Appointment: 01/23/2025    [facility]  [facility]  Scheduled Appointment: 01/25/2025    [facility]  [facility]  Scheduled Appointment: 01/25/2025    [facility]  Scheduled Appointment: 02/01/2025    [facility]  Scheduled Appointment: 02/01/2025    Farrukh Azul  [facility]  NEPHRO [address]  Scheduled Appointment: 04/02/2025    Sebas Salguero  [facility]  [facility]  Scheduled Appointment: 04/04/2025

## 2024-12-28 NOTE — PROGRESS NOTE ADULT - PROBLEM SELECTOR PLAN 2
Resolved  VBG showed pH 7.19, lactate 1.0, PCO2 35, HCO3 13 on admission. Repeat VBG shows improvement.   - in setting of acute renal failure    Plan:  - Continue bicarb   - f/u repeat labs Resolved  VBG showed pH 7.19, lactate 1.0, PCO2 35, HCO3 13 on admission. Repeat VBG shows improvement.   - in setting of acute renal failure    Plan:  - Continue bicarb TID  - f/u repeat labs - renal US showed moderate to severe left and moderate right hydronephrosis which persists post void. Non obstructing 1 cm stone in the left midpole and multiple renal cysts as above  - Likely in setting of nephrolithiasis   - outpatient urologist Dr. Hoenig. Also briefly saw Dr. Guerrero in [facility]  - CTAP: 1.6 cm calcified gallstone region of gallbladder neck. Moderate/severe bilateral hydronephrosis. 11 mm non-obstructing stone midportion left kidney. Moderately severe bilateral hydroureter. No obstructing stones.  - cystoscopy with urology to evaluate bladder/UOs 12/25 but could not pass scope beyond bladder. Per IR hydronephrosis mildly improved, however, US KUB 12/26 showed unchanged    Plan:  - Continue sierra

## 2024-12-28 NOTE — PROGRESS NOTE ADULT - PROBLEM SELECTOR PLAN 3
- renal US showed moderate to severe left and moderate right hydronephrosis which persists post void. Non obstructing 1 cm stone in the left midpole and multiple renal cysts as above  - Likely in setting of nephrolithiasis   - outpatient urologist Dr. Hoenig. Also briefly saw Dr. Guerrero in [facility]  - CTAP: 1.6 cm calcified gallstone region of gallbladder neck. Moderate/severe bilateral hydronephrosis. 11 mm non-obstructing stone midportion left kidney. Moderately severe bilateral hydroureter. No obstructing stones.  - cystoscopy with urology to evaluate bladder/UOs 12/25 but could not pass scope beyond bladder. Per IR hydronephrosis mildly improved, however, US KUB 12/26 showed unchanged    Plan:  - Continue sierra - Complains of intermittent bladder spasm and some pain with urination  - Has been using the Dilaudid regularly   - Oxybutynin 5mg q8h held as patient refused    Plan:  - Valium 2mg PO BID PRN  - Dilaudid 0.2mg IV q4h PRN for breakthrough  - Dilaudid 1mg PO q4h PRN for severe pain  - Gabapentin 300mg BID - Complains of intermittent bladder spasm and some pain with urination  - Has been using the Dilaudid regularly   - Oxybutynin 5mg q8h held as patient refused    Plan:  - Valium 2mg PO BID PRN  - Dilaudid 0.2mg IV q4h PRN for breakthrough  - Dilaudid 1mg PO q4h PRN for severe pain  - Gabapentin 300mg BID  - Holding off on pyridium for now due to decreased CrCl

## 2024-12-29 LAB
ALBUMIN SERPL ELPH-MCNC: 2.9 G/DL — LOW (ref 3.3–5)
ALP SERPL-CCNC: 47 U/L — SIGNIFICANT CHANGE UP (ref 40–120)
ALT FLD-CCNC: 41 U/L — SIGNIFICANT CHANGE UP (ref 4–41)
ANION GAP SERPL CALC-SCNC: 12 MMOL/L — SIGNIFICANT CHANGE UP (ref 7–14)
AST SERPL-CCNC: 30 U/L — SIGNIFICANT CHANGE UP (ref 4–40)
BASOPHILS # BLD AUTO: 0.02 K/UL — SIGNIFICANT CHANGE UP (ref 0–0.2)
BASOPHILS NFR BLD AUTO: 0.4 % — SIGNIFICANT CHANGE UP (ref 0–2)
BILIRUB SERPL-MCNC: <0.2 MG/DL — SIGNIFICANT CHANGE UP (ref 0.2–1.2)
BUN SERPL-MCNC: 63 MG/DL — HIGH (ref 7–23)
CALCIUM SERPL-MCNC: 8.5 MG/DL — SIGNIFICANT CHANGE UP (ref 8.4–10.5)
CHLORIDE SERPL-SCNC: 101 MMOL/L — SIGNIFICANT CHANGE UP (ref 98–107)
CO2 SERPL-SCNC: 25 MMOL/L — SIGNIFICANT CHANGE UP (ref 22–31)
CREAT SERPL-MCNC: 4.21 MG/DL — HIGH (ref 0.5–1.3)
EGFR: 16 ML/MIN/1.73M2 — LOW
EOSINOPHIL # BLD AUTO: 0.47 K/UL — SIGNIFICANT CHANGE UP (ref 0–0.5)
EOSINOPHIL NFR BLD AUTO: 8.5 % — HIGH (ref 0–6)
GAS PNL BLDV: SIGNIFICANT CHANGE UP
GLUCOSE BLDC GLUCOMTR-MCNC: 152 MG/DL — HIGH (ref 70–99)
GLUCOSE BLDC GLUCOMTR-MCNC: 160 MG/DL — HIGH (ref 70–99)
GLUCOSE BLDC GLUCOMTR-MCNC: 190 MG/DL — HIGH (ref 70–99)
GLUCOSE BLDC GLUCOMTR-MCNC: 235 MG/DL — HIGH (ref 70–99)
GLUCOSE BLDC GLUCOMTR-MCNC: 249 MG/DL — HIGH (ref 70–99)
GLUCOSE SERPL-MCNC: 133 MG/DL — HIGH (ref 70–99)
HCT VFR BLD CALC: 26.5 % — LOW (ref 39–50)
HGB BLD-MCNC: 8.1 G/DL — LOW (ref 13–17)
IANC: 3.27 K/UL — SIGNIFICANT CHANGE UP (ref 1.8–7.4)
IMM GRANULOCYTES NFR BLD AUTO: 0.9 % — SIGNIFICANT CHANGE UP (ref 0–0.9)
LYMPHOCYTES # BLD AUTO: 0.77 K/UL — LOW (ref 1–3.3)
LYMPHOCYTES # BLD AUTO: 13.9 % — SIGNIFICANT CHANGE UP (ref 13–44)
MAGNESIUM SERPL-MCNC: 1.9 MG/DL — SIGNIFICANT CHANGE UP (ref 1.6–2.6)
MCHC RBC-ENTMCNC: 25.7 PG — LOW (ref 27–34)
MCHC RBC-ENTMCNC: 30.6 G/DL — LOW (ref 32–36)
MCV RBC AUTO: 84.1 FL — SIGNIFICANT CHANGE UP (ref 80–100)
MONOCYTES # BLD AUTO: 0.97 K/UL — HIGH (ref 0–0.9)
MONOCYTES NFR BLD AUTO: 17.5 % — HIGH (ref 2–14)
NEUTROPHILS # BLD AUTO: 3.27 K/UL — SIGNIFICANT CHANGE UP (ref 1.8–7.4)
NEUTROPHILS NFR BLD AUTO: 58.8 % — SIGNIFICANT CHANGE UP (ref 43–77)
NRBC # BLD: 0 /100 WBCS — SIGNIFICANT CHANGE UP (ref 0–0)
NRBC # FLD: 0 K/UL — SIGNIFICANT CHANGE UP (ref 0–0)
PHOSPHATE SERPL-MCNC: 3.2 MG/DL — SIGNIFICANT CHANGE UP (ref 2.5–4.5)
PLATELET # BLD AUTO: 155 K/UL — SIGNIFICANT CHANGE UP (ref 150–400)
POTASSIUM SERPL-MCNC: 3.6 MMOL/L — SIGNIFICANT CHANGE UP (ref 3.5–5.3)
POTASSIUM SERPL-SCNC: 3.6 MMOL/L — SIGNIFICANT CHANGE UP (ref 3.5–5.3)
PROT SERPL-MCNC: 6.6 G/DL — SIGNIFICANT CHANGE UP (ref 6–8.3)
RBC # BLD: 3.15 M/UL — LOW (ref 4.2–5.8)
RBC # FLD: 13.6 % — SIGNIFICANT CHANGE UP (ref 10.3–14.5)
SODIUM SERPL-SCNC: 138 MMOL/L — SIGNIFICANT CHANGE UP (ref 135–145)
WBC # BLD: 5.55 K/UL — SIGNIFICANT CHANGE UP (ref 3.8–10.5)
WBC # FLD AUTO: 5.55 K/UL — SIGNIFICANT CHANGE UP (ref 3.8–10.5)

## 2024-12-29 PROCEDURE — 99232 SBSQ HOSP IP/OBS MODERATE 35: CPT | Mod: GC

## 2024-12-29 RX ORDER — OXYCODONE HCL 15 MG
10 TABLET ORAL EVERY 4 HOURS
Refills: 0 | Status: DISCONTINUED | OUTPATIENT
Start: 2024-12-29 | End: 2024-12-30

## 2024-12-29 RX ADMIN — ACETAMINOPHEN 650 MILLIGRAM(S): 80 SOLUTION/ DROPS ORAL at 12:27

## 2024-12-29 RX ADMIN — ROSUVASTATIN 10 MILLIGRAM(S): 40 TABLET, FILM COATED ORAL at 21:11

## 2024-12-29 RX ADMIN — Medication 0.4 MILLIGRAM(S): at 06:56

## 2024-12-29 RX ADMIN — Medication 2: at 16:50

## 2024-12-29 RX ADMIN — ACETAMINOPHEN 650 MILLIGRAM(S): 80 SOLUTION/ DROPS ORAL at 13:27

## 2024-12-29 RX ADMIN — Medication 0.4 MILLIGRAM(S): at 21:10

## 2024-12-29 RX ADMIN — GABAPENTIN 300 MILLIGRAM(S): 300 CAPSULE ORAL at 05:58

## 2024-12-29 RX ADMIN — Medication 10 MILLIGRAM(S): at 19:00

## 2024-12-29 RX ADMIN — GABAPENTIN 300 MILLIGRAM(S): 300 CAPSULE ORAL at 18:08

## 2024-12-29 RX ADMIN — Medication 1: at 08:12

## 2024-12-29 RX ADMIN — Medication 0.4 MILLIGRAM(S): at 11:00

## 2024-12-29 RX ADMIN — Medication 0.4 MILLIGRAM(S): at 11:15

## 2024-12-29 RX ADMIN — Medication 1 MILLIGRAM(S): at 10:06

## 2024-12-29 RX ADMIN — FLUCONAZOLE 200 MILLIGRAM(S): 200 TABLET ORAL at 18:07

## 2024-12-29 RX ADMIN — SODIUM BICARBONATE 1300 MILLIGRAM(S): 84 INJECTION, SOLUTION INTRAVENOUS at 21:09

## 2024-12-29 RX ADMIN — Medication 1 MILLIGRAM(S): at 09:06

## 2024-12-29 RX ADMIN — Medication 0.4 MILLIGRAM(S): at 07:25

## 2024-12-29 RX ADMIN — Medication 3 MILLIGRAM(S): at 21:10

## 2024-12-29 RX ADMIN — Medication 10 MILLIGRAM(S): at 18:08

## 2024-12-29 RX ADMIN — Medication 0.4 MILLIGRAM(S): at 20:10

## 2024-12-29 RX ADMIN — SODIUM BICARBONATE 1300 MILLIGRAM(S): 84 INJECTION, SOLUTION INTRAVENOUS at 05:51

## 2024-12-29 RX ADMIN — SODIUM BICARBONATE 1300 MILLIGRAM(S): 84 INJECTION, SOLUTION INTRAVENOUS at 13:38

## 2024-12-29 RX ADMIN — MIDODRINE HYDROCHLORIDE 20 MILLIGRAM(S): 5 TABLET ORAL at 05:50

## 2024-12-29 RX ADMIN — Medication 1: at 11:56

## 2024-12-29 RX ADMIN — ACETAMINOPHEN 650 MILLIGRAM(S): 80 SOLUTION/ DROPS ORAL at 05:51

## 2024-12-29 RX ADMIN — MIDODRINE HYDROCHLORIDE 20 MILLIGRAM(S): 5 TABLET ORAL at 18:06

## 2024-12-29 RX ADMIN — Medication 0.4 MILLIGRAM(S): at 15:29

## 2024-12-29 NOTE — PROGRESS NOTE ADULT - ASSESSMENT
54 y.o. M w/ PMHx T2DM, prostatitis, chronic cystitis, and nephrolithiasis s/p PCNL 02/21, left nephroureteral tube 03/24 c/b hematuria needing L renal angioembolization of pseudoaneurysms and PCN-U 04/06 who presented to the ED for abnormal outpatient labs in acute renal failure. HD as needed and urology consulted for nephrostomy tube placement.        Problem/Plan - 1:  ·  Problem: Acute renal failure.   ·  Plan: - On review of [facility], Scr 0.99 on 4/7/22. Scr elevated to 12.52 on admission (12/21/24). UA with proteinuria, hematuria, and bacteria (12/21/24). Urine Na 77.   - US Kidneys and bladder significant for moderate to severe left and moderate right hydronephrosis, multiple renal cysts, and non-obstructing 1cm stone in the left midpole (12/21/24). --> likely cause of renal failure  - Acute hep panel neg, MRSA neg, RPR neg  - TTE: 60-65% with no regional wall motion abnormalities and mild mitral regurge  - Initial obstruction likely in setting of cystitis    Plan:  - nephrology following. Holding off on HD --> RLE mannyley pulled 12/27  - IR holding off on PCN. Per conversation with Nephro, patient clinically improving  - Continue midodrine 20 TID   - f/u on UPCR SOLO, Anti-dsDNA, Anti-GBM ab, anti-PLA2R, C3, C4, ANCA w/ reflex, SPEP, serum immunofixation, free light chains, HIV  - renally dose meds  - PRN Zofran 4mg q8h ordered. (QTc 12/25: 402).     Problem/Plan - 2:  ·  Problem: Acidosis, metabolic.   ·  Plan: Resolved  VBG showed pH 7.19, lactate 1.0, PCO2 35, HCO3 13 on admission. Repeat VBG shows improvement.   - in setting of acute renal failure    Plan:  - Continue bicarb   - f/u repeat labs.     Problem/Plan - 3:  ·  Problem: Hydronephrosis, bilateral.   ·  Plan: - renal US showed moderate to severe left and moderate right hydronephrosis which persists post void. Non obstructing 1 cm stone in the left midpole and multiple renal cysts as above  - Likely in setting of nephrolithiasis   - outpatient urologist Dr. Hoenig. Also briefly saw Dr. Guerrero in [facility]  - CTAP: 1.6 cm calcified gallstone region of gallbladder neck. Moderate/severe bilateral hydronephrosis. 11 mm non-obstructing stone midportion left kidney. Moderately severe bilateral hydroureter. No obstructing stones.  - cystoscopy with urology to evaluate bladder/UOs 12/25 but could not pass scope beyond bladder. Per IR hydronephrosis mildly improved, however, US KUB 12/26 showed unchanged    Plan:  - Continue sierra.     Problem/Plan - 4:  ·  Problem: Painful bladder spasm.   ·  Plan: - Complains of intermittent bladder spasm and some pain with urination  - Has been using the Dilaudid regularly   - Oxybutynin 5mg q8h held as patient refused    Plan:  - Valium 2mg PO BID PRN  - Dilaudid 0.2mg IV q4h PRN for breakthrough  - Dilaudid 1mg PO q4h PRN for severe pain  - Gabapentin 300mg BID.     Problem/Plan - 5:  ·  Problem: Leukocytosis.   ·  Plan: - Resolved.  UA showed large LE and blood  - hx of nephrostomy tubes. Will need ppx pseudomonas coverage  - CXR clear. Previously on Cefepime 12/22 - 12/25  - Sierra draining significant pus on admission. Clearing today  - Urine Cx growing 50-99k candida     DDx: likely UTI    Plan:  - F/u on urine culture sensitivities. Follow-up with ID   - Continue Fluconazole per ID (12/26 - ).     Problem/Plan - 6:  ·  Problem: Diabetes mellitus.   ·  Plan: A1c on admission 8.3%  - Home meds of alglipitin, jardiance  - usually mayito 1u premeal     Plan:  - ISS premeal and bedtime  - Hold home meds.     Problem/Plan - 7:  ·  Problem: Need for prophylactic measure.   ·  Plan: - Fluids: None  - Electrolytes: Will replete to maintain K>4, Phos>3, and Mag>2  - Nutrition: Renal diet  - Bowel Regiment: miralax and senna  - Activity: As tolerated. PT not indicated  - DVT Prophylaxis: ICD  - Stress Ulcer/GI Prophylaxis: None  - Disposition: Monitor labs and further workup.      54 y.o. M w/ PMHx T2DM, prostatitis, chronic cystitis, and nephrolithiasis s/p PCNL 02/21, left nephroureteral tube 03/24 c/b hematuria needing L renal angioembolization of pseudoaneurysms and PCN-U 04/06 who presented to the ED for abnormal outpatient labs in acute renal failure. HD as needed and urology consulted for nephrostomy tube placement.        Problem/Plan - 1:  ·  Problem: Acute renal failure.   ·  Plan: - On review of [facility], Scr 0.99 on 4/7/22. Scr elevated to 12.52 on admission (12/21/24). UA with proteinuria, hematuria, and bacteria (12/21/24). Urine Na 77.   - US Kidneys and bladder significant for moderate to severe left and moderate right hydronephrosis, multiple renal cysts, and non-obstructing 1cm stone in the left midpole (12/21/24). --> likely cause of renal failure  - Acute hep panel neg, MRSA neg, RPR neg  - TTE: 60-65% with no regional wall motion abnormalities and mild mitral regurge  - Initial obstruction likely in setting of cystitis    Plan:  - nephrology following. Holding off on HD --> RLE mannyley pulled 12/27  - IR holding off on PCN. Per conversation with Nephro, patient clinically improving  - Continue midodrine 20 TID   - f/u on UPCR SOLO, Anti-dsDNA, Anti-GBM ab, anti-PLA2R, C3, C4, ANCA w/ reflex, SPEP, serum immunofixation, free light chains, HIV  - renally dose meds  - PRN Zofran 4mg q8h ordered. (QTc 12/25: 402).     Problem/Plan - 2:  ·  Problem: Acidosis, metabolic.   ·  Plan: Resolved  VBG showed pH 7.19, lactate 1.0, PCO2 35, HCO3 13 on admission. Repeat VBG shows improvement.   - in setting of acute renal failure    Plan:  - Continue bicarb   - f/u repeat labs.     Problem/Plan - 3:  ·  Problem: Hydronephrosis, bilateral.   ·  Plan: - renal US showed moderate to severe left and moderate right hydronephrosis which persists post void. Non obstructing 1 cm stone in the left midpole and multiple renal cysts as above  - Likely in setting of nephrolithiasis   - outpatient urologist Dr. Hoenig. Also briefly saw Dr. Guerrero in [facility]  - CTAP: 1.6 cm calcified gallstone region of gallbladder neck. Moderate/severe bilateral hydronephrosis. 11 mm non-obstructing stone midportion left kidney. Moderately severe bilateral hydroureter. No obstructing stones.  - cystoscopy with urology to evaluate bladder/UOs 12/25 but could not pass scope beyond bladder. Per IR hydronephrosis mildly improved, however, US KUB 12/26 showed unchanged    Plan:  - Continue sierra.     Problem/Plan - 4:  ·  Problem: Painful bladder spasm.   ·  Plan: - Complains of intermittent bladder spasm and some pain with urination  - Has been using the Dilaudid regularly   - Oxybutynin 5mg q8h held as patient refused    Plan:  - Valium 2mg PO BID PRN  - Dilaudid 0.2mg IV q4h PRN for breakthrough  - Dilaudid 1mg PO q4h PRN for severe pain  - Gabapentin 300mg BID.     Problem/Plan - 5:  ·  Problem: Leukocytosis.   ·  Plan: - Resolved.  UA showed large LE and blood  - hx of nephrostomy tubes. Will need ppx pseudomonas coverage  - CXR clear. Previously on Cefepime 12/22 - 12/25  - Sierra draining significant pus on admission. Clearing today  - Urine Cx growing 50-99k candida     DDx: likely UTI    Plan:  - F/u on urine culture sensitivities. Follow-up with ID   - Continue Fluconazole per ID (12/26 - ).  - f/u with ID with abx/antifungal recs on d/c      Problem/Plan - 6:  ·  Problem: Diabetes mellitus.   ·  Plan: A1c on admission 8.3%  - Home meds of alglipitin, jardiance  - usually mayito 1u premeal     Plan:  - ISS premeal and bedtime  - Hold home meds.     Problem/Plan - 7:   Noted weak IgG kappa band on serum immunofixation suggestive of monoclonal gammopathy.     Plan:   - Heme Consulted, f/u recs     Problem/Plan - 8:  ·  Problem: Need for prophylactic measure.   ·  Plan: - Fluids: None  - Electrolytes: Will replete to maintain K>4, Phos>3, and Mag>2  - Nutrition: Renal diet  - Bowel Regiment: miralax and senna  - Activity: As tolerated. PT not indicated  - DVT Prophylaxis: ICD  - Stress Ulcer/GI Prophylaxis: None  - Disposition: Monitor labs and further workup.

## 2024-12-29 NOTE — PROGRESS NOTE ADULT - SUBJECTIVE AND OBJECTIVE BOX
[facility] DIVISION OF KIDNEY DISEASES AND HYPERTENSION --    Reason for consult: BASHIR on CKD    24 hour events/subjective: Patient seen and examined at bedside. Femoral shiley removed on 12/27. Renal function stable. Pt. has sierra and is non-oliguric. No uremic s/s.      PAST HISTORY  --------------------------------------------------------------------------------  No significant changes to PMH, PSH, FHx, SHx, unless otherwise noted    ALLERGIES & MEDICATIONS  --------------------------------------------------------------------------------  Allergies    penicillins (Unknown)      Standing Inpatient Medications  chlorhexidine 2% Cloths 1 Application(s) Topical daily  dextrose 5%. 1000 milliLiter(s) IV Continuous <Continuous>  dextrose 5%. 1000 milliLiter(s) IV Continuous <Continuous>  dextrose 50% Injectable 25 Gram(s) IV Push once  dextrose 50% Injectable 12.5 Gram(s) IV Push once  dextrose 50% Injectable 25 Gram(s) IV Push once  dextrose Oral Gel 15 Gram(s) Oral once  fluconAZOLE   Tablet 200 milliGRAM(s) Oral every 24 hours  gabapentin 300 milliGRAM(s) Oral two times a day  glucagon  Injectable 1 milliGRAM(s) IntraMuscular once  insulin lispro (ADMELOG) corrective regimen sliding scale   SubCutaneous three times a day before meals  insulin lispro (ADMELOG) corrective regimen sliding scale   SubCutaneous at bedtime  lidocaine/prilocaine Cream 1 Application(s) Topical daily  midodrine. 20 milliGRAM(s) Oral three times a day  polyethylene glycol 3350 17 Gram(s) Oral daily  rosuvastatin 10 milliGRAM(s) Oral at bedtime  senna 2 Tablet(s) Oral at bedtime  sodium bicarbonate 1300 milliGRAM(s) Oral three times a day  sodium chloride 0.9%. 1000 milliLiter(s) IV Continuous <Continuous>    PRN Inpatient Medications  diazepam    Tablet 2 milliGRAM(s) Oral two times a day PRN  HYDROmorphone  Injectable 0.4 milliGRAM(s) IV Push every 4 hours PRN  melatonin 3 milliGRAM(s) Oral at bedtime PRN  ondansetron Injectable 4 milliGRAM(s) IV Push every 8 hours PRN  oxyCODONE    IR 10 milliGRAM(s) Oral every 4 hours PRN  sodium chloride 0.9% Bolus. 100 milliLiter(s) IV Bolus every 5 minutes PRN      REVIEW OF SYSTEMS  --------------------------------------------------------------------------------  Gen: No fever  Respiratory: No dyspnea  CV: No chest pain  GI: No abdominal pain, diarrhea, constipation, nausea, vomiting  : +sierra  Skin: No rashes  MSK: No joint pain/swelling; no back pain, no edema  Neuro: No dizziness/lightheadedness    All other systems were reviewed and are negative, except as noted.    VITALS/PHYSICAL EXAM  --------------------------------------------------------------------------------  T(C): 36.7 (12-29-24 @ 08:58), Max: 36.9 (12-28-24 @ 17:00)  HR: 66 (12-29-24 @ 08:58) (53 - 85)  BP: 101/63 (12-29-24 @ 08:58) (96/42 - 103/62)  RR: 18 (12-29-24 @ 08:58) (17 - 18)  SpO2: 100% (12-29-24 @ 08:58) (96% - 100%)  Wt(kg): --        12-28-24 @ 07:01  -  12-29-24 @ 07:00  --------------------------------------------------------  IN: 560 mL / OUT: 2200 mL / NET: -1640 mL    12-29-24 @ 07:01  -  12-29-24 @ 13:26  --------------------------------------------------------  IN: 480 mL / OUT: 1050 mL / NET: -570 mL      PHYSICAL EXAM:  Gen: NAD  Neuro: non-focal  HEENT: Anicteric, MMM  Pulm: CTA B/L  CV: +S1S2  Abd: soft, non-tender/non-distended  : +mariela  Extremities: no edema  Skin: Warm    LABS/STUDIES  --------------------------------------------------------------------------------              8.1    5.55  >-----------<  155      [12-29-24 @ 03:25]              26.5     138  |  101  |  63  ----------------------------<  133      [12-29-24 @ 03:25]  3.6   |  25  |  4.21        Ca     8.5     [12-29-24 @ 03:25]      Mg     1.90     [12-29-24 @ 03:25]      Phos  3.2     [12-29-24 @ 03:25]    TPro  6.6  /  Alb  2.9  /  TBili  <0.2  /  DBili  x   /  AST  30  /  ALT  41  /  AlkPhos  47  [12-29-24 @ 03:25]      Creatinine Trend:  SCr 4.21 [12-29 @ 03:25]  SCr 4.15 [12-28 @ 07:20]  SCr 4.20 [12-27 @ 04:02]  SCr 4.31 [12-26 @ 15:00]  SCr 4.41 [12-26 @ 03:45]      Iron 27, TIBC 154, %sat 18      [12-23-24 @ 06:34]  Ferritin 612      [12-23-24 @ 06:34]    HBsAb <3.0      [12-22-24 @ 17:00]  HBsAg Nonreact      [12-22-24 @ 17:00]  HBcAb Nonreact      [12-22-24 @ 17:00]  HCV 0.38, Nonreact      [12-22-24 @ 17:00]  HIV Nonreact      [12-22-24 @ 12:05]    SOLO: titer Negative, pattern --      [12-22-24 @ 12:05]  dsDNA <1      [12-22-24 @ 12:05]  C3 Complement 109      [12-22-24 @ 12:05]  C4 Complement 28      [12-22-24 @ 12:05]  ANCA: cANCA Negative, pANCA Negative, atypical ANCA Negative      [12-22-24 @ 12:05]  anti-GBM <0.2      [12-22-24 @ 12:05]  Syphilis Screen (Treponema Pallidum Ab) Negative      [12-22-24 @ 12:05]  PLA2R: KANDICE <1.8, IFA --      [12-22-24 @ 12:05]  Free Light Chains: kappa 9.86, lambda 5.97, ratio = 1.65      [12-22 @ 12:05]  Immunofixation Serum:   Corresponding weak IgG and weak kappa bands consistent with monoclonal  IgG kappa protein. PATY Headley M.D.  Reference Range: None Detected      [12-22-24 @ 12:05]  SPEP Interpretation: Weak band in Gamma region suggestive of monoclonal gammopathy. Serum and  urine immunofixation electrophoresis are recommended if not previously  performed.  PATY Headley M.D.      [12-22-24 @ 12:05]

## 2024-12-29 NOTE — PROGRESS NOTE ADULT - ASSESSMENT
_________________________________________________________________________________________  ========>>  M E D I C A L   A T T E N D I N G    F O L L O W  U P  N O T E  <<=========  -----------------------------------------------------------------------------------------------------    - Patient seen and examined by me earlier today.   many discussions were had about the care of  this patient..  patient post removal of Brooke from Rt groin today.. no nephrostomy or dialysis a patient having good urine output and  jennifer has been stable..     pain medications adjusted for better control     otherwise stable     ==================>> REVIEW OF SYSTEM <<=================    GEN: no fever, no chills, as above   RESP: no SOB, no cough, no sputum  CVS: no chest pain, no palpitations  GI: no abdominal pain, no nausea   : as above > pain in penis / bladder somewhat controlled   Neuro: no headache, no dizziness    ==================>> PHYSICAL EXAM <<=================    GEN: A&O X 3 , NAD , comfortable, pleasant, calm in bed >> encouraged out of bed to chair as able post removal of line ..   HEENT: NCAT, PERRL, MMM, hearing intact  CVS: S1S2 , regular , No M/R/G appreciated  PULM: CTA B/L,  no W/R/R appreciated  ABD.: soft. non tender, non distended,  bowel sounds present  Extrem: intact pulses , no edema .. + right groin Brooke in place , + sierra with less puss and debris          ( Note written / Date of service 12-29-24 ( This is certified to be the same as "ENTERED" date above ( for billing purposes)))    ==================>> MEDICATIONS <<====================    chlorhexidine 2% Cloths 1 Application(s) Topical daily  dextrose 5%. 1000 milliLiter(s) IV Continuous <Continuous>  dextrose 5%. 1000 milliLiter(s) IV Continuous <Continuous>  dextrose 50% Injectable 25 Gram(s) IV Push once  dextrose 50% Injectable 12.5 Gram(s) IV Push once  dextrose 50% Injectable 25 Gram(s) IV Push once  dextrose Oral Gel 15 Gram(s) Oral once  fluconAZOLE   Tablet 200 milliGRAM(s) Oral every 24 hours  gabapentin 300 milliGRAM(s) Oral two times a day  glucagon  Injectable 1 milliGRAM(s) IntraMuscular once  insulin lispro (ADMELOG) corrective regimen sliding scale   SubCutaneous three times a day before meals  insulin lispro (ADMELOG) corrective regimen sliding scale   SubCutaneous at bedtime  lidocaine/prilocaine Cream 1 Application(s) Topical daily  midodrine. 20 milliGRAM(s) Oral three times a day  polyethylene glycol 3350 17 Gram(s) Oral daily  rosuvastatin 10 milliGRAM(s) Oral at bedtime  senna 2 Tablet(s) Oral at bedtime  sodium bicarbonate 1300 milliGRAM(s) Oral three times a day  sodium chloride 0.9%. 1000 milliLiter(s) IV Continuous <Continuous>    MEDICATIONS  (PRN):  diazepam    Tablet 2 milliGRAM(s) Oral two times a day PRN bladder spasm  HYDROmorphone  Injectable 0.4 milliGRAM(s) IV Push every 4 hours PRN Breakthrough Pain  melatonin 3 milliGRAM(s) Oral at bedtime PRN Insomnia  ondansetron Injectable 4 milliGRAM(s) IV Push every 8 hours PRN Nausea and/or Vomiting  oxyCODONE    IR 10 milliGRAM(s) Oral every 4 hours PRN Moderate Pain - Severe pain (5 - 10)  sodium chloride 0.9% Bolus. 100 milliLiter(s) IV Bolus every 5 minutes PRN SBP LESS THAN or EQUAL to 90 mmHg    ___________  Active diet:  Diet, Consistent Carbohydrate w/Evening Snack  ___________________    ==================>> VITAL SIGNS <<==================    Vital Signs Last 24 HrsT(C): 36.7 (12-29-24 @ 12:58)  T(F): 98.1 (12-29-24 @ 12:58), Max: 98.4 (12-29-24 @ 05:00)  HR: 83 (12-29-24 @ 12:58) (53 - 85)  BP: 98/55 (12-29-24 @ 12:58)  RR: 18 (12-29-24 @ 12:58) (18 - 18)  SpO2: 100% (12-29-24 @ 12:58) (96% - 100%)      CAPILLARY BLOOD GLUCOSE      POCT Blood Glucose.: 235 mg/dL (29 Dec 2024 16:48)  POCT Blood Glucose.: 190 mg/dL (29 Dec 2024 11:55)  POCT Blood Glucose.: 160 mg/dL (29 Dec 2024 08:10)  POCT Blood Glucose.: 152 mg/dL (29 Dec 2024 06:19)  POCT Blood Glucose.: 166 mg/dL (28 Dec 2024 21:23)     ==================>> LAB AND IMAGING <<==================                        8.1    5.55  )-----------( 155      ( 29 Dec 2024 03:25 )             26.5        12-29    138  |  101  |  63[H]  ----------------------------<  133[H]  3.6   |  25  |  4.21[H]    Ca    8.5      29 Dec 2024 03:25  Phos  3.2     12-29  Mg     1.90     12-29    TPro  6.6  /  Alb  2.9[L]  /  TBili  <0.2  /  DBili  x   /  AST  30  /  ALT  41  /  AlkPhos  47  12-29    WBC count:   5.55 <<== ,  5.69 <<== ,  7.45 <<== ,  9.65 <<== ,  6.66 <<==   Hemoglobin:   8.1 <<==,  8.6 <<==,  8.6 <<==,  10.1 <<==,  9.2 <<==  platelets:  155 <==, 153 <==, 156 <==, 169 <==, 160 <==, 184 <==    Creatinine:  4.21  <<==, 4.15  <<==, 4.20  <<==, 4.31  <<==, 4.41  <<==, 4.78  <<==  Sodium:   138  <==, 137  <==, 135  <==, 135  <==, 134  <==, 138  <==, 138  <==       AST:          30(12-29) <== , 30(12-28) <== , 18(12-27) <== , 12(12-26) <== , 14(12-25) <==      ALT:        41(12-29)  <== , 37(12-28)  <== , 21(12-27)  <== , 9(12-26)  <== , 11(12-25)  <==      AP:        47(12-29)  <=, 56(12-28)  <=, 47(12-27)  <=, 53(12-26)  <=, 51(12-25)  <=     Bili:        <0.2(12-29)  <=, 0.2(12-28)  <=, 0.4(12-27)  <=, 0.5(12-26)  <=, 0.3(12-25)  <=    ____________________________    M I C R O B I O L O G Y :    Culture - Blood (collected 26 Dec 2024 13:48)  Source: .Blood BLOOD  Preliminary Report (29 Dec 2024 17:01):    No growth at 72 Hours    Culture - Blood (collected 26 Dec 2024 12:41)  Source: .Blood BLOOD  Preliminary Report (29 Dec 2024 17:01):    No growth at 72 Hours    Culture - Urine (collected 21 Dec 2024 21:30)  Source: Clean Catch  Final Report (24 Dec 2024 23:57):    50,000 - 99,000 CFU/mL Candida glabrata "Susceptibilities not performed"    Urinalysis with Rflx Culture (collected 21 Dec 2024 21:30)          < from: CT Abdomen and Pelvis No Cont (12.22.24 @ 22:13) >  IMPRESSION:  1.6 cm calcified gallstone region of gallbladder neck. No pericholecystic   inflammatory changes..  Moderate/severe bilateral hydronephrosis. 11 mm nonobstructing stone   midportion left kidney. Moderately severe bilateral hydroureter. No  obstructing stones.  Sierra catheter within urinary bladder which is decompressed.  Please refer to detailed findings otherwise described above.  < end of copied text >    < from: US Kidney and Bladder (12.21.24 @ 22:04) >  IMPRESSION:  Moderate to severe left and moderate right hydronephrosis which persists   post void. CT could evaluate for underlying etiology.  Nonobstructing 1 cm stone in the leftmidpole.  Multiple renal cysts as above.  < end of copied text >    ___________________________________________________________________________________  ===============>>  A S S E S S M E N T   A N D   P L A N <<===============  ------------------------------------------------------------------------------------------    · Assessment	   54 y.o. M w/ PMHx T2DM, prostatitis, chronic cystitis, and nephrolithiasis s/p PCNL 02/21, left nephroureteral tube 03/24 c/b hematuria needing L renal angioembolization of pseudoaneurysms and PCN-U 04/06 who presented to the ED for abnormal outpatient labs. in acute renal failure and plan for HD today      Problem/Plan - 1:  ·  Problem: Acute renal failure.   BASHIR, hydronephrosis / obstructive uropathy, metabolic acidosis, hyperkalemia   - nephrology following, appreciated and discussed   HD per renal team ( on hold for now, brooke out)   nephrostomy onhold as noted >> repeat sono as noted >> monitoring clinically   renally dose meds  monitor electrolytes .. improved   pain management + antispasmotics per   NO TRIAL OF VOID  OOB to chair, , PT    Problem/Plan - 2:  ·  Problem: hypotension   midodrine started already >> taper down as able with PRN dosing prior to dialysis   monitor vitals closely    Problem/Plan - 3:  ·  Problem: Hydronephrosis, bilateral.   ·  Plan: - renal US showed moderate to severe left and moderate right hydronephrosis which persists post void. Non obstructing 1 cm stone in the left midpole and multiple renal cysts as above  - Continue sierra  - urology follow up for nephrostomy as above     Problem/Plan - 4:  ·  Problem: Leukocytosis/ UTI / pyuria  / funguria   - hx of nephrostomy tubes. Will need ppx pseudomonas coverage  - CXR clear  - Sierra draining significant puss  - F/u on urine culture  - Continue cefepime  - ID follow up and management     Problem/Plan - 5:  ·  Problem: Diabetes mellitus.  poorly controlled   ·  Plan: A1c on admission 8.3%  - Home meds of alglipitin, jardiance. Need to confirm doses.  - ISS premeal and bedtime.  endo as needed    Problem/Plan - 6:  ·  Problem: anemia  monitor closely for now       12-23-24 @ 06:34  Iron:     27 ug/dL  Iron %:   18 %  TIBC:     154 ug/dL    Problem/Plan - 7:  ·  Problem: Need for prophylactic measure.   - Nutrition: tolerating better, no nausea post HD   - Bowel Regiment: as needed   - Activity: limited due to groin brooke   - DVT Prophylaxis: venodynes for now     --------------------------------------------  Case discussed with patient, medical team , renal..   Education given on findings and plan of care  ___________________________  H. JOSE ANGEL Stephens.  Pager: [phone]       _________________________________________________________________________________________  ========>>  M E D I C A L   A T T E N D I N G    F O L L O W  U P  N O T E  <<=========  -----------------------------------------------------------------------------------------------------    - Patient seen and examined by me earlier today.     pain medications adjusted for better control     overall otherwise doing better .l. asking about going home tomorrow     ==================>> REVIEW OF SYSTEM <<=================    GEN: no fever, no chills, as above   RESP: no SOB, no cough, no sputum  CVS: no chest pain, no palpitations  GI: no abdominal pain, no nausea   : as above > pain in penis / bladder controlled   Neuro: no headache, no dizziness    ==================>> PHYSICAL EXAM <<=================    GEN: A&O X 3 , NAD , comfortable, pleasant, calm in bed >> encouraged out of bed to chair ( patient did PT / stairs today )  HEENT: NCAT, PERRL, MMM, hearing intact  CVS: S1S2 , regular , No M/R/G appreciated  PULM: CTA B/L,  no W/R/R appreciated  ABD.: soft. non tender, non distended,  bowel sounds present  Extrem: intact pulses , no edema .. + right groin Brooke in place , + sierra with less puss and debris          ( Note written / Date of service 12-29-24 ( This is certified to be the same as "ENTERED" date above ( for billing purposes)))    ==================>> MEDICATIONS <<====================    chlorhexidine 2% Cloths 1 Application(s) Topical daily  dextrose 5%. 1000 milliLiter(s) IV Continuous <Continuous>  dextrose 5%. 1000 milliLiter(s) IV Continuous <Continuous>  dextrose 50% Injectable 25 Gram(s) IV Push once  dextrose 50% Injectable 12.5 Gram(s) IV Push once  dextrose 50% Injectable 25 Gram(s) IV Push once  dextrose Oral Gel 15 Gram(s) Oral once  fluconAZOLE   Tablet 200 milliGRAM(s) Oral every 24 hours  gabapentin 300 milliGRAM(s) Oral two times a day  glucagon  Injectable 1 milliGRAM(s) IntraMuscular once  insulin lispro (ADMELOG) corrective regimen sliding scale   SubCutaneous three times a day before meals  insulin lispro (ADMELOG) corrective regimen sliding scale   SubCutaneous at bedtime  lidocaine/prilocaine Cream 1 Application(s) Topical daily  midodrine. 20 milliGRAM(s) Oral three times a day  polyethylene glycol 3350 17 Gram(s) Oral daily  rosuvastatin 10 milliGRAM(s) Oral at bedtime  senna 2 Tablet(s) Oral at bedtime  sodium bicarbonate 1300 milliGRAM(s) Oral three times a day  sodium chloride 0.9%. 1000 milliLiter(s) IV Continuous <Continuous>    MEDICATIONS  (PRN):  diazepam    Tablet 2 milliGRAM(s) Oral two times a day PRN bladder spasm  HYDROmorphone  Injectable 0.4 milliGRAM(s) IV Push every 4 hours PRN Breakthrough Pain  melatonin 3 milliGRAM(s) Oral at bedtime PRN Insomnia  ondansetron Injectable 4 milliGRAM(s) IV Push every 8 hours PRN Nausea and/or Vomiting  oxyCODONE    IR 10 milliGRAM(s) Oral every 4 hours PRN Moderate Pain - Severe pain (5 - 10)  sodium chloride 0.9% Bolus. 100 milliLiter(s) IV Bolus every 5 minutes PRN SBP LESS THAN or EQUAL to 90 mmHg    ___________  Active diet:  Diet, Consistent Carbohydrate w/Evening Snack  ___________________    ==================>> VITAL SIGNS <<==================    Vital Signs Last 24 HrsT(C): 36.7 (12-29-24 @ 12:58)  T(F): 98.1 (12-29-24 @ 12:58), Max: 98.4 (12-29-24 @ 05:00)  HR: 83 (12-29-24 @ 12:58) (53 - 85)  BP: 98/55 (12-29-24 @ 12:58)  RR: 18 (12-29-24 @ 12:58) (18 - 18)  SpO2: 100% (12-29-24 @ 12:58) (96% - 100%)      CAPILLARY BLOOD GLUCOSE  POCT Blood Glucose.: 235 mg/dL (29 Dec 2024 16:48)  POCT Blood Glucose.: 190 mg/dL (29 Dec 2024 11:55)  POCT Blood Glucose.: 160 mg/dL (29 Dec 2024 08:10)  POCT Blood Glucose.: 152 mg/dL (29 Dec 2024 06:19)  POCT Blood Glucose.: 166 mg/dL (28 Dec 2024 21:23)     ==================>> LAB AND IMAGING <<==================                        8.1    5.55  )-----------( 155      ( 29 Dec 2024 03:25 )             26.5        12-29    138  |  101  |  63[H]  ----------------------------<  133[H]  3.6   |  25  |  4.21[H]    Ca    8.5      29 Dec 2024 03:25  Phos  3.2     12-29  Mg     1.90     12-29    TPro  6.6  /  Alb  2.9[L]  /  TBili  <0.2  /  DBili  x   /  AST  30  /  ALT  41  /  AlkPhos  47  12-29    WBC count:   5.55 <<== ,  5.69 <<== ,  7.45 <<== ,  9.65 <<== ,  6.66 <<==   Hemoglobin:   8.1 <<==,  8.6 <<==,  8.6 <<==,  10.1 <<==,  9.2 <<==  platelets:  155 <==, 153 <==, 156 <==, 169 <==, 160 <==, 184 <==    Creatinine:  4.21  <<==, 4.15  <<==, 4.20  <<==, 4.31  <<==, 4.41  <<==, 4.78  <<==  Sodium:   138  <==, 137  <==, 135  <==, 135  <==, 134  <==, 138  <==, 138  <==       AST:          30(12-29) <== , 30(12-28) <== , 18(12-27) <== , 12(12-26) <== , 14(12-25) <==      ALT:        41(12-29)  <== , 37(12-28)  <== , 21(12-27)  <== , 9(12-26)  <== , 11(12-25)  <==      AP:        47(12-29)  <=, 56(12-28)  <=, 47(12-27)  <=, 53(12-26)  <=, 51(12-25)  <=     Bili:        <0.2(12-29)  <=, 0.2(12-28)  <=, 0.4(12-27)  <=, 0.5(12-26)  <=, 0.3(12-25)  <=    ____________________________    M I C R O B I O L O G Y :    Culture - Blood (collected 26 Dec 2024 13:48)  Source: .Blood BLOOD  Preliminary Report (29 Dec 2024 17:01):    No growth at 72 Hours    Culture - Blood (collected 26 Dec 2024 12:41)  Source: .Blood BLOOD  Preliminary Report (29 Dec 2024 17:01):    No growth at 72 Hours    Culture - Urine (collected 21 Dec 2024 21:30)  Source: Clean Catch  Final Report (24 Dec 2024 23:57):    50,000 - 99,000 CFU/mL Candida glabrata "Susceptibilities not performed"    Urinalysis with Rflx Culture (collected 21 Dec 2024 21:30)        < from: CT Abdomen and Pelvis No Cont (12.22.24 @ 22:13) >  IMPRESSION:  1.6 cm calcified gallstone region of gallbladder neck. No pericholecystic   inflammatory changes..  Moderate/severe bilateral hydronephrosis. 11 mm nonobstructing stone   midportion left kidney. Moderately severe bilateral hydroureter. No  obstructing stones.  Sierra catheter within urinary bladder which is decompressed.  Please refer to detailed findings otherwise described above.  < end of copied text >    < from: US Kidney and Bladder (12.21.24 @ 22:04) >  IMPRESSION:  Moderate to severe left and moderate right hydronephrosis which persists   post void. CT could evaluate for underlying etiology.  Nonobstructing 1 cm stone in the leftmidpole.  Multiple renal cysts as above.  < end of copied text >    ___________________________________________________________________________________  ===============>>  A S S E S S M E N T   A N D   P L A N <<===============  ------------------------------------------------------------------------------------------    · Assessment	   54 y.o. M w/ PMHx T2DM, prostatitis, chronic cystitis, and nephrolithiasis s/p PCNL 02/21, left nephroureteral tube 03/24 c/b hematuria needing L renal angioembolization of pseudoaneurysms and PCN-U 04/06 who presented to the ED for abnormal outpatient labs. in acute renal failure and plan for HD today    Problem/Plan - :  ·  Problem: MGUS  hematology appreciated  need outpatient follow up with Hem for repeat labs and further follow up . management    Problem/Plan - 1:  ·  Problem: Acute renal failure.   BASHIR, hydronephrosis / obstructive uropathy, metabolic acidosis, hyperkalemia   - nephrology following, appreciated   no further HD planned   repeat sono as outpatient and close follow up with renal /    renally dose meds  monitor electrolytes .. improved   pain management + antispasmotics per   NO TRIAL OF VOID >> to go home with Sierra   OOB to chair, PT    Problem/Plan - 2:  ·  Problem: hypotension   midodrine on >> taper down as able as outpatient   monitor vitals closely    Problem/Plan - 3:  ·  Problem: Hydronephrosis, bilateral.   ·  Plan: - renal US showed moderate to severe left and moderate right hydronephrosis which persists post void. Non obstructing 1 cm stone in the left midpole and multiple renal cysts as above  - Continue sierra  - urology follow up as above     Problem/Plan - 4:  ·  Problem: Leukocytosis/ UTI / pyuria  / funguria   - hx of nephrostomy tubes. Will need ppx pseudomonas coverage  - CXR clear  - Sierra draining significant puss  - post cefepime    antifungals as ordered   - ID follow up and management     Problem/Plan - 5:  ·  Problem: Diabetes mellitus.  poorly controlled   ·  Plan: A1c on admission 8.3%  - Home meds of alglipitin, jardiance. Need to confirm doses.  - ISS premeal and bedtime.  endo as needed    Problem/Plan - 6:  ·  Problem: anemia  monitor closely for now       12-23-24 @ 06:34  Iron:     27 ug/dL  Iron %:   18 %  TIBC:     154 ug/dL    Problem/Plan - 7:  ·  Problem: Need for prophylactic measure.    diet   - Bowel Regiment: as needed   - Activityas tolerated     possible DC planing home in next 24 hrs if cleared    --------------------------------------------  Case discussed with patient, medical team ,   Education given on findings and plan of care  ___________________________  H. JOSE ANGEL Stephens.  Pager: [phone]

## 2024-12-29 NOTE — CONSULT NOTE ADULT - ASSESSMENT
54M with DM2, prostatitis, nephrolithiasis s/p PCN admitted with BASHIR on CKD. Hematology consulted for MGUS.    #MGUS  - SPEP M-spike 0.2, HEMA weak monoclonal IgG kappa, K/L 1.65 (normal K/L especially given CKD)  - pt has anemia (attributable to CKD) and CKD but no hypercalcemia or bone lesions      Recommend:  - No high risk features that would necessitate inpatient workup. Recommend outpatient follow up and repeat labs with hematology for MGUS within 6 months.   - If there is clinical suspicion for MGRS (although suspect BASHIR and CKD could be attributable to nephrolithiasis and postrenal BASHIR), defer need for renal biopsy to nephrology  - No heme contraindication to discharge  - Referral sent to [facility].     Seen and discussed with attending Dr King Bree Mg MD PGY-4  Hematology/Oncology Fellow  Available on MS TEAMS  Pagers: [phone] [phone]; [facility] [phone]  54M with DM2, prostatitis, nephrolithiasis s/p PCN admitted with BASHIR on CKD. Hematology consulted for MGUS.    #MGUS  - SPEP M-spike 0.2, HEMA weak monoclonal IgG kappa, K/L 1.65 (normal K/L especially given CKD)  - pt has anemia (attributable to CKD) and CKD but no hypercalcemia or bone lesions      Recommend:  - No high risk features that would necessitate inpatient workup. Recommend outpatient follow up and repeat labs with hematology for MGUS within 6 months.   - If there is clinical suspicion for MGRS (although suspect BASHIR and CKD could be attributable to nephrolithiasis and postrenal BASHIR), defer need for renal biopsy to nephrology  - No heme contraindication to discharge  - Referral sent to [facility].   - Hematology to sign off at this time. Please call us back with questions or if patient's status changes.     Seen and discussed with attending Dr King Bree Mg MD PGY-4  Hematology/Oncology Fellow  Available on MS TEAMS  Pagers: [phone]; [facility] [phone]

## 2024-12-29 NOTE — CONSULT NOTE ADULT - ATTENDING COMMENTS
ARF requiring HD  Vascular consulted for temp hd catheter placement  Plan for catheter placement, awaiting labs
55 y/o man with nephrolithiasis, hydronephrosis, BASHIR on CKD. Hematology consulted for MGUS/MGRS. Evidence for MGUS, can pursue on out-patient basis with hematology, though unlikely contributing substantially to his kidney disease.  Jaxon Guillen MD PhD  Oncology Attending
54 m with T2DM, prostatitis, chronic cystitis, and nephrolithiasis s/p PCNL 02/21, left nephroureteral tube 03/24 c/b hematuria needing L renal angioembolization of pseudoaneurysms and PCN-U 04/06, was started on Ozempic about 3 weeks ago and noticed that for about a week had nausea and was not eating or drinking, he has chronic bladder pain and urinary frequency also, went to PMD and then was sent to ER for renal failure  Here afebrile,  WBC: 11, CR: 12, BUN   u/a positive  u/s: Moderate to severe left and moderate right hydronephrosis which persists post void..Nonobstructing 1 cm stone in the left midpole. Multiple renal cysts     chronic cystitis (has bladder pain and frequency after PCNL and renal angioembolization) now with renal failure, has mod ot severe L and mod R hydro on u/s, was also started on ozempic 3 weeks ago  u/a positive but not sure there is acute UTI    * follow the urine cx  * c/w cefepime renally dosed for now if cultures negative will stop  * follow with nephro and urology    The above assessment and plan was discussed with the primary team    Halle Alexis MD  contact on teams  After 5pm and on weekends call [phone]
Patient with history of DM, BPH, nephrolithiasis with past placement of nephroureteral tube for stones, increasing urinary frequency who presented with complaints of left flank pain radiating to LLQ, dark colored urine, dry heaves and nausea.  Outpatient medications included ACEI and Metformin.  Labs were noted for BASHIR with hyperkalemia and metabolic acidosis.  Lactate was normal on VBG.  Imaging showed significant hydronephrosis that did not change with voiding.   1. BASHIR - most likely etiology is obstructive uropathy but unclear if there is a chronic component.  Given symptoms, will arrange to have dialysis started today (once access placed) with additional treatment for 12/23.  Please have Urology see the patient.  Defer use of ACEI, ARB, nephrotoxic medications.   2. Metabolic acidosis - initially provided with bicarbonate drip.  Hold Metformin.  Lactate reported normal.  Can stop bicarbonate once dialysis initiated and serum bicarbonate level improved.  3. Hyperkalemia - monitor with initiation of dialysis.  Previously treated medically.  Further recommendations per course.  4. Hyperphosphatemia - if repeat levels remain elevated, start on binders. I.e. Sevelamer.  5. Nephrolithiasis - composition unknown.  Apparently is following Nephrology at [facility].    Reviewed with patient at bedside.  He is in agreement today (12/22) to start dialysis.  Further recommendations per clinical course

## 2024-12-29 NOTE — CONSULT NOTE ADULT - CONSULT REQUESTED DATE/TIME
22-Dec-2024 14:40
29-Dec-2024 17:50
22-Dec-2024
23-Dec-2024 13:16
24-Dec-2024 15:07
22-Dec-2024 00:16

## 2024-12-29 NOTE — PHYSICAL THERAPY INITIAL EVALUATION ADULT - ADDITIONAL COMMENTS
Patient lives in a private home with his wife and two sons. Patient reports 3 steps to enter +flight to bedroom and bathroom. Patient was fully independent with functional mobility and ADL's, denies any history of falls. Patient works remotely from home.    Patient left in bed, NAD, all lines and tubes intact, call bell within reach, head of bed elevated >30 degrees, RN Tali aware of PT

## 2024-12-29 NOTE — PROGRESS NOTE ADULT - PROBLEM SELECTOR PLAN 1
Pt. with renal failure in the setting of b/l hydronephrosis with hx of chronic nephrolithiasis (per [facility], stone composition combined calcium oxalate and uric acid stones). On review of [facility], Scr 0.99 on 4/7/22. Scr elevated to 12.52 on admission (12/21/24). UA with proteinuria, hematuria, and bacteria (12/21/24). US Kidneys and bladder significant for moderate to severe B/L hydro with R kidney 15.6cm and L kidney 12.2cm, multiple renal cysts, and non-obstructing 1cm stone in the left midpole (12/21/24). CT A/P also showed mod-severe B/L hydroureteronephrosis, bladder decompressed with sierra. Unclear if pt has underlying CKD due to chronic obstruction or other etiology, no SCr between 2022 and present. Pt had uremic s/s on presentation and was urgently dialyzed on 12/22/24 via R fem cath placed by Vascular team. He received HD again on 12/23 and 12/24. His Cr stabilized so fem shiley removed on 12/27. Pt still remains mildly hypotensive. but better on Midodrine. TTE unremarkable. UCx positive for candida, on Fluconazole per ID.    - UPCR 1.1g. Serologies sent. Noted weak IgG kappa band on serum immunofixation suggestive of monoclonal gammopathy. Please obtain Heme/Onc input.     - Pt s/p  cystoscopy with urology  to evaluate bladder/UOs 12/25 but could not pass scope beyond bladder. Hydro possibly due to chronic cystitis. If pt is to remain sierra-dependent, perhaps he would be a candidate for [place] pouch vs. ileal conduit vs. PCNs in the future.     - Cr remains stable/elevated today at 4.2. No further plan for HD. Monitor labs and urine output. Avoid any potential nephrotoxins when possible and dose medications per eGFR.

## 2024-12-29 NOTE — PHYSICAL THERAPY INITIAL EVALUATION ADULT - PERTINENT HX OF CURRENT PROBLEM, REHAB EVAL
Patient is a 54 year old male who presented to the ED for abnormal outpatient labs in acute renal failure. HD as needed and urology consulted for nephrostomy tube placement.  US Kidneys and bladder significant for moderate to severe left and moderate right hydronephrosis, multiple renal cysts, and non-obstructing 1cm stone in the left midpole (12/21/24). --> likely cause of renal failure.

## 2024-12-29 NOTE — PROGRESS NOTE ADULT - SUBJECTIVE AND OBJECTIVE BOX
INTERVAL HPI/OVERNIGHT EVENTS: NAEON.     SUBJECTIVE: Patient seen and examined at bedside. Reports making yellow urine seen in drainage bag. No acute complaints but requiring frequent PRN pain meds         OBJECTIVE:    VITAL SIGNS:  ICU Vital Signs Last 24 Hrs  T(C): 36.1 (29 Dec 2024 12:58), Max: 36.9 (28 Dec 2024 17:00)  T(F): 97 (29 Dec 2024 12:58), Max: 98.5 (28 Dec 2024 17:00)  HR: 83 (29 Dec 2024 12:58) (53 - 85)  BP: 98/55 (29 Dec 2024 12:58) (96/42 - 103/60)  BP(mean): --  ABP: --  ABP(mean): --  RR: 18 (29 Dec 2024 12:58) (17 - 18)  SpO2: 100% (29 Dec 2024 12:58) (96% - 100%)    O2 Parameters below as of 29 Dec 2024 12:58  Patient On (Oxygen Delivery Method): room air            Plateau pressure:   P/F ratio:     12-28 @ 07:01  -  12-29 @ 07:00  --------------------------------------------------------  IN: 560 mL / OUT: 2200 mL / NET: -1640 mL    12-29 @ 07:01  -  12-29 @ 14:10  --------------------------------------------------------  IN: 480 mL / OUT: 1050 mL / NET: -570 mL      CAPILLARY BLOOD GLUCOSE      POCT Blood Glucose.: 190 mg/dL (29 Dec 2024 11:55)    PHYSICAL EXAM:    CONSTITUTIONAL: NAD, well-developed, well-groomed  RESPIRATORY: Normal respiratory effort; lungs are clear to auscultation bilaterally  CARDIOVASCULAR: Regular rate and rhythm, normal S1 and S2, no murmur/rub/gallop  ABDOMEN: Soft, nontender to palpation, normoactive bowel sounds, no rebound/guarding  MUSCULOSKELETAL: No clubbing or cyanosis of digits; no joint swelling or tenderness to palpation  EXTREMITIES:  No lower extremity edema; Peripheral pulses are 2+ bilaterally  PSYCH: A+O to person, place, and time; affect appropriate  NEUROLOGY: no gross sensory deficits   : Urine was cloudy but yellow.   SKIN: No rashes; no palpable lesions    MEDICATIONS:  MEDICATIONS  (STANDING):  chlorhexidine 2% Cloths 1 Application(s) Topical daily  dextrose 5%. 1000 milliLiter(s) (100 mL/Hr) IV Continuous <Continuous>  dextrose 5%. 1000 milliLiter(s) (50 mL/Hr) IV Continuous <Continuous>  dextrose 50% Injectable 25 Gram(s) IV Push once  dextrose 50% Injectable 12.5 Gram(s) IV Push once  dextrose 50% Injectable 25 Gram(s) IV Push once  dextrose Oral Gel 15 Gram(s) Oral once  fluconAZOLE   Tablet 200 milliGRAM(s) Oral every 24 hours  gabapentin 300 milliGRAM(s) Oral two times a day  glucagon  Injectable 1 milliGRAM(s) IntraMuscular once  insulin lispro (ADMELOG) corrective regimen sliding scale   SubCutaneous three times a day before meals  insulin lispro (ADMELOG) corrective regimen sliding scale   SubCutaneous at bedtime  lidocaine/prilocaine Cream 1 Application(s) Topical daily  midodrine. 20 milliGRAM(s) Oral three times a day  polyethylene glycol 3350 17 Gram(s) Oral daily  rosuvastatin 10 milliGRAM(s) Oral at bedtime  senna 2 Tablet(s) Oral at bedtime  sodium bicarbonate 1300 milliGRAM(s) Oral three times a day  sodium chloride 0.9%. 1000 milliLiter(s) (100 mL/Hr) IV Continuous <Continuous>    MEDICATIONS  (PRN):  diazepam    Tablet 2 milliGRAM(s) Oral two times a day PRN bladder spasm  HYDROmorphone  Injectable 0.4 milliGRAM(s) IV Push every 4 hours PRN Breakthrough Pain  melatonin 3 milliGRAM(s) Oral at bedtime PRN Insomnia  ondansetron Injectable 4 milliGRAM(s) IV Push every 8 hours PRN Nausea and/or Vomiting  oxyCODONE    IR 10 milliGRAM(s) Oral every 4 hours PRN Moderate Pain - Severe pain (5 - 10)  sodium chloride 0.9% Bolus. 100 milliLiter(s) IV Bolus every 5 minutes PRN SBP LESS THAN or EQUAL to 90 mmHg      LABS:                        8.1    5.55  )-----------( 155      ( 29 Dec 2024 03:25 )             26.5     12-29    138  |  101  |  63[H]  ----------------------------<  133[H]  3.6   |  25  |  4.21[H]    Ca    8.5      29 Dec 2024 03:25  Phos  3.2     12-29  Mg     1.90     12-29    TPro  6.6  /  Alb  2.9[L]  /  TBili  <0.2  /  DBili  x   /  AST  30  /  ALT  41  /  AlkPhos  47  12-29      Urinalysis Basic - ( 29 Dec 2024 03:25 )    Color: x / Appearance: x / SG: x / pH: x  Gluc: 133 mg/dL / Ketone: x  / Bili: x / Urobili: x   Blood: x / Protein: x / Nitrite: x   Leuk Esterase: x / RBC: x / WBC x   Sq Epi: x / Non Sq Epi: x / Bacteria: x        RADIOLOGY & ADDITIONAL TESTS: Reviewed.

## 2024-12-29 NOTE — CONSULT NOTE ADULT - CONSULT REASON
Acute kidney failure and b/l hydro
MGUS
Renal Failure
bilateral pcn
Vascular Access
concern for UTI
none

## 2024-12-29 NOTE — PHYSICAL THERAPY INITIAL EVALUATION ADULT - GENERAL OBSERVATIONS, REHAB EVAL
Patient found semi-reclined in bed NAD, A&Ox4, +tele monitor, +sierra, patient OK for PT per RN Tali, patient agreeable to PT evaluation, HR 81, spO2 94% on room air

## 2024-12-29 NOTE — CONSULT NOTE ADULT - SUBJECTIVE AND OBJECTIVE BOX
HEMATOLOGY ONCOLOGY CONSULT     Patient is a 54y old  Male who presents with a chief complaint of Abnormal Labs (29 Dec 2024 17:48)      HPI:  54M with DM2, prostatitis, nephrolithiasis s/p PCN admitted with BASHIR on CKD. Hematology consulted for MGUS.    Pt seen at bedside. We discussed that MGUS is a premalignant condition but requires regular monitoring with hematology. Pt states he is motivated to make an appointment and does not want to stay admitted for workup. All questions answered.       PAST MEDICAL & SURGICAL HISTORY:  Esophageal achalasia  s/p esophageal repair about 5 years ago      Type 2 diabetes mellitus      Calculus of kidney  passed on own in the past; CT scan showing a 3.5 cm and 1.2 cm left mid-upper stones in December 2021      Former smoker  1 PPD x 16 years; Quit in 2003      Obesity  BMI 34.4      Kidney stones      History of esophageal surgery  esophageal repair about 5 years ago for esopheal achalasia      Other injury of spleen  fall on ice s/p spleen embolization at [facility] IR over 10 years ago      Nephrostomy tube bleed          FAMILY HISTORY:      MEDICATIONS  (STANDING):  chlorhexidine 2% Cloths 1 Application(s) Topical daily  dextrose 5%. 1000 milliLiter(s) (100 mL/Hr) IV Continuous <Continuous>  dextrose 5%. 1000 milliLiter(s) (50 mL/Hr) IV Continuous <Continuous>  dextrose 50% Injectable 25 Gram(s) IV Push once  dextrose 50% Injectable 12.5 Gram(s) IV Push once  dextrose 50% Injectable 25 Gram(s) IV Push once  dextrose Oral Gel 15 Gram(s) Oral once  fluconAZOLE   Tablet 200 milliGRAM(s) Oral every 24 hours  gabapentin 300 milliGRAM(s) Oral two times a day  glucagon  Injectable 1 milliGRAM(s) IntraMuscular once  insulin lispro (ADMELOG) corrective regimen sliding scale   SubCutaneous three times a day before meals  insulin lispro (ADMELOG) corrective regimen sliding scale   SubCutaneous at bedtime  lidocaine/prilocaine Cream 1 Application(s) Topical daily  midodrine. 20 milliGRAM(s) Oral three times a day  polyethylene glycol 3350 17 Gram(s) Oral daily  rosuvastatin 10 milliGRAM(s) Oral at bedtime  senna 2 Tablet(s) Oral at bedtime  sodium bicarbonate 1300 milliGRAM(s) Oral three times a day  sodium chloride 0.9%. 1000 milliLiter(s) (100 mL/Hr) IV Continuous <Continuous>    MEDICATIONS  (PRN):  diazepam    Tablet 2 milliGRAM(s) Oral two times a day PRN bladder spasm  HYDROmorphone  Injectable 0.4 milliGRAM(s) IV Push every 4 hours PRN Breakthrough Pain  melatonin 3 milliGRAM(s) Oral at bedtime PRN Insomnia  ondansetron Injectable 4 milliGRAM(s) IV Push every 8 hours PRN Nausea and/or Vomiting  oxyCODONE    IR 10 milliGRAM(s) Oral every 4 hours PRN Moderate Pain - Severe pain (5 - 10)  sodium chloride 0.9% Bolus. 100 milliLiter(s) IV Bolus every 5 minutes PRN SBP LESS THAN or EQUAL to 90 mmHg      Allergies    penicillins (Unknown)    Intolerances        Vital Signs Last 24 Hrs  T(C): 36.7 (29 Dec 2024 12:58), Max: 36.9 (29 Dec 2024 05:00)  T(F): 98.1 (29 Dec 2024 12:58), Max: 98.4 (29 Dec 2024 05:00)  HR: 83 (29 Dec 2024 12:58) (53 - 85)  BP: 98/55 (29 Dec 2024 12:58) (96/42 - 101/63)  BP(mean): --  RR: 18 (29 Dec 2024 12:58) (18 - 18)  SpO2: 100% (29 Dec 2024 12:58) (96% - 100%)    Parameters below as of 29 Dec 2024 12:58  Patient On (Oxygen Delivery Method): room air        PHYSICAL EXAM  General: adult in NAD  HEENT: clear oropharynx, anicteric sclera, pink conjunctiva  Neck: supple  CV: normal S1/S2 with no murmur rubs or gallops  Lungs: positive air movement b/l ant lungs,clear to auscultation, no wheezes, no rales  Abdomen: soft non-tender non-distended, no hepatosplenomegaly  Ext: no clubbing cyanosis or edema  Skin: no rashes and no petechiae  Neuro: alert and oriented X 4, no focal deficits      12-28-24 @ 07:01  -  12-29-24 @ 07:00  --------------------------------------------------------  IN: 560 mL / OUT: 2200 mL / NET: -1640 mL    12-29-24 @ 07:01  -  12-29-24 @ 17:51  --------------------------------------------------------  IN: 480 mL / OUT: 1050 mL / NET: -570 mL      LABS:                          8.1    5.55  )-----------( 155      ( 29 Dec 2024 03:25 )             26.5         Mean Cell Volume : 84.1 fL  Mean Cell Hemoglobin : 25.7 pg  Mean Cell Hemoglobin Concentration : 30.6 g/dL  Auto Neutrophil # : 3.27 K/uL  Auto Lymphocyte # : 0.77 K/uL  Auto Monocyte # : 0.97 K/uL  Auto Eosinophil # : 0.47 K/uL  Auto Basophil # : 0.02 K/uL  Auto Neutrophil % : 58.8 %  Auto Lymphocyte % : 13.9 %  Auto Monocyte % : 17.5 %  Auto Eosinophil % : 8.5 %  Auto Basophil % : 0.4 %      12-29    138  |  101  |  63[H]  ----------------------------<  133[H]  3.6   |  25  |  4.21[H]    Ca    8.5      29 Dec 2024 03:25  Phos  3.2     12-29  Mg     1.90     12-29    TPro  6.6  /  Alb  2.9[L]  /  TBili  <0.2  /  DBili  x   /  AST  30  /  ALT  41  /  AlkPhos  47  12-29          Iron - Total Binding Capacity.: 154 ug/dL (12-23 @ 06:34)  Ferritin: 612 ng/mL (12-23 @ 06:34)              BLOOD SMEAR INTERPRETATION:       RADIOLOGY & ADDITIONAL STUDIES:

## 2024-12-30 ENCOUNTER — TRANSCRIPTION ENCOUNTER (OUTPATIENT)
Age: 54
End: 2024-12-30

## 2024-12-30 VITALS
OXYGEN SATURATION: 100 % | HEART RATE: 60 BPM | DIASTOLIC BLOOD PRESSURE: 63 MMHG | RESPIRATION RATE: 18 BRPM | TEMPERATURE: 99 F | SYSTOLIC BLOOD PRESSURE: 103 MMHG

## 2024-12-30 LAB
ALBUMIN SERPL ELPH-MCNC: 3.4 G/DL — SIGNIFICANT CHANGE UP (ref 3.3–5)
ALP SERPL-CCNC: 49 U/L — SIGNIFICANT CHANGE UP (ref 40–120)
ALT FLD-CCNC: 33 U/L — SIGNIFICANT CHANGE UP (ref 4–41)
ANION GAP SERPL CALC-SCNC: 14 MMOL/L — SIGNIFICANT CHANGE UP (ref 7–14)
AST SERPL-CCNC: 18 U/L — SIGNIFICANT CHANGE UP (ref 4–40)
BASOPHILS # BLD AUTO: 0.02 K/UL — SIGNIFICANT CHANGE UP (ref 0–0.2)
BASOPHILS NFR BLD AUTO: 0.4 % — SIGNIFICANT CHANGE UP (ref 0–2)
BILIRUB SERPL-MCNC: 0.2 MG/DL — SIGNIFICANT CHANGE UP (ref 0.2–1.2)
BLD GP AB SCN SERPL QL: NEGATIVE — SIGNIFICANT CHANGE UP
BUN SERPL-MCNC: 70 MG/DL — HIGH (ref 7–23)
CALCIUM SERPL-MCNC: 8.6 MG/DL — SIGNIFICANT CHANGE UP (ref 8.4–10.5)
CHLORIDE SERPL-SCNC: 99 MMOL/L — SIGNIFICANT CHANGE UP (ref 98–107)
CO2 SERPL-SCNC: 26 MMOL/L — SIGNIFICANT CHANGE UP (ref 22–31)
CREAT SERPL-MCNC: 4.18 MG/DL — HIGH (ref 0.5–1.3)
EGFR: 16 ML/MIN/1.73M2 — LOW
EOSINOPHIL # BLD AUTO: 0.6 K/UL — HIGH (ref 0–0.5)
EOSINOPHIL NFR BLD AUTO: 10.6 % — HIGH (ref 0–6)
GAS PNL BLDV: SIGNIFICANT CHANGE UP
GLUCOSE BLDC GLUCOMTR-MCNC: 204 MG/DL — HIGH (ref 70–99)
GLUCOSE BLDC GLUCOMTR-MCNC: 218 MG/DL — HIGH (ref 70–99)
GLUCOSE SERPL-MCNC: 150 MG/DL — HIGH (ref 70–99)
HCT VFR BLD CALC: 25.6 % — LOW (ref 39–50)
HGB BLD-MCNC: 8.1 G/DL — LOW (ref 13–17)
IANC: 2.9 K/UL — SIGNIFICANT CHANGE UP (ref 1.8–7.4)
IMM GRANULOCYTES NFR BLD AUTO: 0.5 % — SIGNIFICANT CHANGE UP (ref 0–0.9)
LYMPHOCYTES # BLD AUTO: 1.05 K/UL — SIGNIFICANT CHANGE UP (ref 1–3.3)
LYMPHOCYTES # BLD AUTO: 18.6 % — SIGNIFICANT CHANGE UP (ref 13–44)
MAGNESIUM SERPL-MCNC: 1.9 MG/DL — SIGNIFICANT CHANGE UP (ref 1.6–2.6)
MCHC RBC-ENTMCNC: 26.2 PG — LOW (ref 27–34)
MCHC RBC-ENTMCNC: 31.6 G/DL — LOW (ref 32–36)
MCV RBC AUTO: 82.8 FL — SIGNIFICANT CHANGE UP (ref 80–100)
MONOCYTES # BLD AUTO: 1.06 K/UL — HIGH (ref 0–0.9)
MONOCYTES NFR BLD AUTO: 18.7 % — HIGH (ref 2–14)
NEUTROPHILS # BLD AUTO: 2.9 K/UL — SIGNIFICANT CHANGE UP (ref 1.8–7.4)
NEUTROPHILS NFR BLD AUTO: 51.2 % — SIGNIFICANT CHANGE UP (ref 43–77)
NRBC # BLD: 0 /100 WBCS — SIGNIFICANT CHANGE UP (ref 0–0)
NRBC # FLD: 0 K/UL — SIGNIFICANT CHANGE UP (ref 0–0)
PHOSPHATE SERPL-MCNC: 2.7 MG/DL — SIGNIFICANT CHANGE UP (ref 2.5–4.5)
PLATELET # BLD AUTO: 183 K/UL — SIGNIFICANT CHANGE UP (ref 150–400)
POTASSIUM SERPL-MCNC: 3.9 MMOL/L — SIGNIFICANT CHANGE UP (ref 3.5–5.3)
POTASSIUM SERPL-SCNC: 3.9 MMOL/L — SIGNIFICANT CHANGE UP (ref 3.5–5.3)
PROT SERPL-MCNC: 6.9 G/DL — SIGNIFICANT CHANGE UP (ref 6–8.3)
RBC # BLD: 3.09 M/UL — LOW (ref 4.2–5.8)
RBC # FLD: 13.4 % — SIGNIFICANT CHANGE UP (ref 10.3–14.5)
RH IG SCN BLD-IMP: POSITIVE — SIGNIFICANT CHANGE UP
SODIUM SERPL-SCNC: 139 MMOL/L — SIGNIFICANT CHANGE UP (ref 135–145)
WBC # BLD: 5.66 K/UL — SIGNIFICANT CHANGE UP (ref 3.8–10.5)
WBC # FLD AUTO: 5.66 K/UL — SIGNIFICANT CHANGE UP (ref 3.8–10.5)

## 2024-12-30 PROCEDURE — 99222 1ST HOSP IP/OBS MODERATE 55: CPT | Mod: GC

## 2024-12-30 PROCEDURE — 99232 SBSQ HOSP IP/OBS MODERATE 35: CPT

## 2024-12-30 PROCEDURE — 99232 SBSQ HOSP IP/OBS MODERATE 35: CPT | Mod: GC

## 2024-12-30 RX ORDER — MIDODRINE HYDROCHLORIDE 5 MG/1
2 TABLET ORAL
Qty: 180 | Refills: 0
Start: 2024-12-30 | End: 2025-01-28

## 2024-12-30 RX ORDER — MIRABEGRON 50 MG/1
1 TABLET, FILM COATED, EXTENDED RELEASE ORAL
Refills: 0 | DISCHARGE

## 2024-12-30 RX ORDER — GINKGO BILOBA 40 MG
1 CAPSULE ORAL
Qty: 0 | Refills: 0 | DISCHARGE
Start: 2024-12-30

## 2024-12-30 RX ORDER — OXYCODONE HCL 15 MG
1 TABLET ORAL
Qty: 0 | Refills: 0 | DISCHARGE
Start: 2024-12-30

## 2024-12-30 RX ORDER — OXYBUTYNIN CHLORIDE 5 MG/1
5 TABLET ORAL EVERY 8 HOURS
Refills: 0 | Status: DISCONTINUED | OUTPATIENT
Start: 2024-12-30 | End: 2024-12-30

## 2024-12-30 RX ORDER — SODIUM BICARBONATE 84 MG/ML
2 INJECTION, SOLUTION INTRAVENOUS
Qty: 180 | Refills: 0
Start: 2024-12-30 | End: 2025-01-28

## 2024-12-30 RX ORDER — FLUCONAZOLE 200 MG/1
1 TABLET ORAL
Qty: 10 | Refills: 0
Start: 2024-12-30 | End: 2025-01-08

## 2024-12-30 RX ORDER — OXYBUTYNIN CHLORIDE 5 MG/1
1 TABLET ORAL
Qty: 90 | Refills: 0
Start: 2024-12-30 | End: 2025-01-28

## 2024-12-30 RX ORDER — SENNOSIDES 8.6 MG/1
2 TABLET, FILM COATED ORAL
Qty: 0 | Refills: 0 | DISCHARGE
Start: 2024-12-30

## 2024-12-30 RX ORDER — GABAPENTIN 300 MG/1
1 CAPSULE ORAL
Qty: 28 | Refills: 0
Start: 2024-12-30 | End: 2025-01-12

## 2024-12-30 RX ORDER — POLYETHYLENE GLYCOL 3350 17 G/DOSE
17 POWDER (GRAM) ORAL
Qty: 0 | Refills: 0 | DISCHARGE
Start: 2024-12-30

## 2024-12-30 RX ADMIN — OXYBUTYNIN CHLORIDE 5 MILLIGRAM(S): 5 TABLET ORAL at 15:37

## 2024-12-30 RX ADMIN — SODIUM BICARBONATE 1300 MILLIGRAM(S): 84 INJECTION, SOLUTION INTRAVENOUS at 05:37

## 2024-12-30 RX ADMIN — Medication 2: at 08:01

## 2024-12-30 RX ADMIN — Medication 0.4 MILLIGRAM(S): at 06:50

## 2024-12-30 RX ADMIN — CHLORHEXIDINE GLUCONATE 1 APPLICATION(S): 1.2 RINSE ORAL at 12:34

## 2024-12-30 RX ADMIN — GABAPENTIN 300 MILLIGRAM(S): 300 CAPSULE ORAL at 05:36

## 2024-12-30 RX ADMIN — Medication 2: at 11:32

## 2024-12-30 RX ADMIN — SODIUM BICARBONATE 1300 MILLIGRAM(S): 84 INJECTION, SOLUTION INTRAVENOUS at 13:34

## 2024-12-30 RX ADMIN — MIDODRINE HYDROCHLORIDE 20 MILLIGRAM(S): 5 TABLET ORAL at 05:37

## 2024-12-30 RX ADMIN — MIDODRINE HYDROCHLORIDE 20 MILLIGRAM(S): 5 TABLET ORAL at 12:33

## 2024-12-30 RX ADMIN — Medication 0.4 MILLIGRAM(S): at 12:49

## 2024-12-30 NOTE — DISCHARGE NOTE NURSING/CASE MANAGEMENT/SOCIAL WORK - NSDCFUADDAPPT_GEN_ALL_CORE_FT
APPTS ARE READY TO BE MADE: [X] YES    Best Family or Patient Contact (if needed): Patient     Additional Information about above appointments (if needed):    1: Urology (Dr. Hoenig)  2: PCP (Dr. Shannon)  3: Nephrology, new patient    Other comments or requests:

## 2024-12-30 NOTE — CHART NOTE - NSCHARTNOTESELECT_GEN_ALL_CORE
IR Pre-procedure
ADS Signout/Event Note
Event Note
IR Pre-procedure
IR follow up note/Event Note
Nephrology attending/Event Note

## 2024-12-30 NOTE — PROVIDER CONTACT NOTE (OTHER) - ASSESSMENT
Patient had a BM then his Right groin dialysis shiley became bloody- Image sent   Unable to change dressing due to it being a dialysis site- needs completed by provider or dialysis
Pt complains of irritation at insertion site
Patient dialysis herman is bloody unable to change dressing due to needing to be completed by provider or dialysis nurse     Patient sierra has mucus around the penis tip- urine cloudy
Pt not in acute distress. Remains asymptomatic.
low BP 96/42 pt is asymptomatic
Patient complains of uncontrolled pain w/ PRN
Renew Jung order please
Patient Vomited, feeling nausea, complaining of uncontrolled pain with PRN meds
Patient complaining of 10/10 pain after 0.2mg of Dilaudid
low BP 86/53 pt is asymptomatic, pt given midodrine
Pt not in acute distress. Remains asymptomatic.
Pt not in acute distress. Remains asymptomatic.

## 2024-12-30 NOTE — CHART NOTE - NSCHARTNOTEFT_GEN_A_CORE
PRE-INTERVENTIONAL RADIOLOGY PROCEDURE NOTE    Patient Age: 54y    Patient Gender: Male    Procedure: Bilateral PCN on 12/26/24    Diagnosis/Indication: Acute Renal Failure w/ b/l hydronephrosis    Interventional Radiology Attending Physician: Dr. Russell    Ordering Attending Physician: Dr. Stephens    Pertinent Medical History:  PAST MEDICAL & SURGICAL HISTORY:  Esophageal achalasia  s/p esophageal repair about 5 years ago      Type 2 diabetes mellitus      Calculus of kidney  passed on own in the past; CT scan showing a 3.5 cm and 1.2 cm left mid-upper stones in December 2021      Former smoker  1 PPD x 16 years; Quit in 2003      Obesity  BMI 34.4      Kidney stones      History of esophageal surgery  esophageal repair about 5 years ago for esopheal achalasia      Other injury of spleen  fall on ice s/p spleen embolization at [facility] IR over 10 years ago      Nephrostomy tube bleed           NPO:  Antibiotics:  Anticoagulation:  Adverse events to anesthesia:   Objection to blood products:       Allergies: Allergies    penicillins (Unknown)    Intolerances      Vital Signs Last 24 Hrs  T(C): 37.2 (25 Dec 2024 21:18), Max: 37.2 (25 Dec 2024 21:18)  T(F): 99 (25 Dec 2024 21:18), Max: 99 (25 Dec 2024 21:18)  HR: 86 (25 Dec 2024 21:18) (60 - 86)  BP: 126/63 (25 Dec 2024 21:18) (93/53 - 126/63)  BP(mean): 63 (25 Dec 2024 08:04) (63 - 63)  RR: 18 (25 Dec 2024 21:18) (18 - 20)  SpO2: 100% (25 Dec 2024 21:18) (97% - 100%)    Parameters below as of 25 Dec 2024 21:18  Patient On (Oxygen Delivery Method): room air    Labs:                        9.2    6.66  )-----------( 160      ( 25 Dec 2024 04:35 )             27.9     12-25    138  |  102  |  72[H]  ----------------------------<  128[H]  4.1   |  23  |  4.78[H]    Ca    9.0      25 Dec 2024 04:35  Phos  4.9     12-25  Mg     2.10     12-25    TPro  6.9  /  Alb  3.2[L]  /  TBili  0.3  /  DBili  x   /  AST  14  /  ALT  11  /  AlkPhos  51  12-25    PT/INR - ( 25 Dec 2024 04:35 )   PT: 12.4 sec;   INR: 1.07 ratio         PTT - ( 25 Dec 2024 04:35 )  PTT:35.9 sec      Patient and Family Aware ? Yes  All questions and concerns have been addressed at this time.
Patient brought to IR for bilateral nephrostomy tube placement.     Patient with elevated creatinine with minimal output from sierra catheter. Has received cystoscopy with urology with limited visualization of UO, unable to place internal stents. Since then, has increased urine output since then.     Bedside shows less degree of hydronephrosis.     Patient also drank water prior to being transported to IR.     Discussed with anesthesia, he would likely need general anesthesia even if full NPO due to history of achalasia.    Discussed with Dr. Azul and Dr. Stephens,    Given increased urine output, will hold of on procedure today.   Team to order repeat ultrasound to confirm degree of hydronephrosis.  Possible HD tonight pending repeat labs per nephro.  Patient to be made NPO after midnight tonight for possible bilateral nephrostomy tube placement if there is still significant degree of hydronephrosis/worsening kidney function.
Pt. seen and examined. Pt. still with uremic symptoms for >10 days. Plan to initiate HD today. Please obtain vascular consult for non-tunneled HD catheter placement. Endorsed to primary team.    If you have any questions, please feel free to contact me  Virgil Sterling  Nephrology Fellow  Page [phone] / [employer] Teams (Preferred)  (After 4pm or on weekends please page the on-call fellow)
Signed out this patient to NP Melania Bravo on ADS service for 7N unit at 7:15AM 12/30/24.  Discussed hospital course and recommendations for further care.     Alonzo Culver MD, PGY-1  Department of Internal Medicine  Available on MS Teams
patient with B hydro and dialysis dependent. appreciate urology input. discussed with urology resident on behalf of attending who is in OR and Medicine attending. would proceed with Nephrostomy tubes to help acuuve renal recovery. otherwise, he will be dialysis dependent. urology is supportive of this approach. full note to follow
PRE-INTERVENTIONAL RADIOLOGY PROCEDURE NOTE  ============================  Liban Knox MD  Resident Physician  Department of Medicine  ============================  Patient Name: ANNMARIE RICK    Patient Age: 54y    Patient Gender: Male    Procedure: Bilateral PCN on 12/26/24    Diagnosis/Indication: Acute Renal Failure w/ b/l hydronephrosis    Interventional Radiology Attending Physician: Dr. Merida    Ordering Attending Physician: Dr. Stephens    Pertinent Medical History:  PAST MEDICAL & SURGICAL HISTORY:  Esophageal achalasia  s/p esophageal repair about 5 years ago      Type 2 diabetes mellitus      Calculus of kidney  passed on own in the past; CT scan showing a 3.5 cm and 1.2 cm left mid-upper stones in December 2021      Former smoker  1 PPD x 16 years; Quit in 2003      Obesity  BMI 34.4      Kidney stones      History of esophageal surgery  esophageal repair about 5 years ago for esopheal achalasia      Other injury of spleen  fall on ice s/p spleen embolization at [facility] IR over 10 years ago      Nephrostomy tube bleed           NPO:  Antibiotics:  Anticoagulation:  Adverse events to anesthesia:   Objection to blood products:       Allergies: Allergies    penicillins (Unknown)    Intolerances        Physical Exam:     Vitals:Vital Signs Last 24 Hrs  T(C): 36.9 (24 Dec 2024 17:00), Max: 37.4 (24 Dec 2024 07:40)  T(F): 98.5 (24 Dec 2024 17:00), Max: 99.4 (24 Dec 2024 07:40)  HR: 68 (24 Dec 2024 17:00) (63 - 82)  BP: 102/68 (24 Dec 2024 17:00) (91/51 - 118/61)  BP(mean): --  RR: 18 (24 Dec 2024 17:00) (16 - 18)  SpO2: 95% (24 Dec 2024 17:00) (95% - 99%)    Parameters below as of 24 Dec 2024 17:00  Patient On (Oxygen Delivery Method): room air      Pertinent Labs:                         9.1    9.38  )-----------( 184      ( 24 Dec 2024 06:45 )             27.4     12-24    138  |  98  |  104[H]  ----------------------------<  117[H]  4.1   |  21[L]  |  7.06[H]    Ca    9.0      24 Dec 2024 06:45  Phos  6.3     12-24  Mg     2.30     12-24    TPro  6.7  /  Alb  3.2[L]  /  TBili  0.2  /  DBili  x   /  AST  7   /  ALT  8   /  AlkPhos  51  12-24        Patient and Family Aware ? Yes  All questions and concerns have been addressed at this time.

## 2024-12-30 NOTE — PROGRESS NOTE ADULT - SUBJECTIVE AND OBJECTIVE BOX
[facility] DIVISION OF KIDNEY DISEASES AND HYPERTENSION   FOLLOW UP NOTE  --------------------------------------------------------------------------------  Reason for consult: BASHIR on CKD    24 hour events/subjective: Patient seen and examined at bedside. Pt feeling well, hopes to go home. Pt non oliguric via Jung.     PAST HISTORY  --------------------------------------------------------------------------------  No significant changes to PMH, PSH, FHx, SHx, unless otherwise noted    ALLERGIES & MEDICATIONS  --------------------------------------------------------------------------------  Allergies  penicillins (Unknown)    Intolerances    Standing Inpatient Medications  chlorhexidine 2% Cloths 1 Application(s) Topical daily  dextrose 5%. 1000 milliLiter(s) IV Continuous <Continuous>  dextrose 5%. 1000 milliLiter(s) IV Continuous <Continuous>  dextrose 50% Injectable 25 Gram(s) IV Push once  dextrose 50% Injectable 12.5 Gram(s) IV Push once  dextrose 50% Injectable 25 Gram(s) IV Push once  dextrose Oral Gel 15 Gram(s) Oral once  fluconAZOLE   Tablet 200 milliGRAM(s) Oral every 24 hours  gabapentin 300 milliGRAM(s) Oral two times a day  glucagon  Injectable 1 milliGRAM(s) IntraMuscular once  insulin lispro (ADMELOG) corrective regimen sliding scale   SubCutaneous three times a day before meals  insulin lispro (ADMELOG) corrective regimen sliding scale   SubCutaneous at bedtime  lidocaine/prilocaine Cream 1 Application(s) Topical daily  midodrine. 20 milliGRAM(s) Oral three times a day  polyethylene glycol 3350 17 Gram(s) Oral daily  rosuvastatin 10 milliGRAM(s) Oral at bedtime  senna 2 Tablet(s) Oral at bedtime  sodium bicarbonate 1300 milliGRAM(s) Oral three times a day  sodium chloride 0.9%. 1000 milliLiter(s) IV Continuous <Continuous>    PRN Inpatient Medications  diazepam    Tablet 2 milliGRAM(s) Oral two times a day PRN  HYDROmorphone  Injectable 0.4 milliGRAM(s) IV Push every 4 hours PRN  melatonin 3 milliGRAM(s) Oral at bedtime PRN  ondansetron Injectable 4 milliGRAM(s) IV Push every 8 hours PRN  oxyCODONE    IR 10 milliGRAM(s) Oral every 4 hours PRN  sodium chloride 0.9% Bolus. 100 milliLiter(s) IV Bolus every 5 minutes PRN    REVIEW OF SYSTEMS  --------------------------------------------------------------------------------  Gen: No fevers/chills  Head/Eyes/Ears: No HA   Respiratory: No dyspnea, cough  CV: No chest pain  GI: No abdominal pain, diarrhea  : +Jung   MSK: No edema  Skin: No rashes  Heme: No easy bruising or bleeding    All other systems were reviewed and are negative, except as noted.    VITALS/PHYSICAL EXAM  --------------------------------------------------------------------------------  T(C): 37.2 (12-30-24 @ 08:55), Max: 37.2 (12-29-24 @ 21:00)  HR: 63 (12-30-24 @ 08:55) (61 - 83)  BP: 101/62 (12-30-24 @ 08:55) (86/53 - 114/72)  RR: 18 (12-30-24 @ 08:55) (16 - 18)  SpO2: 100% (12-30-24 @ 08:55) (97% - 100%)  Wt(kg): --    12-29-24 @ 07:01  -  12-30-24 @ 07:00  --------------------------------------------------------  IN: 830 mL / OUT: 1350 mL / NET: -520 mL    12-30-24 @ 07:01  -  12-30-24 @ 11:59  --------------------------------------------------------  IN: 240 mL / OUT: 0 mL / NET: 240 mL    Physical Exam:  	Gen: NAD  	HEENT: Anicteric  	Pulm: CTA B/L  	CV: S1S2+  	Abd: Soft, +BS               : +Fabiana   	Ext: No LE edema B/L  	Neuro: Awake  	Skin: Warm and dry    LABS/STUDIES  --------------------------------------------------------------------------------              8.1    5.66  >-----------<  183      [12-30-24 @ 07:00]              25.6     139  |  99  |  70  ----------------------------<  150      [12-30-24 @ 07:00]  3.9   |  26  |  4.18        Ca     8.6     [12-30-24 @ 07:00]      Mg     1.90     [12-30-24 @ 07:00]      Phos  2.7     [12-30-24 @ 07:00]    TPro  6.9  /  Alb  3.4  /  TBili  0.2  /  DBili  x   /  AST  18  /  ALT  33  /  AlkPhos  49  [12-30-24 @ 07:00]    Creatinine Trend:  SCr 4.18 [12-30 @ 07:00]  SCr 4.21 [12-29 @ 03:25]  SCr 4.15 [12-28 @ 07:20]  SCr 4.20 [12-27 @ 04:02]  SCr 4.31 [12-26 @ 15:00]    HBsAb <3.0      [12-22-24 @ 17:00]  HBsAg Nonreact      [12-22-24 @ 17:00]  HBcAb Nonreact      [12-22-24 @ 17:00]  HCV 0.38, Nonreact      [12-22-24 @ 17:00]  HIV Nonreact      [12-22-24 @ 12:05]    SOLO: titer Negative, pattern --      [12-22-24 @ 12:05]  dsDNA <1      [12-22-24 @ 12:05]  C3 Complement 109      [12-22-24 @ 12:05]  C4 Complement 28      [12-22-24 @ 12:05]  ANCA: cANCA Negative, pANCA Negative, atypical ANCA Negative      [12-22-24 @ 12:05]  anti-GBM <0.2      [12-22-24 @ 12:05]  Syphilis Screen (Treponema Pallidum Ab) Negative      [12-22-24 @ 12:05]  PLA2R: KANDICE <1.8, IFA --      [12-22-24 @ 12:05]  Free Light Chains: kappa 9.86, lambda 5.97, ratio = 1.65      [12-22 @ 12:05]  Immunofixation Serum: Corresponding weak IgG and weak kappa bands consistent with monoclonal  IgG kappa protein. PATY Headley M.D.  Reference Range: None Detected      [12-22-24 @ 12:05]  SPEP Interpretation: Weak band in Gamma region suggestive of monoclonal gammopathy. Serum and  urine immunofixation electrophoresis are recommended if not previously  performed. PATY Headley M.D.      [12-22-24 @ 12:05]

## 2024-12-30 NOTE — PROGRESS NOTE ADULT - REASON FOR ADMISSION
Abnormal Labs

## 2024-12-30 NOTE — PROGRESS NOTE ADULT - ATTENDING COMMENTS
ARF requiring HD  Vascular consulted for temp hd catheter placement  s/p placement, being used without issues  if permcath needed, please consult IR  reconsult vascular if long term HD access anticipated
Patient examined and ROS reviewed. A case of CKD in the setting of b/l hydronephrosis with multiple renal cysts wih h/o chronic nephrolithiasis and chronic cystitis.  Patient had couple of dialysis and now stabilized on Jung's catheter drainage. Serum creatinine is stable butt BP on the lower side and being managed on midodrine. Advised fluid balance.
pt would like to go home.  no objection to DC with close f/u  he can follow me. will have the office call him directly for an appointment   discussed with Dr. Stephens
1. BASHIR - bilateral HDN, history of nephrolithiasis with above noted stone compositions.  Has received dialysis with last treatment noted for low systolic blood pressures.  Await Urology intervention with possible IR placed NT depending upon course.    2. Hypotension - see above discussion.  Currently on Midodrine.    3. Metabolic acidosis - continue with oral bicarbonate pending trend of labs.   When his renal function returns to baseline, he will need formal kidney stone prevention evaluation given his high risk status.    As noted above, next dialysis TBS pending current interventions.    Complaints of constant pain secondary to sierra.  Await  review and their opinion regarding removing the catheter with monitoring of bladder residuals.   Above reviewed with patient.
unclear reason for current B Hydro severe L>R   follows with [facility] DR. Bliss   s/p HD x 2. this am hypotension   start IVF   start Midodrine   he needs /IR intervention to decompress Hydroureter  agree with emperic antibiotics  attempt HD again tomorrow  discussed with Med attending Dr. Stephens  dose meds per eGFR
unclear reason for current B Hydro severe L>R   follows with [facility] DR. Bliss OP  s/p HD x 3. this am hypotension but better on Midorine and abx . SBP low 100  continue Midodrine   he needs /IR intervention to decompress Hydroureter. again, discussed with , IR, and Medicine teams. he needs an intervention to decompress obstruction and allow for renal recovery to prevent dialysis dependency long term.  IR and  to discuss best approach.   agree with antibiotics( pus in urine)  change femoral to IJ non-tunnuled when able   discussed with Med attending Dr. Stephens, Dr Merida, Dr Baum on behalf of  attending Dr. Woody( in OR)   dose meds per eGFR  pt follows with Dr. Chung Cole. when I looked him up he is Urologist at [facility]. went back to pt and asked if he sees nephrology and he is not sure. does not have nay names or contacts fro me to inquire

## 2024-12-30 NOTE — PROGRESS NOTE ADULT - PROBLEM SELECTOR PROBLEM 1
Acute renal failure

## 2024-12-30 NOTE — PROVIDER CONTACT NOTE (OTHER) - NAME OF MD/NP/PA/DO NOTIFIED:
Danish Santamaria
Mariel Bills
Nigel Ahn
Provider
Liban Knox
Alma Ferreira
Liban Knox
Alonzo Culver
Liban Mendoza MD
ACP Antonia Daley
Liban Knox MD
Thuy Knight

## 2024-12-30 NOTE — PROGRESS NOTE ADULT - ASSESSMENT
M E D I C A L   A T T E N D I N G    F O L L O W    U P   N O T E  (12-30-24 )                                     ------------------------------------------------------------------------------------------------    patient evaluated by me, case discussed with team, chart, medications, and physical exam reviewed, labs / tests  and vitals reviewed by me, as bellow.   Patient is stable for discharge today. patient overall doing MUCH better.. comfortable, pain controlled... no issues with sierra,... cleared by specialsits for TX home   Patient to follow up with  PMD, renal, , hematology ..   See discharge document for full note (discussed with ACP).  [Greater than 35 min spent for these services. ]          ( Note written / Date of service 12-30-24 ( This is certified to be the same as "ENTERED" date above ( for billing purposes)))    ==================>> MEDICATIONS <<====================    chlorhexidine 2% Cloths 1 Application(s) Topical daily  dextrose 5%. 1000 milliLiter(s) IV Continuous <Continuous>  dextrose 5%. 1000 milliLiter(s) IV Continuous <Continuous>  dextrose 50% Injectable 25 Gram(s) IV Push once  dextrose 50% Injectable 12.5 Gram(s) IV Push once  dextrose 50% Injectable 25 Gram(s) IV Push once  dextrose Oral Gel 15 Gram(s) Oral once  fluconAZOLE   Tablet 200 milliGRAM(s) Oral every 24 hours  gabapentin 300 milliGRAM(s) Oral two times a day  glucagon  Injectable 1 milliGRAM(s) IntraMuscular once  insulin lispro (ADMELOG) corrective regimen sliding scale   SubCutaneous three times a day before meals  insulin lispro (ADMELOG) corrective regimen sliding scale   SubCutaneous at bedtime  lidocaine/prilocaine Cream 1 Application(s) Topical daily  midodrine. 20 milliGRAM(s) Oral three times a day  oxybutynin 5 milliGRAM(s) Oral every 8 hours  polyethylene glycol 3350 17 Gram(s) Oral daily  rosuvastatin 10 milliGRAM(s) Oral at bedtime  senna 2 Tablet(s) Oral at bedtime  sodium bicarbonate 1300 milliGRAM(s) Oral three times a day  sodium chloride 0.9%. 1000 milliLiter(s) IV Continuous <Continuous>    MEDICATIONS  (PRN):  diazepam    Tablet 2 milliGRAM(s) Oral two times a day PRN bladder spasm  HYDROmorphone  Injectable 0.4 milliGRAM(s) IV Push every 4 hours PRN Breakthrough Pain  melatonin 3 milliGRAM(s) Oral at bedtime PRN Insomnia  ondansetron Injectable 4 milliGRAM(s) IV Push every 8 hours PRN Nausea and/or Vomiting  oxyCODONE    IR 10 milliGRAM(s) Oral every 4 hours PRN Moderate Pain - Severe pain (5 - 10)  sodium chloride 0.9% Bolus. 100 milliLiter(s) IV Bolus every 5 minutes PRN SBP LESS THAN or EQUAL to 90 mmHg    ___________  Active diet:  Diet, Consistent Carbohydrate w/Evening Snack  ___________________    ==================>> VITAL SIGNS <<==================    T(C): 37 (12-30-24 @ 12:30), Max: 37.2 (12-29-24 @ 21:00)  HR: 60 (12-30-24 @ 12:30) (60 - 64)  BP: 103/63 (12-30-24 @ 12:30) (86/53 - 114/72)  BP(mean): --  RR: 18 (12-30-24 @ 12:30) (16 - 18)  SpO2: 100% (12-30-24 @ 12:30) (97% - 100%)     CAPILLARY BLOOD GLUCOSE    POCT Blood Glucose.: 218 mg/dL (30 Dec 2024 11:23)  POCT Blood Glucose.: 204 mg/dL (30 Dec 2024 08:00)  POCT Blood Glucose.: 249 mg/dL (29 Dec 2024 21:19)  POCT Blood Glucose.: 235 mg/dL (29 Dec 2024 16:48)    I&O's Summary    29 Dec 2024 07:01  -  30 Dec 2024 07:00  --------------------------------------------------------  IN: 830 mL / OUT: 1350 mL / NET: -520 mL    30 Dec 2024 07:01  -  30 Dec 2024 13:55  --------------------------------------------------------  IN: 240 mL / OUT: 350 mL / NET: -110 mL       ==================>> LAB AND IMAGING <<==================                        8.1    5.66  )-----------( 183      ( 30 Dec 2024 07:00 )             25.6        12-30    139  |  99  |  70[H]  ----------------------------<  150[H]  3.9   |  26  |  4.18[H]    Ca    8.6      30 Dec 2024 07:00  Phos  2.7     12-30  Mg     1.90     12-30    TPro  6.9  /  Alb  3.4  /  TBili  0.2  /  DBili  x   /  AST  18  /  ALT  33  /  AlkPhos  49  12-30                 HgA1C:   (12-21-24)          (12-21-24)      8.3    WBC count:   5.66 <<== ,  5.55 <<== ,  5.69 <<== ,  7.45 <<== ,  9.65 <<==   Hemoglobin:   8.1 <<==,  8.1 <<==,  8.6 <<==,  8.6 <<==,  10.1 <<==  platelets:  183 <==, 155 <==, 153 <==, 156 <==, 169 <==, 160 <==    Creatinine:  4.18  <<==, 4.21  <<==, 4.15  <<==, 4.20  <<==, 4.31  <<==, 4.41  <<==  Sodium:   139  <==, 138  <==, 137  <==, 135  <==, 135  <==, 134  <==, 138  <==       AST:          18(12-30) <== , 30(12-29) <== , 30(12-28) <== , 18(12-27) <== , 12(12-26) <==      ALT:        33(12-30)  <== , 41(12-29)  <== , 37(12-28)  <== , 21(12-27)  <== , 9(12-26)  <==      AP:        49(12-30)  <=, 47(12-29)  <=, 56(12-28)  <=, 47(12-27)  <=, 53(12-26)  <=     Bili:        0.2(12-30)  <=, <0.2(12-29)  <=, 0.2(12-28)  <=, 0.4(12-27)  <=, 0.5(12-26)  <=        ( Note written / Date of service 12-30-24 ( This is certified to be the same as "ENTERED" date above ( for billing Purposes )))

## 2024-12-30 NOTE — PROGRESS NOTE ADULT - SUBJECTIVE AND OBJECTIVE BOX
Follow Up:  acute renal failure and b/l hydro, ?UTI    Interval History/ROS: pt afebrile, no vomiting or SOB, shiley was removed and no more groin/pelvic pain          Allergies  penicillins (Unknown)        ANTIMICROBIALS:  fluconAZOLE   Tablet 200 every 24 hours      OTHER MEDS:  chlorhexidine 2% Cloths 1 Application(s) Topical daily  dextrose 5%. 1000 milliLiter(s) IV Continuous <Continuous>  dextrose 5%. 1000 milliLiter(s) IV Continuous <Continuous>  dextrose 50% Injectable 25 Gram(s) IV Push once  dextrose 50% Injectable 12.5 Gram(s) IV Push once  dextrose 50% Injectable 25 Gram(s) IV Push once  dextrose Oral Gel 15 Gram(s) Oral once  diazepam    Tablet 2 milliGRAM(s) Oral two times a day PRN  gabapentin 300 milliGRAM(s) Oral two times a day  glucagon  Injectable 1 milliGRAM(s) IntraMuscular once  HYDROmorphone  Injectable 0.4 milliGRAM(s) IV Push every 4 hours PRN  insulin lispro (ADMELOG) corrective regimen sliding scale   SubCutaneous three times a day before meals  insulin lispro (ADMELOG) corrective regimen sliding scale   SubCutaneous at bedtime  lidocaine/prilocaine Cream 1 Application(s) Topical daily  melatonin 3 milliGRAM(s) Oral at bedtime PRN  midodrine. 20 milliGRAM(s) Oral three times a day  ondansetron Injectable 4 milliGRAM(s) IV Push every 8 hours PRN  oxyCODONE    IR 10 milliGRAM(s) Oral every 4 hours PRN  polyethylene glycol 3350 17 Gram(s) Oral daily  rosuvastatin 10 milliGRAM(s) Oral at bedtime  senna 2 Tablet(s) Oral at bedtime  sodium bicarbonate 1300 milliGRAM(s) Oral three times a day  sodium chloride 0.9% Bolus. 100 milliLiter(s) IV Bolus every 5 minutes PRN  sodium chloride 0.9%. 1000 milliLiter(s) IV Continuous <Continuous>      Vital Signs Last 24 Hrs  T(C): 37 (30 Dec 2024 12:30), Max: 37.2 (29 Dec 2024 21:00)  T(F): 98.6 (30 Dec 2024 12:30), Max: 99 (30 Dec 2024 08:55)  HR: 60 (30 Dec 2024 12:30) (60 - 64)  BP: 103/63 (30 Dec 2024 12:30) (86/53 - 114/72)  BP(mean): --  RR: 18 (30 Dec 2024 12:30) (16 - 18)  SpO2: 100% (30 Dec 2024 12:30) (97% - 100%)    Parameters below as of 30 Dec 2024 12:30  Patient On (Oxygen Delivery Method): room air        Physical Exam:  General:    NAD,  non toxic  Respiratory:  comfortable on RA  abd:     soft,   no tenderness  :    sierra  Musculoskeletal:   no joint swelling  vascular: R fem shiley removed  Skin:    no rash                          8.1    5.66  )-----------( 183      ( 30 Dec 2024 07:00 )             25.6       12-30    139  |  99  |  70[H]  ----------------------------<  150[H]  3.9   |  26  |  4.18[H]    Ca    8.6      30 Dec 2024 07:00  Phos  2.7     12-30  Mg     1.90     12-30    TPro  6.9  /  Alb  3.4  /  TBili  0.2  /  DBili  x   /  AST  18  /  ALT  33  /  AlkPhos  49  12-30      Urinalysis Basic - ( 30 Dec 2024 07:00 )    Color: x / Appearance: x / SG: x / pH: x  Gluc: 150 mg/dL / Ketone: x  / Bili: x / Urobili: x   Blood: x / Protein: x / Nitrite: x   Leuk Esterase: x / RBC: x / WBC x   Sq Epi: x / Non Sq Epi: x / Bacteria: x        MICROBIOLOGY:  v  .Blood BLOOD  12-26-24   No growth at 72 Hours  --  --      .Blood BLOOD  12-26-24   No growth at 72 Hours  --  --      Clean Catch  12-21-24   50,000 - 99,000 CFU/mL Candida glabrata "Susceptibilities not performed"  --  --                RADIOLOGY:  Images independently visualized and reviewed personally, findings as below  < from: US Kidney and Bladder (12.26.24 @ 13:06) >  IMPRESSION:  Moderate bilateral hydronephrosis, unchanged.    Nonspecific hyperechoic focus in the right kidney midpole, not previously   seen.    < end of copied text >  < from: CT Abdomen and Pelvis No Cont (12.22.24 @ 22:13) >  IMPRESSION:  1.6 cm calcified gallstone region of gallbladder neck. No pericholecystic   inflammatory changes..    Moderate/severe bilateral hydronephrosis. 11 mm nonobstructing stone   midportion left kidney. Moderately severe bilateral hydroureter. No   obstructing stones.    Sierra catheter within urinary bladder which is decompressed.    Please refer to detailed findings otherwise described above.    < end of copied text >

## 2024-12-30 NOTE — PROGRESS NOTE ADULT - PROBLEM SELECTOR PLAN 1
Pt. with renal failure in the setting of b/l hydronephrosis with hx of chronic [facility] composition combined calcium oxalate and uric acid stones). On review of [facility], Scr 0.99 on 4/7/22. Scr elevated to 12.52 on admission (12/21/24). UA with proteinuria, hematuria, and bacteria (12/21/24). US Kidneys and bladder significant for moderate to severe B/L hydro with R kidney 15.6cm and L kidney 12.2cm, multiple renal cysts, and non-obstructing 1cm stone in the left midpole (12/21/24). CT A/P also showed mod-severe B/L hydroureteronephrosis, bladder decompressed with sierra. Unclear if pt has underlying CKD due to chronic obstruction or other etiology, no SCr between 2022 and present. Pt had uremic s/s on presentation and was urgently dialyzed on 12/22/24 via R fem cath placed by Vascular team. He received HD again on 12/23 and 12/24. His Cr stabilized and fem HD cath removed on 12/27. UPCR 1.1g. Serologies sent. Noted weak IgG kappa band on serum immunofixation suggestive of monoclonal gammopathy. Heme/Onc consulted, note reviewed. Pt underwent  cystoscopy with urology  to evaluate bladder/UOs 12/25 but could not pass scope beyond bladder. Hydro possibly due to chronic cystitis. If pt is to remain sierra-dependent, perhaps he would be a candidate for [place] pouch vs. ileal conduit vs. PCNs in the future. Cr remains stable/elevated today at 4.18 and pt non oliguric via Sierra. No further plan for HD at this time. Monitor labs and urine output. Avoid any potential nephrotoxins when possible and dose medications per eGFR.

## 2024-12-30 NOTE — PROVIDER CONTACT NOTE (OTHER) - REASON
Increase pain
[facility]
low blood pressure 99/49 hr 79
Jung insertion site appears to have leakage and discharge
Renew Jung order
Uncontrolled pain
Nausea/Pain
low BP 96/42 pt is asymptomatic
dialysis herman & Fabiana
low blood pressure 92/45, HR 75 post Dilaudid IVP
low BP 86/53 pt is asymptomatic
low blood pressure

## 2024-12-30 NOTE — DISCHARGE NOTE NURSING/CASE MANAGEMENT/SOCIAL WORK - FINANCIAL ASSISTANCE
[facility] provides services at a reduced cost to those who are determined to be eligible through [facility]’s financial assistance program. Information regarding [facility]’s financial assistance program can be found by going to [email or web address] or by calling [phone].

## 2024-12-30 NOTE — PHARMACOTHERAPY INTERVENTION NOTE - COMMENTS
Discharge medications were reviewed with patient. Current medication schedule was discussed in detail including: medication name, indication, dose, administration times, treatment duration, side effects, drug interactions, and special instructions. Patient questions and concerns were answered and addressed. Patient demonstrated understanding and drug information handout was provided.    Justen CrowD, [facility]  Clinical Pharmacy Specialist  Spectra: [phone]

## 2024-12-30 NOTE — PROVIDER CONTACT NOTE (OTHER) - SITUATION
low BP 96/42 pt is asymptomatic
Post HD BP at 99-49 hr 79
low blood pressure 92/45, HR 75 post Dilaudid IVP
Jung insertion site appears to have leakage and discharge
low BP 86/53 pt is asymptomatic
Post HD BP at 97/48 mmHg

## 2024-12-30 NOTE — PROGRESS NOTE ADULT - ASSESSMENT
54 m with T2DM, prostatitis, chronic cystitis, and nephrolithiasis s/p PCNL 02/21, left nephroureteral tube 03/24 c/b hematuria needing L renal angioembolization of pseudoaneurysms and PCN-U 04/06, was started on Ozempic about 3 weeks ago and noticed that for about a week had nausea and was not eating or drinking, he has chronic bladder pain and urinary frequency also, went to PMD and then was sent to ER for renal failure  Here afebrile,  WBC: 11, CR: 12, BUN   u/a positive  u/s: Moderate to severe left and moderate right hydronephrosis which persists post void..Nonobstructing 1 cm stone in the left midpole. Multiple renal cysts     chronic cystitis (has bladder pain and frequency after PCNL and renal angioembolization) now with renal failure, has mod ot severe L and mod R hydro on u/s, was also started on ozempic 3 weeks ago, s/p R fem shiley and HD   u/a positive and urine cx with candida glabrata  s/p cysto 12/25, R UO was identified in very lateral position but no efflux was observed, left UO unable to be identified, bladder was very friable and sensitive, patient did not tolerate prolonged cystoscopy given extreme sensitivity of bladder mucosa.  there was plan for IR nephrostomy but now canceled as urine output improved, nephro also recommended to remove shiley, ?obstruction due to inflammation and ?infection    * s/p 12/22-12/26 stopped as urine cx showed candida glabrata, started fluconazole 200 12/26, now day 5  * will complete a total 2 week course as there was sever inflammation in bladder and UO  * follow with nephro and urology      The above assessment and plan was discussed with the primary team    Halle Alexis MD  contact on teams  After 5pm and on weekends call [phone]

## 2024-12-30 NOTE — PROGRESS NOTE ADULT - PROVIDER SPECIALTY LIST ADULT
Infectious Disease
Internal Medicine
Intervent Radiology
Nephrology
Nephrology
Urology
Infectious Disease
Infectious Disease
Internal Medicine
Intervent Radiology
Nephrology
Nephrology
Urology
Infectious Disease
Infectious Disease
Internal Medicine
Nephrology
Urology
Internal Medicine
Nephrology
Vascular Surgery
Nephrology
Internal Medicine

## 2024-12-30 NOTE — PROVIDER CONTACT NOTE (OTHER) - BACKGROUND
Acute renal failure
Acute renal failure
Acute renal failure pmh kidney stones, diabetes
Acute renal failure pmh kidney stones, diabetes
Acute renal failure

## 2024-12-30 NOTE — DISCHARGE NOTE NURSING/CASE MANAGEMENT/SOCIAL WORK - PATIENT PORTAL LINK FT
You can access the FollowMyHealth Patient Portal offered by [facility] by registering at the following website: [email or web address]. By joining [facility]’s FollowMyHealth portal, you will also be able to view your health information using other applications (apps) compatible with our system.

## 2024-12-31 ENCOUNTER — OUTPATIENT (OUTPATIENT)
Dept: OUTPATIENT SERVICES | Facility: HOSPITAL | Age: 54
LOS: 1 days | Discharge: HOME | End: 2024-12-31

## 2024-12-31 DIAGNOSIS — Z98.890 OTHER SPECIFIED POSTPROCEDURAL STATES: Chronic | ICD-10-CM

## 2024-12-31 DIAGNOSIS — D47.2 MONOCLONAL GAMMOPATHY: ICD-10-CM

## 2024-12-31 DIAGNOSIS — T83.83XA HEMORRHAGE DUE TO GENITOURINARY PROSTHETIC DEVICES, IMPLANTS AND GRAFTS, INITIAL ENCOUNTER: Chronic | ICD-10-CM

## 2024-12-31 LAB
CULTURE RESULTS: SIGNIFICANT CHANGE UP
CULTURE RESULTS: SIGNIFICANT CHANGE UP
SPECIMEN SOURCE: SIGNIFICANT CHANGE UP
SPECIMEN SOURCE: SIGNIFICANT CHANGE UP

## 2025-01-03 ENCOUNTER — RESULT REVIEW (OUTPATIENT)
Age: 55
End: 2025-01-03

## 2025-01-03 ENCOUNTER — NON-APPOINTMENT (OUTPATIENT)
Age: 55
End: 2025-01-03

## 2025-01-03 ENCOUNTER — APPOINTMENT (OUTPATIENT)
Dept: HEMATOLOGY ONCOLOGY | Facility: CLINIC | Age: 55
End: 2025-01-03
Payer: COMMERCIAL

## 2025-01-03 VITALS
RESPIRATION RATE: 16 BRPM | TEMPERATURE: 97.2 F | OXYGEN SATURATION: 99 % | BODY MASS INDEX: 29.28 KG/M2 | HEART RATE: 75 BPM | SYSTOLIC BLOOD PRESSURE: 131 MMHG | HEIGHT: 71.65 IN | WEIGHT: 213.83 LBS | DIASTOLIC BLOOD PRESSURE: 77 MMHG

## 2025-01-03 DIAGNOSIS — D47.2 MONOCLONAL GAMMOPATHY: ICD-10-CM

## 2025-01-03 DIAGNOSIS — Z86.79 PERSONAL HISTORY OF OTHER DISEASES OF THE CIRCULATORY SYSTEM: ICD-10-CM

## 2025-01-03 DIAGNOSIS — Z87.442 PERSONAL HISTORY OF URINARY CALCULI: ICD-10-CM

## 2025-01-03 DIAGNOSIS — Z78.9 OTHER SPECIFIED HEALTH STATUS: ICD-10-CM

## 2025-01-03 DIAGNOSIS — N19 UNSPECIFIED KIDNEY FAILURE: ICD-10-CM

## 2025-01-03 DIAGNOSIS — E61.1 IRON DEFICIENCY: ICD-10-CM

## 2025-01-03 DIAGNOSIS — Z87.448 PERSONAL HISTORY OF OTHER DISEASES OF URINARY SYSTEM: ICD-10-CM

## 2025-01-03 DIAGNOSIS — N18.2 CHRONIC KIDNEY DISEASE, STAGE 2 (MILD): ICD-10-CM

## 2025-01-03 PROBLEM — D64.9 ANEMIA, UNSPECIFIED TYPE: Status: ACTIVE | Noted: 2025-01-03

## 2025-01-03 LAB
ALBUMIN SERPL ELPH-MCNC: 3.9 G/DL
ALP BLD-CCNC: 69 U/L
ALT SERPL-CCNC: 37 U/L
ANION GAP SERPL CALC-SCNC: 12 MMOL/L
AST SERPL-CCNC: 17 U/L
B2 MICROGLOB SERPL-MCNC: 6.2 MG/L
BASOPHILS # BLD AUTO: 0.02 K/UL — SIGNIFICANT CHANGE UP (ref 0–0.2)
BILIRUB SERPL-MCNC: 0.2 MG/DL
BUN SERPL-MCNC: 56 MG/DL
CALCIUM SERPL-MCNC: 9.3 MG/DL
CHLORIDE SERPL-SCNC: 99 MMOL/L
CO2 SERPL-SCNC: 27 MMOL/L
CREAT SERPL-MCNC: 2.92 MG/DL
DEPRECATED D DIMER PPP IA-ACNC: 748 NG/ML DDU
EGFR: 24 ML/MIN/1.73M2
EOSINOPHIL # BLD AUTO: 0.47 K/UL — SIGNIFICANT CHANGE UP (ref 0–0.5)
EOSINOPHIL NFR BLD AUTO: 6.5 % — HIGH (ref 0–6)
FERRITIN SERPL-MCNC: 557 NG/ML
FOLATE SERPL-MCNC: 6 NG/ML
GLUCOSE SERPL-MCNC: 200 MG/DL
HCT VFR BLD CALC: 24.1 % — LOW (ref 39–50)
HGB BLD-MCNC: 7.6 G/DL — LOW (ref 13–17)
IMM GRANULOCYTES NFR BLD AUTO: 1.4 % — HIGH (ref 0–0.9)
IRON SATN MFR SERPL: 11 %
IRON SERPL-MCNC: 27 UG/DL
LDH SERPL-CCNC: 179 U/L
LYMPHOCYTES # BLD AUTO: 13.6 % — SIGNIFICANT CHANGE UP (ref 13–44)
MCHC RBC-ENTMCNC: 27.1 PG — SIGNIFICANT CHANGE UP (ref 27–34)
MCHC RBC-ENTMCNC: 31.5 G/DL — LOW (ref 32–36)
MCV RBC AUTO: 86.1 FL — SIGNIFICANT CHANGE UP (ref 80–100)
MONOCYTES # BLD AUTO: 0.86 K/UL — SIGNIFICANT CHANGE UP (ref 0–0.9)
MONOCYTES NFR BLD AUTO: 12 % — SIGNIFICANT CHANGE UP (ref 2–14)
NEUTROPHILS # BLD AUTO: 4.76 K/UL — SIGNIFICANT CHANGE UP (ref 1.8–7.4)
NEUTROPHILS NFR BLD AUTO: 66.2 % — SIGNIFICANT CHANGE UP (ref 43–77)
NRBC # BLD: 0 /100 WBCS — SIGNIFICANT CHANGE UP (ref 0–0)
NRBC BLD-RTO: 0 /100 WBCS — SIGNIFICANT CHANGE UP (ref 0–0)
PLATELET # BLD AUTO: 309 K/UL — SIGNIFICANT CHANGE UP (ref 150–400)
POTASSIUM SERPL-SCNC: 4.6 MMOL/L
PROT SERPL-MCNC: 7.3 G/DL
RBC # BLD: 2.8 M/UL — LOW (ref 4.2–5.8)
RBC # FLD: 13.5 % — SIGNIFICANT CHANGE UP (ref 10.3–14.5)
RETICS #: 54.6 K/UL — SIGNIFICANT CHANGE UP (ref 25–125)
RETICS/RBC NFR: 2 % — SIGNIFICANT CHANGE UP (ref 0.5–2.5)
SODIUM SERPL-SCNC: 138 MMOL/L
TIBC SERPL-MCNC: 237 UG/DL
TSH SERPL-ACNC: 2.35 UIU/ML
UIBC SERPL-MCNC: 211 UG/DL
VIT B12 SERPL-MCNC: 870 PG/ML
WBC # BLD: 7.19 K/UL — SIGNIFICANT CHANGE UP (ref 3.8–10.5)

## 2025-01-03 PROCEDURE — 99215 OFFICE O/P EST HI 40 MIN: CPT

## 2025-01-06 LAB
ANA SER IF-ACNC: NEGATIVE
EPO SERPL-MCNC: 25.8 MIU/ML
STFR SERPL-MCNC: 14.9 NMOL/L

## 2025-01-06 NOTE — PATIENT PROFILE ADULT - NSPROPASSIVESMOKEEXPOSURE_GEN_A_NUR
Hearing Aid Evaluation  Time of Appointment:  Chief Complaint   Patient presents with    Hearing Loss     Hearing Aid Evalution        This patient was seen for an HAE. The patient has a benefit through her insurance which she will check for a benefit which is [facility].    Demonstrated Phonak Audeo/GNRESOUND hearing aids in the office today.  The patient was pleased with the look and fit.    Order placed today: None as is seeing another office for her benefit. If she orders from /[facility] she would like the Nexia 960 #1 length and M  and small vented domes.Patient will need hearing aid corded clips to help with loss as she has lost hearing aids in the past.    The patient paid No charge  for today's visit and may call back to order aids.     Return for a short appointment for an order and quick  hearing aid evaluation if interested that was scheduled today.        Time of Appointment:  50 minutes.    No

## 2025-01-10 ENCOUNTER — APPOINTMENT (OUTPATIENT)
Dept: HEMATOLOGY ONCOLOGY | Facility: CLINIC | Age: 55
End: 2025-01-10

## 2025-01-10 ENCOUNTER — RESULT REVIEW (OUTPATIENT)
Age: 55
End: 2025-01-10

## 2025-01-10 ENCOUNTER — EMERGENCY (EMERGENCY)
Facility: HOSPITAL | Age: 55
LOS: 1 days | Discharge: HOME | End: 2025-01-10
Attending: STUDENT IN AN ORGANIZED HEALTH CARE EDUCATION/TRAINING PROGRAM | Admitting: STUDENT IN AN ORGANIZED HEALTH CARE EDUCATION/TRAINING PROGRAM
Payer: COMMERCIAL

## 2025-01-10 ENCOUNTER — NON-APPOINTMENT (OUTPATIENT)
Age: 55
End: 2025-01-10

## 2025-01-10 ENCOUNTER — APPOINTMENT (OUTPATIENT)
Dept: INFUSION THERAPY | Facility: HOSPITAL | Age: 55
End: 2025-01-10

## 2025-01-10 VITALS
SYSTOLIC BLOOD PRESSURE: 142 MMHG | HEART RATE: 97 BPM | OXYGEN SATURATION: 100 % | HEIGHT: 71 IN | TEMPERATURE: 100 F | RESPIRATION RATE: 16 BRPM | WEIGHT: 212.97 LBS | DIASTOLIC BLOOD PRESSURE: 70 MMHG

## 2025-01-10 DIAGNOSIS — S36.09XA OTHER INJURY OF SPLEEN, INITIAL ENCOUNTER: Chronic | ICD-10-CM

## 2025-01-10 DIAGNOSIS — T83.83XA HEMORRHAGE DUE TO GENITOURINARY PROSTHETIC DEVICES, IMPLANTS AND GRAFTS, INITIAL ENCOUNTER: Chronic | ICD-10-CM

## 2025-01-10 DIAGNOSIS — Z98.890 OTHER SPECIFIED POSTPROCEDURAL STATES: Chronic | ICD-10-CM

## 2025-01-10 LAB
ALBUMIN MFR SERPL ELPH: 44.9 %
ALBUMIN SERPL ELPH-MCNC: 3.4 G/DL — SIGNIFICANT CHANGE UP (ref 3.3–5)
ALBUMIN SERPL-MCNC: 3.3 G/DL
ALBUMIN/GLOB SERPL: 0.8 RATIO
ALP SERPL-CCNC: 62 U/L — SIGNIFICANT CHANGE UP (ref 40–120)
ALPHA1 GLOB MFR SERPL ELPH: 8.3 %
ALPHA1 GLOB SERPL ELPH-MCNC: 0.6 G/DL
ALPHA2 GLOB MFR SERPL ELPH: 18.1 %
ALPHA2 GLOB SERPL ELPH-MCNC: 1.3 G/DL
ALT FLD-CCNC: 8 U/L — SIGNIFICANT CHANGE UP (ref 4–41)
ANION GAP SERPL CALC-SCNC: 12 MMOL/L — SIGNIFICANT CHANGE UP (ref 7–14)
AST SERPL-CCNC: 10 U/L — SIGNIFICANT CHANGE UP (ref 4–40)
B-GLOBULIN MFR SERPL ELPH: 10.3 %
B-GLOBULIN SERPL ELPH-MCNC: 0.8 G/DL
BASOPHILS # BLD AUTO: 0.03 K/UL — SIGNIFICANT CHANGE UP (ref 0–0.2)
BASOPHILS # BLD AUTO: 0.03 K/UL — SIGNIFICANT CHANGE UP (ref 0–0.2)
BASOPHILS NFR BLD AUTO: 0.4 % — SIGNIFICANT CHANGE UP (ref 0–2)
BASOPHILS NFR BLD AUTO: 0.5 % — SIGNIFICANT CHANGE UP (ref 0–2)
BILIRUB SERPL-MCNC: 0.2 MG/DL — SIGNIFICANT CHANGE UP (ref 0.2–1.2)
BUN SERPL-MCNC: 25 MG/DL — HIGH (ref 7–23)
CALCIUM SERPL-MCNC: 9.2 MG/DL — SIGNIFICANT CHANGE UP (ref 8.4–10.5)
CHLORIDE SERPL-SCNC: 99 MMOL/L — SIGNIFICANT CHANGE UP (ref 98–107)
CO2 SERPL-SCNC: 28 MMOL/L — SIGNIFICANT CHANGE UP (ref 22–31)
CREAT SERPL-MCNC: 2.35 MG/DL — HIGH (ref 0.5–1.3)
DEPRECATED KAPPA LC FREE/LAMBDA SER: 1.66 RATIO
EGFR: 32 ML/MIN/1.73M2 — LOW
EOSINOPHIL # BLD AUTO: 0.2 K/UL — SIGNIFICANT CHANGE UP (ref 0–0.5)
EOSINOPHIL # BLD AUTO: 0.25 K/UL — SIGNIFICANT CHANGE UP (ref 0–0.5)
EOSINOPHIL NFR BLD AUTO: 3.3 % — SIGNIFICANT CHANGE UP (ref 0–6)
EOSINOPHIL NFR BLD AUTO: 3.4 % — SIGNIFICANT CHANGE UP (ref 0–6)
GAMMA GLOB FLD ELPH-MCNC: 1.3 G/DL
GAMMA GLOB MFR SERPL ELPH: 18.4 %
GAS PNL BLDV: SIGNIFICANT CHANGE UP
GLUCOSE SERPL-MCNC: 178 MG/DL — HIGH (ref 70–99)
HCT VFR BLD CALC: 22.6 % — LOW (ref 39–50)
HCT VFR BLD CALC: 24 % — LOW (ref 39–50)
HGB BLD-MCNC: 7.1 G/DL — LOW (ref 13–17)
HGB BLD-MCNC: 7.6 G/DL — LOW (ref 13–17)
IANC: 3.67 K/UL — SIGNIFICANT CHANGE UP (ref 1.8–7.4)
IGA SER QL IEP: 135 MG/DL
IGG SER QL IEP: 1184 MG/DL
IGM SER QL IEP: 377 MG/DL
IMM GRANULOCYTES NFR BLD AUTO: 0.3 % — SIGNIFICANT CHANGE UP (ref 0–0.9)
IMM GRANULOCYTES NFR BLD AUTO: 0.4 % — SIGNIFICANT CHANGE UP (ref 0–0.9)
INTERPRETATION SERPL IEP-IMP: NORMAL
KAPPA LC CSF-MCNC: 3.57 MG/DL
KAPPA LC SERPL-MCNC: 5.94 MG/DL
LYMPHOCYTES # BLD AUTO: 1.23 K/UL — SIGNIFICANT CHANGE UP (ref 1–3.3)
LYMPHOCYTES # BLD AUTO: 1.36 K/UL — SIGNIFICANT CHANGE UP (ref 1–3.3)
LYMPHOCYTES # BLD AUTO: 20.6 % — SIGNIFICANT CHANGE UP (ref 13–44)
M PROTEIN MFR SERPL ELPH: NORMAL
M PROTEIN SPEC IFE-MCNC: NORMAL
MAGNESIUM SERPL-MCNC: 1.8 MG/DL — SIGNIFICANT CHANGE UP (ref 1.6–2.6)
MCHC RBC-ENTMCNC: 27 PG — SIGNIFICANT CHANGE UP (ref 27–34)
MCHC RBC-ENTMCNC: 27.2 PG — SIGNIFICANT CHANGE UP (ref 27–34)
MCHC RBC-ENTMCNC: 31.4 G/DL — LOW (ref 32–36)
MCHC RBC-ENTMCNC: 31.7 G/DL — LOW (ref 32–36)
MCV RBC AUTO: 85.1 FL — SIGNIFICANT CHANGE UP (ref 80–100)
MCV RBC AUTO: 86.6 FL — SIGNIFICANT CHANGE UP (ref 80–100)
MONOCLON BAND OBS SERPL: NORMAL
MONOCYTES # BLD AUTO: 0.79 K/UL — SIGNIFICANT CHANGE UP (ref 0–0.9)
MONOCYTES # BLD AUTO: 0.81 K/UL — SIGNIFICANT CHANGE UP (ref 0–0.9)
MONOCYTES NFR BLD AUTO: 10.5 % — SIGNIFICANT CHANGE UP (ref 2–14)
MONOCYTES NFR BLD AUTO: 13.6 % — SIGNIFICANT CHANGE UP (ref 2–14)
NEUTROPHILS # BLD AUTO: 3.67 K/UL — SIGNIFICANT CHANGE UP (ref 1.8–7.4)
NEUTROPHILS # BLD AUTO: 5.04 K/UL — SIGNIFICANT CHANGE UP (ref 1.8–7.4)
NEUTROPHILS NFR BLD AUTO: 61.6 % — SIGNIFICANT CHANGE UP (ref 43–77)
NEUTROPHILS NFR BLD AUTO: 67.3 % — SIGNIFICANT CHANGE UP (ref 43–77)
NRBC # BLD: 0 /100 WBCS — SIGNIFICANT CHANGE UP (ref 0–0)
NRBC # BLD: 0 /100 WBCS — SIGNIFICANT CHANGE UP (ref 0–0)
NRBC # FLD: 0 K/UL — SIGNIFICANT CHANGE UP (ref 0–0)
NRBC BLD-RTO: 0 /100 WBCS — SIGNIFICANT CHANGE UP (ref 0–0)
NT-PROBNP SERPL-SCNC: 681 PG/ML — HIGH
PLATELET # BLD AUTO: 286 K/UL — SIGNIFICANT CHANGE UP (ref 150–400)
POTASSIUM SERPL-MCNC: 3.8 MMOL/L — SIGNIFICANT CHANGE UP (ref 3.5–5.3)
POTASSIUM SERPL-SCNC: 3.8 MMOL/L — SIGNIFICANT CHANGE UP (ref 3.5–5.3)
PROT SERPL-MCNC: 6.9 G/DL — SIGNIFICANT CHANGE UP (ref 6–8.3)
PROT SERPL-MCNC: 7.3 G/DL
PROT SERPL-MCNC: 7.3 G/DL
RBC # BLD: 2.61 M/UL — LOW (ref 4.2–5.8)
RBC # FLD: 14.2 % — SIGNIFICANT CHANGE UP (ref 10.3–14.5)
RBC # FLD: 14.4 % — SIGNIFICANT CHANGE UP (ref 10.3–14.5)
SODIUM SERPL-SCNC: 139 MMOL/L — SIGNIFICANT CHANGE UP (ref 135–145)
WBC # BLD: 5.96 K/UL — SIGNIFICANT CHANGE UP (ref 3.8–10.5)
WBC # BLD: 7.5 K/UL — SIGNIFICANT CHANGE UP (ref 3.8–10.5)
WBC # FLD AUTO: 5.96 K/UL — SIGNIFICANT CHANGE UP (ref 3.8–10.5)
WBC # FLD AUTO: 7.5 K/UL — SIGNIFICANT CHANGE UP (ref 3.8–10.5)

## 2025-01-10 PROCEDURE — 93010 ELECTROCARDIOGRAM REPORT: CPT

## 2025-01-10 PROCEDURE — 99285 EMERGENCY DEPT VISIT HI MDM: CPT

## 2025-01-10 PROCEDURE — 71046 X-RAY EXAM CHEST 2 VIEWS: CPT | Mod: 26

## 2025-01-10 PROCEDURE — 93970 EXTREMITY STUDY: CPT | Mod: 26

## 2025-01-10 NOTE — ED PROVIDER NOTE - PATIENT PORTAL LINK FT
You can access the FollowMyHealth Patient Portal offered by [facility] by registering at the following website: [email or web address]. By joining [facility]’s FollowMyHealth portal, you will also be able to view your health information using other applications (apps) compatible with our system.

## 2025-01-10 NOTE — ED PROVIDER NOTE - PROGRESS NOTE DETAILS
Shiva Ceballos, DO (PGY-2) Patients labs with improving renal function compared to his recent admission. However, his Hgb has been downtrending. I reviewed his outpatient nephrology notes and his anemia is thought to be iso his renal insufficiency and he was started on Procrit. I offered the patient a blood transfusion but he declined  His CHxr does not show pulmonary edema. Shiva Ceballos, DO (PGY-2) Patients labs with improving renal function compared to his recent admission. However, his Hgb has been downtrending. I reviewed his outpatient hematology notes and his anemia is thought to be due to his renal insufficiency and he was started on Procrit. I offered the patient a blood transfusion but he declined citing the above and states he has no CP, SOB, light headdedness. "I feel great I only came back because they told me I should if my legs swell up". His CHxr does not show pulmonary edema. Pending US duplex, if negative patient can f/u outpatient with his hematologist and nephrologist. US negative, pt medically cleared for DCTH w/nephrology, hematology, and PCP follow up. - Johnathan Lemus MD. EM Attending

## 2025-01-10 NOTE — ED ADULT NURSE NOTE - OBJECTIVE STATEMENT
Patient received in intake room 10B. Patient is AOx4, ambulatory, able to speak in full sentences, c/o b/l l/e leg swelling. Patient has a past medical history of DM and chronic sierra on leg bag. Patient states his wife noticed his lower extremities were swollen b/l. Patient's left ankle more swollen than right. Patient lower extremities shiny and hairless. No open wounds or bleeding noticed. Patient states he was hospitalized for his kidneys recently; no issues urinating as of now. Patient states he feels fine just was told to come by his MD for evaluation of his lower extremities. 20G placed in LAC; labs drawn and sent. Patient pending US. Patient appears comfortable on stretcher in no signs of acute distress. Denies chest pain, N/V, fever or chills. Denies difficulty ambulating. Comfort measures maintained. Bed in lowest position. Safety maintained.

## 2025-01-10 NOTE — ED ADULT TRIAGE NOTE - CHIEF COMPLAINT QUOTE
Pt arrives ambulatory to triage c/o bilat LE swelling x1 day, instructed by MD to come for further eval. Denies recent travel. + prolonged sitting. Denies chest pain/SOB. Recently hospitalized for renal fx. PHx: DM, chronic sierra.

## 2025-01-10 NOTE — ED PROVIDER NOTE - NSFOLLOWUPINSTRUCTIONS_ED_ALL_ED_FT
You were seen in the emergency department for swelling in both of your legs.  You had the following relevant results: your kidney function continues to improve and you do not have and severe electrolyte abnormalities. You do not have blood clots in your leg on ultrasound. You were however found to be more anemic from 7.6>7.1 but you stated you do not want a blood transfusion and would like to FOLLOW UP WITH YOUR HEMATOLOGIST on Monday as scheduled. Please also follow up with your NEPHROLOGIST     Anemia    Anemia is a condition in which the concentration of red blood cells or hemoglobin in the blood is below normal. Hemoglobin is a substance in red blood cells that carries oxygen to the tissues of the body. Anemia results in not enough oxygen reaching these tissues which can cause symptoms such as weakness, dizziness/lightheadedness, shortness of breath, chest pain, paleness, or nausea. The cause of your anemia may or may not be determined immediately. If your hemoglobin was dangerously low, you may have received a blood transfusion. Usually reactions to transfusions occur immediately but monitor yourself for any fevers, rash, or shortness of breath.    SEEK IMMEDIATE MEDICAL CARE IF YOU HAVE ANY OF THE FOLLOWING SYMPTOMS: extreme weakness/chest pain/shortness of breath, black or bloody stools, vomiting blood, fainting, fever, or any signs of dehydration.

## 2025-01-10 NOTE — ED PROVIDER NOTE - PHYSICAL EXAMINATION
General: well appearing, interactive, well nourished, no apparent distress, ncat  HEENT: EOMI, PERRLA, normal mucosa, normal oropharynx, no lesions on the lips or on oral mucosa, normal external ear  Neck: supple, no lymphadenopathy, full range of motion, no nuchal rigidity  CV: RRR, normal S1 and S2 with no murmur, capillary refill less than two seconds  Resp:+b/l mild crackles worse on the left. No respiratory distress. good aeration bilaterally, symmetric chest wall   Abd: non-distended, soft, non-tender  : no CVA tenderness  MSK: +B/L 2+ pitting edema to the level of the shins, L leg appears slightly larger than the right. full range of motion, no cyanosis no clubbing, no immobility  Neuro: CN II-XII grossly intact, muscle strength 5/5 in all extremities, normal gait  Skin: no rashes, skin intact. Well appearing site of previous femoral shiley.

## 2025-01-10 NOTE — ED ADULT NURSE NOTE - CAS TRG GENERAL AIRWAY, MLM
Patent General Sunscreen Counseling: Recommend broad spectrum sunscreen with a SPF of 30 or higher. Sunscreens should be applied at least 15 minutes prior to expected sun exposure and then every 2 hours after that as long as sun exposure continues. If swimming or exercising sunscreen should be reapplied every 45 minutes to an hour after getting wet or sweating.  One ounce, or the equivalent of a shot glass full of sunscreen, is adequate to protect the skin not covered by a bathing suit. Sun protective clothing can be used in lieu of sunscreen but must be worn the entire time you are exposed to the sun's rays. Detail Level: Detailed

## 2025-01-10 NOTE — ED PROVIDER NOTE - ATTENDING CONTRIBUTION TO CARE
Dr. Lemus, Attending Physician-  I performed a face to face bedside interview with patient regarding history of present illness, review of symptoms and past medical history. I completed an independent physical exam.  I have discussed patient's plan of care with the resident.    Agree with MDM; pt recently discharged after acute renal failure requiring HD, LLE more swollen than RLE, no crackles on lung exam; other than LE edema, pt does not appear fluid overloaded; plan for labs, DVT US study, bnp, dispo pending workup, likely TBDC home if w/u neg as pt has follow up appointments already scheduled, pt agreeable to plan. - Johnathan Lemus MD. EM Attending

## 2025-01-10 NOTE — ED PROVIDER NOTE - CLINICAL SUMMARY MEDICAL DECISION MAKING FREE TEXT BOX
Shiva Ceballos,  (PGY-2)   Patient is a 54-year-old male past medical history diabetes on Ozempic, chronic nephrolithiasis presenting for bilateral lower extremity edema over the past 24 hours.  Of note patient was recently admitted for acute renal failure with bilateral hydronephrosis had a Jung placed and has Jung in place at this time.  During his admission patient had dialysis via right [place] cath on 1222, 1223, 1224.  His creatinine is stabilized and HD catheter removed 12/27.  Patient also has monoclonal gammopathy of undetermined significance.  Patient notes he has continued to have good urine output from his Jung and there is no dialysis scheduled for him going forward but he has follow-up with Dr. Sauer his nephrologist scheduled for this Monday.  Patient denies chest pain, shortness of breath.  He notes he has been compliant with all the medications he was discharged on.  Patient notes his leg swelling seems slightly more on the left side but denies any pain in his legs.  VS non actionable  Exam notable for bilateral pedal edema 2+ with likely larger diameter left leg.  Also mild crackles worse in the left lung base.  Will obtain chest x-ray to evaluate for pulmonary edema, proBNP, basic labs to eval for worsening renal failure.  Will consider nephro consult if necessary. Lower suspicion for HF given Recent unremarkable echo on 12/24/24 with EF >65%. Very low concern for DVT given better alternative explanation however given recent fem shiley and hospitalization will obtain US DVT studies.

## 2025-01-11 VITALS
TEMPERATURE: 99 F | DIASTOLIC BLOOD PRESSURE: 70 MMHG | SYSTOLIC BLOOD PRESSURE: 135 MMHG | OXYGEN SATURATION: 100 % | RESPIRATION RATE: 18 BRPM | HEART RATE: 88 BPM

## 2025-01-13 ENCOUNTER — APPOINTMENT (OUTPATIENT)
Dept: NEPHROLOGY | Facility: CLINIC | Age: 55
End: 2025-01-13
Payer: COMMERCIAL

## 2025-01-13 VITALS — SYSTOLIC BLOOD PRESSURE: 110 MMHG | DIASTOLIC BLOOD PRESSURE: 70 MMHG

## 2025-01-13 VITALS
TEMPERATURE: 97.9 F | HEIGHT: 71 IN | DIASTOLIC BLOOD PRESSURE: 80 MMHG | BODY MASS INDEX: 31.17 KG/M2 | OXYGEN SATURATION: 98 % | HEART RATE: 76 BPM | WEIGHT: 222.66 LBS | SYSTOLIC BLOOD PRESSURE: 149 MMHG

## 2025-01-13 PROCEDURE — 99215 OFFICE O/P EST HI 40 MIN: CPT

## 2025-01-13 PROCEDURE — G2211 COMPLEX E/M VISIT ADD ON: CPT | Mod: NC

## 2025-01-13 RX ORDER — OXYCODONE HYDROCHLORIDE 5 MG/1
5 CAPSULE ORAL EVERY 8 HOURS
Refills: 0 | Status: ACTIVE | COMMUNITY

## 2025-01-13 RX ORDER — GABAPENTIN 250 MG/5ML
300 SOLUTION ORAL
Refills: 0 | Status: DISCONTINUED | COMMUNITY
End: 2025-01-13

## 2025-01-13 RX ORDER — SODIUM BICARBONATE 650 MG/1
650 TABLET ORAL 3 TIMES DAILY
Refills: 0 | Status: ACTIVE | COMMUNITY

## 2025-01-13 RX ORDER — SEMAGLUTIDE 0.68 MG/ML
2 INJECTION, SOLUTION SUBCUTANEOUS
Refills: 0 | Status: ACTIVE | COMMUNITY
Start: 2025-01-13

## 2025-01-13 RX ORDER — OXYBUTYNIN CHLORIDE 5 MG/1
5 TABLET ORAL EVERY 8 HOURS
Refills: 0 | Status: DISCONTINUED | COMMUNITY
End: 2025-01-13

## 2025-01-13 RX ORDER — MIDODRINE HYDROCHLORIDE 10 MG/1
10 TABLET ORAL 3 TIMES DAILY
Refills: 0 | Status: ACTIVE | COMMUNITY

## 2025-01-18 ENCOUNTER — APPOINTMENT (OUTPATIENT)
Dept: INFUSION THERAPY | Facility: HOSPITAL | Age: 55
End: 2025-01-18

## 2025-01-18 ENCOUNTER — APPOINTMENT (OUTPATIENT)
Dept: HEMATOLOGY ONCOLOGY | Facility: CLINIC | Age: 55
End: 2025-01-18

## 2025-01-18 ENCOUNTER — RESULT REVIEW (OUTPATIENT)
Age: 55
End: 2025-01-18

## 2025-01-18 LAB
BASOPHILS # BLD AUTO: 0.03 K/UL — SIGNIFICANT CHANGE UP (ref 0–0.2)
BASOPHILS NFR BLD AUTO: 0.5 % — SIGNIFICANT CHANGE UP (ref 0–2)
EOSINOPHIL # BLD AUTO: 0.3 K/UL — SIGNIFICANT CHANGE UP (ref 0–0.5)
EOSINOPHIL NFR BLD AUTO: 4.9 % — SIGNIFICANT CHANGE UP (ref 0–6)
HCT VFR BLD CALC: 26.4 % — LOW (ref 39–50)
HGB BLD-MCNC: 8.4 G/DL — LOW (ref 13–17)
IMM GRANULOCYTES NFR BLD AUTO: 1.2 % — HIGH (ref 0–0.9)
LYMPHOCYTES # BLD AUTO: 1.29 K/UL — SIGNIFICANT CHANGE UP (ref 1–3.3)
LYMPHOCYTES # BLD AUTO: 21.2 % — SIGNIFICANT CHANGE UP (ref 13–44)
MCHC RBC-ENTMCNC: 27.1 PG — SIGNIFICANT CHANGE UP (ref 27–34)
MCHC RBC-ENTMCNC: 31.8 G/DL — LOW (ref 32–36)
MCV RBC AUTO: 85.2 FL — SIGNIFICANT CHANGE UP (ref 80–100)
MONOCYTES # BLD AUTO: 0.52 K/UL — SIGNIFICANT CHANGE UP (ref 0–0.9)
NEUTROPHILS # BLD AUTO: 3.87 K/UL — SIGNIFICANT CHANGE UP (ref 1.8–7.4)
NEUTROPHILS NFR BLD AUTO: 63.6 % — SIGNIFICANT CHANGE UP (ref 43–77)
NRBC # BLD: 0 /100 WBCS — SIGNIFICANT CHANGE UP (ref 0–0)
NRBC BLD-RTO: 0 /100 WBCS — SIGNIFICANT CHANGE UP (ref 0–0)
PLATELET # BLD AUTO: 210 K/UL — SIGNIFICANT CHANGE UP (ref 150–400)
RBC # BLD: 3.1 M/UL — LOW (ref 4.2–5.8)
RBC # FLD: 15.2 % — HIGH (ref 10.3–14.5)
WBC # BLD: 6.08 K/UL — SIGNIFICANT CHANGE UP (ref 3.8–10.5)
WBC # FLD AUTO: 6.08 K/UL — SIGNIFICANT CHANGE UP (ref 3.8–10.5)

## 2025-01-22 DIAGNOSIS — N19 UNSPECIFIED KIDNEY FAILURE: ICD-10-CM

## 2025-01-22 DIAGNOSIS — D63.1 ANEMIA IN CHRONIC KIDNEY DISEASE: ICD-10-CM

## 2025-01-22 DIAGNOSIS — D61.1 DRUG-INDUCED APLASTIC ANEMIA: ICD-10-CM

## 2025-01-22 DIAGNOSIS — D64.9 ANEMIA, UNSPECIFIED: ICD-10-CM

## 2025-01-23 ENCOUNTER — APPOINTMENT (OUTPATIENT)
Dept: UROLOGY | Facility: CLINIC | Age: 55
End: 2025-01-23
Payer: COMMERCIAL

## 2025-01-23 VITALS — SYSTOLIC BLOOD PRESSURE: 168 MMHG | DIASTOLIC BLOOD PRESSURE: 90 MMHG | HEART RATE: 67 BPM | OXYGEN SATURATION: 97 %

## 2025-01-23 DIAGNOSIS — N20.9 URINARY CALCULUS, UNSPECIFIED: ICD-10-CM

## 2025-01-23 DIAGNOSIS — Z87.448 PERSONAL HISTORY OF OTHER DISEASES OF URINARY SYSTEM: ICD-10-CM

## 2025-01-23 DIAGNOSIS — N20.0 CALCULUS OF KIDNEY: ICD-10-CM

## 2025-01-23 DIAGNOSIS — R33.8 OTHER RETENTION OF URINE: ICD-10-CM

## 2025-01-23 PROCEDURE — 99214 OFFICE O/P EST MOD 30 MIN: CPT

## 2025-01-24 DIAGNOSIS — D64.9 ANEMIA, UNSPECIFIED: ICD-10-CM

## 2025-01-25 ENCOUNTER — RESULT REVIEW (OUTPATIENT)
Age: 55
End: 2025-01-25

## 2025-01-25 ENCOUNTER — APPOINTMENT (OUTPATIENT)
Dept: HEMATOLOGY ONCOLOGY | Facility: CLINIC | Age: 55
End: 2025-01-25

## 2025-01-25 ENCOUNTER — APPOINTMENT (OUTPATIENT)
Dept: INFUSION THERAPY | Facility: HOSPITAL | Age: 55
End: 2025-01-25

## 2025-01-25 LAB
BASOPHILS # BLD AUTO: 0.03 K/UL — SIGNIFICANT CHANGE UP (ref 0–0.2)
BASOPHILS NFR BLD AUTO: 0.5 % — SIGNIFICANT CHANGE UP (ref 0–2)
EOSINOPHIL # BLD AUTO: 0.17 K/UL — SIGNIFICANT CHANGE UP (ref 0–0.5)
EOSINOPHIL NFR BLD AUTO: 2.9 % — SIGNIFICANT CHANGE UP (ref 0–6)
HCT VFR BLD CALC: 27.9 % — LOW (ref 39–50)
HGB BLD-MCNC: 8.7 G/DL — LOW (ref 13–17)
IMM GRANULOCYTES NFR BLD AUTO: 1 % — HIGH (ref 0–0.9)
LYMPHOCYTES # BLD AUTO: 0.74 K/UL — LOW (ref 1–3.3)
LYMPHOCYTES # BLD AUTO: 12.5 % — LOW (ref 13–44)
MCHC RBC-ENTMCNC: 27.4 PG — SIGNIFICANT CHANGE UP (ref 27–34)
MCHC RBC-ENTMCNC: 31.2 G/DL — LOW (ref 32–36)
MCV RBC AUTO: 87.7 FL — SIGNIFICANT CHANGE UP (ref 80–100)
MONOCYTES NFR BLD AUTO: 10.1 % — SIGNIFICANT CHANGE UP (ref 2–14)
NEUTROPHILS # BLD AUTO: 4.32 K/UL — SIGNIFICANT CHANGE UP (ref 1.8–7.4)
NEUTROPHILS NFR BLD AUTO: 73 % — SIGNIFICANT CHANGE UP (ref 43–77)
NRBC # BLD: 0 /100 WBCS — SIGNIFICANT CHANGE UP (ref 0–0)
NRBC BLD-RTO: 0 /100 WBCS — SIGNIFICANT CHANGE UP (ref 0–0)
PLATELET # BLD AUTO: 189 K/UL — SIGNIFICANT CHANGE UP (ref 150–400)
RBC # BLD: 3.18 M/UL — LOW (ref 4.2–5.8)
RBC # FLD: 16.4 % — HIGH (ref 10.3–14.5)
WBC # BLD: 5.92 K/UL — SIGNIFICANT CHANGE UP (ref 3.8–10.5)
WBC # FLD AUTO: 5.92 K/UL — SIGNIFICANT CHANGE UP (ref 3.8–10.5)

## 2025-02-01 ENCOUNTER — APPOINTMENT (OUTPATIENT)
Dept: HEMATOLOGY ONCOLOGY | Facility: CLINIC | Age: 55
End: 2025-02-01

## 2025-02-01 ENCOUNTER — RESULT REVIEW (OUTPATIENT)
Age: 55
End: 2025-02-01

## 2025-02-01 ENCOUNTER — APPOINTMENT (OUTPATIENT)
Dept: INFUSION THERAPY | Facility: HOSPITAL | Age: 55
End: 2025-02-01

## 2025-02-01 LAB
BASOPHILS # BLD AUTO: 0.03 K/UL — SIGNIFICANT CHANGE UP (ref 0–0.2)
BASOPHILS NFR BLD AUTO: 0.6 % — SIGNIFICANT CHANGE UP (ref 0–2)
EOSINOPHIL # BLD AUTO: 0.19 K/UL — SIGNIFICANT CHANGE UP (ref 0–0.5)
EOSINOPHIL NFR BLD AUTO: 3.7 % — SIGNIFICANT CHANGE UP (ref 0–6)
HCT VFR BLD CALC: 28.6 % — LOW (ref 39–50)
HGB BLD-MCNC: 9 G/DL — LOW (ref 13–17)
IMM GRANULOCYTES NFR BLD AUTO: 0.8 % — SIGNIFICANT CHANGE UP (ref 0–0.9)
LYMPHOCYTES # BLD AUTO: 0.92 K/UL — LOW (ref 1–3.3)
LYMPHOCYTES # BLD AUTO: 17.7 % — SIGNIFICANT CHANGE UP (ref 13–44)
MCHC RBC-ENTMCNC: 27.4 PG — SIGNIFICANT CHANGE UP (ref 27–34)
MCHC RBC-ENTMCNC: 31.5 G/DL — LOW (ref 32–36)
MCV RBC AUTO: 86.9 FL — SIGNIFICANT CHANGE UP (ref 80–100)
MONOCYTES # BLD AUTO: 0.33 K/UL — SIGNIFICANT CHANGE UP (ref 0–0.9)
MONOCYTES NFR BLD AUTO: 6.3 % — SIGNIFICANT CHANGE UP (ref 2–14)
NEUTROPHILS # BLD AUTO: 3.69 K/UL — SIGNIFICANT CHANGE UP (ref 1.8–7.4)
NEUTROPHILS NFR BLD AUTO: 70.9 % — SIGNIFICANT CHANGE UP (ref 43–77)
NRBC # BLD: 0 /100 WBCS — SIGNIFICANT CHANGE UP (ref 0–0)
NRBC BLD-RTO: 0 /100 WBCS — SIGNIFICANT CHANGE UP (ref 0–0)
PLATELET # BLD AUTO: 193 K/UL — SIGNIFICANT CHANGE UP (ref 150–400)
RBC # BLD: 3.29 M/UL — LOW (ref 4.2–5.8)
RBC # FLD: 15.5 % — HIGH (ref 10.3–14.5)
WBC # BLD: 5.2 K/UL — SIGNIFICANT CHANGE UP (ref 3.8–10.5)
WBC # FLD AUTO: 5.2 K/UL — SIGNIFICANT CHANGE UP (ref 3.8–10.5)

## 2025-02-19 ENCOUNTER — EMERGENCY (EMERGENCY)
Facility: HOSPITAL | Age: 55
LOS: 1 days | Discharge: HOME | End: 2025-02-19
Attending: EMERGENCY MEDICINE | Admitting: EMERGENCY MEDICINE
Payer: COMMERCIAL

## 2025-02-19 VITALS
DIASTOLIC BLOOD PRESSURE: 80 MMHG | OXYGEN SATURATION: 98 % | WEIGHT: 240.08 LBS | HEIGHT: 71 IN | RESPIRATION RATE: 16 BRPM | SYSTOLIC BLOOD PRESSURE: 180 MMHG | TEMPERATURE: 100 F | HEART RATE: 84 BPM

## 2025-02-19 VITALS
TEMPERATURE: 98 F | DIASTOLIC BLOOD PRESSURE: 68 MMHG | OXYGEN SATURATION: 98 % | HEART RATE: 81 BPM | SYSTOLIC BLOOD PRESSURE: 171 MMHG | RESPIRATION RATE: 16 BRPM

## 2025-02-19 DIAGNOSIS — S36.09XA OTHER INJURY OF SPLEEN, INITIAL ENCOUNTER: Chronic | ICD-10-CM

## 2025-02-19 DIAGNOSIS — Z98.890 OTHER SPECIFIED POSTPROCEDURAL STATES: Chronic | ICD-10-CM

## 2025-02-19 DIAGNOSIS — T83.83XA HEMORRHAGE DUE TO GENITOURINARY PROSTHETIC DEVICES, IMPLANTS AND GRAFTS, INITIAL ENCOUNTER: Chronic | ICD-10-CM

## 2025-02-19 LAB
ALBUMIN SERPL ELPH-MCNC: 4.4 G/DL — SIGNIFICANT CHANGE UP (ref 3.3–5)
ALP SERPL-CCNC: 76 U/L — SIGNIFICANT CHANGE UP (ref 40–120)
ALT FLD-CCNC: 7 U/L — SIGNIFICANT CHANGE UP (ref 4–41)
ANION GAP SERPL CALC-SCNC: 17 MMOL/L — HIGH (ref 7–14)
APPEARANCE UR: CLEAR — SIGNIFICANT CHANGE UP
AST SERPL-CCNC: 6 U/L — SIGNIFICANT CHANGE UP (ref 4–40)
BASOPHILS # BLD AUTO: 0.13 K/UL — SIGNIFICANT CHANGE UP (ref 0–0.2)
BASOPHILS NFR BLD AUTO: 0.9 % — SIGNIFICANT CHANGE UP (ref 0–2)
BILIRUB SERPL-MCNC: 0.6 MG/DL — SIGNIFICANT CHANGE UP (ref 0.2–1.2)
BILIRUB UR-MCNC: NEGATIVE — SIGNIFICANT CHANGE UP
BLOOD GAS VENOUS COMPREHENSIVE RESULT: SIGNIFICANT CHANGE UP
BUN SERPL-MCNC: 20 MG/DL — SIGNIFICANT CHANGE UP (ref 7–23)
CALCIUM SERPL-MCNC: 9.4 MG/DL — SIGNIFICANT CHANGE UP (ref 8.4–10.5)
CHLORIDE SERPL-SCNC: 99 MMOL/L — SIGNIFICANT CHANGE UP (ref 98–107)
CO2 SERPL-SCNC: 21 MMOL/L — LOW (ref 22–31)
COLOR SPEC: YELLOW — SIGNIFICANT CHANGE UP
CREAT SERPL-MCNC: 1.47 MG/DL — HIGH (ref 0.5–1.3)
DIFF PNL FLD: ABNORMAL
EGFR: 56 ML/MIN/1.73M2 — LOW
EOSINOPHIL # BLD AUTO: 0 K/UL — SIGNIFICANT CHANGE UP (ref 0–0.5)
EOSINOPHIL NFR BLD AUTO: 0 % — SIGNIFICANT CHANGE UP (ref 0–6)
GLUCOSE SERPL-MCNC: 196 MG/DL — HIGH (ref 70–99)
GLUCOSE UR QL: 250 MG/DL
HCT VFR BLD CALC: 33.5 % — LOW (ref 39–50)
HGB BLD-MCNC: 11 G/DL — LOW (ref 13–17)
IANC: 13.2 K/UL — HIGH (ref 1.8–7.4)
KETONES UR-MCNC: NEGATIVE MG/DL — SIGNIFICANT CHANGE UP
LEUKOCYTE ESTERASE UR-ACNC: ABNORMAL
LYMPHOCYTES # BLD AUTO: 0.13 K/UL — LOW (ref 1–3.3)
LYMPHOCYTES # BLD AUTO: 0.9 % — LOW (ref 13–44)
MCHC RBC-ENTMCNC: 27.2 PG — SIGNIFICANT CHANGE UP (ref 27–34)
MCHC RBC-ENTMCNC: 32.8 G/DL — SIGNIFICANT CHANGE UP (ref 32–36)
MCV RBC AUTO: 82.7 FL — SIGNIFICANT CHANGE UP (ref 80–100)
MONOCYTES # BLD AUTO: 0.5 K/UL — SIGNIFICANT CHANGE UP (ref 0–0.9)
MONOCYTES NFR BLD AUTO: 3.4 % — SIGNIFICANT CHANGE UP (ref 2–14)
NEUTROPHILS # BLD AUTO: 13.7 K/UL — HIGH (ref 1.8–7.4)
NEUTROPHILS NFR BLD AUTO: 90.5 % — HIGH (ref 43–77)
NITRITE UR-MCNC: NEGATIVE — SIGNIFICANT CHANGE UP
PH UR: 7 — SIGNIFICANT CHANGE UP (ref 5–8)
PLATELET # BLD AUTO: 165 K/UL — SIGNIFICANT CHANGE UP (ref 150–400)
POTASSIUM SERPL-MCNC: 4.2 MMOL/L — SIGNIFICANT CHANGE UP (ref 3.5–5.3)
POTASSIUM SERPL-SCNC: 4.2 MMOL/L — SIGNIFICANT CHANGE UP (ref 3.5–5.3)
PROT SERPL-MCNC: 7.6 G/DL — SIGNIFICANT CHANGE UP (ref 6–8.3)
PROT UR-MCNC: 30 MG/DL
RBC # BLD: 4.05 M/UL — LOW (ref 4.2–5.8)
RBC # FLD: 14.2 % — SIGNIFICANT CHANGE UP (ref 10.3–14.5)
SODIUM SERPL-SCNC: 137 MMOL/L — SIGNIFICANT CHANGE UP (ref 135–145)
SP GR SPEC: 1.01 — SIGNIFICANT CHANGE UP (ref 1–1.03)
UROBILINOGEN FLD QL: 0.2 MG/DL — SIGNIFICANT CHANGE UP (ref 0.2–1)
WBC # BLD: 14.59 K/UL — HIGH (ref 3.8–10.5)
WBC # FLD AUTO: 14.59 K/UL — HIGH (ref 3.8–10.5)

## 2025-02-19 PROCEDURE — 99285 EMERGENCY DEPT VISIT HI MDM: CPT

## 2025-02-19 PROCEDURE — 99283 EMERGENCY DEPT VISIT LOW MDM: CPT

## 2025-02-19 PROCEDURE — 76870 US EXAM SCROTUM: CPT | Mod: 26

## 2025-02-19 RX ORDER — MORPHINE SULFATE 60 MG/1
4 TABLET, FILM COATED, EXTENDED RELEASE ORAL ONCE
Refills: 0 | Status: DISCONTINUED | OUTPATIENT
Start: 2025-02-19 | End: 2025-02-19

## 2025-02-19 RX ORDER — HYDROMORPHONE HYDROCHLORIDE 4 MG/ML
1 INJECTION, SOLUTION INTRAMUSCULAR; INTRAVENOUS; SUBCUTANEOUS ONCE
Refills: 0 | Status: DISCONTINUED | OUTPATIENT
Start: 2025-02-19 | End: 2025-02-19

## 2025-02-19 RX ORDER — LEVOFLOXACIN 500 MG/1
1 TABLET, FILM COATED ORAL
Qty: 10 | Refills: 0
Start: 2025-02-19 | End: 2025-02-28

## 2025-02-19 RX ORDER — BACTERIOSTATIC SODIUM CHLORIDE 0.9 %
2300 VIAL (ML) INJECTION ONCE
Refills: 0 | Status: COMPLETED | OUTPATIENT
Start: 2025-02-19 | End: 2025-02-19

## 2025-02-19 RX ORDER — ACETAMINOPHEN 160 MG/5ML
1000 SUSPENSION ORAL ONCE
Refills: 0 | Status: COMPLETED | OUTPATIENT
Start: 2025-02-19 | End: 2025-02-19

## 2025-02-19 RX ORDER — CEFTRIAXONE 250 MG/1
1000 INJECTION, POWDER, FOR SOLUTION INTRAMUSCULAR; INTRAVENOUS ONCE
Refills: 0 | Status: COMPLETED | OUTPATIENT
Start: 2025-02-19 | End: 2025-02-19

## 2025-02-19 RX ADMIN — MORPHINE SULFATE 4 MILLIGRAM(S): 60 TABLET, FILM COATED, EXTENDED RELEASE ORAL at 10:06

## 2025-02-19 RX ADMIN — HYDROMORPHONE HYDROCHLORIDE 1 MILLIGRAM(S): 4 INJECTION, SOLUTION INTRAMUSCULAR; INTRAVENOUS; SUBCUTANEOUS at 13:11

## 2025-02-19 RX ADMIN — ACETAMINOPHEN 400 MILLIGRAM(S): 160 SUSPENSION ORAL at 13:10

## 2025-02-19 RX ADMIN — CEFTRIAXONE 100 MILLIGRAM(S): 250 INJECTION, POWDER, FOR SOLUTION INTRAMUSCULAR; INTRAVENOUS at 13:11

## 2025-02-19 RX ADMIN — Medication 2300 MILLILITER(S): at 13:00

## 2025-02-19 NOTE — ED ADULT NURSE REASSESSMENT NOTE - NS ED NURSE REASSESS COMMENT FT1
Jung catheter replaced using sterile technique, medicated as ordered, respirations even and unlabored, normal sinus rhythm on the monitor. Plan of care ongoing, comfort measures provided, safety maintained. 14 [place] Jung catheter replaced using sterile technique, medicated as ordered, respirations even and unlabored, normal sinus rhythm on the monitor. Plan of care ongoing, comfort measures provided, safety maintained.

## 2025-02-19 NOTE — ED ADULT NURSE NOTE - CHIEF COMPLAINT QUOTE
pt ambulatory, c/o LLQ pain, has sierra catheter x 1 month, endorses hematuria, nausea. took 10mg oxycodone PO at 0500 without relief. denies fevers, v/d. FS: 203, hx: DM type 2 on ozempic, chronic cystitis, chronic sierra

## 2025-02-19 NOTE — ED PROVIDER NOTE - ATTENDING APP SHARED VISIT CONTRIBUTION OF CARE
Dr. Razo: 54-year-old male with past medical history of kidney stones and resultant kidney injury, status post nephrostomy tubes, now removed, s/p hemodialysis during recent admission, but no longer with hemodialysis access, with indwelling Jung catheter placed last month and improving renal function, type 2 diabetes, former achalasia repair of the esophagus, in the emergency department with 1 day of left lower quadrant/left testicular pain.  Pain was initially waxing and waning, now constant.  Patient had minimal response to oxycodone that he took at home 4 hours ago.  He denies any fever, chest pain, shortness of breath, nausea, vomiting, diarrhea.  Patient notes that urine in his Jung bag is clear yellow, no blood.  He denies any dysuria or other urinary complaints, including urethral discharge.  I performed genitourinary exam with CHEVY Rollins as chaperone.  Patient is circumcised, urethral catheter in place, no urethral discharge noted.  Right testicle is normal-appearing and nontender.  Left testicle is very swollen and generally tender to palpation, but there is no cellulitis or erythema noted to the testicles or perineum.  No inguinal hernias noted.  No CVA tenderness bilaterally.  Otherwise, pt appearing in pain, heart RRR, lungs CTAB, abd NTND, extremities without swelling, strength 5/5 in all extremities and skin without rash.    I, Yuniel Razo MD, personally made/approved the management plan for this patient and take responsibility for the patient's management. Dr. Razo: 54-year-old male with past medical history of kidney stones and resultant kidney injury, status post nephrostomy tubes, now removed, s/p hemodialysis during recent admission, but no longer with hemodialysis access, with indwelling Jung catheter placed last month and improving renal function, type 2 diabetes, former achalasia repair of the esophagus, in the emergency department with 1 day of left lower quadrant/left testicular pain.  Pain was initially waxing and waning, now constant.  Patient had minimal response to oxycodone that he took at home 4 hours ago.  He denies any fever, chest pain, shortness of breath, nausea, vomiting, diarrhea.  Patient notes that urine in his Jung bag is clear yellow, no blood.  He denies any dysuria or other urinary complaints, including urethral discharge.  I performed genitourinary exam with CHEVY Rollins as chaperone.  Patient is circumcised, urethral catheter in place, no urethral discharge noted.  Right testicle is normal-appearing and nontender.  Left testicle is very swollen and generally tender to palpation, but there is no cellulitis or erythema noted to the testicles or perineum.  No inguinal hernias noted.  No CVA tenderness bilaterally.  Otherwise, pt appearing in pain, heart RRR, lungs CTAB, abd NTND, extremities without swelling, strength 5/5 in all extremities and skin without rash.    I, Yuniel Razo MD, personally made/approved the management plan for this patient and take responsibility for the patient's management

## 2025-02-19 NOTE — ED ADULT NURSE NOTE - NSFALLASSESSNEED_ED_ALL_ED
"Subjective:      Patient ID: Noemi Pacheco is a 36 y.o. male.    Vitals:  height is 5' 7" (1.702 m) and weight is 61.2 kg (135 lb). His oral temperature is 98.7 °F (37.1 °C). His blood pressure is 150/78 (abnormal) and his pulse is 78. His respiration is 17 and oxygen saturation is 98%.     Chief Complaint: Eye Problem    Noemi Pacheco is a 36 y.o. male with hx of back pain and hypertension who complains of right eye irritation. Patient started discomfort yesterday when he woke up and he is not using eye drops.He states his right eye had orange discharge.     Of note, patient reports getting off work at 8 am yesterday. He has a headache since. Headache is a 7/10. Worse in the light. Reports seeing floaters for the first time.  He has tried Tylenol. He reports hx of H.pylori - avoids NSAIDS.    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    Eye Problem   The right eye is affected. This is a new problem. The current episode started yesterday. The problem occurs constantly. The problem has been unchanged. There was no injury mechanism. The patient is experiencing no pain. There is No known exposure to pink eye. He Does not wear contacts. Associated symptoms include blurred vision, an eye discharge, eye redness, a foreign body sensation and itching. Pertinent negatives include no double vision, fever, nausea, photophobia, recent URI, tingling, vomiting or weakness. He has tried nothing for the symptoms. The treatment provided no relief.   Migraine   This is a new problem. The current episode started yesterday. The problem occurs constantly. The problem has been gradually improving. The pain is located in the Bilateral region. The pain does not radiate. The pain quality is not similar to prior headaches. The quality of the pain is described as aching. The pain is at a severity of 7/10. The pain is moderate. Associated symptoms include blurred vision, eye pain, eye redness, eye watering and a visual change. Pertinent " negatives include no abdominal pain, abnormal behavior, anorexia, coughing, dizziness, drainage, ear pain, fever, muscle aches, nausea, neck pain, numbness, phonophobia, photophobia, rhinorrhea, seizures, sinus pressure, sore throat, swollen glands, tingling, tinnitus, vomiting, weakness or weight loss. Nothing aggravates the symptoms. He has tried acetaminophen for the symptoms. The treatment provided mild relief. His past medical history is significant for hypertension. There is no history of migraine headaches or sinus disease.       Constitution: Negative for chills and fever.   HENT:  Negative for ear pain, tinnitus, sinus pressure and sore throat.    Neck: Negative for neck pain and neck stiffness.   Cardiovascular:  Negative for chest pain, leg swelling, palpitations and sob on exertion.   Eyes:  Positive for eye discharge, eye itching, eye pain, eye redness, vision loss and blurred vision. Negative for eye trauma, foreign body in eye, photophobia, double vision and eyelid swelling.   Respiratory:  Negative for cough.    Gastrointestinal:  Negative for abdominal pain, nausea and vomiting.   Skin:  Negative for wound.   Allergic/Immunologic: Negative for environmental allergies and seasonal allergies.   Neurological:  Positive for headaches. Negative for dizziness, history of migraines, numbness and seizures.   Psychiatric/Behavioral:  Negative for nervous/anxious. The patient is not nervous/anxious.       Past Medical History:   Diagnosis Date    Back pain     Hypertension      Current Outpatient Medications on File Prior to Visit   Medication Sig Dispense Refill    AMLODIPINE BESYLATE (NORVASC ORAL) Take by mouth.      CYCLOBENZAPRINE HCL (FLEXERIL ORAL) Take by mouth.      lisinopril-hydrochlorothiazide (PRINZIDE,ZESTORETIC) 10-12.5 mg per tablet Take 1 tablet by mouth once daily.       No current facility-administered medications on file prior to visit.     No past surgical history on file.  Review of  patient's allergies indicates:   Allergen Reactions    Hydrocodone Nausea And Vomiting    Oxycodone-acetaminophen Nausea And Vomiting    Tramadol Nausea And Vomiting     Objective:     Physical Exam   Constitutional: He is oriented to person, place, and time. He appears well-developed. He is cooperative.   HENT:   Head: Normocephalic and atraumatic.   Ears:   Right Ear: Hearing, tympanic membrane, external ear and ear canal normal.   Left Ear: Hearing, tympanic membrane, external ear and ear canal normal.   Nose: Nose normal. No mucosal edema, rhinorrhea, nasal deformity or congestion. No epistaxis. Right sinus exhibits no maxillary sinus tenderness and no frontal sinus tenderness. Left sinus exhibits no maxillary sinus tenderness and no frontal sinus tenderness.   Mouth/Throat: Uvula is midline, oropharynx is clear and moist and mucous membranes are normal. Mucous membranes are moist. No trismus in the jaw. Normal dentition. No uvula swelling. Oropharynx is clear.   Eyes: Conjunctivae and lids are normal. Pupils are equal, round, and reactive to light. Lids are everted and swept, no foreign bodies found. Right eye exhibits no exudate. Left eye exhibits no exudate. Right conjunctiva is not injected. Left conjunctiva is not injected. Extraocular movement intact vision grossly intact gaze aligned appropriately   Neck: Trachea normal and phonation normal. Neck supple.   Cardiovascular: Normal rate, regular rhythm, normal heart sounds and normal pulses.   Pulmonary/Chest: Effort normal and breath sounds normal.   Abdominal: Normal appearance.   Musculoskeletal: Normal range of motion.         General: Normal range of motion.   Neurological: no focal deficit. He is alert and oriented to person, place, and time. He has normal motor skills, normal sensation and intact cranial nerves (2-12). He exhibits normal muscle tone. Gait and coordination normal. GCS eye subscore is 4. GCS verbal subscore is 5. GCS motor subscore is  6.   Skin: Skin is warm, dry and intact.   Psychiatric: His speech is normal and behavior is normal. Mood, judgment and thought content normal.   Nursing note and vitals reviewed.      Vision Screening    Right eye Left eye Both eyes   Without correction 20/200     With correction  20/20    Comments: Patient had a contact in the left eye but not the right     Assessment:     1. Ocular migraine with status migrainosus, not intractable    2. Irritation of right eye    3. Elevated blood pressure reading in office with diagnosis of hypertension      Patient presents with clinical exam findings and history consistent with above.      On exam, patient is nontoxic appearing and vitals are stable.        Diagnostic testing results were reviewed and discussed with patient/guardian.   Tests ordered in clinic: None  Previous progress notes/admissions/labs and medications were reviewed.      Plan:   In cases of eye pain of unknown origin, proparacaine ophthalmic solution was used to rule out surface causes of pain (i.e. corneal or epithelial abrasion). Patient reported eye pain resolved a few seconds after 1-2 drops of proparacaine.     No abrasions or foreign body noted to cornea during fluorescein eye stain test. Proparacaine hydrochloride ophthalmic solution for >5 minutes, altafluor benox ophthalmic solution were used.    Patient stated his headache resolved after toradol injection.       Ocular migraine with status migrainosus, not intractable  -     ketorolac injection 30 mg  -     butalbital-acetaminophen-caffeine -40 mg (FIORICET, ESGIC) -40 mg per tablet; Take 1 tablet by mouth every 4 (four) hours as needed for Pain or Headaches (1 to 2 tablets or capsules every 4 hours as needed; not to exceed 6 tablets or capsules daily.).  Dispense: 60 tablet; Refill: 0  -     Ambulatory referral/consult to Neurology    Irritation of right eye  -     Cancel: Visual acuity screening  -     ciprofloxacin HCl (CILOXAN) 0.3  "% ophthalmic solution; Place 2 drops into the right eye every 4 (four) hours. for 7 days  Dispense: 5 mL; Refill: 0    Elevated blood pressure reading in office with diagnosis of hypertension  -     Ambulatory referral/consult to Internal Medicine                  1) See orders for this visit as documented in the electronic medical record.  2) Symptomatic therapy suggested: use acetaminophen/ibuprofen every 6-8 hours prn pain or fever, push fluids.   3) Call or return to clinic prn if these symptoms worsen or fail to improve as anticipated.    Discussed results/diagnosis/plan with patient in clinic.  We had shared decision making for patient's treatment. Patient verbalized understanding and in agreement with current treatment plan.     Patient was instructed to return for re-evaluation with urgent care or PCP for continued outpatient workup and management if symptoms do not improve/worsening symptoms. Strict ED versus clinic precautions given in depth.    Discharge and follow-up instructions given verbally/printed with the patient who expressed understanding. The instructions and results are also available on [employer].              Aurora "Marichuy Jaramillo PA-C          Patient Instructions   Keep a diary of what makes your migraines worse.        Prevention includes avoidance of the specific allergens that are causing symptoms in a specific patient. For those allergic to dust mites, helpful measures include replacing old pillows, blankets, and mattresses; using dust mite allergen impermeable covers for pillows, comforters, and mattresses; frequently washing beddings; reducing humidity; and frequently vacuuming and dusting the home. Additionally, other reservoirs of dust should be removed, such as old carpets, old furniture, and old curtains or drapes. When the culprit allergen is animal dander, the animal may need to be removed from the home, and old carpets, furniture, and curtains should be removed or cleaned " "thoroughly.     Common side effects include stinging and burning upon instillation. Patients may find it helpful to refrigerate the drops and/or use refrigerated artificial tears before using these medications. Other adverse effects include headache and increased ocular dryness.       When using eye drops for infection, do not touch your good eye after touching your infected eye. Also, do not touch the bottle or dropper directly onto one eye and then use it in the other. Doing these things can cause the infection to spread from one eye to the other.    If you wear contact lenses and you have symptoms of pink eye, it is really important to have a doctor look at your eyes. In people who wear contacts, the symptoms of pink eye can be caused by "corneal abrasion." Corneal abrasion is a scratch on the eye and can be a serious problem.. If your contacts are disposable, you will want to throw them away and start fresh. If you contacts are not disposable, you will need to carefully clean them. You should also throw away your contact lens case and get a new one.        Please remember that you have received care at an urgent care today. Urgent cares are not emergency rooms and are not equipped to handle life threatening emergencies and cannot rule in or out certain medical conditions and you may be released before all of your medical problems are known or treated. Please arrange follow up with your primary care physician or speciality clinic (optometry or opthalmology)  within 2-5 days if your signs and symptoms have not resolved or worsen. Patient can call our Referral Hotline at [phone] to make an appointment.    Please return here or go to the Emergency Department for any concerns or worsening of condition.Patient was educated on signs/symptoms (Worsening vision, eye pain, sensitivity to light, increased eye discharge) that would warrant emergent medical attention. Patient verbalized understanding.    Signs of a bad " reaction. These include very bad headache beyond the usual; speech, vision, motion problems or loss of balance; headaches are worse when lying down or headaches suddenly starts. Call for emergency help right away.  Changes in migraine attacks  Migraines that happen more often than 3 times a month  You are not feeling better in 2 to 3 days or you are feeling worse               yes

## 2025-02-19 NOTE — ED PROVIDER NOTE - TEST CONSIDERED BUT NOT PERFORMED
Tests Considered But Not Performed Considered CT of the abdomen and pelvis, however patient's pain is localized to the left testicle, low suspicion for intra-abdominal pathology at this time.  Low utility for CT of the abdomen and pelvis at this time.

## 2025-02-19 NOTE — CONSULT NOTE ADULT - ASSESSMENT
54M PMHx chronic bladder pain, nephrolithiasis h/o L PCNT (by Dr. Hoenig, removed), DM2, recent [facility] adm in 12/2024 for BASHIR on CKD requiring HD x3days, now with sierra since 12/2024 for freq urination p/w L testicular nonradiating constant sharp pain x1d,  No illiciting events. T 100.7F in ED, /76. WBC 14.59, Hct 33.5, Cr 1.47 (previous Cr 2.35 in 1/2025), LA 1.6. US testicles notable for L epididymoorchitis and complex hydrocele/pyocele.     RECOMMENDATION:   PENDING      54M PMHx chronic bladder pain, nephrolithiasis h/o L PCNT (by Dr. Hoenig, removed), DM2, recent [facility] adm in 12/2024 for BASHIR on CKD requiring HD x3days, now with sierra since 12/2024 for freq urination p/w L testicular nonradiating constant sharp pain x1d,  No illiciting events. T 100.7F in ED, /76. WBC 14.59, Hct 33.5, Cr 1.47 (previous Cr 2.35 in 1/2025), LA 1.6. US testicles notable for L epididymoorchitis and complex hydrocele/pyocele.     RECOMMENDATION:   -UA/UCX     54M PMHx chronic bladder pain, nephrolithiasis h/o L PCNT (by Dr. Hoenig, removed), DM2, recent [facility] adm in 12/2024 for BASHIR on CKD requiring HD x3days, now with sierra since 12/2024 for freq urination p/w L testicular nonradiating constant sharp pain x1d,  No illiciting events. T 100.7F in ED, /76. WBC 14.59, Hct 33.5, Cr 1.47 (previous Cr 2.35 in 1/2025), LA 1.6. US testicles notable for L epididymoorchitis and complex hydrocele/pyocele. ED Tx Ceftriaxone.     RECOMMENDATION:   -UA/UCX     54M PMHx chronic bladder pain, nephrolithiasis h/o L PCNT (by Dr. Hoenig, removed), DM2, recent [facility] adm in 12/2024 for BASHIR on CKD requiring HD x3days, now with sierra since 12/2024 for freq urination p/w L testicular nonradiating constant sharp pain x1d,  No illiciting events. T 100.7F in ED, /76. WBC 14.59, Hct 33.5, Cr 1.47 (previous Cr 2.35 in 1/2025), LA 1.6. US testicles notable for L epididymoorchitis and complex hydrocele/pyocele. ED Tx Ceftriaxone.     RECOMMENDATION:   -UA/UCX    PENDING DISCUSSION WITH ATTENDING.      54M PMHx chronic bladder pain, nephrolithiasis h/o L PCNT (by Dr. Hoenig, removed), DM2, recent [facility] adm in 12/2024 for BASHIR on CKD requiring HD x3days, now with sierra since 12/2024 for freq urination p/w L testicular nonradiating constant sharp pain x1d,  No illiciting events. T 100.7F in ED, /76. WBC 14.59, Hct 33.5, Cr 1.47 (previous Cr 2.35 in 1/2025), LA 1.6. US testicles notable for L epididymoorchitis and complex hydrocele/pyocele. ED Tx Ceftriaxone.     RECOMMENDATION:   -NO acute urological intervention.   -Abx for L epididymoorchitis.   -Scrotal elevation  -UA/UCX  -FU outpatient with Urology.     DISCUSSED WITH UROLOGY ATTENDING DR. CASAREZ.     [facility]  [address] [place] [address] [phone]       54M PMHx chronic bladder pain, nephrolithiasis h/o L PCNT (by Dr. Hoenig, removed), DM2, recent [facility] adm in 12/2024 for BASHIR on CKD requiring HD x3days, now with sierra since 12/2024 p/w L testicular nonradiating constant sharp pain x1d,  No illiciting events. T 100.7F in ED, /76. WBC 14.59, Hct 33.5, Cr 1.47 (previous Cr 2.35 in 1/2025), LA 1.6. US testicles notable for L epididymoorchitis and complex hydrocele/pyocele. ED Tx Ceftriaxone.     RECOMMENDATION:   -NO acute urological intervention.   -Abx for L epididymoorchitis.   -Scrotal elevation  -UA/UCX  -FU outpatient with Urology.     DISCUSSED WITH UROLOGY ATTENDING DR. CASAREZ.     [facility]  [address] [place] [address] [phone]

## 2025-02-19 NOTE — ED ADULT NURSE NOTE - OBJECTIVE STATEMENT
Patient received to room #15, A&OX4, ambulatory with assist, English speaking, Hx. of esophageal achalasia, kidney stones, chronic cystitis, chronic Jung, and DM2 on Ozempic, coming to the ED for c/o of LLQ pain and Jung catheter issue since yesterday. Patient reports he had his current Jung catheter placed in the end of December due to "constant problems urinating." Patient denies fevers, chills, chest pain, sob, headache, dizziness, blurry vision, v/d, and urinary symptoms. Patient endorses LLQ pain, hematuria, and nausea. No hematuria noted to Jung drainage bag, urine yellow in color, nonodorous, and not clear. Respirations even, unlabored, and clear in all fields, normal sinus rhythm on the monitor, abdomen soft, nondistended, tender to touch. 20 G IV placed to the right AC, labs drawn and sent, medicated as ordered. Care plan continued, comfort measures provided, safety maintained. Patient received to room #15, A&OX4, ambulatory with assist, English speaking, Hx. of esophageal achalasia, kidney stones, chronic cystitis, chronic Jung, and DM2 on Ozempic, coming to the ED for c/o of LLQ pain and Jung catheter issue since yesterday. Patient reports he had his current Jung catheter placed in the end of December due to "constant problems urinating." Patient denies fevers, chills, chest pain, sob, headache, dizziness, blurry vision, v/d, and urinary symptoms. Patient endorses LLQ pain, hematuria, and nausea. No hematuria noted to Jung drainage bag, urine yellow in color, nonodorous, and clear. Testicles red, swollen, and tender to touch. Respirations even, unlabored, and clear in all fields, normal sinus rhythm on the monitor, abdomen soft, nondistended, tender to touch. 20 G IV placed to the right AC, labs drawn and sent, medicated as ordered. Care plan continued, comfort measures provided, safety maintained.

## 2025-02-19 NOTE — ED PROVIDER NOTE - OBJECTIVE STATEMENT
54-year-old male with past medical history DM2, chronic cystitis, nephrolithiasis with past history of bilateral nephrostomy tubes now removed, recent admission with renal failure requiring dialysis with access removed, with Jung catheter in place presenting to the ER with left-sided testicular pain that began yesterday.  Patient states since yesterday evening he has had severe left-sided testicular pain with associated swelling to the area.  Patient states that he chronically has intermittent hematuria when he strains for bowel movements.  Patient does also report pain radiates to the left lower abdomen.  Patient also reports bilateral lower extremity edema at his baseline.  Patient denies fever/chills, nausea, vomiting, diarrhea, penile discharge, CP, SOB, weakness, headache or any other concerns 54-year-old male with past medical history DM2, chronic cystitis, nephrolithiasis with past history of  nephrostomy tube now removed, recent admission with renal failure requiring dialysis with access removed, with Jung catheter in place presenting to the ER with left-sided testicular pain that began yesterday.  Patient states since yesterday evening he has had severe left-sided testicular pain with associated swelling to the area.  Patient states that he chronically has intermittent hematuria when he strains for bowel movements.  Patient does also report pain radiates to the left lower abdomen.  Patient also reports bilateral lower extremity edema at his baseline.  Patient denies fever/chills, nausea, vomiting, diarrhea, penile discharge, CP, SOB, weakness, headache or any other concerns

## 2025-02-19 NOTE — CONSULT NOTE ADULT - ATTENDING COMMENTS
he was seen and examined, above was reviewed, Jung was recently changed. Scrotal elevation, ice packs and NSAIDs recommended. May consider Bactrim or Cipro upon discharge.

## 2025-02-19 NOTE — ED ADULT NURSE NOTE - NSICDXPASTSURGICALHX_GEN_ALL_CORE_FT
PAST SURGICAL HISTORY:  History of esophageal surgery esophageal repair about 5 years ago for esopheal achalasia    Nephrostomy tube bleed     Other injury of spleen fall on ice s/p spleen embolization at [facility] IR over 10 years ago

## 2025-02-19 NOTE — ED PROVIDER NOTE - CLINICAL SUMMARY MEDICAL DECISION MAKING FREE TEXT BOX
54-year-old male with past medical history DM2, chronic cystitis, nephrolithiasis with past history of bilateral nephrostomy tubes now removed, recent admission with renal failure requiring dialysis with access removed, with Sierra catheter in place presenting to the ER with left-sided testicular pain that began yesterday.  Patient states since yesterday evening he has had severe left-sided testicular pain with associated swelling to the area.  Patient states that he chronically has intermittent hematuria when he strains for bowel movements.  Patient does also report pain radiates to the left lower abdomen.  Patient also reports bilateral lower extremity edema at his baseline.  On exam pt is well appearing, oral temp 99.6, non tender abd, left testicular swelling with faint erythema, +diffuse ttp, no crepitus. Concern for epididymo-orchitis, UTI, lower suspicion for intra-abdominal pathology. Plan: cbc/cmp, vbg, US testicles, ua/ucx, will change sierra catheter, pain control. 54-year-old male with past medical history DM2, chronic cystitis, nephrolithiasis with past history of nephrostomy tube now removed, recent admission with renal failure requiring dialysis with access removed, with Sierra catheter in place presenting to the ER with left-sided testicular pain that began yesterday.  Patient states since yesterday evening he has had severe left-sided testicular pain with associated swelling to the area.  Patient states that he chronically has intermittent hematuria when he strains for bowel movements.  Patient does also report pain radiates to the left lower abdomen.  Patient also reports bilateral lower extremity edema at his baseline.  On exam pt is well appearing, oral temp 99.6, non tender abd, left testicular swelling with faint erythema, +diffuse ttp, no crepitus. Concern for epididymo-orchitis, UTI, lower suspicion for intra-abdominal pathology. Plan: cbc/cmp, vbg, US testicles, ua/ucx, will change sierra catheter, pain control.

## 2025-02-19 NOTE — ED PROVIDER NOTE - NSICDXPASTSURGICALHX_GEN_ALL_CORE_FT
PAST SURGICAL HISTORY:  History of esophageal surgery esophageal repair about 5 years ago for esopheal achalasia    Nephrostomy tube bleed     Other injury of spleen fall on ice s/p spleen embolization at [facility] IR over 10 years ago

## 2025-02-19 NOTE — ED ADULT TRIAGE NOTE - CHIEF COMPLAINT QUOTE
pt ambulatory, c/o LLQ pain, has sierra catheter x 1 month, endorses hematuria, nausea. took 10mg oxycodone PO at 0500 without relief. denies fevers, n/d. FS: 203, hx: DM type 2 on ozempic, chronic cystitis pt ambulatory, c/o LLQ pain, has sierra catheter x 1 month, endorses hematuria, nausea. took 10mg oxycodone PO at 0500 without relief. denies fevers, v/d. FS: 203, hx: DM type 2 on ozempic, chronic cystitis, chronic sierra

## 2025-02-19 NOTE — CONSULT NOTE ADULT - SUBJECTIVE AND OBJECTIVE BOX
54M PMHx chronic bladder pain, nephrolithiasis h/o L PCNT (by Dr. Hoenig, removed), DM2, recent [facility] adm in 12/2024 for BASHIR on CKD requiring HD x3days, now with sierra since 12/2024 for freq urination p/w L testicular constant sharp pain x1d, nonradiating, intensity 9/10. No illiciting events. Pain initially relieved with Oxycodon 10mg, but then stopped working. Pt currently only on Oxybutynin, last dose of Gabapentin 2wks ago due to medication ran out.  Otherwise denies fever/chills at home, CP, SOB, abd discomfort, N/V/diarrhea/constipation, hematuria, suprapubic pain, or testicular trauma/falls.    Of note: pt scheduled for UDS on 2/26/25 with Dr. Hoenig.        PAST MEDICAL & SURGICAL HISTORY:  Esophageal achalasia  s/p esophageal repair about 5 years ago  Type 2 diabetes mellitus  Calculus of kidney passed on own in the past; CT scan showing a 3.5 cm and 1.2 cm left mid-upper stones in December 2021    Former smoker  1 PPD x 16 years; Quit in 2003    Obesity  BMI 34.4    Kidney stones    History of esophageal surgery  esophageal repair about 5 years ago for esopheal achalasia      Other injury of spleen  fall on ice s/p spleen embolization at [facility] IR over 10 years ago  Nephrostomy tube bleed    FAMILY HISTORY:  No known  malignancy     SOCIAL HISTORY  Denies alcohol and drug abuse, nonsmoker     MEDICATIONS  (STANDING):  acetaminophen   IVPB .. 1000 milliGRAM(s) IV Intermittent Once  cefTRIAXone   IVPB 1000 milliGRAM(s) IV Intermittent once  sodium chloride 0.9% Bolus 2300 milliLiter(s) IV Bolus once    MEDICATIONS  (PRN):    Allergies    penicillins (Unknown)    Intolerances    REVIEW OF SYSTEMS: Pertinent positives and negatives as stated in HPI, otherwise negative    Vital signs  T(C): 38.2 (02-19-25 @ 10:00), Max: 38.2 (02-19-25 @ 10:00)  HR: 85 (02-19-25 @ 10:00)  BP: 190/76 (02-19-25 @ 10:00)  SpO2: 95% (02-19-25 @ 10:00)  Wt(kg): --    PHYSICAL EXAM ([employer] student present during exam)  GENERAL: AAOx3, Mild distress due to L testicular pain, obese, speak complete sentences  HEENT: NC/AT, EOMI, anicteric, moist mucous membrane. No facial droop. Conjunctiva clear.  NECK: supple, Full ROM  RESP: NRE, no accessory muscle use.  CVS: RRR  ABD: soft, no TTP, no Rebound tenderness/Guarding/Rigidity.  BACK: no CVAT b/l  EXTREMITIES: Full ROM  : Sierra in place draining clear yellow urine, No penile discharge, L testicular swelling with mild erythema/tenderness, induration of L posterior testicular area, NO fluctuance/crepitus.   PSYCH: approp mood/affect    LABS:  CBC                       11.0   14.59 )-----------( 165      ( 19 Feb 2025 10:05 )             33.5     BMP   02-19    137  |  99  |  20  ----------------------------<  196[H]  4.2   |  21[L]  |  1.47[H]    Ca    9.4      19 Feb 2025 10:05    TPro  7.6  /  Alb  4.4  /  TBili  0.6  /  DBili  x   /  AST  6   /  ALT  7   /  AlkPhos  76  02-19        Urinalysis Basic - ( 19 Feb 2025 10:05 )    Color: x / Appearance: x / SG: x / pH: x  Gluc: 196 mg/dL / Ketone: x  / Bili: x / Urobili: x   Blood: x / Protein: x / Nitrite: x   Leuk Esterase: x / RBC: x / WBC x   Sq Epi: x / Non Sq Epi: x / Bacteria: x      Urine Cx: PENDING COLLECTION  Blood Cx: PENDING COLLECTION    Radiology:    < from: US Testicles (02.19.25 @ 11:01) >  FINDINGS:    RIGHT:  Right testis: 4.5 cm x 2.6 cm x 2.2 cm. Normal echogenicity and   echotexture with no masses or areas of architectural distortion. Normal   arterial and venous blood flow pattern.  Right epididymis: Epididymal cyst measuring 2.4 cm.  Right hydrocele: Small hydrocele  Right varicocele: None.      LEFT:  Left testis: 4.7 cm x 3.1 cm x 3.0 cm. Mildly heterogeneous with   increased vascularity.  Left epididymis: Increased vascularity.  Left hydrocele: Complex fluid with septations.  Left varicocele: None.      IMPRESSION:    Left epididymoorchitis and complex hydrocele/pyocele.    < end of copied text >   54M PMHx chronic bladder pain, nephrolithiasis h/o L PCNT (by Dr. Hoenig, removed), DM2, recent [facility] adm in 12/2024 for BASHIR on CKD requiring HD x3days, now with sierra since 12/2024 for freq urination p/w L testicular constant sharp pain x1d, nonradiating, intensity 9/10. No illiciting events. Pain initially relieved with Oxycodon 10mg, but then stopped working. Pt currently only on Oxybutynin, last dose of Gabapentin 2wks ago due to medication ran out.  Otherwise denies fever/chills at home, CP, SOB, abd discomfort, N/V/diarrhea/constipation, hematuria, suprapubic pain, or testicular trauma/falls.    Of note: pt scheduled for UDS on 2/26/25 with Dr. Hoenig.        PAST MEDICAL & SURGICAL HISTORY:  Esophageal achalasia  s/p esophageal repair about 5 years ago  Type 2 diabetes mellitus  Calculus of kidney passed on own in the past; CT scan showing a 3.5 cm and 1.2 cm left mid-upper stones in December 2021    Former smoker  1 PPD x 16 years; Quit in 2003    Obesity  BMI 34.4    Kidney stones    History of esophageal surgery  esophageal repair about 5 years ago for esopheal achalasia      Other injury of spleen  fall on ice s/p spleen embolization at [facility] IR over 10 years ago  Nephrostomy tube bleed    FAMILY HISTORY:  No known  malignancy     SOCIAL HISTORY  Denies alcohol and drug abuse, nonsmoker     MEDICATIONS  (STANDING):  acetaminophen   IVPB .. 1000 milliGRAM(s) IV Intermittent Once  cefTRIAXone   IVPB 1000 milliGRAM(s) IV Intermittent once  sodium chloride 0.9% Bolus 2300 milliLiter(s) IV Bolus once    MEDICATIONS  (PRN):    Allergies    penicillins (Unknown)    Intolerances    REVIEW OF SYSTEMS: Pertinent positives and negatives as stated in HPI, otherwise negative    Vital signs  T(C): 38.2 (02-19-25 @ 10:00), Max: 38.2 (02-19-25 @ 10:00)  HR: 85 (02-19-25 @ 10:00)  BP: 190/76 (02-19-25 @ 10:00)  SpO2: 95% (02-19-25 @ 10:00)  Wt(kg): --    PHYSICAL EXAM ([employer] student present during exam)  GENERAL: AAOx3, Mild distress due to L testicular pain, obese, speak complete sentences  HEENT: NC/AT, EOMI, anicteric, moist mucous membrane. No facial droop. Conjunctiva clear.  NECK: supple, Full ROM  RESP: NRE, no accessory muscle use.  CVS: RRR  ABD: soft, no Rebound tenderness/Guarding/Rigidity. Mild tenderness of L groin area.   BACK: no CVAT b/l  EXTREMITIES: Full ROM  : buried penis due to body habitus, Sierra in place draining clear yellow urine, No penile discharge, L testicular swelling with mild erythema/tenderness, induration of L posterior testicular area, NO fluctuance/crepitus. NO erythema or tenderness of perineal area.   PSYCH: approp mood/affect    LABS:  CBC                       11.0   14.59 )-----------( 165      ( 19 Feb 2025 10:05 )             33.5     BMP   02-19    137  |  99  |  20  ----------------------------<  196[H]  4.2   |  21[L]  |  1.47[H]    Ca    9.4      19 Feb 2025 10:05    TPro  7.6  /  Alb  4.4  /  TBili  0.6  /  DBili  x   /  AST  6   /  ALT  7   /  AlkPhos  76  02-19        Urinalysis Basic - ( 19 Feb 2025 10:05 )    Color: x / Appearance: x / SG: x / pH: x  Gluc: 196 mg/dL / Ketone: x  / Bili: x / Urobili: x   Blood: x / Protein: x / Nitrite: x   Leuk Esterase: x / RBC: x / WBC x   Sq Epi: x / Non Sq Epi: x / Bacteria: x      Urine Cx: PENDING COLLECTION  Blood Cx: PENDING COLLECTION    Radiology:    < from: US Testicles (02.19.25 @ 11:01) >  FINDINGS:    RIGHT:  Right testis: 4.5 cm x 2.6 cm x 2.2 cm. Normal echogenicity and   echotexture with no masses or areas of architectural distortion. Normal   arterial and venous blood flow pattern.  Right epididymis: Epididymal cyst measuring 2.4 cm.  Right hydrocele: Small hydrocele  Right varicocele: None.      LEFT:  Left testis: 4.7 cm x 3.1 cm x 3.0 cm. Mildly heterogeneous with   increased vascularity.  Left epididymis: Increased vascularity.  Left hydrocele: Complex fluid with septations.  Left varicocele: None.      IMPRESSION:    Left epididymoorchitis and complex hydrocele/pyocele.    < end of copied text >   54M PMHx chronic bladder pain, nephrolithiasis h/o L PCNT (by Dr. Hoenig, removed), DM2, recent [facility] adm in 12/2024 for BASHIR on CKD requiring HD x3days, now with sierra since 12/2024 for ?freq urination vs leakage p/w L testicular constant sharp pain x1d, nonradiating, intensity 9/10. No illiciting events. Pain initially relieved with Oxycodon 10mg, but then stopped working. Pt currently only on Oxybutynin, last dose of Gabapentin 2wks ago due to medication ran out.  Otherwise denies fever/chills at home, CP, SOB, abd discomfort, N/V/diarrhea/constipation, hematuria, suprapubic pain, or testicular trauma/falls.    Of note: pt scheduled for UDS on 2/26/25 with Dr. Hoenig.        PAST MEDICAL & SURGICAL HISTORY:  Esophageal achalasia  s/p esophageal repair about 5 years ago  Type 2 diabetes mellitus  Calculus of kidney passed on own in the past; CT scan showing a 3.5 cm and 1.2 cm left mid-upper stones in December 2021    Former smoker  1 PPD x 16 years; Quit in 2003    Obesity  BMI 34.4    Kidney stones    History of esophageal surgery  esophageal repair about 5 years ago for esopheal achalasia      Other injury of spleen  fall on ice s/p spleen embolization at [facility] IR over 10 years ago  Nephrostomy tube bleed    FAMILY HISTORY:  No known  malignancy     SOCIAL HISTORY  Denies alcohol and drug abuse, nonsmoker     MEDICATIONS  (STANDING):  acetaminophen   IVPB .. 1000 milliGRAM(s) IV Intermittent Once  cefTRIAXone   IVPB 1000 milliGRAM(s) IV Intermittent once  sodium chloride 0.9% Bolus 2300 milliLiter(s) IV Bolus once    MEDICATIONS  (PRN):    Allergies    penicillins (Unknown)    Intolerances    REVIEW OF SYSTEMS: Pertinent positives and negatives as stated in HPI, otherwise negative    Vital signs  T(C): 38.2 (02-19-25 @ 10:00), Max: 38.2 (02-19-25 @ 10:00)  HR: 85 (02-19-25 @ 10:00)  BP: 190/76 (02-19-25 @ 10:00)  SpO2: 95% (02-19-25 @ 10:00)  Wt(kg): --    PHYSICAL EXAM ([employer] student present during exam)  GENERAL: AAOx3, Mild distress due to L testicular pain, obese, speak complete sentences  HEENT: NC/AT, EOMI, anicteric, moist mucous membrane. No facial droop. Conjunctiva clear.  NECK: supple, Full ROM  RESP: NRE, no accessory muscle use.  CVS: RRR  ABD: soft, no Rebound tenderness/Guarding/Rigidity. Mild tenderness of L groin area.   BACK: no CVAT b/l  EXTREMITIES: Full ROM  : buried penis due to body habitus, Sierra in place draining clear yellow urine, No penile discharge, L testicular swelling with mild erythema/tenderness, induration of L posterior testicular area, NO fluctuance/crepitus. NO erythema or tenderness of perineal area.   PSYCH: approp mood/affect    LABS:  CBC                       11.0   14.59 )-----------( 165      ( 19 Feb 2025 10:05 )             33.5     BMP   02-19    137  |  99  |  20  ----------------------------<  196[H]  4.2   |  21[L]  |  1.47[H]    Ca    9.4      19 Feb 2025 10:05    TPro  7.6  /  Alb  4.4  /  TBili  0.6  /  DBili  x   /  AST  6   /  ALT  7   /  AlkPhos  76  02-19        Urinalysis Basic - ( 19 Feb 2025 10:05 )    Color: x / Appearance: x / SG: x / pH: x  Gluc: 196 mg/dL / Ketone: x  / Bili: x / Urobili: x   Blood: x / Protein: x / Nitrite: x   Leuk Esterase: x / RBC: x / WBC x   Sq Epi: x / Non Sq Epi: x / Bacteria: x      Urine Cx: PENDING COLLECTION  Blood Cx: PENDING COLLECTION    Radiology:    < from: US Testicles (02.19.25 @ 11:01) >  FINDINGS:    RIGHT:  Right testis: 4.5 cm x 2.6 cm x 2.2 cm. Normal echogenicity and   echotexture with no masses or areas of architectural distortion. Normal   arterial and venous blood flow pattern.  Right epididymis: Epididymal cyst measuring 2.4 cm.  Right hydrocele: Small hydrocele  Right varicocele: None.      LEFT:  Left testis: 4.7 cm x 3.1 cm x 3.0 cm. Mildly heterogeneous with   increased vascularity.  Left epididymis: Increased vascularity.  Left hydrocele: Complex fluid with septations.  Left varicocele: None.      IMPRESSION:    Left epididymoorchitis and complex hydrocele/pyocele.    < end of copied text >

## 2025-02-19 NOTE — ED PROVIDER NOTE - NSFOLLOWUPINSTRUCTIONS_ED_ALL_ED_FT
Follow-up with your urologist within 3-5 days  Follow-up with your primary care doctor within 1 week  Take levofloxacin 500 mg once daily for 10 days  Take the medications as previously prescribed to you  Return to the ER with any worsening or concerning symptoms fever/chills, worsening pain or swelling, vomiting, nausea, weakness or any other concerns.

## 2025-02-19 NOTE — ED PROVIDER NOTE - PROGRESS NOTE DETAILS
CHEVY Rollins: repeat vitals oral temp 100.7, pt ordered for blood cultures, IV fluids, IV abx and tylenol CHEVY Rollins: US showing left epididymo-orchitis with complex hydrocele/pyocele, urology consulted CHEVY Rollins: Patient seen by urology, no acute urologic intervention recommending outpatient follow-up and continued treatment with antibiotics, not recommending admission at this time.  Given patient febrile with significant pain in the ED offered admission, patient declined states he is unable to stay.  Prescription for levofloxacin sent to patient's pharmacy, pt states he is not sexually active.  He will return to the ER with any worsening or concerning symptoms.  Discharge discussed with ED attending.

## 2025-02-19 NOTE — ED PROVIDER NOTE - NSDCPRINTRESULTS_ED_ALL_ED
bilateral lower extremity ROM was WFL (within functional limits)/bilateral upper extremity ROM was WFL (within functional limits)
Patient requests all Lab, Cardiology, and Radiology Results on their Discharge Instructions

## 2025-02-20 LAB
CULTURE RESULTS: SIGNIFICANT CHANGE UP
SPECIMEN SOURCE: SIGNIFICANT CHANGE UP

## 2025-02-26 ENCOUNTER — APPOINTMENT (OUTPATIENT)
Dept: UROLOGY | Facility: CLINIC | Age: 55
End: 2025-02-26

## 2025-02-26 ENCOUNTER — OUTPATIENT (OUTPATIENT)
Dept: OUTPATIENT SERVICES | Facility: HOSPITAL | Age: 55
LOS: 1 days | End: 2025-02-26

## 2025-02-26 VITALS — DIASTOLIC BLOOD PRESSURE: 82 MMHG | HEART RATE: 74 BPM | SYSTOLIC BLOOD PRESSURE: 167 MMHG

## 2025-02-26 DIAGNOSIS — Z98.890 OTHER SPECIFIED POSTPROCEDURAL STATES: Chronic | ICD-10-CM

## 2025-02-26 DIAGNOSIS — T83.83XA HEMORRHAGE DUE TO GENITOURINARY PROSTHETIC DEVICES, IMPLANTS AND GRAFTS, INITIAL ENCOUNTER: Chronic | ICD-10-CM

## 2025-02-26 DIAGNOSIS — N45.3 EPIDIDYMO-ORCHITIS: ICD-10-CM

## 2025-02-26 DIAGNOSIS — N20.0 CALCULUS OF KIDNEY: ICD-10-CM

## 2025-02-26 DIAGNOSIS — S36.09XA OTHER INJURY OF SPLEEN, INITIAL ENCOUNTER: Chronic | ICD-10-CM

## 2025-02-26 DIAGNOSIS — R35.0 FREQUENCY OF MICTURITION: ICD-10-CM

## 2025-02-26 DIAGNOSIS — R33.8 OTHER RETENTION OF URINE: ICD-10-CM

## 2025-02-26 DIAGNOSIS — N20.9 URINARY CALCULUS, UNSPECIFIED: ICD-10-CM

## 2025-03-04 ENCOUNTER — NON-APPOINTMENT (OUTPATIENT)
Age: 55
End: 2025-03-04

## 2025-03-04 RX ORDER — KETOROLAC TROMETHAMINE 10 MG/1
10 TABLET, FILM COATED ORAL EVERY 8 HOURS
Qty: 15 | Refills: 0 | Status: ACTIVE | COMMUNITY
Start: 2025-03-04 | End: 1900-01-01

## 2025-03-12 ENCOUNTER — APPOINTMENT (OUTPATIENT)
Dept: UROLOGY | Facility: CLINIC | Age: 55
End: 2025-03-12

## 2025-03-12 ENCOUNTER — APPOINTMENT (OUTPATIENT)
Dept: UROLOGY | Facility: CLINIC | Age: 55
End: 2025-03-12
Payer: COMMERCIAL

## 2025-03-12 ENCOUNTER — OUTPATIENT (OUTPATIENT)
Dept: OUTPATIENT SERVICES | Facility: HOSPITAL | Age: 55
LOS: 1 days | End: 2025-03-12
Payer: COMMERCIAL

## 2025-03-12 VITALS
DIASTOLIC BLOOD PRESSURE: 81 MMHG | OXYGEN SATURATION: 98 % | HEART RATE: 81 BPM | TEMPERATURE: 98 F | SYSTOLIC BLOOD PRESSURE: 151 MMHG

## 2025-03-12 DIAGNOSIS — N45.3 EPIDIDYMO-ORCHITIS: ICD-10-CM

## 2025-03-12 DIAGNOSIS — T83.83XA HEMORRHAGE DUE TO GENITOURINARY PROSTHETIC DEVICES, IMPLANTS AND GRAFTS, INITIAL ENCOUNTER: Chronic | ICD-10-CM

## 2025-03-12 DIAGNOSIS — N20.9 URINARY CALCULUS, UNSPECIFIED: ICD-10-CM

## 2025-03-12 DIAGNOSIS — Z98.890 OTHER SPECIFIED POSTPROCEDURAL STATES: Chronic | ICD-10-CM

## 2025-03-12 DIAGNOSIS — R35.0 FREQUENCY OF MICTURITION: ICD-10-CM

## 2025-03-12 DIAGNOSIS — S36.09XA OTHER INJURY OF SPLEEN, INITIAL ENCOUNTER: Chronic | ICD-10-CM

## 2025-03-12 PROBLEM — N40.1 BPH WITH OBSTRUCTION/LOWER URINARY TRACT SYMPTOMS: Status: ACTIVE | Noted: 2025-03-12

## 2025-03-12 PROCEDURE — 51728 CYSTOMETROGRAM W/VP: CPT | Mod: 26

## 2025-03-12 PROCEDURE — 99213 OFFICE O/P EST LOW 20 MIN: CPT | Mod: 25

## 2025-03-12 PROCEDURE — 51728 CYSTOMETROGRAM W/VP: CPT

## 2025-03-12 RX ORDER — TAMSULOSIN HYDROCHLORIDE 0.4 MG/1
0.4 CAPSULE ORAL
Qty: 90 | Refills: 3 | Status: ACTIVE | COMMUNITY
Start: 2025-03-12 | End: 1900-01-01

## 2025-03-18 DIAGNOSIS — N20.9 URINARY CALCULUS, UNSPECIFIED: ICD-10-CM

## 2025-03-18 DIAGNOSIS — Z87.448 PERSONAL HISTORY OF OTHER DISEASES OF URINARY SYSTEM: ICD-10-CM

## 2025-03-18 DIAGNOSIS — N32.81 OVERACTIVE BLADDER: ICD-10-CM

## 2025-03-18 DIAGNOSIS — N20.0 CALCULUS OF KIDNEY: ICD-10-CM

## 2025-03-18 DIAGNOSIS — R39.89 OTHER SYMPTOMS AND SIGNS INVOLVING THE GENITOURINARY SYSTEM: ICD-10-CM

## 2025-03-18 DIAGNOSIS — N32.89 OTHER SPECIFIED DISORDERS OF BLADDER: ICD-10-CM

## 2025-03-18 DIAGNOSIS — N40.1 BENIGN PROSTATIC HYPERPLASIA WITH LOWER URINARY TRACT SYMPTOMS: ICD-10-CM

## 2025-03-21 ENCOUNTER — NON-APPOINTMENT (OUTPATIENT)
Age: 55
End: 2025-03-21

## 2025-03-21 ENCOUNTER — APPOINTMENT (OUTPATIENT)
Dept: UROLOGY | Facility: CLINIC | Age: 55
End: 2025-03-21
Payer: COMMERCIAL

## 2025-03-21 DIAGNOSIS — Z87.448 PERSONAL HISTORY OF OTHER DISEASES OF URINARY SYSTEM: ICD-10-CM

## 2025-03-21 PROCEDURE — 99213 OFFICE O/P EST LOW 20 MIN: CPT

## 2025-03-21 RX ORDER — OXYCODONE AND ACETAMINOPHEN 5; 325 MG/1; MG/1
5-325 TABLET ORAL EVERY 6 HOURS
Qty: 12 | Refills: 0 | Status: DISCONTINUED | COMMUNITY
Start: 2025-03-21 | End: 2025-03-21

## 2025-03-21 RX ORDER — OXYBUTYNIN CHLORIDE 5 MG/1
5 TABLET ORAL EVERY 8 HOURS
Qty: 21 | Refills: 0 | Status: ACTIVE | COMMUNITY
Start: 2025-03-21 | End: 1900-01-01

## 2025-03-21 RX ORDER — AMITRIPTYLINE HYDROCHLORIDE 25 MG/1
25 TABLET, FILM COATED ORAL DAILY
Qty: 30 | Refills: 0 | Status: ACTIVE | COMMUNITY
Start: 2025-03-21 | End: 1900-01-01

## 2025-03-24 DIAGNOSIS — D47.2 MONOCLONAL GAMMOPATHY: ICD-10-CM

## 2025-03-26 ENCOUNTER — OUTPATIENT (OUTPATIENT)
Dept: OUTPATIENT SERVICES | Facility: HOSPITAL | Age: 55
LOS: 1 days | End: 2025-03-26
Payer: COMMERCIAL

## 2025-03-26 ENCOUNTER — APPOINTMENT (OUTPATIENT)
Dept: UROLOGY | Facility: CLINIC | Age: 55
End: 2025-03-26
Payer: COMMERCIAL

## 2025-03-26 DIAGNOSIS — N20.0 CALCULUS OF KIDNEY: ICD-10-CM

## 2025-03-26 DIAGNOSIS — S36.09XA OTHER INJURY OF SPLEEN, INITIAL ENCOUNTER: Chronic | ICD-10-CM

## 2025-03-26 DIAGNOSIS — R39.89 OTHER SYMPTOMS AND SIGNS INVOLVING THE GENITOURINARY SYSTEM: ICD-10-CM

## 2025-03-26 DIAGNOSIS — N32.81 OVERACTIVE BLADDER: ICD-10-CM

## 2025-03-26 DIAGNOSIS — N40.1 BENIGN PROSTATIC HYPERPLASIA WITH LOWER URINARY TRACT SYMPMS: ICD-10-CM

## 2025-03-26 DIAGNOSIS — Z98.890 OTHER SPECIFIED POSTPROCEDURAL STATES: Chronic | ICD-10-CM

## 2025-03-26 DIAGNOSIS — Z87.448 PERSONAL HISTORY OF OTHER DISEASES OF URINARY SYSTEM: ICD-10-CM

## 2025-03-26 DIAGNOSIS — R35.0 FREQUENCY OF MICTURITION: ICD-10-CM

## 2025-03-26 DIAGNOSIS — N13.8 BENIGN PROSTATIC HYPERPLASIA WITH LOWER URINARY TRACT SYMPMS: ICD-10-CM

## 2025-03-26 DIAGNOSIS — N32.89 OTHER SPECIFIED DISORDERS OF BLADDER: ICD-10-CM

## 2025-03-26 DIAGNOSIS — T83.83XA HEMORRHAGE DUE TO GENITOURINARY PROSTHETIC DEVICES, IMPLANTS AND GRAFTS, INITIAL ENCOUNTER: Chronic | ICD-10-CM

## 2025-03-26 PROCEDURE — 51700 IRRIGATION OF BLADDER: CPT

## 2025-03-27 ENCOUNTER — NON-APPOINTMENT (OUTPATIENT)
Age: 55
End: 2025-03-27

## 2025-03-27 RX ORDER — CIPROFLOXACIN HYDROCHLORIDE 500 MG/1
500 TABLET, FILM COATED ORAL TWICE DAILY
Qty: 20 | Refills: 0 | Status: ACTIVE | COMMUNITY
Start: 2025-03-27 | End: 1900-01-01

## 2025-03-28 DIAGNOSIS — N20.0 CALCULUS OF KIDNEY: ICD-10-CM

## 2025-03-28 DIAGNOSIS — N40.1 BENIGN PROSTATIC HYPERPLASIA WITH LOWER URINARY TRACT SYMPTOMS: ICD-10-CM

## 2025-03-28 DIAGNOSIS — N32.89 OTHER SPECIFIED DISORDERS OF BLADDER: ICD-10-CM

## 2025-03-28 DIAGNOSIS — R39.89 OTHER SYMPTOMS AND SIGNS INVOLVING THE GENITOURINARY SYSTEM: ICD-10-CM

## 2025-03-28 DIAGNOSIS — Z87.448 PERSONAL HISTORY OF OTHER DISEASES OF URINARY SYSTEM: ICD-10-CM

## 2025-03-28 DIAGNOSIS — N32.81 OVERACTIVE BLADDER: ICD-10-CM

## 2025-03-29 LAB
APPEARANCE: ABNORMAL
BACTERIA UR CULT: NORMAL
BACTERIA: ABNORMAL /HPF
BILIRUBIN URINE: NEGATIVE
BLOOD URINE: ABNORMAL
CAST: 1 /LPF
COLOR: YELLOW
EPITHELIAL CELLS: 1 /HPF
GLUCOSE QUALITATIVE U: 100 MG/DL
KETONES URINE: NEGATIVE MG/DL
LEUKOCYTE ESTERASE URINE: ABNORMAL
MICROSCOPIC-UA: NORMAL
NITRITE URINE: POSITIVE
PH URINE: 6
PROTEIN URINE: 100 MG/DL
RED BLOOD CELLS URINE: 37 /HPF
REVIEW: NORMAL
SPECIFIC GRAVITY URINE: 1.01
UROBILINOGEN URINE: 0.2 MG/DL
WHITE BLOOD CELLS URINE: >998 /HPF

## 2025-04-01 ENCOUNTER — OUTPATIENT (OUTPATIENT)
Dept: OUTPATIENT SERVICES | Facility: HOSPITAL | Age: 55
LOS: 1 days | Discharge: HOME | End: 2025-04-01

## 2025-04-01 DIAGNOSIS — D47.2 MONOCLONAL GAMMOPATHY: ICD-10-CM

## 2025-04-01 DIAGNOSIS — S36.09XA OTHER INJURY OF SPLEEN, INITIAL ENCOUNTER: Chronic | ICD-10-CM

## 2025-04-01 DIAGNOSIS — N19 UNSPECIFIED KIDNEY FAILURE: ICD-10-CM

## 2025-04-01 DIAGNOSIS — Z98.890 OTHER SPECIFIED POSTPROCEDURAL STATES: Chronic | ICD-10-CM

## 2025-04-01 DIAGNOSIS — E61.1 IRON DEFICIENCY: ICD-10-CM

## 2025-04-01 DIAGNOSIS — T83.83XA HEMORRHAGE DUE TO GENITOURINARY PROSTHETIC DEVICES, IMPLANTS AND GRAFTS, INITIAL ENCOUNTER: Chronic | ICD-10-CM

## 2025-04-01 DIAGNOSIS — D64.9 ANEMIA, UNSPECIFIED: ICD-10-CM

## 2025-04-01 DIAGNOSIS — D63.1 ANEMIA IN CHRONIC KIDNEY DISEASE: ICD-10-CM

## 2025-04-02 ENCOUNTER — APPOINTMENT (OUTPATIENT)
Dept: NEPHROLOGY | Facility: CLINIC | Age: 55
End: 2025-04-02

## 2025-04-04 ENCOUNTER — APPOINTMENT (OUTPATIENT)
Dept: HEMATOLOGY ONCOLOGY | Facility: CLINIC | Age: 55
End: 2025-04-04

## 2025-04-04 NOTE — ED ADULT NURSE NOTE - TEMPLATE
Managed by Endocrine - on Metformin 1000 mg BID and Lantus. A1C improved from the fall. Recently advised to decrease Lantus dosing due to presence of hypoglycemia. Next f/u scheduled for July 2025. Refill for Metformin provided.     Lab Results   Component Value Date    HGBA1C 7.6 (H) 03/28/2025       Orders:    Diabetic foot exam; Future    Albumin / creatinine urine ratio; Future      Symptoms

## 2025-04-08 ENCOUNTER — APPOINTMENT (OUTPATIENT)
Dept: UROLOGY | Facility: CLINIC | Age: 55
End: 2025-04-08
Payer: COMMERCIAL

## 2025-04-08 VITALS — DIASTOLIC BLOOD PRESSURE: 76 MMHG | HEART RATE: 89 BPM | SYSTOLIC BLOOD PRESSURE: 116 MMHG

## 2025-04-08 DIAGNOSIS — R33.9 RETENTION OF URINE, UNSPECIFIED: ICD-10-CM

## 2025-04-08 DIAGNOSIS — B37.89 OTHER SITES OF CANDIDIASIS: ICD-10-CM

## 2025-04-08 DIAGNOSIS — N13.30 UNSPECIFIED HYDRONEPHROSIS: ICD-10-CM

## 2025-04-08 DIAGNOSIS — R31.0 GROSS HEMATURIA: ICD-10-CM

## 2025-04-08 PROCEDURE — 51798 US URINE CAPACITY MEASURE: CPT

## 2025-04-08 PROCEDURE — 99245 OFF/OP CONSLTJ NEW/EST HI 55: CPT | Mod: 25

## 2025-04-08 RX ORDER — NYSTATIN 100000 [USP'U]/G
100000 POWDER TOPICAL
Qty: 1 | Refills: 3 | Status: ACTIVE | COMMUNITY
Start: 2025-04-08 | End: 1900-01-01

## 2025-04-08 RX ORDER — PHENAZOPYRIDINE 200 MG/1
200 TABLET, FILM COATED ORAL 3 TIMES DAILY
Qty: 20 | Refills: 3 | Status: ACTIVE | COMMUNITY
Start: 2025-04-08 | End: 1900-01-01

## 2025-04-09 ENCOUNTER — APPOINTMENT (OUTPATIENT)
Dept: UROLOGY | Facility: CLINIC | Age: 55
End: 2025-04-09
Payer: COMMERCIAL

## 2025-04-09 ENCOUNTER — OUTPATIENT (OUTPATIENT)
Dept: OUTPATIENT SERVICES | Facility: HOSPITAL | Age: 55
LOS: 1 days | End: 2025-04-09

## 2025-04-09 ENCOUNTER — APPOINTMENT (OUTPATIENT)
Dept: ULTRASOUND IMAGING | Facility: CLINIC | Age: 55
End: 2025-04-09
Payer: COMMERCIAL

## 2025-04-09 ENCOUNTER — OUTPATIENT (OUTPATIENT)
Dept: OUTPATIENT SERVICES | Facility: HOSPITAL | Age: 55
LOS: 1 days | End: 2025-04-09
Payer: COMMERCIAL

## 2025-04-09 ENCOUNTER — INPATIENT (INPATIENT)
Facility: HOSPITAL | Age: 55
LOS: 0 days | Discharge: LEFT BEFORE DISCHARGE | End: 2025-04-10
Attending: GENERAL PRACTICE | Admitting: GENERAL PRACTICE
Payer: COMMERCIAL

## 2025-04-09 ENCOUNTER — NON-APPOINTMENT (OUTPATIENT)
Age: 55
End: 2025-04-09

## 2025-04-09 VITALS — SYSTOLIC BLOOD PRESSURE: 127 MMHG | HEART RATE: 77 BPM | RESPIRATION RATE: 16 BRPM | DIASTOLIC BLOOD PRESSURE: 81 MMHG

## 2025-04-09 VITALS
HEIGHT: 71 IN | WEIGHT: 214.95 LBS | HEART RATE: 103 BPM | RESPIRATION RATE: 16 BRPM | TEMPERATURE: 98 F | DIASTOLIC BLOOD PRESSURE: 78 MMHG | SYSTOLIC BLOOD PRESSURE: 139 MMHG

## 2025-04-09 DIAGNOSIS — Z98.890 OTHER SPECIFIED POSTPROCEDURAL STATES: Chronic | ICD-10-CM

## 2025-04-09 DIAGNOSIS — S36.09XA OTHER INJURY OF SPLEEN, INITIAL ENCOUNTER: Chronic | ICD-10-CM

## 2025-04-09 DIAGNOSIS — T83.83XA HEMORRHAGE DUE TO GENITOURINARY PROSTHETIC DEVICES, IMPLANTS AND GRAFTS, INITIAL ENCOUNTER: Chronic | ICD-10-CM

## 2025-04-09 DIAGNOSIS — R35.0 FREQUENCY OF MICTURITION: ICD-10-CM

## 2025-04-09 LAB
ALBUMIN SERPL ELPH-MCNC: 3.7 G/DL — SIGNIFICANT CHANGE UP (ref 3.3–5)
ALP SERPL-CCNC: 76 U/L — SIGNIFICANT CHANGE UP (ref 40–120)
ALT FLD-CCNC: 16 U/L — SIGNIFICANT CHANGE UP (ref 4–41)
ANION GAP SERPL CALC-SCNC: 14 MMOL/L — SIGNIFICANT CHANGE UP (ref 7–14)
ANION GAP SERPL CALC-SCNC: 16 MMOL/L
APPEARANCE UR: ABNORMAL
APPEARANCE: ABNORMAL
AST SERPL-CCNC: 9 U/L — SIGNIFICANT CHANGE UP (ref 4–40)
BACTERIA # UR AUTO: ABNORMAL /HPF
BACTERIA: NEGATIVE /HPF
BASOPHILS # BLD AUTO: 0.03 K/UL — SIGNIFICANT CHANGE UP (ref 0–0.2)
BASOPHILS NFR BLD AUTO: 0.3 % — SIGNIFICANT CHANGE UP (ref 0–2)
BILIRUB SERPL-MCNC: 0.2 MG/DL — SIGNIFICANT CHANGE UP (ref 0.2–1.2)
BILIRUB UR-MCNC: NEGATIVE — SIGNIFICANT CHANGE UP
BILIRUBIN URINE: NEGATIVE
BLOOD URINE: ABNORMAL
BUN SERPL-MCNC: 65 MG/DL
BUN SERPL-MCNC: 67 MG/DL — HIGH (ref 7–23)
CALCIUM SERPL-MCNC: 9.4 MG/DL — SIGNIFICANT CHANGE UP (ref 8.4–10.5)
CALCIUM SERPL-MCNC: 9.9 MG/DL
CAST: 0 /LPF — SIGNIFICANT CHANGE UP (ref 0–4)
CAST: 2 /LPF
CHLORIDE SERPL-SCNC: 101 MMOL/L
CHLORIDE SERPL-SCNC: 96 MMOL/L — LOW (ref 98–107)
CO2 SERPL-SCNC: 16 MMOL/L
CO2 SERPL-SCNC: 16 MMOL/L — LOW (ref 22–31)
COLOR SPEC: SIGNIFICANT CHANGE UP
COLOR: ABNORMAL
CREAT SERPL-MCNC: 4.75 MG/DL — HIGH (ref 0.5–1.3)
CREAT SERPL-MCNC: 4.81 MG/DL
DIFF PNL FLD: ABNORMAL
EGFR: 14 ML/MIN/1.73M2 — LOW
EGFR: 14 ML/MIN/1.73M2 — LOW
EGFRCR SERPLBLD CKD-EPI 2021: 13 ML/MIN/1.73M2
EOSINOPHIL # BLD AUTO: 0.26 K/UL — SIGNIFICANT CHANGE UP (ref 0–0.5)
EOSINOPHIL NFR BLD AUTO: 2.7 % — SIGNIFICANT CHANGE UP (ref 0–6)
EPITHELIAL CELLS: >36 /HPF
GLUCOSE QUALITATIVE U: NEGATIVE MG/DL
GLUCOSE SERPL-MCNC: 226 MG/DL
GLUCOSE SERPL-MCNC: 343 MG/DL — HIGH (ref 70–99)
GLUCOSE UR QL: 250 MG/DL
HCT VFR BLD CALC: 29.4 % — LOW (ref 39–50)
HGB BLD-MCNC: 9.8 G/DL — LOW (ref 13–17)
IANC: 7.7 K/UL — HIGH (ref 1.8–7.4)
IMM GRANULOCYTES NFR BLD AUTO: 0.7 % — SIGNIFICANT CHANGE UP (ref 0–0.9)
KETONES UR-MCNC: NEGATIVE MG/DL — SIGNIFICANT CHANGE UP
KETONES URINE: NEGATIVE MG/DL
LEUKOCYTE ESTERASE UR-ACNC: ABNORMAL
LEUKOCYTE ESTERASE URINE: ABNORMAL
LYMPHOCYTES # BLD AUTO: 0.77 K/UL — LOW (ref 1–3.3)
LYMPHOCYTES # BLD AUTO: 8 % — LOW (ref 13–44)
MCHC RBC-ENTMCNC: 25.3 PG — LOW (ref 27–34)
MCHC RBC-ENTMCNC: 33.3 G/DL — SIGNIFICANT CHANGE UP (ref 32–36)
MCV RBC AUTO: 75.8 FL — LOW (ref 80–100)
MICROSCOPIC-UA: NORMAL
MONOCYTES # BLD AUTO: 0.83 K/UL — SIGNIFICANT CHANGE UP (ref 0–0.9)
MONOCYTES NFR BLD AUTO: 8.6 % — SIGNIFICANT CHANGE UP (ref 2–14)
NEUTROPHILS # BLD AUTO: 7.7 K/UL — HIGH (ref 1.8–7.4)
NEUTROPHILS NFR BLD AUTO: 79.7 % — HIGH (ref 43–77)
NITRITE UR-MCNC: POSITIVE
NITRITE URINE: NEGATIVE
NRBC # BLD AUTO: 0 K/UL — SIGNIFICANT CHANGE UP (ref 0–0)
NRBC # FLD: 0 K/UL — SIGNIFICANT CHANGE UP (ref 0–0)
NRBC BLD AUTO-RTO: 0 /100 WBCS — SIGNIFICANT CHANGE UP (ref 0–0)
PH UR: 6 — SIGNIFICANT CHANGE UP (ref 5–8)
PH URINE: 5.5
PLATELET # BLD AUTO: 302 K/UL — SIGNIFICANT CHANGE UP (ref 150–400)
POTASSIUM SERPL-MCNC: 4.3 MMOL/L — SIGNIFICANT CHANGE UP (ref 3.5–5.3)
POTASSIUM SERPL-SCNC: 4.3 MMOL/L — SIGNIFICANT CHANGE UP (ref 3.5–5.3)
POTASSIUM SERPL-SCNC: 5.2 MMOL/L
PROT SERPL-MCNC: 7.7 G/DL — SIGNIFICANT CHANGE UP (ref 6–8.3)
PROT UR-MCNC: 300 MG/DL
PROTEIN URINE: 100 MG/DL
RBC # BLD: 3.88 M/UL — LOW (ref 4.2–5.8)
RBC # FLD: 15.1 % — HIGH (ref 10.3–14.5)
RBC CASTS # UR COMP ASSIST: 481 /HPF — HIGH (ref 0–4)
RED BLOOD CELLS URINE: 1133 /HPF
REVIEW: NORMAL
REVIEW: SIGNIFICANT CHANGE UP
SODIUM SERPL-SCNC: 126 MMOL/L — LOW (ref 135–145)
SODIUM SERPL-SCNC: 133 MMOL/L
SP GR SPEC: 1.01 — SIGNIFICANT CHANGE UP (ref 1–1.03)
SPECIFIC GRAVITY URINE: 1.01
SQUAMOUS # UR AUTO: 1 /HPF — SIGNIFICANT CHANGE UP (ref 0–5)
UROBILINOGEN FLD QL: 1 MG/DL — SIGNIFICANT CHANGE UP (ref 0.2–1)
UROBILINOGEN URINE: 0.2 MG/DL
WBC # BLD: 9.66 K/UL — SIGNIFICANT CHANGE UP (ref 3.8–10.5)
WBC # FLD AUTO: 9.66 K/UL — SIGNIFICANT CHANGE UP (ref 3.8–10.5)
WBC UR QL: 1815 /HPF — HIGH (ref 0–5)
WHITE BLOOD CELLS URINE: >998 /HPF
YEAST-LIKE CELLS: PRESENT
YEAST-LIKE CELLS: PRESENT

## 2025-04-09 PROCEDURE — 51702 INSERT TEMP BLADDER CATH: CPT

## 2025-04-09 PROCEDURE — 99214 OFFICE O/P EST MOD 30 MIN: CPT | Mod: 25

## 2025-04-09 PROCEDURE — 99285 EMERGENCY DEPT VISIT HI MDM: CPT

## 2025-04-09 PROCEDURE — 76770 US EXAM ABDO BACK WALL COMP: CPT

## 2025-04-09 RX ORDER — CEFTRIAXONE 500 MG/1
1000 INJECTION, POWDER, FOR SOLUTION INTRAMUSCULAR; INTRAVENOUS ONCE
Refills: 0 | Status: COMPLETED | OUTPATIENT
Start: 2025-04-09 | End: 2025-04-09

## 2025-04-09 RX ADMIN — Medication 1000 MILLILITER(S): at 23:02

## 2025-04-09 RX ADMIN — CEFTRIAXONE 100 MILLIGRAM(S): 500 INJECTION, POWDER, FOR SOLUTION INTRAMUSCULAR; INTRAVENOUS at 23:43

## 2025-04-09 NOTE — ED PROVIDER NOTE - PROGRESS NOTE DETAILS
Anand Palafox PGY3:  Urology at bedside Anand Palafox PGY3:  Urology does not have plan for intervention at this time Anand Palafox PGY3:  Urology does not have plan for intervention at this time. Anand Palafox PGY3:  Spoke with pt and he does not want to stay in the hospital as he has a special needs son who he needs to attend. Discussed that he needs close monitoring of his creatinine as If it worsens, he could progress to kidney failure and require HD. He understands and states he will follow up with his urologist however he higuera snot want to stay and wants to leave AMA.

## 2025-04-09 NOTE — ED PROVIDER NOTE - PATIENT PORTAL LINK FT
You can access the FollowMyHealth Patient Portal offered by [facility] by registering at the following website: [email or web address]. By joining [facility]’s FollowMyHealth portal, you will also be able to view your health information using other applications (apps) compatible with our system.

## 2025-04-09 NOTE — ED PROVIDER NOTE - NSFOLLOWUPINSTRUCTIONS_ED_ALL_ED_FT
You were sent a prescription of antibiotics to your pharmacy.     Please follow up with your Urologist as soon as possible to continue to monitor your Creatinine.     Urinary Retention in Men    WHAT YOU NEED TO KNOW:  Urinary retention is a condition that develops when your bladder does not empty completely when you urinate.      DISCHARGE INSTRUCTIONS:    *** DO NOT REMOVE THE SIERRA YOURSELF as this can damage the urethral track. There is a balloon on the sierra that keeps it it place. Please follow up with Urology for further treatment. ***    Medicines:   •Medicines can help decrease the size of your prostate, fight infection, and help you urinate more easily.  •Take your medicine as directed. Contact your healthcare provider if you think your medicine is not helping or if you have side effects. Tell him or her if you are allergic to any medicine. Keep a list of the medicines, vitamins, and herbs you take. Include the amounts, and when and why you take them. Bring the list or the pill bottles to follow-up visits. Carry your medicine list with you in case of an emergency.    Sierra catheter care: You may need a Sierra catheter for up to 2 weeks at home. Healthcare providers will give you a smaller leg bag to collect urine. Keep the bag below your waist. This will prevent urine from flowing back into your bladder and causing an infection or other problems. Also, keep the tube free of kinks so the urine will drain properly. Do not pull on the catheter. This can cause pain and bleeding, and may cause the catheter to come out. Ask your healthcare provider or urologist for more information on Sierra catheter care.    Urinate regularly: When your catheter is removed, do not let your bladder become too full before you urinate. Set regular times each day to urinate. Urinate as soon as you feel the need or at least every 3 hours while you are awake. Do not drink liquids before you go to bed. Urinate right before you go to bed.    Follow up with your healthcare provider or urologist as directed: Write down your questions so you remember to ask them during your visits.     Contact your healthcare provider or urologist if:   •You have a fever.  •You have pain when you urinate.  •You have blood in your urine.  •You have problems with your catheter.  •You have questions or concerns about your condition or care.    Return to the emergency department if:   •You have severe abdominal pain.  •You are breathing faster than usual.  •Your heartbeat is faster than usual.  •Your face, hands, feet, or ankles are swollen.      Urinary Tract Infection    A urinary tract infection (UTI) is an infection of any part of the urinary tract, which includes the kidneys, ureters, bladder, and urethra. Risk factors include ignoring your need to urinate, wiping back to front if female, being an uncircumcised male, and having diabetes or a weak immune system. Symptoms include frequent urination, pain or burning with urination, foul smelling urine, cloudy urine, pain in the lower abdomen, blood in the urine, and fever. If you were prescribed an antibiotic medicine, take it as told by your health care provider. Do not stop taking the antibiotic even if you start to feel better.    SEEK IMMEDIATE MEDICAL CARE IF YOU HAVE ANY OF THE FOLLOWING SYMPTOMS: severe back or abdominal pain, fever, inability to keep fluids or medicine down, dizziness/lightheadedness, or a change in mental status.

## 2025-04-09 NOTE — ED ADULT TRIAGE NOTE - CHIEF COMPLAINT QUOTE
pt c/o urinary retention since ~2000 today. pt states had urinary catheter placed at 1330 today. states had urine backed up to kidneys. pt states no drainage noted in leg bag, endorsing severe pain. appears uncomfortable. denies nausea/vomiting.  past medical hx DM2. pt states took oxycodone 20mg ~2140 prior to arrival.

## 2025-04-09 NOTE — ED PROVIDER NOTE - NSICDXPASTSURGICALHX_GEN_ALL_CORE_FT
PAST SURGICAL HISTORY:  History of esophageal surgery esophageal repair about 5 years ago for esopheal achalasia    Nephrostomy tube bleed     Other injury of spleen fall on ice s/p spleen embolization at [facility] IR over 10 years ago

## 2025-04-09 NOTE — ED ADULT NURSE NOTE - OBJECTIVE STATEMENT
float rn. pt received to room 2. A&Ox4, ambulatory at baseline. history of BPH and DM2. pt c/o urinary retention since 8pm today. endorses having sierra catheter placed around 1pm today at urologist office due to elevated creatine levels, states post placement did have urinary output. upon arrival to room, catheter bag is dry and empty. MD at bedside for eval, detached catheter and flushed and noted to have + urinary output. output is dark yellow in color without sediments. no signs of blood clots in catheter bag. pt denies fevers, chills, nausea, vomiting, diarrhea within the past 48 hours. MD rocky aware of patient urinary output. labs collected and sent, pending CT report given to primary RN Mitra

## 2025-04-09 NOTE — ED PROVIDER NOTE - ATTENDING CONTRIBUTION TO CARE
54M PMHx chronic bladder pain, nephrolithiasis h/o L PCNT (by Dr. Hoenig, removed), DM2, adm in 12/2024 for BASHIR on CKD requiring HD x3days p/w inability to pass urine in sierra bag. Pt saw his urologist today, had sierra placed for c/f new BASHIR and hydronephrosis. I reviewed pt's labs from today showing BASHIR, had outpt US showing moderate b/l hydronephrosis unchanged from prior, urologist placed a sierra catheter and pt went home. Outpt note also states c/f for UTI, not started on abx, unclear why. Pt noticed some blood in bag and then no flow of urine. Sierra flushed and now with good urine outpt. Plan for non con CT eval for stone, will need admission for new BASHIR, will also consult uro

## 2025-04-09 NOTE — ED PROVIDER NOTE - PATIENT'S PREFERRED PRONOUN
Notification to r/o measles. Pt. c/o fevers, running red eyes and rash noted to back and legs x 1wk ago. Pt. brought to rm 6 through outside door. Him/He

## 2025-04-09 NOTE — ED PROVIDER NOTE - PHYSICAL EXAMINATION
GENERAL: Not in acute distress, non-toxic appearing  HEAD: normocephalic, atraumatic  HEENT: EOMI, normal conjunctiva, oral mucosa moist, neck supple  CARDIAC: appears well perfused, RRR  PULM: normal work of breathing, clear to auscultation bilat  GI: abdomen nondistended, no significant suprapubic tenderness, no CVA tenderness  : sierra cath in place without urine draining, small clot visible   NEURO: alert and oriented x 3, normal speech, no focal motor or sensory deficits, gait normal, no gross neurologic deficit  MSK: No visible deformities, no peripheral edema,   SKIN: No visible rashes, dry, well-perfused  PSYCH: appropriate mood and affect

## 2025-04-09 NOTE — ED PROVIDER NOTE - CLINICAL SUMMARY MEDICAL DECISION MAKING FREE TEXT BOX
54M PMHx chronic bladder pain, nephrolithiasis h/o L PCNT (by Dr. Hoenig, removed), DM2, adm in 12/2024 for BASHIR on CKD requiring HD x3days p/w inability to pass urine in sierra bag. He had the sierra removed a few days ago and was seen by his urologist as his labs showed BASHIR and he had an renal US which showed moderate bilat hydronephrosis however it was unchanged from prior. After having the sierra replaced in the office, it was draining well with some blood and clots until around 1pm when it stopped. He is currently having discomfort secondary to urinary retention. Pt has not been on any abx and unclear if Pt was told to come into the ED by urology.     On exam he does not have significant suprapubic tenderness or CVA tenderness. Afebrile.     Sierra was flushed and now draining well. Will order CT noncon for eval of stone and labs. Pt will likely need tba for BASHIR. Will consult Urology.      Pt saw his urologist today, had sierra placed for c/f new BASHIR and hydronephrosis. I reviewed pt's labs from today showing BASHIR, had outpt US showing moderate b/l hydronephrosis unchanged from prior, urologist placed a sierra catheter and pt went home. Outpt note also states c/f for UTI, not started on abx, unclear why. Pt noticed some blood in bag and then no flow of urine. Sierra flushed and now with good urine outpt. Plan for non con CT eval for stone, will need admission for new BASHIR, will also consult uro

## 2025-04-10 ENCOUNTER — APPOINTMENT (OUTPATIENT)
Dept: UROLOGY | Facility: CLINIC | Age: 55
End: 2025-04-10

## 2025-04-10 ENCOUNTER — NON-APPOINTMENT (OUTPATIENT)
Age: 55
End: 2025-04-10

## 2025-04-10 VITALS
SYSTOLIC BLOOD PRESSURE: 119 MMHG | TEMPERATURE: 99 F | DIASTOLIC BLOOD PRESSURE: 70 MMHG | HEART RATE: 100 BPM | RESPIRATION RATE: 18 BRPM | OXYGEN SATURATION: 98 %

## 2025-04-10 DIAGNOSIS — R33.9 RETENTION OF URINE, UNSPECIFIED: ICD-10-CM

## 2025-04-10 LAB
ANION GAP SERPL CALC-SCNC: 15 MMOL/L — HIGH (ref 7–14)
BUN SERPL-MCNC: 64 MG/DL — HIGH (ref 7–23)
CALCIUM SERPL-MCNC: 9.3 MG/DL — SIGNIFICANT CHANGE UP (ref 8.4–10.5)
CHLORIDE SERPL-SCNC: 101 MMOL/L — SIGNIFICANT CHANGE UP (ref 98–107)
CO2 SERPL-SCNC: 15 MMOL/L — LOW (ref 22–31)
CREAT SERPL-MCNC: 4.58 MG/DL — HIGH (ref 0.5–1.3)
EGFR: 14 ML/MIN/1.73M2 — LOW
EGFR: 14 ML/MIN/1.73M2 — LOW
GLUCOSE SERPL-MCNC: 248 MG/DL — HIGH (ref 70–99)
POTASSIUM SERPL-MCNC: 4.5 MMOL/L — SIGNIFICANT CHANGE UP (ref 3.5–5.3)
POTASSIUM SERPL-SCNC: 4.5 MMOL/L — SIGNIFICANT CHANGE UP (ref 3.5–5.3)
SODIUM SERPL-SCNC: 131 MMOL/L — LOW (ref 135–145)

## 2025-04-10 PROCEDURE — 74176 CT ABD & PELVIS W/O CONTRAST: CPT | Mod: 26

## 2025-04-10 RX ORDER — DEXTROSE 50 % IN WATER 50 %
15 SYRINGE (ML) INTRAVENOUS ONCE
Refills: 0 | Status: DISCONTINUED | OUTPATIENT
Start: 2025-04-10 | End: 2025-04-10

## 2025-04-10 RX ORDER — CEFPODOXIME PROXETIL 200 MG/1
1 TABLET, FILM COATED ORAL
Qty: 20 | Refills: 0
Start: 2025-04-10 | End: 2025-04-19

## 2025-04-10 RX ORDER — INSULIN LISPRO 100 U/ML
INJECTION, SOLUTION INTRAVENOUS; SUBCUTANEOUS
Refills: 0 | Status: DISCONTINUED | OUTPATIENT
Start: 2025-04-10 | End: 2025-04-10

## 2025-04-10 RX ORDER — DEXTROSE 50 % IN WATER 50 %
25 SYRINGE (ML) INTRAVENOUS ONCE
Refills: 0 | Status: DISCONTINUED | OUTPATIENT
Start: 2025-04-10 | End: 2025-04-10

## 2025-04-10 RX ORDER — DEXTROSE 50 % IN WATER 50 %
12.5 SYRINGE (ML) INTRAVENOUS ONCE
Refills: 0 | Status: DISCONTINUED | OUTPATIENT
Start: 2025-04-10 | End: 2025-04-10

## 2025-04-10 RX ORDER — INSULIN LISPRO 100 U/ML
INJECTION, SOLUTION INTRAVENOUS; SUBCUTANEOUS AT BEDTIME
Refills: 0 | Status: DISCONTINUED | OUTPATIENT
Start: 2025-04-10 | End: 2025-04-10

## 2025-04-10 RX ORDER — SODIUM CHLORIDE 9 G/1000ML
1000 INJECTION, SOLUTION INTRAVENOUS
Refills: 0 | Status: DISCONTINUED | OUTPATIENT
Start: 2025-04-10 | End: 2025-04-10

## 2025-04-10 RX ORDER — CEFTRIAXONE 500 MG/1
1000 INJECTION, POWDER, FOR SOLUTION INTRAMUSCULAR; INTRAVENOUS EVERY 24 HOURS
Refills: 0 | Status: DISCONTINUED | OUTPATIENT
Start: 2025-04-10 | End: 2025-04-10

## 2025-04-10 RX ORDER — GLUCAGON 3 MG/1
1 POWDER NASAL ONCE
Refills: 0 | Status: DISCONTINUED | OUTPATIENT
Start: 2025-04-10 | End: 2025-04-10

## 2025-04-10 NOTE — ED ADULT NURSE REASSESSMENT NOTE - NS ED NURSE REASSESS COMMENT FT1
patient laying in semi fowlers position on the stretcher. patient alert and oriented times four. patient denies shortness of breath, chest pain, nausea, vomiting, chill, fever. Patient normal sinus on the monitor. Respirations equal and adequate. Patients IV patent, no signs of infiltration. Safety measures in place, call bell within reach. Patient stable upon leaving the room.   Fabiana in tact

## 2025-04-10 NOTE — ED ADULT NURSE REASSESSMENT NOTE - NS ED NURSE REASSESS COMMENT FT1
break coverage RN. received report from Mitra RN; pt A&Ox4 ambulatory without assistive devices at baseline, vitally stable. denies complaints pending CT imaging. NAD noted. Safety is maintained.

## 2025-04-10 NOTE — CONSULT NOTE ADULT - ASSESSMENT
54M h/o renal stone s/p L PCNT (Dr. Hoenig, removed), Chronic bladder pain vs chronic Cystitis, Chronic bilateral moderate hydronephrosis, DM, admited in 12/2024 for BASHIR on CKD requiring HD x3d and had sierra since for ?freq urination vs. leakage p/w clogged sierra which was replaced in office on 4/8/25 for Cr 4.81. Urology consulted for clogged sierra which resolved with flushing, and moderate bilateral hydronephrosis and improved from prior. AFVSS. Exam unremarkable. WBC 9.66. Hct 29.4. Cr 4.75 to 4.58 on repeat (from 4.81 done in office on 4/8/25. Baseline ~1.5 in 2/2025). UA show large Leuk, (+) Nitrate, 1815 wbc, 481rbc. UCX PENDING.     RECOMMENDATION:   -Admit under medicine  -Recommend Nephro consult for BASHIR on CKD  -Monitor Cr.   -F/u UCX  -Maintain Sierra for adequate drainage.     D/w Dr. Hernandez.  54M h/o renal stone s/p L PCNT (Dr. Hoenig, removed), Chronic bladder pain vs chronic Cystitis, Chronic bilateral moderate hydronephrosis, DM, admited in 12/2024 for BASHIR on CKD requiring HD x3d and had sierra since for ?freq urination vs. leakage p/w clogged sierra which was replaced in office on 4/8/25 for Cr 4.81. Urology consulted for clogged sierra which resolved with flushing, and moderate bilateral hydronephrosis and improved from prior. AFVSS. Exam unremarkable. WBC 9.66. Hct 29.4. Cr 4.75 to 4.58 on repeat (from 4.81 done in office on 4/8/25. Baseline ~1.5 in 2/2025). UA show large Leuk, (+) Nitrate, 1815 wbc, 481rbc. UCX PENDING. ED Tx Ceftriaxone.     RECOMMENDATION:   -Admit under medicine  -Recommend Nephro consult for BASHIR on CKD  -Monitor Cr.   -F/u UCX  -Abx per primary team.   -Maintain Sierra for adequate drainage.     D/w Dr. Hernandez.

## 2025-04-10 NOTE — CONSULT NOTE ADULT - SUBJECTIVE AND OBJECTIVE BOX
54M h/o renal stone s/p L PCNT (Dr. Hoenig, removed), Chronic bladder pain vs chronic Cystitis, Chronic bilateral moderate hydronephrosis, DM, admited in 12/2024 for BASHIR on CKD requiring HD x3d and had sierra since for ?freq urination vs. leakage p/w clogged sierra which was replaced in office on 4/8/25. Urology consulted for clogged sierra which resolved with flushing, and moderate bilateral hydronephrosis and improved from prior. Otherwise denies fever/chills, abd discomfort, N/V, hematuria.     PAST MEDICAL & SURGICAL HISTORY:  Esophageal achalasia  s/p esophageal repair about 5 years ago      Type 2 diabetes mellitus      Calculus of kidney  passed on own in the past; CT scan showing a 3.5 cm and 1.2 cm left mid-upper stones in December 2021      Former smoker  1 PPD x 16 years; Quit in 2003      Obesity  BMI 34.4      Kidney stones      History of esophageal surgery  esophageal repair about 5 years ago for esopheal achalasia      Other injury of spleen  fall on ice s/p spleen embolization at [facility] IR over 10 years ago      Nephrostomy tube bleed        FAMILY HISTORY:  No known  malignancy     SOCIAL HISTORY  Denies alcohol and drug abuse, nonsmoker     MEDICATIONS  (STANDING):    MEDICATIONS  (PRN):    Allergies    penicillins (Unknown)    Intolerances      REVIEW OF SYSTEMS: Pertinent positives and negatives as stated in HPI, otherwise negative    Vital signs  T(C): 37.2 (04-10-25 @ 05:43), Max: 37.3 (04-10-25 @ 03:45)  HR: 100 (04-10-25 @ 05:43)  BP: 122/70 (04-10-25 @ 05:43)  SpO2: 98% (04-10-25 @ 05:43)  Wt(kg): --    PHYSICAL EXAM (Chaperon PA present during exam)  GENERAL: AAOx3, NAD, obese, speak complete sentences  HEENT: NC/AT, EOMI, anicteric, moist mucous membrane. No facial droop. Conjunctiva clear.  NECK: supple, Full ROM  RESP: NRE, no accessory muscle use.  ABD: soft, no TTP, no Rebound tenderness/Guarding/Rigidity.  BACK: no CVAT b/l, No midline or paraspinal tenderness.   EXTREMITIES: Full ROM  : 16Fr sierra in place and draining adequately.   PSYCH: approp mood/affect      LABS:  CBC                       9.8    9.66  )-----------( 302      ( 09 Apr 2025 23:00 )             29.4     BMP   04-10    131[L]  |  101  |  64[H]  ----------------------------<  248[H]  4.5   |  15[L]  |  4.58[H]    Ca    9.3      10 Apr 2025 03:20    TPro  7.7  /  Alb  3.7  /  TBili  0.2  /  DBili  x   /  AST  9   /  ALT  16  /  AlkPhos  76  04-09        Urinalysis Basic - ( 10 Apr 2025 03:20 )    Color: x / Appearance: x / SG: x / pH: x  Gluc: 248 mg/dL / Ketone: x  / Bili: x / Urobili: x   Blood: x / Protein: x / Nitrite: x   Leuk Esterase: x / RBC: x / WBC x   Sq Epi: x / Non Sq Epi: x / Bacteria: x      Urine Cx: PENDING  Blood Cx: NONE    Radiology:    < from: CT Abdomen and Pelvis No Cont (04.10.25 @ 02:45) >  ADRENALS: A stable 1.9 cm indeterminate right adrenal nodule.  KIDNEYS/URETERS: Moderate bilateral hydroureteronephrosis to the level of   the urinary bladder, improved since prior study. Bilateral perinephric   stranding is nonspecific. Bilateral urothelial thickening. Bilateral   cysts. Subcentimeter hypodense right renal lesions are incompletely   characterized but stable when compared to prior study. Left renal   cortical calcification..    BLADDER: Decompressed by a Sierra catheter. Mild perivesicular   infiltration. Wall thickening may be due in part due to underdistention.  REPRODUCTIVE ORGANS:Prostate within normal limits.    BOWEL: No bowel obstruction. Appendix is normal.  PERITONEUM/RETROPERITONEUM: Within normal limits.  VESSELS: Atherosclerotic changes.  LYMPH NODES: No lymphadenopathy.  ABDOMINAL WALL: Small fat-containing right inguinal hernia.  BONES: Degenerative changes.    IMPRESSION:  Moderate bilateral hydroureteronephrosis to the level of the urinary   bladder. Evaluation is limited by lack of intravenous contrast, however,   there is bilateral perinephric stranding and urothelial thickening which   may indicate inflammation or infection. Urinalysis correlation is   recommended.    Cystitis.    < end of copied text >   54M h/o renal stone s/p L PCNT (Dr. Hoenig, removed), Chronic bladder pain vs chronic Cystitis, Chronic bilateral moderate hydronephrosis, DM, admited in 12/2024 for BASHIR on CKD requiring HD x3d and had sierra since for ?freq urination vs. leakage p/w clogged sierra which was replaced in office on 4/8/25. Urology consulted for clogged sierra which resolved with flushing, and moderate bilateral hydronephrosis and improved from prior. Otherwise denies fever/chills, abd discomfort, N/V, hematuria.     PAST MEDICAL & SURGICAL HISTORY:  Esophageal achalasia  s/p esophageal repair about 5 years ago  Type 2 diabetes mellitus  Calculus of kidney  passed on own in the past; CT scan showing a 3.5 cm and 1.2 cm left mid-upper stones in December 2021  Former smoker  1 PPD x 16 years; Quit in 2003  Obesity  BMI 34.4  Kidney stones  History of esophageal surgery  esophageal repair about 5 years ago for esopheal achalasia  Other injury of spleen  fall on ice s/p spleen embolization at [facility] IR over 10 years ago  Nephrostomy tube bleed      FAMILY HISTORY:  No known  malignancy     SOCIAL HISTORY  Denies alcohol and drug abuse, nonsmoker     MEDICATIONS  (STANDING):    MEDICATIONS  (PRN):    Allergies    penicillins (Unknown)    Intolerances      REVIEW OF SYSTEMS: Pertinent positives and negatives as stated in HPI, otherwise negative    Vital signs  T(C): 37.2 (04-10-25 @ 05:43), Max: 37.3 (04-10-25 @ 03:45)  HR: 100 (04-10-25 @ 05:43)  BP: 122/70 (04-10-25 @ 05:43)  SpO2: 98% (04-10-25 @ 05:43)  Wt(kg): --    PHYSICAL EXAM (Chaperon PA present during exam)  GENERAL: AAOx3, NAD, obese, speak complete sentences  HEENT: NC/AT, EOMI, anicteric, moist mucous membrane. No facial droop. Conjunctiva clear.  NECK: supple, Full ROM  RESP: NRE, no accessory muscle use.  ABD: soft, no TTP, no Rebound tenderness/Guarding/Rigidity.  BACK: no CVAT b/l, No midline or paraspinal tenderness.   EXTREMITIES: Full ROM  : 16Fr sierra in place and draining adequately.   PSYCH: approp mood/affect      LABS:  CBC                       9.8    9.66  )-----------( 302      ( 09 Apr 2025 23:00 )             29.4     BMP   04-10    131[L]  |  101  |  64[H]  ----------------------------<  248[H]  4.5   |  15[L]  |  4.58[H]    Ca    9.3      10 Apr 2025 03:20    TPro  7.7  /  Alb  3.7  /  TBili  0.2  /  DBili  x   /  AST  9   /  ALT  16  /  AlkPhos  76  04-09        Urinalysis Basic - ( 10 Apr 2025 03:20 )    Color: x / Appearance: x / SG: x / pH: x  Gluc: 248 mg/dL / Ketone: x  / Bili: x / Urobili: x   Blood: x / Protein: x / Nitrite: x   Leuk Esterase: x / RBC: x / WBC x   Sq Epi: x / Non Sq Epi: x / Bacteria: x      Urine Cx: PENDING  Blood Cx: NONE    Radiology:    < from: CT Abdomen and Pelvis No Cont (04.10.25 @ 02:45) >  ADRENALS: A stable 1.9 cm indeterminate right adrenal nodule.  KIDNEYS/URETERS: Moderate bilateral hydroureteronephrosis to the level of   the urinary bladder, improved since prior study. Bilateral perinephric   stranding is nonspecific. Bilateral urothelial thickening. Bilateral   cysts. Subcentimeter hypodense right renal lesions are incompletely   characterized but stable when compared to prior study. Left renal   cortical calcification..    BLADDER: Decompressed by a Sierra catheter. Mild perivesicular   infiltration. Wall thickening may be due in part due to underdistention.  REPRODUCTIVE ORGANS:Prostate within normal limits.    BOWEL: No bowel obstruction. Appendix is normal.  PERITONEUM/RETROPERITONEUM: Within normal limits.  VESSELS: Atherosclerotic changes.  LYMPH NODES: No lymphadenopathy.  ABDOMINAL WALL: Small fat-containing right inguinal hernia.  BONES: Degenerative changes.    IMPRESSION:  Moderate bilateral hydroureteronephrosis to the level of the urinary   bladder. Evaluation is limited by lack of intravenous contrast, however,   there is bilateral perinephric stranding and urothelial thickening which   may indicate inflammation or infection. Urinalysis correlation is   recommended.    Cystitis.    < end of copied text >

## 2025-04-11 ENCOUNTER — APPOINTMENT (OUTPATIENT)
Dept: UROLOGY | Facility: CLINIC | Age: 55
End: 2025-04-11
Payer: COMMERCIAL

## 2025-04-11 LAB
CULTURE RESULTS: SIGNIFICANT CHANGE UP
SPECIMEN SOURCE: SIGNIFICANT CHANGE UP

## 2025-04-11 PROCEDURE — 99213 OFFICE O/P EST LOW 20 MIN: CPT | Mod: 95

## 2025-04-14 DIAGNOSIS — N30.20 OTHER CHRONIC CYSTITIS W/OUT HEMATURIA: ICD-10-CM

## 2025-04-14 DIAGNOSIS — R33.9 RETENTION OF URINE, UNSPECIFIED: ICD-10-CM

## 2025-04-14 LAB
APPEARANCE: ABNORMAL
BACTERIA UR CULT: ABNORMAL
BACTERIA UR CULT: NORMAL
BACTERIA: NEGATIVE /HPF
BILIRUBIN URINE: NEGATIVE
BLOOD URINE: ABNORMAL
CAST: 1 /LPF
COLOR: NORMAL
EPITHELIAL CELLS: 2 /HPF
GLUCOSE QUALITATIVE U: 500 MG/DL
KETONES URINE: NEGATIVE MG/DL
LEUKOCYTE ESTERASE URINE: ABNORMAL
MICROSCOPIC-UA: NORMAL
NITRITE URINE: POSITIVE
PH URINE: 6
PROTEIN URINE: 100 MG/DL
RED BLOOD CELLS URINE: 320 /HPF
REVIEW: NORMAL
SPECIFIC GRAVITY URINE: 1.01
UROBILINOGEN URINE: 1 MG/DL
WBC CLUMPS: PRESENT
WHITE BLOOD CELLS URINE: 681 /HPF

## 2025-04-14 RX ORDER — FLUCONAZOLE 200 MG/1
200 TABLET ORAL TWICE DAILY
Qty: 3 | Refills: 0 | Status: ACTIVE | COMMUNITY
Start: 2025-04-14 | End: 1900-01-01

## 2025-04-15 ENCOUNTER — APPOINTMENT (OUTPATIENT)
Dept: UROLOGY | Facility: CLINIC | Age: 55
End: 2025-04-15

## 2025-04-16 ENCOUNTER — APPOINTMENT (OUTPATIENT)
Dept: ULTRASOUND IMAGING | Facility: IMAGING CENTER | Age: 55
End: 2025-04-16
Payer: COMMERCIAL

## 2025-04-16 ENCOUNTER — OUTPATIENT (OUTPATIENT)
Dept: OUTPATIENT SERVICES | Facility: HOSPITAL | Age: 55
LOS: 1 days | End: 2025-04-16
Payer: COMMERCIAL

## 2025-04-16 DIAGNOSIS — S36.09XA OTHER INJURY OF SPLEEN, INITIAL ENCOUNTER: Chronic | ICD-10-CM

## 2025-04-16 DIAGNOSIS — Z87.448 PERSONAL HISTORY OF OTHER DISEASES OF URINARY SYSTEM: ICD-10-CM

## 2025-04-16 DIAGNOSIS — Z98.890 OTHER SPECIFIED POSTPROCEDURAL STATES: Chronic | ICD-10-CM

## 2025-04-16 PROCEDURE — 76775 US EXAM ABDO BACK WALL LIM: CPT | Mod: 26

## 2025-04-16 PROCEDURE — 76775 US EXAM ABDO BACK WALL LIM: CPT

## 2025-04-22 ENCOUNTER — OUTPATIENT (OUTPATIENT)
Dept: OUTPATIENT SERVICES | Facility: HOSPITAL | Age: 55
LOS: 1 days | End: 2025-04-22
Payer: COMMERCIAL

## 2025-04-22 ENCOUNTER — APPOINTMENT (OUTPATIENT)
Dept: UROLOGY | Facility: CLINIC | Age: 55
End: 2025-04-22
Payer: COMMERCIAL

## 2025-04-22 ENCOUNTER — APPOINTMENT (OUTPATIENT)
Dept: UROLOGY | Facility: CLINIC | Age: 55
End: 2025-04-22

## 2025-04-22 VITALS — SYSTOLIC BLOOD PRESSURE: 177 MMHG | DIASTOLIC BLOOD PRESSURE: 79 MMHG

## 2025-04-22 DIAGNOSIS — Z98.890 OTHER SPECIFIED POSTPROCEDURAL STATES: Chronic | ICD-10-CM

## 2025-04-22 DIAGNOSIS — R35.0 FREQUENCY OF MICTURITION: ICD-10-CM

## 2025-04-22 DIAGNOSIS — D41.4 NEOPLASM OF UNCERTAIN BEHAVIOR OF BLADDER: ICD-10-CM

## 2025-04-22 DIAGNOSIS — N31.8 OTHER NEUROMUSCULAR DYSFUNCTION OF BLADDER: ICD-10-CM

## 2025-04-22 DIAGNOSIS — N13.30 UNSPECIFIED HYDRONEPHROSIS: ICD-10-CM

## 2025-04-22 PROCEDURE — 52001 CYSTO W/IRRG&EVAC MLT CLOTS: CPT

## 2025-04-22 PROCEDURE — 99214 OFFICE O/P EST MOD 30 MIN: CPT | Mod: 25

## 2025-04-22 RX ORDER — VIBEGRON 75 MG/1
75 TABLET, FILM COATED ORAL
Qty: 30 | Refills: 3 | Status: ACTIVE | COMMUNITY
Start: 2025-04-22 | End: 1900-01-01

## 2025-04-22 RX ORDER — VIBEGRON 75 MG/1
75 TABLET, FILM COATED ORAL
Qty: 90 | Refills: 3 | Status: ACTIVE | COMMUNITY
Start: 2025-04-22 | End: 1900-01-01

## 2025-04-22 RX ORDER — OXYBUTYNIN CHLORIDE 15 MG/1
15 TABLET, EXTENDED RELEASE ORAL DAILY
Qty: 90 | Refills: 3 | Status: ACTIVE | COMMUNITY
Start: 2025-04-22 | End: 1900-01-01

## 2025-04-23 LAB
ANION GAP SERPL CALC-SCNC: 16 MMOL/L
APPEARANCE: ABNORMAL
BACTERIA: NEGATIVE /HPF
BILIRUBIN URINE: NEGATIVE
BLOOD URINE: ABNORMAL
BUN SERPL-MCNC: 47 MG/DL
CALCIUM SERPL-MCNC: 9.2 MG/DL
CAST: NORMAL /LPF
CHLORIDE SERPL-SCNC: 100 MMOL/L
CO2 SERPL-SCNC: 20 MMOL/L
COLOR: ABNORMAL
CREAT SERPL-MCNC: 3.01 MG/DL
EGFRCR SERPLBLD CKD-EPI 2021: 24 ML/MIN/1.73M2
EPITHELIAL CELLS: 1 /HPF
GLUCOSE QUALITATIVE U: 100 MG/DL
GLUCOSE SERPL-MCNC: 317 MG/DL
KETONES URINE: NEGATIVE MG/DL
LEUKOCYTE ESTERASE URINE: ABNORMAL
MICROSCOPIC-UA: NORMAL
NITRITE URINE: NEGATIVE
PH URINE: 6.5
POTASSIUM SERPL-SCNC: 4.7 MMOL/L
PROTEIN URINE: 100 MG/DL
RED BLOOD CELLS URINE: 60 /HPF
REVIEW: NORMAL
SODIUM SERPL-SCNC: 136 MMOL/L
SPECIFIC GRAVITY URINE: 1.01
UROBILINOGEN URINE: 0.2 MG/DL
WBC CLUMPS: PRESENT
WHITE BLOOD CELLS URINE: 740 /HPF
YEAST-LIKE CELLS: PRESENT

## 2025-04-24 DIAGNOSIS — N30.00 ACUTE CYSTITIS W/OUT HEMATURIA: ICD-10-CM

## 2025-04-24 DIAGNOSIS — N31.8 OTHER NEUROMUSCULAR DYSFUNCTION OF BLADDER: ICD-10-CM

## 2025-04-24 DIAGNOSIS — D41.4 NEOPLASM OF UNCERTAIN BEHAVIOR OF BLADDER: ICD-10-CM

## 2025-04-24 DIAGNOSIS — N13.30 UNSPECIFIED HYDRONEPHROSIS: ICD-10-CM

## 2025-04-24 LAB
BACTERIA UR CULT: NORMAL
URINE CYTOLOGY: NORMAL

## 2025-04-24 RX ORDER — FLUCONAZOLE 200 MG/1
200 TABLET ORAL
Qty: 28 | Refills: 0 | Status: ACTIVE | COMMUNITY
Start: 2025-04-24 | End: 1900-01-01

## 2025-04-30 ENCOUNTER — OUTPATIENT (OUTPATIENT)
Dept: OUTPATIENT SERVICES | Facility: HOSPITAL | Age: 55
LOS: 1 days | End: 2025-04-30
Payer: COMMERCIAL

## 2025-04-30 VITALS
WEIGHT: 223.11 LBS | DIASTOLIC BLOOD PRESSURE: 85 MMHG | RESPIRATION RATE: 16 BRPM | HEIGHT: 71 IN | OXYGEN SATURATION: 97 % | SYSTOLIC BLOOD PRESSURE: 138 MMHG | TEMPERATURE: 97 F | HEART RATE: 72 BPM

## 2025-04-30 DIAGNOSIS — E11.9 TYPE 2 DIABETES MELLITUS WITHOUT COMPLICATIONS: ICD-10-CM

## 2025-04-30 DIAGNOSIS — Z01.818 ENCOUNTER FOR OTHER PREPROCEDURAL EXAMINATION: ICD-10-CM

## 2025-04-30 DIAGNOSIS — Z98.890 OTHER SPECIFIED POSTPROCEDURAL STATES: Chronic | ICD-10-CM

## 2025-04-30 DIAGNOSIS — S36.09XA OTHER INJURY OF SPLEEN, INITIAL ENCOUNTER: Chronic | ICD-10-CM

## 2025-04-30 DIAGNOSIS — N31.8 OTHER NEUROMUSCULAR DYSFUNCTION OF BLADDER: ICD-10-CM

## 2025-04-30 DIAGNOSIS — N13.30 UNSPECIFIED HYDRONEPHROSIS: ICD-10-CM

## 2025-04-30 DIAGNOSIS — T83.83XA HEMORRHAGE DUE TO GENITOURINARY PROSTHETIC DEVICES, IMPLANTS AND GRAFTS, INITIAL ENCOUNTER: Chronic | ICD-10-CM

## 2025-04-30 DIAGNOSIS — D41.4 NEOPLASM OF UNCERTAIN BEHAVIOR OF BLADDER: ICD-10-CM

## 2025-04-30 DIAGNOSIS — D49.4 NEOPLASM OF UNSPECIFIED BEHAVIOR OF BLADDER: ICD-10-CM

## 2025-04-30 LAB
ANION GAP SERPL CALC-SCNC: 14 MMOL/L — SIGNIFICANT CHANGE UP (ref 5–17)
BUN SERPL-MCNC: 32 MG/DL — HIGH (ref 7–23)
CALCIUM SERPL-MCNC: 10 MG/DL — SIGNIFICANT CHANGE UP (ref 8.4–10.5)
CHLORIDE SERPL-SCNC: 105 MMOL/L — SIGNIFICANT CHANGE UP (ref 96–108)
CO2 SERPL-SCNC: 21 MMOL/L — LOW (ref 22–31)
CREAT SERPL-MCNC: 2.3 MG/DL — HIGH (ref 0.5–1.3)
EGFR: 33 ML/MIN/1.73M2 — LOW
EGFR: 33 ML/MIN/1.73M2 — LOW
GLUCOSE SERPL-MCNC: 163 MG/DL — HIGH (ref 70–99)
HCT VFR BLD CALC: 30.1 % — LOW (ref 39–50)
HGB BLD-MCNC: 9.9 G/DL — LOW (ref 13–17)
MCHC RBC-ENTMCNC: 28 PG — SIGNIFICANT CHANGE UP (ref 27–34)
MCHC RBC-ENTMCNC: 32.9 G/DL — SIGNIFICANT CHANGE UP (ref 32–36)
MCV RBC AUTO: 85 FL — SIGNIFICANT CHANGE UP (ref 80–100)
NRBC BLD AUTO-RTO: 0 /100 WBCS — SIGNIFICANT CHANGE UP (ref 0–0)
PLATELET # BLD AUTO: 195 K/UL — SIGNIFICANT CHANGE UP (ref 150–400)
POTASSIUM SERPL-MCNC: 5.2 MMOL/L — SIGNIFICANT CHANGE UP (ref 3.5–5.3)
POTASSIUM SERPL-SCNC: 5.2 MMOL/L — SIGNIFICANT CHANGE UP (ref 3.5–5.3)
RBC # BLD: 3.54 M/UL — LOW (ref 4.2–5.8)
RBC # FLD: 19.6 % — HIGH (ref 10.3–14.5)
SODIUM SERPL-SCNC: 140 MMOL/L — SIGNIFICANT CHANGE UP (ref 135–145)
WBC # BLD: 9.5 K/UL — SIGNIFICANT CHANGE UP (ref 3.8–10.5)
WBC # FLD AUTO: 9.5 K/UL — SIGNIFICANT CHANGE UP (ref 3.8–10.5)

## 2025-04-30 PROCEDURE — 85027 COMPLETE CBC AUTOMATED: CPT

## 2025-04-30 PROCEDURE — 83036 HEMOGLOBIN GLYCOSYLATED A1C: CPT

## 2025-04-30 PROCEDURE — 80048 BASIC METABOLIC PNL TOTAL CA: CPT

## 2025-04-30 PROCEDURE — G0463: CPT

## 2025-04-30 NOTE — H&P PST ADULT - PROBLEM SELECTOR PLAN 1
Cystogram simple cystometrogram, Trans urethral resection of bladder tumors on 05/05/2025.  Pre op CBC, BMP, A1C sent   UC (no growth) reports in Chart.   Instructed to inform surgeon & seek medical attention if any changes in health  status  and will call surgeon's office for revised instructions if surgery is rescheduled. PST  instructions given in written/ explained about NPO, arrival in SDA,   2 hours prior to OR time.Pre-op education provided - all questions answered. Pt verbalized understanding. Cystogram simple cystometrogram, Trans urethral resection of bladder tumors on 05/05/2025.  Pre op CBC, BMP, A1C sent   UC (no growth) reports in Chart.   Instructed to inform surgeon & seek medical attention if any changes in health  status  and will call surgeon's office for revised instructions if surgery is rescheduled. PST  instructions given in written/ explained about NPO, arrival in SDA,   2 hours prior to OR time.Pre-op education provided - all questions answered. Pt verbalized understanding.    1) gross hematuria  He appears to have some type or chronic cystitis. Tissue culture will be done in the operating room.  ?2) chronic cystitis  The patient appears to have a chronic cystitis.   ?3) incomplete bladder emptying  Please see above regarding cystoscopy.  Discussed that we may have to repeat video urodynamics after his UTI has been adequately treated.  He likely has a small capacity poorly compliant bladder with incomplete bladder emptying. Discussed CIC, suprapubic catheter, Sierra. Recommend keeping Sierra for now.  ?Discussed suprapubic catheter. he prefers this over clean intermittent catheterization. Explained will rule out malitnancy first. Discussed nature of suprapubic catheter insertion and perioperative care. He prefers this option over CIC. He states he cannot perform CIC.    4) bilateral hydronephrosis  Improved with sierra catheter.  ?5) BASHIR on CKD  Instructed to keep well hydrated.

## 2025-04-30 NOTE — H&P PST ADULT - MUSCULOSKELETAL
Fax received from [employer] Junior PA denied due to patient needing to have tried an failed on 2 other drugs with the formulary drugs covered by the patient's current insurance plan.     Per 3/4/2022, Oliviai has history of being denied both the PA and appeal. Edarbi medication to be discussed further at 4/21/2023 OV.        ROM intact/normal gait/strength 5/5 bilateral upper extremities/strength 5/5 bilateral lower extremities negative

## 2025-04-30 NOTE — H&P PST ADULT - OTHER CARE PROVIDERS
Nephrologist Dr. Azul , [facility], no Endo Nephrologist Dr. Azul , [facility],  No Endocrine

## 2025-04-30 NOTE — H&P PST ADULT - HISTORY OF PRESENT ILLNESS
54 year old male PMH  HTN, , Quit cigarette smoking in 2003, CKD2,MGUS, anemia, DM2 (diagnosed in 2022) renal cysts, Nephrolithiasis s/p Left PCNL with Dr. Hoenig  in 2021 , fall/ traumatic injury spleen in the past (had laparotomy with cauterization of spleen) , s/p surgery for esophageal achalasia, prostatitis, chronic cystitis,  nephrolithiasis still having a large left renal calculi,  s/p L renal angioembolization of pseudoaneurysms and PCN-U 04/06/24 , recent BASHIR (12/2024  in the setting of hydronephrosis. s/p Jung since 12/2024, last replaced 2 weeks ago & needed HD in 12/2024 for a week.), recurrent UTI  & with recent diagnosis bladder tumor  who presents in PST prior to scheduled Cystogram simple cystometrogram, Trans urethral resection of bladder tumors on 05/05/2025.     Pt. denies  fever, chills, CP, SOB, testicular pain or LE swelling. He also denies any family hx of renal cysts, or kidney disease.              54 year old male with  c/o hematuria & recent diagnosis of bladder tumor  who presents in PST prior to scheduled Cystogram simple cystometrogram, Trans urethral resection of bladder tumors on 05/05/2025.    PMH:  HTN, Quit cigarette smoking in 2003, CKD2, MGUS, anemia, s/p surgery for esophageal achalasia, DM2 (diagnosed in 2022), renal cysts, nephrolithiasis s/p Left PCNL with Dr. Hoenig  in 2021 , fall/ traumatic injury spleen in the past ( h/o laparotomy with cauterization of spleen) ,  prostatitis, chronic cystitis,  nephrolithiasis "still having a large left renal calculi",  s/p L renal angioembolization of  pseudoaneurysms and PCN-U 04/06/24 , left renal calculi with recent BASHIR  (in 12/2024  in the setting of hydronephrosis required hemodialysis for a week. Jung status since 12/2024 (last replaced 2 weeks ago) & recurrent UTI  on urology follow up.     Denies  fever, chills, CP, SOB, testicular pain or LE swelling.              54 year old male with  c/o hematuria & recent diagnosis of bladder tumor  who presents in PST prior to scheduled Cystogram simple cystometrogram, Trans urethral resection of bladder tumors on 05/05/2025.    PMH:  HTN, Quit cigarette smoking in 2003, CKD2, MGUS, anemia, s/p surgery for esophageal achalasia, DM2 (diagnosed in 2022), renal cysts, nephrolithiasis s/p Left PCNL with Dr. Hoenig  in 2021 , fall/ traumatic injury spleen in the past ( h/o laparotomy with cauterization of spleen) ,  prostatitis, chronic cystitis,  nephrolithiasis "still having a large left renal calculi",  s/p L renal angioembolization of  pseudoaneurysms and PCN-U 04/06/24 , left renal calculi with recent BASHIR  (in 12/2024  in the setting of hydronephrosis required hemodialysis for a week. Sierra status since 12/2024 (last replaced 2 weeks ago) & recurrent UTI  on urology follow up.     Denies  fever, chills, CP, SOB, testicular pain or LE swelling.    The patient is here with the following problems:  1) gross hematuria  2) history of febrile UTI  3) bilateral hydronephrosis  4) urgency incontinence  ?    ?  He has a history of CKD2, HTN, MGUS, DM2 (diagnosed 2022) renal cysts, nephrolithiasis s/p PCNL with Dr. Hoenig, and is taking ozempic. He also has a history of trauma laparotomy with cauterization of spleen and surgery for esophageal achalasia. He works in sales, is  and has 2 kids.  ?  He can walk a long distance without shortness of breath.  ?  He has been taking tamsulosin 0.4 mg and amitriptyline.  The patient has been having pain in the suprapubic area and has had urinary retention with hydronephrosis and BASHIR requiring hemodialysis. At the time he felt unwell and his PCP called him due to severely elevated creatinine.  ?  He has had pain in the suprapubic area for 1.5 years.  ?  He did not have a sierra during his initial visit 04/08/25. He underwent repeat renal US and BMP which demonstrated bilateral hydronephrosis and creatinine of 4.18 mg/dL.  ?  Currently he has a Sierra catheter in place. He feels better with the catheter. He has to adjust the catheter. He is feeling much better. He is drinking gatorade and water. The urine looks more clear now. He feels well.  ?  The patient feels incomplete bladder emptying.  ?  Denies UTIs until recently and + gross hematuria.  ?  0-1 bowel movements per day that are [place] I.  He takes ozempic which is causing constipation.  The patient denies fecal incontinence.  ?  Cytoscopy today 04/22/25 demonstrated  1) thick-walled bladder  2) approximately 3 small papillary bladder tumors at the bladder neck and sidewall  3) small capacity approximately 200 ml  4) cystitis with friable bladder wall  5) 1.5 cm prostatic urethra  ?  04/10/25 Sierra catheter inserted.  ?  PREVIOUS HISTORY/ DATA REVIEW  04/16/25 - renal US - R kidney cysts up to 2.6 cm in the upper pole of the R kidney, mild hydronephrosis, L kidney thinly sepated lower pole cyst up to 7.3 cm, thickened proximal ureter, L hydronephrosis, nonobstructing 1 cm calculus interpolar region  04/09/25 - renal US - stable moderate bilateral hydroneprosis, persistent 1.2 cm calculus L kidney midpole, cysts 6.7 and 4.8 cm, bladder mildly thick-walled with debris. bilateral ureteral jets, prevoid 165 ml, PVR 97 ml, creatinine 4.81 mg/dL  04/08/25 - initial visit with me - LUTS for 3 months, history of febrile UTI, +UUI wearing 8-10 pullups per day,  ml purulent urine, renal US to reassess hydronephrosis, BMP, Ua >998 wbc 1133 rbc, UCx pending, The patient voids 20 times per day and 8-9 times at night.+UUI  The patient denies stress urinary incontinence.+ decreased stream, +hesitancy, +intermittency and denies straining.  03/27/25 - Ua >998 wbc, 37 rbc, UCx contamination  03/26/25 - void trial - voided 100 ml with PVR 3 ml  03/24/25 - telemedicine visit with Dr. Hoenig - Rx amitriptyline 25 mg for bladder pain, stop oxybutynin  03/12/25 - visit with Dr. Hoenig - keep Sierra in place  03/12/25 - urodynamics with Dr. Hoenig - tracing independently reviewed with me today  1) small capacity bladder - 112 ml  2) detrusor overactivity x 1  3) detrusor overactivity incontinence  4) incomplete bladder emptying - PVR 98 ml  5) bladder outlet obstruction - Qmax 1 ml/s, PdetQmax 47 cm H2O  6) detrusor underactivity  03/08/25 - visit with Dr. Hoenig - canceled UDS, epididymo-orchitis  02/19/25 - creatinine 1.47 mg/dL  12/30/24 - creatinine 4.18 mg/dL  12/22/24 - CT AP wo contrast - KIDNEYS/URETERS: Moderate/severe bilateral hydronephrosis. 11 mm nonobstructing stone midportion left kidney. Moderately severe bilateral hydroureter. No obstructing stones. Left renal cysts unchanged from prior exam largest measuring 7 cm arising lower pole. Small exophytic right renal cyst present as well. Prostate within normal limits  03/01/25 - creatinine 2.92 mg/dL  12/2024 - admission to hospital Scr elevated to 12.52 on admission (12/21/24). UA with proteinuria, hematuria, and bacteria (12/21/24). US Kidneys and bladder significant for moderate to severe B/L hydro with R kidney 15.6cm and L kidney 12.2cm, multiple renal cysts, and non-obstructing 1cm stone in the left midpole (12/21/24). CT A/P also showed mod-severe B/L hydroureteronephrosis, bladder decompressed with sierra.  11/18/23 - renal US - bilateral renal cysts, stable 1.3 cm stone midpole L kidney, mild L hydronephrosis, diffuse bladder wall thickening prevoid 130 ml, PVR 29 ml  03/2022 - L PCNL - Dr. Hoenig

## 2025-04-30 NOTE — H&P PST ADULT - ASSESSMENT
bladder tumor :     Activity:  walks alot, home chores, walked from parking lot to PST today,    Energy Expenditure score (DASI SCORE METS): 7.3  Symptoms : denies chest pain, palpitations, dyspnea, dizziness or  WILLSON,   Mallampati 3  Dental: Patient denies Loose teeth/Denture.    bladder tumor :     Activity:  walks a lot, home chores, walked from parking lot to PST today,    Energy Expenditure score (DASI SCORE METS): 7.3  Symptoms : denies chest pain, palpitations, dyspnea, dizziness or  WILLSON,   Mallampati 3  Dental: Patient denies Loose teeth/Denture.

## 2025-04-30 NOTE — H&P PST ADULT - NSICDXPROCEDURE_GEN_ALL_CORE_FT
PROCEDURES:  Transurethral resection of bladder tumor (TURBT) with biopsy 30-Apr-2025 18:22:50  Mundo Rogers

## 2025-04-30 NOTE — H&P PST ADULT - PROBLEM SELECTOR PLAN 2
Will follow up with labs/ fingerstick.  Preop HgA1c ordered .  Instructed - pt taken last dose Ozempic last 4/25 Friday.   STAT FS  on the day of surgery ordered. Will follow up with labs/ fingerstick.  Preop HgA1c ordered .  Instructed - pt taken last dose Ozempic last 4/25 Friday.   STAT FS  on the day of surgery ordered.    Hypertonocity of bladder and bladder lesion  Plan for   TURBT and cystogram with simple cystometrogram and botulinum toxin injection    Explained nature of procedure in lay person's terms. Discussed risks including but not limited to bleeding, infection, damage to adjacent structure, formation of scar tissue, chronic pain, anesthesia related complications. Patient demonstrated good understading. All questions answered to satisfaction.

## 2025-04-30 NOTE — H&P PST ADULT - NSICDXPASTSURGICALHX_GEN_ALL_CORE_FT
PAST SURGICAL HISTORY:  History of esophageal surgery esophageal repair about 5 years ago for esopheal achalasia    Nephrostomy tube bleed     Other injury of spleen fall on ice s/p spleen embolization at [facility] IR over 10 years ago

## 2025-04-30 NOTE — H&P PST ADULT - GENITOURINARY COMMENTS
found bladder tumor recently , left kiney stone , Jung cath + since 12/2024 last Jung change 2 weeks ago

## 2025-05-01 LAB
A1C WITH ESTIMATED AVERAGE GLUCOSE RESULT: 9 % — HIGH (ref 4–5.6)
ESTIMATED AVERAGE GLUCOSE: 212 MG/DL — HIGH (ref 68–114)

## 2025-05-05 ENCOUNTER — OUTPATIENT (OUTPATIENT)
Dept: INPATIENT UNIT | Facility: HOSPITAL | Age: 55
LOS: 1 days | End: 2025-05-05
Payer: COMMERCIAL

## 2025-05-05 ENCOUNTER — TRANSCRIPTION ENCOUNTER (OUTPATIENT)
Age: 55
End: 2025-05-05

## 2025-05-05 ENCOUNTER — APPOINTMENT (OUTPATIENT)
Dept: UROLOGY | Facility: HOSPITAL | Age: 55
End: 2025-05-05

## 2025-05-05 ENCOUNTER — RESULT REVIEW (OUTPATIENT)
Age: 55
End: 2025-05-05

## 2025-05-05 VITALS
SYSTOLIC BLOOD PRESSURE: 166 MMHG | RESPIRATION RATE: 16 BRPM | HEIGHT: 71 IN | OXYGEN SATURATION: 96 % | HEART RATE: 72 BPM | TEMPERATURE: 98 F | WEIGHT: 223.11 LBS | DIASTOLIC BLOOD PRESSURE: 88 MMHG

## 2025-05-05 VITALS
RESPIRATION RATE: 16 BRPM | SYSTOLIC BLOOD PRESSURE: 164 MMHG | DIASTOLIC BLOOD PRESSURE: 85 MMHG | HEART RATE: 71 BPM | OXYGEN SATURATION: 96 %

## 2025-05-05 DIAGNOSIS — N31.8 OTHER NEUROMUSCULAR DYSFUNCTION OF BLADDER: ICD-10-CM

## 2025-05-05 DIAGNOSIS — T83.83XA HEMORRHAGE DUE TO GENITOURINARY PROSTHETIC DEVICES, IMPLANTS AND GRAFTS, INITIAL ENCOUNTER: Chronic | ICD-10-CM

## 2025-05-05 DIAGNOSIS — Z98.890 OTHER SPECIFIED POSTPROCEDURAL STATES: Chronic | ICD-10-CM

## 2025-05-05 DIAGNOSIS — D41.4 NEOPLASM OF UNCERTAIN BEHAVIOR OF BLADDER: ICD-10-CM

## 2025-05-05 DIAGNOSIS — N13.30 UNSPECIFIED HYDRONEPHROSIS: ICD-10-CM

## 2025-05-05 DIAGNOSIS — S36.09XA OTHER INJURY OF SPLEEN, INITIAL ENCOUNTER: Chronic | ICD-10-CM

## 2025-05-05 LAB
ANION GAP SERPL CALC-SCNC: 13 MMOL/L — SIGNIFICANT CHANGE UP (ref 5–17)
BUN SERPL-MCNC: 36 MG/DL — HIGH (ref 7–23)
CALCIUM SERPL-MCNC: 9.4 MG/DL — SIGNIFICANT CHANGE UP (ref 8.4–10.5)
CHLORIDE SERPL-SCNC: 107 MMOL/L — SIGNIFICANT CHANGE UP (ref 96–108)
CO2 SERPL-SCNC: 21 MMOL/L — LOW (ref 22–31)
CREAT SERPL-MCNC: 2.41 MG/DL — HIGH (ref 0.5–1.3)
EGFR: 31 ML/MIN/1.73M2 — LOW
EGFR: 31 ML/MIN/1.73M2 — LOW
GLUCOSE BLDC GLUCOMTR-MCNC: 153 MG/DL — HIGH (ref 70–99)
GLUCOSE BLDC GLUCOMTR-MCNC: 183 MG/DL — HIGH (ref 70–99)
GLUCOSE SERPL-MCNC: 151 MG/DL — HIGH (ref 70–99)
HCT VFR BLD CALC: 32.3 % — LOW (ref 39–50)
HGB BLD-MCNC: 10.2 G/DL — LOW (ref 13–17)
MCHC RBC-ENTMCNC: 26.5 PG — LOW (ref 27–34)
MCHC RBC-ENTMCNC: 31.6 G/DL — LOW (ref 32–36)
MCV RBC AUTO: 83.9 FL — SIGNIFICANT CHANGE UP (ref 80–100)
NRBC BLD AUTO-RTO: 0 /100 WBCS — SIGNIFICANT CHANGE UP (ref 0–0)
PLATELET # BLD AUTO: 145 K/UL — LOW (ref 150–400)
POTASSIUM SERPL-MCNC: 4.2 MMOL/L — SIGNIFICANT CHANGE UP (ref 3.5–5.3)
POTASSIUM SERPL-SCNC: 4.2 MMOL/L — SIGNIFICANT CHANGE UP (ref 3.5–5.3)
RBC # BLD: 3.85 M/UL — LOW (ref 4.2–5.8)
RBC # FLD: 19 % — HIGH (ref 10.3–14.5)
SODIUM SERPL-SCNC: 141 MMOL/L — SIGNIFICANT CHANGE UP (ref 135–145)
WBC # BLD: 5.04 K/UL — SIGNIFICANT CHANGE UP (ref 3.8–10.5)
WBC # FLD AUTO: 5.04 K/UL — SIGNIFICANT CHANGE UP (ref 3.8–10.5)

## 2025-05-05 PROCEDURE — 87116 MYCOBACTERIA CULTURE: CPT

## 2025-05-05 PROCEDURE — 87102 FUNGUS ISOLATION CULTURE: CPT

## 2025-05-05 PROCEDURE — 88305 TISSUE EXAM BY PATHOLOGIST: CPT | Mod: 26

## 2025-05-05 PROCEDURE — 52204 CYSTOSCOPY W/BIOPSY(S): CPT | Mod: XU

## 2025-05-05 PROCEDURE — 85027 COMPLETE CBC AUTOMATED: CPT

## 2025-05-05 PROCEDURE — 52235 CYSTOSCOPY AND TREATMENT: CPT

## 2025-05-05 PROCEDURE — 82962 GLUCOSE BLOOD TEST: CPT

## 2025-05-05 PROCEDURE — 51725 SIMPLE CYSTOMETROGRAM: CPT

## 2025-05-05 PROCEDURE — 87075 CULTR BACTERIA EXCEPT BLOOD: CPT

## 2025-05-05 PROCEDURE — 51725 SIMPLE CYSTOMETROGRAM: CPT | Mod: 26

## 2025-05-05 PROCEDURE — 88305 TISSUE EXAM BY PATHOLOGIST: CPT

## 2025-05-05 PROCEDURE — 80048 BASIC METABOLIC PNL TOTAL CA: CPT

## 2025-05-05 PROCEDURE — 87015 SPECIMEN INFECT AGNT CONCNTJ: CPT

## 2025-05-05 PROCEDURE — 87206 SMEAR FLUORESCENT/ACID STAI: CPT

## 2025-05-05 PROCEDURE — C9399: CPT

## 2025-05-05 PROCEDURE — 76000 FLUOROSCOPY <1 HR PHYS/QHP: CPT

## 2025-05-05 PROCEDURE — 87070 CULTURE OTHR SPECIMN AEROBIC: CPT

## 2025-05-05 PROCEDURE — 51610 INJECTION FOR BLADDER X-RAY: CPT

## 2025-05-05 RX ORDER — CEFAZOLIN SODIUM IN 0.9 % NACL 3 G/100 ML
2000 INTRAVENOUS SOLUTION, PIGGYBACK (ML) INTRAVENOUS ONCE
Refills: 0 | Status: COMPLETED | OUTPATIENT
Start: 2025-05-05 | End: 2025-05-05

## 2025-05-05 RX ORDER — LIDOCAINE HCL/PF 10 MG/ML
0.2 VIAL (ML) INJECTION ONCE
Refills: 0 | Status: DISCONTINUED | OUTPATIENT
Start: 2025-05-05 | End: 2025-05-05

## 2025-05-05 RX ORDER — VIBEGRON 75 MG/1
1 TABLET, FILM COATED ORAL
Refills: 0 | DISCHARGE

## 2025-05-05 NOTE — ASU DISCHARGE PLAN (ADULT/PEDIATRIC) - FINANCIAL ASSISTANCE
[facility] provides services at a reduced cost to those who are determined to be eligible through [facility]’s financial assistance program. Information regarding [facility]’s financial assistance program can be found by going to [email or web address] or by calling [phone].

## 2025-05-05 NOTE — ASU PATIENT PROFILE, ADULT - NSICDXPASTSURGICALHX_GEN_ALL_CORE_FT
PAST SURGICAL HISTORY:  History of esophageal surgery esophageal repair about 5 years ago for esopheal achalasia    Nephrostomy tube bleed     Other injury of spleen fall on ice s/p spleen embolization at [facility] IR over 10 years ago

## 2025-05-05 NOTE — BRIEF OPERATIVE NOTE - SPECIMENS
right bladder tumor, left bladder tumor, bladder dome tumor x2, bladder base, bladder tissue for culture

## 2025-05-05 NOTE — ASU DISCHARGE PLAN (ADULT/PEDIATRIC) - ASU DC SPECIAL INSTRUCTIONSFT
•	Catheter: Some patients are sent home with a sierra catheter while others go home urinating on their own. If you still have a catheter, the nurses will review instructions and care before you go home. For men, you will have a prescription for 1% lidocaine jelly to apply to the tip of your penis as needed for catheter related discomfort.  •	General: It is common to have blood in the urine after your procedure. It may be pink or even red; inform your doctor if you have a significant amount of clot in the urine or if you are unable to void at all or if your catheter stops draining. It is not uncommon to have some burning when you urinate, this will gradually improve. With a catheter in place, it is not uncommon to have occasional leakage of urine or blood around the catheter. Please call your urologist if this is excessive and/or the urine is not draining through the catheter into the bag.  •	Bathing: You may shower or bathe. If going home with Sierra, shower only until catheter is removed.  •	Diet: You may resume your regular diet and regular medication regimen.  •	Pain: You may take Tylenol (acetaminophen) 650-975mg available over the counter, for pain every 6 hours as needed. Do not exceed 4000 milligrams of Tylenol (acetaminophen) daily.   •	Stool softeners: Do not allow yourself to become constipated as straining will cause bleeding. Take stool softeners (ex. Colace) or a laxative (ex. Senekot, ExLax), available over the counter, if needed.  •	Activity: No heavy lifting or strenuous exercise until you are evaluated at your post-operative appointment. Otherwise, you may return to your usual level of activity.  •	Follow-up: If you did not already schedule your post-operative appointment, please call your urologist to schedule a follow-up appointment.  •	Call your urologist if: You have any bleeding that does not stop, inability to void >8 hours, fever over 100.4 F, chills, persistent nausea/vomiting, or if your pain is not controlled on your discharge pain medications.

## 2025-05-05 NOTE — PRE-ANESTHESIA EVALUATION ADULT - NSANTHAGERD_ENT_A_CORE
----- Message from SABRINA Caldwell sent at 5/24/2024  3:30 PM CDT -----  Stress test with no ischemia/infarct.  Recommend better BP control.  Add amlodipine 5 mg daily.    Monitor BPs at home, contact office with update in 1-2 weeks.    Thanks   Yes

## 2025-05-05 NOTE — ASU DISCHARGE PLAN (ADULT/PEDIATRIC) - NS MD DC FALL RISK RISK
For information on Fall & Injury Prevention, visit: [email or web address] OR  [email or web address] OR  https://www.cdc.gov/steadi/patient.html

## 2025-05-05 NOTE — ASU DISCHARGE PLAN (ADULT/PEDIATRIC) - CARE PROVIDER_API CALL
Edith Randhawa  Urology  [address] [place] [address], [address] [address]  Phone: [phone]  Fax: [phone]  Follow Up Time: Routine

## 2025-05-05 NOTE — PRE-ANESTHESIA EVALUATION ADULT - NSANTHTIREDRD_ENT_A_CORE
"Chief Complaint   Patient presents with     Gyn Exam       Initial /62   Pulse 64   Ht 1.702 m (5' 7\")   Wt 83.5 kg (184 lb)   BMI 28.82 kg/m   Estimated body mass index is 28.82 kg/m  as calculated from the following:    Height as of this encounter: 1.702 m (5' 7\").    Weight as of this encounter: 83.5 kg (184 lb).  Medication Reconciliation: complete    Debra Dave LPN    " No

## 2025-05-06 LAB
GRAM STN FLD: SIGNIFICANT CHANGE UP
NIGHT BLUE STAIN TISS: SIGNIFICANT CHANGE UP
SPECIMEN SOURCE: SIGNIFICANT CHANGE UP
SPECIMEN SOURCE: SIGNIFICANT CHANGE UP

## 2025-05-08 ENCOUNTER — NON-APPOINTMENT (OUTPATIENT)
Age: 55
End: 2025-05-08

## 2025-05-08 LAB — SURGICAL PATHOLOGY STUDY: SIGNIFICANT CHANGE UP

## 2025-05-10 LAB
CULTURE RESULTS: NO GROWTH — SIGNIFICANT CHANGE UP
SPECIMEN SOURCE: SIGNIFICANT CHANGE UP

## 2025-05-15 NOTE — H&P ADULT - PATIENT'S SEXUAL ORIENTATION
different options of CODE STATUS.  Patient opted for DNR CCA.    History from:     electronic medical record, patient's son    History of Present Illness:     Chief Complaint: Complicated UTI (urinary tract infection)  Greg Easton is a 84 y.o. male with pmh of recurrent complicated UTI, chronic suprapubic catheter, dementia, left ankle wound, hypothyroidism, hyperlipidemia, who presents with slow speech, less conversational, more fatigued compared to baseline.  Patient's son reports that he recurrently has urine infection and his symptoms are same each time.  No other focal neurological deficit could be appreciated within the limitation of patient's delirium and dementia.  CT head without contrast was negative for any acute changes.        Personally reviewed Lab Studies and Imaging     Discussed management of the case with ED team who recommended admission for further evaluation and management    EKG interpreted personally and results normal sinus rhythm    Imaging that was interpreted personally includes CT head without contrast and results negative for acute change         Review of Systems: Need 10 Elements   Review of Systems    Unable to review    Objective:     Intake/Output Summary (Last 24 hours) at 5/15/2025 1430  Last data filed at 5/15/2025 1100  Gross per 24 hour   Intake --   Output 600 ml   Net -600 ml      Vitals:   Vitals:    05/15/25 1330 05/15/25 1345 05/15/25 1355 05/15/25 1418   BP: (!) 182/78 (!) 194/77 (!) 202/78 (!) 154/74   Pulse: 91 87 90 89   Resp: 17 17 17 16   Temp:    97.8 °F (36.6 °C)   TempSrc:    Oral   SpO2: 98% 98% 98% 99%   Height:           Medications Prior to Admission     Prior to Admission medications    Medication Sig Start Date End Date Taking? Authorizing Provider   metoprolol tartrate (LOPRESSOR) 25 MG tablet Take 1 tablet by mouth 2 times daily 3/24/25  Yes Provider, MD Celso   metoclopramide (REGLAN) 10 MG tablet Take 1 tablet by mouth 3 times daily 5/8/25  Yes  Heterosexual

## 2025-05-20 ENCOUNTER — NON-APPOINTMENT (OUTPATIENT)
Age: 55
End: 2025-05-20

## 2025-05-20 ENCOUNTER — APPOINTMENT (OUTPATIENT)
Dept: UROLOGY | Facility: CLINIC | Age: 55
End: 2025-05-20
Payer: COMMERCIAL

## 2025-05-20 VITALS — HEART RATE: 75 BPM | SYSTOLIC BLOOD PRESSURE: 133 MMHG | DIASTOLIC BLOOD PRESSURE: 82 MMHG

## 2025-05-20 DIAGNOSIS — R82.71 BACTERIURIA: ICD-10-CM

## 2025-05-20 DIAGNOSIS — N13.30 UNSPECIFIED HYDRONEPHROSIS: ICD-10-CM

## 2025-05-20 PROCEDURE — 99214 OFFICE O/P EST MOD 30 MIN: CPT

## 2025-05-21 LAB
ANION GAP SERPL CALC-SCNC: 15 MMOL/L
BUN SERPL-MCNC: 31 MG/DL
CALCIUM SERPL-MCNC: 9.5 MG/DL
CHLORIDE SERPL-SCNC: 105 MMOL/L
CO2 SERPL-SCNC: 22 MMOL/L
CREAT SERPL-MCNC: 2.59 MG/DL
EGFRCR SERPLBLD CKD-EPI 2021: 28 ML/MIN/1.73M2
GLUCOSE SERPL-MCNC: 177 MG/DL
POTASSIUM SERPL-SCNC: 4.6 MMOL/L
SODIUM SERPL-SCNC: 143 MMOL/L

## 2025-06-03 ENCOUNTER — APPOINTMENT (OUTPATIENT)
Dept: UROLOGY | Facility: CLINIC | Age: 55
End: 2025-06-03

## 2025-06-04 ENCOUNTER — OUTPATIENT (OUTPATIENT)
Dept: OUTPATIENT SERVICES | Facility: HOSPITAL | Age: 55
LOS: 1 days | End: 2025-06-04
Payer: COMMERCIAL

## 2025-06-04 VITALS
HEART RATE: 68 BPM | RESPIRATION RATE: 18 BRPM | DIASTOLIC BLOOD PRESSURE: 69 MMHG | SYSTOLIC BLOOD PRESSURE: 116 MMHG | WEIGHT: 223.99 LBS | OXYGEN SATURATION: 98 % | TEMPERATURE: 99 F | HEIGHT: 71 IN

## 2025-06-04 DIAGNOSIS — Z90.79 ACQUIRED ABSENCE OF OTHER GENITAL ORGAN(S): Chronic | ICD-10-CM

## 2025-06-04 DIAGNOSIS — N13.30 UNSPECIFIED HYDRONEPHROSIS: ICD-10-CM

## 2025-06-04 DIAGNOSIS — Z98.890 OTHER SPECIFIED POSTPROCEDURAL STATES: Chronic | ICD-10-CM

## 2025-06-04 DIAGNOSIS — N31.9 NEUROMUSCULAR DYSFUNCTION OF BLADDER, UNSPECIFIED: ICD-10-CM

## 2025-06-04 DIAGNOSIS — T83.83XA HEMORRHAGE DUE TO GENITOURINARY PROSTHETIC DEVICES, IMPLANTS AND GRAFTS, INITIAL ENCOUNTER: Chronic | ICD-10-CM

## 2025-06-04 DIAGNOSIS — S36.09XA OTHER INJURY OF SPLEEN, INITIAL ENCOUNTER: Chronic | ICD-10-CM

## 2025-06-04 DIAGNOSIS — E11.9 TYPE 2 DIABETES MELLITUS WITHOUT COMPLICATIONS: ICD-10-CM

## 2025-06-04 DIAGNOSIS — N31.8 OTHER NEUROMUSCULAR DYSFUNCTION OF BLADDER: ICD-10-CM

## 2025-06-04 PROBLEM — K22.0 ACHALASIA OF CARDIA: Chronic | Status: INACTIVE | Noted: 2022-02-28 | Resolved: 2025-06-04

## 2025-06-04 PROBLEM — N20.0 CALCULUS OF KIDNEY: Chronic | Status: INACTIVE | Noted: 2022-02-28 | Resolved: 2025-06-04

## 2025-06-04 PROBLEM — E66.9 OBESITY, UNSPECIFIED: Chronic | Status: INACTIVE | Noted: 2022-02-28 | Resolved: 2025-06-04

## 2025-06-04 PROBLEM — N20.0 CALCULUS OF KIDNEY: Chronic | Status: INACTIVE | Noted: 2022-04-05 | Resolved: 2025-06-04

## 2025-06-04 LAB
A1C WITH ESTIMATED AVERAGE GLUCOSE RESULT: 6.8 % — HIGH (ref 4–5.6)
ANION GAP SERPL CALC-SCNC: 12 MMOL/L — SIGNIFICANT CHANGE UP (ref 5–17)
BUN SERPL-MCNC: 25 MG/DL — HIGH (ref 7–23)
CALCIUM SERPL-MCNC: 9.5 MG/DL — SIGNIFICANT CHANGE UP (ref 8.4–10.5)
CHLORIDE SERPL-SCNC: 104 MMOL/L — SIGNIFICANT CHANGE UP (ref 96–108)
CO2 SERPL-SCNC: 23 MMOL/L — SIGNIFICANT CHANGE UP (ref 22–31)
CREAT SERPL-MCNC: 2.61 MG/DL — HIGH (ref 0.5–1.3)
EGFR: 28 ML/MIN/1.73M2 — LOW
EGFR: 28 ML/MIN/1.73M2 — LOW
ESTIMATED AVERAGE GLUCOSE: 148 MG/DL — HIGH (ref 68–114)
GLUCOSE SERPL-MCNC: 163 MG/DL — HIGH (ref 70–99)
HCT VFR BLD CALC: 30.4 % — LOW (ref 39–50)
HGB BLD-MCNC: 9.8 G/DL — LOW (ref 13–17)
MCHC RBC-ENTMCNC: 27.1 PG — SIGNIFICANT CHANGE UP (ref 27–34)
MCHC RBC-ENTMCNC: 32.2 G/DL — SIGNIFICANT CHANGE UP (ref 32–36)
MCV RBC AUTO: 84.2 FL — SIGNIFICANT CHANGE UP (ref 80–100)
NRBC BLD AUTO-RTO: 0 /100 WBCS — SIGNIFICANT CHANGE UP (ref 0–0)
PLATELET # BLD AUTO: 158 K/UL — SIGNIFICANT CHANGE UP (ref 150–400)
POTASSIUM SERPL-MCNC: 3.9 MMOL/L — SIGNIFICANT CHANGE UP (ref 3.5–5.3)
POTASSIUM SERPL-SCNC: 3.9 MMOL/L — SIGNIFICANT CHANGE UP (ref 3.5–5.3)
RBC # BLD: 3.61 M/UL — LOW (ref 4.2–5.8)
RBC # FLD: 14.6 % — HIGH (ref 10.3–14.5)
SODIUM SERPL-SCNC: 139 MMOL/L — SIGNIFICANT CHANGE UP (ref 135–145)
WBC # BLD: 8.37 K/UL — SIGNIFICANT CHANGE UP (ref 3.8–10.5)
WBC # FLD AUTO: 8.37 K/UL — SIGNIFICANT CHANGE UP (ref 3.8–10.5)

## 2025-06-04 PROCEDURE — 83036 HEMOGLOBIN GLYCOSYLATED A1C: CPT

## 2025-06-04 PROCEDURE — 87086 URINE CULTURE/COLONY COUNT: CPT

## 2025-06-04 PROCEDURE — 80048 BASIC METABOLIC PNL TOTAL CA: CPT

## 2025-06-04 PROCEDURE — G0463: CPT

## 2025-06-04 PROCEDURE — 85027 COMPLETE CBC AUTOMATED: CPT

## 2025-06-04 NOTE — H&P PST ADULT - NSICDXPASTSURGICALHX_GEN_ALL_CORE_FT
PAST SURGICAL HISTORY:  History of esophageal surgery esophageal repair about 5 years ago for esopheal achalasia    Nephrostomy tube bleed     Other injury of spleen fall on ice s/p spleen embolization at [facility] IR over 10 years ago    S/P TURP

## 2025-06-04 NOTE — H&P PST ADULT - BP NONINVASIVE SYSTOLIC (MM HG)
Bactrim Pregnancy And Lactation Text: This medication is Pregnancy Category D and is known to cause fetal risk.  It is also excreted in breast milk. Topical Clindamycin Counseling: Patient counseled that this medication may cause skin irritation or allergic reactions.  In the event of skin irritation, the patient was advised to reduce the amount of the drug applied or use it less frequently.   The patient verbalized understanding of the proper use and possible adverse effects of clindamycin.  All of the patient's questions and concerns were addressed. Bactrim Counseling:  I discussed with the patient the risks of sulfa antibiotics including but not limited to GI upset, allergic reaction, drug rash, diarrhea, dizziness, photosensitivity, and yeast infections.  Rarely, more serious reactions can occur including but not limited to aplastic anemia, agranulocytosis, methemoglobinemia, blood dyscrasias, liver or kidney failure, lung infiltrates or desquamative/blistering drug rashes. Isotretinoin Counseling: Patient should get monthly blood tests, not donate blood, not drive at night if vision affected, not share medication, and not undergo elective surgery for 6 months after tx completed. Side effects reviewed, pt to contact office should one occur. Benzoyl Peroxide Pregnancy And Lactation Text: This medication is Pregnancy Category C. It is unknown if benzoyl peroxide is excreted in breast milk. Azelaic Acid Counseling: Patient counseled that medicine may cause skin irritation and to avoid applying near the eyes.  In the event of skin irritation, the patient was advised to reduce the amount of the drug applied or use it less frequently.   The patient verbalized understanding of the proper use and possible adverse effects of azelaic acid.  All of the patient's questions and concerns were addressed. Aklief Pregnancy And Lactation Text: It is unknown if this medication is safe to use during pregnancy.  It is unknown if this medication is excreted in breast milk.  Breastfeeding women should use the topical cream on the smallest area of the skin for the shortest time needed while breastfeeding.  Do not apply to nipple and areola. Isotretinoin Pregnancy And Lactation Text: This medication is Pregnancy Category X and is considered extremely dangerous during pregnancy. It is unknown if it is excreted in breast milk. Benzoyl Peroxide Counseling: Patient counseled that medicine may cause skin irritation and bleach clothing.  In the event of skin irritation, the patient was advised to reduce the amount of the drug applied or use it less frequently.   The patient verbalized understanding of the proper use and possible adverse effects of benzoyl peroxide.  All of the patient's questions and concerns were addressed. Winlevi Pregnancy And Lactation Text: This medication is considered safe during pregnancy and breastfeeding. Azithromycin Pregnancy And Lactation Text: This medication is considered safe during pregnancy and is also secreted in breast milk. Topical Retinoid counseling:  Patient advised to apply a pea-sized amount only at bedtime and wait 30 minutes after washing their face before applying.  If too drying, patient may add a non-comedogenic moisturizer. The patient verbalized understanding of the proper use and possible adverse effects of retinoids.  All of the patient's questions and concerns were addressed. Birth Control Pills Counseling: Birth Control Pill Counseling: I discussed with the patient the potential side effects of OCPs including but not limited to increased risk of stroke, heart attack, thrombophlebitis, deep venous thrombosis, hepatic adenomas, breast changes, GI upset, headaches, and depression.  The patient verbalized understanding of the proper use and possible adverse effects of OCPs. All of the patient's questions and concerns were addressed. Topical Sulfur Applications Pregnancy And Lactation Text: This medication is Pregnancy Category C and has an unknown safety profile during pregnancy. It is unknown if this topical medication is excreted in breast milk. Detail Level: Detailed 116 Dapsone Counseling: I discussed with the patient the risks of dapsone including but not limited to hemolytic anemia, agranulocytosis, rashes, methemoglobinemia, kidney failure, peripheral neuropathy, headaches, GI upset, and liver toxicity.  Patients who start dapsone require monitoring including baseline LFTs and weekly CBCs for the first month, then every month thereafter.  The patient verbalized understanding of the proper use and possible adverse effects of dapsone.  All of the patient's questions and concerns were addressed. Doxycycline Counseling:  Patient counseled regarding possible photosensitivity and increased risk for sunburn.  Patient instructed to avoid sunlight, if possible.  When exposed to sunlight, patients should wear protective clothing, sunglasses, and sunscreen.  The patient was instructed to call the office immediately if the following severe adverse effects occur:  hearing changes, easy bruising/bleeding, severe headache, or vision changes.  The patient verbalized understanding of the proper use and possible adverse effects of doxycycline.  All of the patient's questions and concerns were addressed. Dapsone Pregnancy And Lactation Text: This medication is Pregnancy Category C and is not considered safe during pregnancy or breast feeding. Azithromycin Counseling:  I discussed with the patient the risks of azithromycin including but not limited to GI upset, allergic reaction, drug rash, diarrhea, and yeast infections. Sarecycline Counseling: Patient advised regarding possible photosensitivity and discoloration of the teeth, skin, lips, tongue and gums.  Patient instructed to avoid sunlight, if possible.  When exposed to sunlight, patients should wear protective clothing, sunglasses, and sunscreen.  The patient was instructed to call the office immediately if the following severe adverse effects occur:  hearing changes, easy bruising/bleeding, severe headache, or vision changes.  The patient verbalized understanding of the proper use and possible adverse effects of sarecycline.  All of the patient's questions and concerns were addressed. Aklief counseling:  Patient advised to apply a pea-sized amount only at bedtime and wait 30 minutes after washing their face before applying.  If too drying, patient may add a non-comedogenic moisturizer.  The most commonly reported side effects including irritation, redness, scaling, dryness, stinging, burning, itching, and increased risk of sunburn.  The patient verbalized understanding of the proper use and possible adverse effects of retinoids.  All of the patient's questions and concerns were addressed. Topical Retinoid Pregnancy And Lactation Text: This medication is Pregnancy Category C. It is unknown if this medication is excreted in breast milk. Sarecycline Pregnancy And Lactation Text: This medication is Pregnancy Category D and not consider safe during pregnancy. It is also excreted in breast milk. High Dose Vitamin A Counseling: Side effects reviewed, pt to contact office should one occur. Birth Control Pills Pregnancy And Lactation Text: This medication should be avoided if pregnant and for the first 30 days post-partum. Spironolactone Pregnancy And Lactation Text: This medication can cause feminization of the male fetus and should be avoided during pregnancy. The active metabolite is also found in breast milk. Tetracycline Counseling: Patient counseled regarding possible photosensitivity and increased risk for sunburn.  Patient instructed to avoid sunlight, if possible.  When exposed to sunlight, patients should wear protective clothing, sunglasses, and sunscreen.  The patient was instructed to call the office immediately if the following severe adverse effects occur:  hearing changes, easy bruising/bleeding, severe headache, or vision changes.  The patient verbalized understanding of the proper use and possible adverse effects of tetracycline.  All of the patient's questions and concerns were addressed. Patient understands to avoid pregnancy while on therapy due to potential birth defects. Erythromycin Counseling:  I discussed with the patient the risks of erythromycin including but not limited to GI upset, allergic reaction, drug rash, diarrhea, increase in liver enzymes, and yeast infections. Minocycline Counseling: Patient advised regarding possible photosensitivity and discoloration of the teeth, skin, lips, tongue and gums.  Patient instructed to avoid sunlight, if possible.  When exposed to sunlight, patients should wear protective clothing, sunglasses, and sunscreen.  The patient was instructed to call the office immediately if the following severe adverse effects occur:  hearing changes, easy bruising/bleeding, severe headache, or vision changes.  The patient verbalized understanding of the proper use and possible adverse effects of minocycline.  All of the patient's questions and concerns were addressed. High Dose Vitamin A Pregnancy And Lactation Text: High dose vitamin A therapy is contraindicated during pregnancy and breast feeding. Topical Sulfur Applications Counseling: Topical Sulfur Counseling: Patient counseled that this medication may cause skin irritation or allergic reactions.  In the event of skin irritation, the patient was advised to reduce the amount of the drug applied or use it less frequently.   The patient verbalized understanding of the proper use and possible adverse effects of topical sulfur application.  All of the patient's questions and concerns were addressed. Include Pregnancy/Lactation Warning?: No Tazorac Pregnancy And Lactation Text: This medication is not safe during pregnancy. It is unknown if this medication is excreted in breast milk. Azelaic Acid Pregnancy And Lactation Text: This medication is considered safe during pregnancy and breast feeding. Tazorac Counseling:  Patient advised that medication is irritating and drying.  Patient may need to apply sparingly and wash off after an hour before eventually leaving it on overnight.  The patient verbalized understanding of the proper use and possible adverse effects of tazorac.  All of the patient's questions and concerns were addressed. Winlevi Counseling:  I discussed with the patient the risks of topical clascoterone including but not limited to erythema, scaling, itching, and stinging. Patient voiced their understanding. Spironolactone Counseling: Patient advised regarding risks of diarrhea, abdominal pain, hyperkalemia, birth defects (for female patients), liver toxicity and renal toxicity. The patient may need blood work to monitor liver and kidney function and potassium levels while on therapy. The patient verbalized understanding of the proper use and possible adverse effects of spironolactone.  All of the patient's questions and concerns were addressed. Topical Clindamycin Pregnancy And Lactation Text: This medication is Pregnancy Category B and is considered safe during pregnancy. It is unknown if it is excreted in breast milk. Erythromycin Pregnancy And Lactation Text: This medication is Pregnancy Category B and is considered safe during pregnancy. It is also excreted in breast milk. Doxycycline Pregnancy And Lactation Text: This medication is Pregnancy Category D and not consider safe during pregnancy. It is also excreted in breast milk but is considered safe for shorter treatment courses.

## 2025-06-04 NOTE — H&P PST ADULT - NSICDXPASTMEDICALHX_GEN_ALL_CORE_FT
PAST MEDICAL HISTORY:  Esophageal achalasia     Former smoker 1 PPD x 16 years; Quit in 2003    HLD (hyperlipidemia)     Obesity     Renal calculus     Type 2 diabetes mellitus      PAST MEDICAL HISTORY:  Esophageal achalasia     Former smoker 1 PPD x 16 years; Quit in 2003    H/O chronic cystitis     HLD (hyperlipidemia)     Hydronephrosis, bilateral     Obesity     Renal calculus     Type 2 diabetes mellitus

## 2025-06-04 NOTE — H&P PST ADULT - ASSESSMENT
DASI score: 8.0 METS  DASI activity: Active, walks for exercise, can climb stairs, carry groceries without CP or SOB  Loose teeth or dentures: denies   Airway: MP 3

## 2025-06-04 NOTE — H&P PST ADULT - NSANTHAGERD_ENT_A_CORE
Pt fall protocol  Yellow arm band on patient, yellow non skid socks on   bed in low position, all side rails up, call bell in reach  Pt  & family instructed in \"call don't fall\" protocol     Use your call bell,and wait for assistance, staff not family will assist you to get up &   move about  Pt & family verbalize understanding of fall precautions and \"call don't fall\" protocol Yes

## 2025-06-04 NOTE — H&P PST ADULT - HISTORY OF PRESENT ILLNESS
54 year old male with  HLD, former smoker, CKD2, MGUS, anemia, s/p surgery for esophageal achalasia, T2DM (diagnosed in 2022), renal cysts, nephrolithiasis s/p Left PCNL with Dr. 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 Pt developed hematuria & recent diagnosis of bladder tumor in April 2025, s/p trans urethral resection of bladder tumors on 05/05/2025, Jung catheter was placed at that time, pt remains with catheter for incomplete bladder emptying.  Plan for cystoscopy, suprapubic catheter insertion intra detrusor botulinum toxin injection on 6/9/25 with Dr. Randhawa.                 54 year old male with  HLD, former smoker, CKD2, MGUS, anemia, s/p surgery for esophageal achalasia, T2DM (diagnosed in 2022), renal cysts, nephrolithiasis s/p Left PCNL with Dr. Hoenig  in 2021 , fall/ traumatic injury to spleen in the past ( h/o laparotomy with cauterization of spleen), chronic cystitis, nephrolithiasis,  s/p L renal angioembolization of  pseudoaneurysms and PCN-U 04/06/24 , left renal calculi with recent BASHIR/CKD  (in 12/2024  in the setting of hydronephrosis required hemodialysis for a week. Pt developed hematuria & recent diagnosis of bladder tumor in April 2025, s/p trans urethral resection of bladder tumors on 05/05/2025, Jung catheter was placed at that time, pt remains with catheter for incomplete bladder emptying.  Plan for cystoscopy, suprapubic catheter insertion intra detrusor botulinum toxin injection on 6/9/25 with Dr. Randhawa. Denies recent fever, chills, chest pain, SOB, changes in bowel/habits or recent exposure to COVID-19 infection, denies hematuria, urine discoloration/change in urine odor.      **Of note-pt refused clamping of Jung catheter for sterile urine collection at time of PST visit d/t pain and malfunctioning with manipulation of the connection to the leg bag.  He stated he has a presentation for work today and does not want the catheter touched.  Pt reports "I just changed the bag".  Ur Cx was taken off catheter bag.

## 2025-06-04 NOTE — H&P PST ADULT - PROBLEM SELECTOR PLAN 1
Plan for cystoscopy, suprapubic catheter insertion intra detrusor botulinum toxin injection on 6/9/25 with Dr. Randhawa.   PST labs sent (CBC, BMP, Ur Cx, HA1c)  Pre surgical scrub instructions discussed  Pre-op education provided - all questions answered

## 2025-06-05 RX ORDER — FLUCONAZOLE 200 MG/1
200 TABLET ORAL TWICE DAILY
Qty: 14 | Refills: 0 | Status: ACTIVE | COMMUNITY
Start: 2025-06-05 | End: 1900-01-01

## 2025-06-05 RX ORDER — CEFPODOXIME PROXETIL 200 MG/1
200 TABLET, FILM COATED ORAL
Qty: 14 | Refills: 0 | Status: ACTIVE | COMMUNITY
Start: 2025-06-05 | End: 1900-01-01

## 2025-06-06 ENCOUNTER — APPOINTMENT (OUTPATIENT)
Dept: UROLOGY | Facility: CLINIC | Age: 55
End: 2025-06-06
Payer: COMMERCIAL

## 2025-06-06 ENCOUNTER — OUTPATIENT (OUTPATIENT)
Dept: OUTPATIENT SERVICES | Facility: HOSPITAL | Age: 55
LOS: 1 days | End: 2025-06-06
Payer: COMMERCIAL

## 2025-06-06 VITALS
SYSTOLIC BLOOD PRESSURE: 151 MMHG | OXYGEN SATURATION: 100 % | TEMPERATURE: 98.2 F | RESPIRATION RATE: 16 BRPM | HEART RATE: 78 BPM | DIASTOLIC BLOOD PRESSURE: 79 MMHG

## 2025-06-06 DIAGNOSIS — R35.0 FREQUENCY OF MICTURITION: ICD-10-CM

## 2025-06-06 DIAGNOSIS — Z90.79 ACQUIRED ABSENCE OF OTHER GENITAL ORGAN(S): Chronic | ICD-10-CM

## 2025-06-06 DIAGNOSIS — Z98.890 OTHER SPECIFIED POSTPROCEDURAL STATES: Chronic | ICD-10-CM

## 2025-06-06 DIAGNOSIS — T83.83XA HEMORRHAGE DUE TO GENITOURINARY PROSTHETIC DEVICES, IMPLANTS AND GRAFTS, INITIAL ENCOUNTER: Chronic | ICD-10-CM

## 2025-06-06 DIAGNOSIS — S36.09XA OTHER INJURY OF SPLEEN, INITIAL ENCOUNTER: Chronic | ICD-10-CM

## 2025-06-06 PROBLEM — Z87.448 PERSONAL HISTORY OF OTHER DISEASES OF URINARY SYSTEM: Chronic | Status: ACTIVE | Noted: 2025-06-04

## 2025-06-06 PROBLEM — N13.30 UNSPECIFIED HYDRONEPHROSIS: Chronic | Status: ACTIVE | Noted: 2025-06-04

## 2025-06-06 PROBLEM — E78.5 HYPERLIPIDEMIA, UNSPECIFIED: Chronic | Status: ACTIVE | Noted: 2025-06-04

## 2025-06-06 PROBLEM — N20.0 CALCULUS OF KIDNEY: Chronic | Status: ACTIVE | Noted: 2025-06-04

## 2025-06-06 PROBLEM — K22.0 ACHALASIA OF CARDIA: Chronic | Status: ACTIVE | Noted: 2025-06-04

## 2025-06-06 PROBLEM — E66.9 OBESITY, UNSPECIFIED: Chronic | Status: ACTIVE | Noted: 2025-06-04

## 2025-06-06 LAB
CULTURE RESULTS: SIGNIFICANT CHANGE UP
SPECIMEN SOURCE: SIGNIFICANT CHANGE UP

## 2025-06-06 PROCEDURE — 51703 INSERT BLADDER CATH COMPLEX: CPT

## 2025-06-09 ENCOUNTER — OUTPATIENT (OUTPATIENT)
Dept: OUTPATIENT SERVICES | Facility: HOSPITAL | Age: 55
LOS: 1 days | End: 2025-06-09
Payer: COMMERCIAL

## 2025-06-09 DIAGNOSIS — S36.09XA OTHER INJURY OF SPLEEN, INITIAL ENCOUNTER: Chronic | ICD-10-CM

## 2025-06-09 DIAGNOSIS — R33.8 OTHER RETENTION OF URINE: ICD-10-CM

## 2025-06-09 DIAGNOSIS — N30.00 ACUTE CYSTITIS WITHOUT HEMATURIA: ICD-10-CM

## 2025-06-09 DIAGNOSIS — Z98.890 OTHER SPECIFIED POSTPROCEDURAL STATES: Chronic | ICD-10-CM

## 2025-06-09 DIAGNOSIS — T83.83XA HEMORRHAGE DUE TO GENITOURINARY PROSTHETIC DEVICES, IMPLANTS AND GRAFTS, INITIAL ENCOUNTER: Chronic | ICD-10-CM

## 2025-06-09 DIAGNOSIS — Z90.79 ACQUIRED ABSENCE OF OTHER GENITAL ORGAN(S): Chronic | ICD-10-CM

## 2025-06-09 PROCEDURE — 82962 GLUCOSE BLOOD TEST: CPT

## 2025-06-11 DIAGNOSIS — N13.30 UNSPECIFIED HYDRONEPHROSIS: ICD-10-CM

## 2025-06-11 LAB
APPEARANCE: ABNORMAL
BACTERIA UR CULT: ABNORMAL
BACTERIA: NEGATIVE /HPF
BILIRUBIN URINE: NEGATIVE
BLOOD URINE: ABNORMAL
CAST: 0 /LPF
COLOR: ABNORMAL
EPITHELIAL CELLS: 0 /HPF
GLUCOSE QUALITATIVE U: 100 MG/DL
KETONES URINE: NEGATIVE MG/DL
LEUKOCYTE ESTERASE URINE: ABNORMAL
MICROSCOPIC-UA: NORMAL
NITRITE URINE: NEGATIVE
PH URINE: 6.5
PROTEIN URINE: 300 MG/DL
RED BLOOD CELLS URINE: 440 /HPF
REVIEW: NORMAL
SPECIFIC GRAVITY URINE: 1.01
UROBILINOGEN URINE: 0.2 MG/DL
WBC CLUMPS: PRESENT
WHITE BLOOD CELLS URINE: 105 /HPF

## 2025-06-11 RX ORDER — CIPROFLOXACIN HYDROCHLORIDE 500 MG/1
500 TABLET, FILM COATED ORAL DAILY
Qty: 7 | Refills: 0 | Status: ACTIVE | COMMUNITY
Start: 2025-06-11 | End: 1900-01-01

## 2025-06-15 ENCOUNTER — EMERGENCY (EMERGENCY)
Facility: HOSPITAL | Age: 55
LOS: 1 days | End: 2025-06-15
Attending: EMERGENCY MEDICINE
Payer: COMMERCIAL

## 2025-06-15 VITALS
SYSTOLIC BLOOD PRESSURE: 186 MMHG | OXYGEN SATURATION: 96 % | HEART RATE: 86 BPM | HEIGHT: 71 IN | TEMPERATURE: 98 F | WEIGHT: 220.02 LBS | DIASTOLIC BLOOD PRESSURE: 93 MMHG | RESPIRATION RATE: 20 BRPM

## 2025-06-15 VITALS
RESPIRATION RATE: 20 BRPM | HEART RATE: 85 BPM | OXYGEN SATURATION: 99 % | TEMPERATURE: 98 F | SYSTOLIC BLOOD PRESSURE: 172 MMHG | DIASTOLIC BLOOD PRESSURE: 78 MMHG

## 2025-06-15 DIAGNOSIS — Z98.890 OTHER SPECIFIED POSTPROCEDURAL STATES: Chronic | ICD-10-CM

## 2025-06-15 DIAGNOSIS — T83.83XA HEMORRHAGE DUE TO GENITOURINARY PROSTHETIC DEVICES, IMPLANTS AND GRAFTS, INITIAL ENCOUNTER: Chronic | ICD-10-CM

## 2025-06-15 DIAGNOSIS — S36.09XA OTHER INJURY OF SPLEEN, INITIAL ENCOUNTER: Chronic | ICD-10-CM

## 2025-06-15 DIAGNOSIS — Z90.79 ACQUIRED ABSENCE OF OTHER GENITAL ORGAN(S): Chronic | ICD-10-CM

## 2025-06-15 PROCEDURE — 99283 EMERGENCY DEPT VISIT LOW MDM: CPT

## 2025-06-15 PROCEDURE — 99282 EMERGENCY DEPT VISIT SF MDM: CPT

## 2025-06-15 NOTE — ED PROVIDER NOTE - CLINICAL SUMMARY MEDICAL DECISION MAKING FREE TEXT BOX
Elke Kilgore PGY-1:  55-year-old male past medical history of CKD requiring hemodialysis x3d now status post Jugn catheter placement, diabetes, nephrolithiasis status post left PCNT that was removed, and chronic bladder pain presenting for concern for Jung catheter issues.  States he was awake around 3 AM and noticed his Jung catheter was not draining.  States this has happened in the past and he usually comes to the ED and they flushed/replaced the catheter without any issues.  While he was on his way to the ED he noticed drainage in his leg bag.  Last catheter placed 2 weeks ago and is scheduled for suprapubic catheter placement in 2 weeks with Dr. Randhawa.  Currently denies any abdominal pain just slight discomfort around the Jung catheter insertion site which is typical for him.  No fevers/chills, hematuria, chest pain, shortness of breath.    GENERAL APPEARANCE:  AxOx4, generally well-appearing M, no acute distress.   HEENT:  NC, AT. MMM. EOMI, clear conjunctiva.  NECK:  Supple.  No stiffness or restricted ROM.   HEART:  Normal rate and regular rhythm, normal S1/S1, no m/r/g   LUNGS:  CTAB, moving air well. No crackles or wheezes are heard.   ABDOMEN:  Soft, nontender, nondistended with good bowel sounds heard.   BACK: No CVAT, no obvious deformity.   EXTREMITIES:  Without cyanosis, clubbing or edema.   NEUROLOGICAL:  Grossly nonfocal. Alert and oriented, moving all 4 extremities. CN not formally tested but appear grossly intact. Observed to ambulate with normal gait.  SKIN:  Warm and dry without any rash.    Patient arrives to the ED vitally stable and is afebrile.  Noted to have significant output of clear, yellow urine in leg bag.  No abdominal pain on examination.  Bladder scan done at bedside with 9 cc in bladder.  No concern for Jung catheter obstruction at this time.

## 2025-06-15 NOTE — ED PROVIDER NOTE - ATTENDING CONTRIBUTION TO CARE
Attending MD Coughlin: I personally have seen and examined this patient.  Resident note reviewed and agree on plan of care except where noted.  See below for details.     Seen in [place]    55M with PMH/PSH including CKD, MGUS, anemia, esophageal achalasia, DM, renal cysts, nephrolithiasis s/p L PCNL (2021, Dr. Hoenig), splenic trauma s/p laparotomy and splenic cautery, prostatitis, chronic cystitis,  s/p L renal angioembolization of  pseudoaneurysms and PCN-U (04/06/24), left renal calculi with recent BASHIR  in 12/2024  in the setting of hydronephrosis required hemodialysis for a week. recurrent UTIs, s/p Cystoscopy, TURBT, cystogram, cystometometry bladder tumors (5/5/25, Dr. Randhawa) presents to the ED with sierra not draining but now it is.  Reports that at around 3am noted that Sierra was not draining.  Reports previously that has necessitated a visit to the Emergency Department for flushing or replacement.  Reports while in route to the Emergency Department noted it was draining.  Reports that Sierra was replaced two weeks ago and is scheduled for suprapubic cath in 2 weeks.  Reports has minor discomfort at sierra/penis but reports this is not new or worsened.  Denies blood in bag or meatus.  Denies testicular pain or scrotal edema.  Denies fevers, chills.      Exam:   General: NAD  HENT: head NCAT, airway patent   Chest: symmetric chest rise, no increased work of breathing  MSK: ranging neck freely  : bag with clear yellow fluid, no blood  Neuro: moving all extremities spontaneously, sensory grossly intact, no gross neuro deficits  Psych: normal mood and affect     A/P: 55M with sierra, here for sierra check, will obtain bladder scan, can likely be discharged if draining which it appears to be.    Bladder Scan 9cc as per RN    Stable for discharge. Follow up instructions given, importance of follow up emphasized, return to ED parameters reviewed and patient verbalized understanding.  All questions answered, all concerns addressed.

## 2025-06-15 NOTE — ED ADULT TRIAGE NOTE - CHIEF COMPLAINT QUOTE
Sierra not draining for the last 1 hr. Pt endorsing abdominal cramping. Pt denies fevers or chills. Pt reports sierra placed 2 weeks ago.

## 2025-06-15 NOTE — ED ADULT NURSE NOTE - OBJECTIVE STATEMENT
54YO male with pmhx of Benign prostatic hyperplasia, T2DM comes to the ED with c/o sierra complications. pt states sierra wasn't draining for an hour, sierra gets changed once a month, was changed two weeks ago. Driving to the ED sierra started to drain with <150 in sierra bag. Bladder scanned showed 9cc present. Pt endorses penile spasms at site of catheter pt is currently being treated for spasm, states medication rx isnt working. pt is scheduled for a suprapubic catheter placement Monday 06/23. Pt is A&OX4, respirations are even and nonlabored, radial pulses are strong and equal. Pt placed in position of comfort. Pt educated on call bell system and provided call bell. Bed in lowest position, wheels locked, appropriate side rails raised. Pt denies needs at this time.

## 2025-06-15 NOTE — ED PROVIDER NOTE - NSICDXPASTSURGICALHX_GEN_ALL_CORE_FT
PAST SURGICAL HISTORY:  History of esophageal surgery esophageal repair about 5 years ago for esopheal achalasia    Nephrostomy tube bleed     Other injury of spleen fall on ice s/p spleen embolization at [facility] over 10 years ago    S/P TURP

## 2025-06-15 NOTE — ED PROVIDER NOTE - BIRTH SEX
Patient: Rachna Vasquez Date: 2021  : 1937 Attending: Rodri Simmons MD  83 year old female     PCP: Anastasia Reinoso MD     [facility] [place]    Assessment:     POD #4, colovesical fistula repair w/colostomy 1-2- per Dr. Hollins  Elevated troponin - not an ACS   SSS s/p dual chamber PPM (BS)  Hypertension  Mild pulmonary hypertension-PA systolic pressure 47 mmHg  Paroxysmal atrial fibrillation on flecainide.  No AC 2/2 GI bleed  COPD      Plan:  - Cardiac status stable postoperatively  - Continue cardiac meds: flecainide 150mg bid, toprol 75mg qd and losartan 50mg bid  -  Aggressive IS encouraged  - Continue  tele monitoring  - Long term IV antibiotics planned  - DVT prophylaxis: enoxaparin 40mg qd    GAYATHRI Ramos  [facility]     HPI: 84yo with hx PAF on flecainide, [employer] PPM, HTN who was admitted from [facility] with malaise, fatigue and low grade fever.    Primary cardiologist: Dr. Black    CARDIOLOGY ATTENDING ADDENDUM  Pt seen and examined around 10:15am.    Pt was sitting up in a chair, felt poorly overall but no CP or SOB.       Agree with INA Roland's note and exam and plan  Telem: remains in NSR with RA pacing, no afib  Lungs clear, heart regular s1s2, abd healing midline incision / colostomy    Discharge to [facility] has been postponed today    Doing well from CV standpoint.  Cardiology will sign off, pt can FU with me in 2021 as previously scheduled    Maritza DAVIS      Subjective: Patient seen eating breakfast.  No c/o pain.  Feels tired today.      ROS: 10 systems reviewed, pertinent findings above.     Pertinent Reviewed:   Notes reviewed     Vitals:    21 2005 21 2231 21 0253 21 0731   BP: (!) 143/62 (!) 188/78 138/72 (!) 140/66   BP Location: LUE - Left upper extremity LUE - Left upper extremity LUE - Left upper extremity LUE - Left upper extremity   Patient Position:    Sitting   Pulse: 60 60 60 (!) 58   Resp:  16 16 16 16   Temp: 99.1 °F (37.3 °C) 98.2 °F (36.8 °C) 98.1 °F (36.7 °C) 98.4 °F (36.9 °C)   TempSrc: Oral Oral Oral Oral   SpO2: 96% 96% 94% 95%   Weight:       Height:             Weight    12/28/20 0914 01/02/21 0600   Weight: 80.1 kg (176 lb 9.4 oz) 81.9 kg (180 lb 8.9 oz)        Intake/Output:    I/O last 3 completed shifts:  In: 720 [P.O.:720]  Out: 1450 [Urine:1450]      Intake/Output Summary (Last 24 hours) at 1/6/2021 0945  Last data filed at 1/6/2021 0040  Gross per 24 hour   Intake 480 ml   Output 1450 ml   Net -970 ml     Telemetry: , TARYN Paced - V Sensed  Telemetry personally reviewed     Physical Exam:      General: breathing comfortably, NAD  HEENT: Normocephalic, anicteric sclera, neck supple.  Neck: No JVD, carotids 2+, no bruits.  Cardiac: Regular rate and rhythm. S1, S2 normal. II/VI soft, short murmur at RUSB.  Lungs: Clear without wheezes, rales, rhonchi or dullness.  Normal excursions and effort.  Abdomen: Soft, non-tender. BS-present.  Extremities: Without clubbing, cyanosis or edema.  Peripheral pulses are 2+.  Neurologic: Non-focal  Skin: Warm and dry.     Blood pressure (!) 140/66, pulse (!) 58, temperature 98.4 °F (36.9 °C), temperature source Oral, resp. rate 16, height 5' 2\" (1.575 m), weight 81.9 kg (180 lb 8.9 oz), SpO2 95 %.        Laboratory Results:    Recent Labs   Lab 01/05/21  1849 01/05/21  0544 01/04/21  0425 01/03/21  0434 01/03/21  0112 01/02/21  1917 01/02/21  1422   WBC  --  8.3 10.2 16.7*  --   --   --    HCT  --  34.1* 35.1* 35.9*  --   --   --    HGB  --  11.0* 11.2* 11.7*  --   --   --    PLT  --  247 233 201  --   --   --    SODIUM  --  140 139 140  --   --   --    POTASSIUM 4.0 3.8 3.7 4.5  --   --   --    CHLORIDE  --  107 106 108*  --   --   --    CO2  --  30 29 28  --   --   --    GLUCOSE  --  82 84 122*  --   --   --    BUN  --  6 7 9  --   --   --    CREATININE  --  0.53 0.57 0.63  --   --   --    RAPDTR  --   --   --   --  0.05* 0.06* 0.06*       Radiology  Results:  XR CHEST PA OR AP 1 VIEW   Final Result       PICC line tip in the SVC. There is no pneumothorax.        Left lower lobe worsening atelectasis, infiltrate and or effusion. Not seen on the prior examination.         Electronically Signed by: GREG PEPE M.D.    Signed on: 1/4/2021 4:34 PM          CT ABDOMEN PELVIS W WO CONTRAST   Final Result      1.  Nonenhancing and new greater than 4 cm lesion within the right hepatic lobe since May 2019 is highly suspicious for abscess.  Findings may be amenable to percutaneous drainage through interventional radiology.        2.  New colovesical fistula with air and oral contrast material within the bladder lumen secondary to chronic sigmoid diverticulitis this patient with extensive colonic diverticulosis.  Associated bladder wall thickening is felt to be chronic and    reactive to the fistula.  General surgery consultation recommended.      3.  Mild volume overload within the chest.      4.  No findings to suggest soft tissue mass or pathologic adenopathy.      5.  Multiple chronic findings are detailed above.      Electronically Signed by: LETY NINO M.D.    Signed on: 12/30/2020 10:08 AM          CTA CHEST PULMONARY EMBOLISM W CONTRAST   Final Result      1.  No pulmonary embolism or other acute process within the chest.      2.  New greater than 4 cm right hepatic lobe lesion since May 2019 could represent an abscess or malignancy.  Further evaluation with pre and postcontrast MRI abdomen recommended.      3.  Multiple chronic findings are detailed above.      Electronically Signed by: LETY NINO M.D.    Signed on: 12/29/2020 9:50 AM          US ABDOMEN LIMITED   Final Result       Septated cyst in the left lobe of the liver. Seen in 2016.         Electronically Signed by: GREG PEPE M.D.    Signed on: 12/28/2020 11:09 PM          US VASC EXTREMITY LOWER VENOUS DUPLEX   Final Result   No acute DVT.      Electronically Signed by: GREG PEPE M.D.     Signed on: 12/28/2020 11:11 PM          XR CHEST PA OR AP 1 VIEW   Final Result   No radiographic evidence of acute cardiopulmonary process.  Nonacute findings are present and are described within the body of the report.      Electronically Signed by: MARE MIRZA M.D.    Signed on: 12/28/2020 11:11 AM          FL UROGRAM RETROGRADE INTRAOPERATIVE    (Results Pending)       Relevant Results:  Encounter Date: 12/28/20   Electrocardiogram 12-Lead   Result Value    Ventricular Rate EKG/Min (BPM) 60    Atrial Rate (BPM) 60    QRS-Interval (MSEC) 112    QT-Interval (MSEC) 476    QTc 476    R Axis (Degrees) -4    T Axis (Degrees) 25    REPORT TEXT      Sinus rhythm  with 1st degree AV block  Minimal voltage criteria for LVH, may be normal variant  T wave abnormality, consider anterior ischemia  Prolonged QT  Abnormal ECG  When compared with ECG of  02-JAN-2021 18:54,  No significant change was found  Confirmed by JITENDRA DAVIS, DAWIT RIVERA ([id number]) on 1/3/2021 8:20:39 PM         Impression:  Patient Active Problem List   Diagnosis   • Hypertension   • Presence of cardiac pacemaker   • SSS (sick sinus syndrome) (CMS/HCC)   • Hypercholesterolemia   • History of GI bleed   • Paroxysmal atrial fibrillation (CMS/HCC)   • Encounter for monitoring flecainide therapy   • Hypothyroidism   • Dyspnea   • Fatigue   • Acute non-recurrent pansinusitis   • Acute bronchitis   • CAP (community acquired pneumonia)   • Respiratory failure (CMS/HCC)         Code Status:    Code Status: Full Resuscitation    Medications/Infusions:    Current Facility-Administered Medications   Medication   • flecainide (TAMBOCOR) tablet 150 mg   • melatonin tablet 3 mg   • sodium chloride (PF) 0.9 % injection 10 mL   • sodium chloride (PF) 0.9 % injection 20 mL   • sodium chloride (PF) 0.9 % injection 10 mL   • sodium chloride (NORMAL SALINE) 0.9 % bolus 500 mL   • acetaminophen (TYLENOL) tablet 1,000 mg   • gabapentin (NEURONTIN) capsule 300 mg   • oxyCODONE  (IMM REL) (ROXICODONE) tablet 5 mg   • fentaNYL (SUBLIMAZE) injection 25 mcg   • ondansetron (ZOFRAN ODT) disintegrating tablet 4 mg    Or   • ondansetron (ZOFRAN) injection 4 mg   • chlorhexidine gluconate (PERIDEX) 0.12 % solution 15 mL   • calcium carbonate (TUMS) chewable tablet 1,000 mg   • lactated ringers infusion   • alvimopan (ENTEREG) capsule 12 mg   • metoPROLOL succinate (TOPROL-XL) ER tablet 75 mg   • enoxaparin (LOVENOX) injection 40 mg   • cloNIDine (CATAPRES) tablet 0.1 mg   • cefepime (MAXIPIME) 1,000 mg in sodium chloride 0.9 % 100 mL IVPB   • metroNIDAZOLE (FLAGYL) IVPB 500 mg   • losartan (COZAAR) tablet 50 mg   • levothyroxine (SYNTHROID, LEVOTHROID) tablet 75 mcg   • sodium chloride 0.9 % flush bag 25 mL   • sodium chloride (PF) 0.9 % injection 2 mL   • sodium chloride 0.9 % flush bag 25 mL   • sodium chloride (NORMAL SALINE) 0.9 % bolus 500 mL   • Potassium Standard Replacement Protocol   • Magnesium Standard Replacement Protocol   • ondansetron (ZOFRAN) injection 4 mg   • prochlorperazine (COMPAZINE) tablet 5 mg   • albuterol (VENTOLIN) nebulizer 2.5 mg             Male

## 2025-06-15 NOTE — ED PROVIDER NOTE - NSICDXPASTMEDICALHX_GEN_ALL_CORE_FT
PAST MEDICAL HISTORY:  Esophageal achalasia     Former smoker 1 PPD x 16 years; Quit in 2003    H/O chronic cystitis     HLD (hyperlipidemia)     Hydronephrosis, bilateral     Obesity     Renal calculus     Type 2 diabetes mellitus

## 2025-06-15 NOTE — ED PROVIDER NOTE - NSFOLLOWUPINSTRUCTIONS_ED_ALL_ED_FT
You have been seen and evaluated in the Emergency Department for sierra catheter problems. You were evaluated clinically and there is no indication for further workup at this time. Your bladder scan showed minimal urine in the bladder and there was urine draining into the leg bag.     Please follow up with your urologist within 1-2 days regarding today's visit.     Your blood pressure was elevated at 186/93. We recommend you follow up with your primary care provider within 1-2 days for further evaluation of elevated blood pressure.     Please return to the Emergency Department for returning issues with sierra catheter not draining properly, abdominal pain, fevers or chills, blood in the urine or cloudy urine, severe chest pain, shortness of breath, or any concerns.

## 2025-06-15 NOTE — ED ADULT NURSE REASSESSMENT NOTE - NS ED NURSE REASSESS COMMENT FT1
pt's sierra bag was emptied with 200cc dark urine and clots are visualized in sierra bag. Catheter was flushed, with slight resistance, clots were seen in piston syringe with janusz urine. Catheter was flushed/withdrawed approx 5x until no resistance was presented upon flushing and withdrawing.

## 2025-06-15 NOTE — ED PROVIDER NOTE - PATIENT PORTAL LINK FT
You can access the FollowMyHealth Patient Portal offered by [facility] by registering at the following website: [email or web address]. By joining [facility]’s FollowMyHealth portal, you will also be able to view your health information using other applications (apps) compatible with our system.

## 2025-06-23 ENCOUNTER — TRANSCRIPTION ENCOUNTER (OUTPATIENT)
Age: 55
End: 2025-06-23

## 2025-06-23 ENCOUNTER — APPOINTMENT (OUTPATIENT)
Dept: UROLOGY | Facility: HOSPITAL | Age: 55
End: 2025-06-23

## 2025-06-23 ENCOUNTER — OUTPATIENT (OUTPATIENT)
Dept: OUTPATIENT SERVICES | Facility: HOSPITAL | Age: 55
LOS: 1 days | End: 2025-06-23
Payer: COMMERCIAL

## 2025-06-23 VITALS
OXYGEN SATURATION: 100 % | SYSTOLIC BLOOD PRESSURE: 150 MMHG | WEIGHT: 223.99 LBS | TEMPERATURE: 98 F | DIASTOLIC BLOOD PRESSURE: 81 MMHG | HEART RATE: 62 BPM | RESPIRATION RATE: 18 BRPM | HEIGHT: 71 IN

## 2025-06-23 VITALS
SYSTOLIC BLOOD PRESSURE: 136 MMHG | HEART RATE: 73 BPM | DIASTOLIC BLOOD PRESSURE: 65 MMHG | RESPIRATION RATE: 16 BRPM | OXYGEN SATURATION: 100 %

## 2025-06-23 DIAGNOSIS — N31.8 OTHER NEUROMUSCULAR DYSFUNCTION OF BLADDER: ICD-10-CM

## 2025-06-23 DIAGNOSIS — T83.83XA HEMORRHAGE DUE TO GENITOURINARY PROSTHETIC DEVICES, IMPLANTS AND GRAFTS, INITIAL ENCOUNTER: Chronic | ICD-10-CM

## 2025-06-23 DIAGNOSIS — Z98.890 OTHER SPECIFIED POSTPROCEDURAL STATES: Chronic | ICD-10-CM

## 2025-06-23 DIAGNOSIS — N13.30 UNSPECIFIED HYDRONEPHROSIS: ICD-10-CM

## 2025-06-23 DIAGNOSIS — S36.09XA OTHER INJURY OF SPLEEN, INITIAL ENCOUNTER: Chronic | ICD-10-CM

## 2025-06-23 DIAGNOSIS — Z90.79 ACQUIRED ABSENCE OF OTHER GENITAL ORGAN(S): Chronic | ICD-10-CM

## 2025-06-23 LAB
ANION GAP SERPL CALC-SCNC: 15 MMOL/L — SIGNIFICANT CHANGE UP (ref 5–17)
BUN SERPL-MCNC: 35 MG/DL — HIGH (ref 7–23)
CALCIUM SERPL-MCNC: 9 MG/DL — SIGNIFICANT CHANGE UP (ref 8.4–10.5)
CHLORIDE SERPL-SCNC: 108 MMOL/L — SIGNIFICANT CHANGE UP (ref 96–108)
CO2 SERPL-SCNC: 18 MMOL/L — LOW (ref 22–31)
CREAT SERPL-MCNC: 3.39 MG/DL — HIGH (ref 0.5–1.3)
EGFR: 20 ML/MIN/1.73M2 — LOW
EGFR: 20 ML/MIN/1.73M2 — LOW
GLUCOSE BLDC GLUCOMTR-MCNC: 142 MG/DL — HIGH (ref 70–99)
GLUCOSE BLDC GLUCOMTR-MCNC: 199 MG/DL — HIGH (ref 70–99)
GLUCOSE BLDC GLUCOMTR-MCNC: 213 MG/DL — HIGH (ref 70–99)
GLUCOSE SERPL-MCNC: 174 MG/DL — HIGH (ref 70–99)
HCT VFR BLD CALC: 30.2 % — LOW (ref 39–50)
HGB BLD-MCNC: 9.6 G/DL — LOW (ref 13–17)
MCHC RBC-ENTMCNC: 26.2 PG — LOW (ref 27–34)
MCHC RBC-ENTMCNC: 31.8 G/DL — LOW (ref 32–36)
MCV RBC AUTO: 82.3 FL — SIGNIFICANT CHANGE UP (ref 80–100)
NRBC # BLD AUTO: 0 K/UL — SIGNIFICANT CHANGE UP (ref 0–0)
NRBC # FLD: 0 K/UL — SIGNIFICANT CHANGE UP (ref 0–0)
NRBC BLD AUTO-RTO: 0 /100 WBCS — SIGNIFICANT CHANGE UP (ref 0–0)
PLATELET # BLD AUTO: 250 K/UL — SIGNIFICANT CHANGE UP (ref 150–400)
PMV BLD: 9.3 FL — SIGNIFICANT CHANGE UP (ref 7–13)
POTASSIUM SERPL-MCNC: 4.1 MMOL/L — SIGNIFICANT CHANGE UP (ref 3.5–5.3)
POTASSIUM SERPL-SCNC: 4.1 MMOL/L — SIGNIFICANT CHANGE UP (ref 3.5–5.3)
RBC # BLD: 3.67 M/UL — LOW (ref 4.2–5.8)
RBC # FLD: 13.5 % — SIGNIFICANT CHANGE UP (ref 10.3–14.5)
SODIUM SERPL-SCNC: 141 MMOL/L — SIGNIFICANT CHANGE UP (ref 135–145)
WBC # BLD: 9.9 K/UL — SIGNIFICANT CHANGE UP (ref 3.8–10.5)
WBC # FLD AUTO: 9.9 K/UL — SIGNIFICANT CHANGE UP (ref 3.8–10.5)

## 2025-06-23 PROCEDURE — 87086 URINE CULTURE/COLONY COUNT: CPT

## 2025-06-23 PROCEDURE — 51102 DRAIN BL W/CATH INSERTION: CPT

## 2025-06-23 PROCEDURE — 52287 CYSTOSCOPY CHEMODENERVATION: CPT

## 2025-06-23 PROCEDURE — 80048 BASIC METABOLIC PNL TOTAL CA: CPT

## 2025-06-23 PROCEDURE — 82962 GLUCOSE BLOOD TEST: CPT

## 2025-06-23 PROCEDURE — 85027 COMPLETE CBC AUTOMATED: CPT

## 2025-06-23 PROCEDURE — 93010 ELECTROCARDIOGRAM REPORT: CPT

## 2025-06-23 PROCEDURE — C2627: CPT

## 2025-06-23 PROCEDURE — 51040 INCISE & DRAIN BLADDER: CPT

## 2025-06-23 PROCEDURE — 93005 ELECTROCARDIOGRAM TRACING: CPT

## 2025-06-23 DEVICE — CATH INTRODUCER LAWERENCE SUPRA-FOLEY 16FR: Type: IMPLANTABLE DEVICE | Status: FUNCTIONAL

## 2025-06-23 RX ORDER — ROSUVASTATIN CALCIUM 5 MG/1
10 TABLET, FILM COATED ORAL AT BEDTIME
Refills: 0 | Status: DISCONTINUED | OUTPATIENT
Start: 2025-06-23 | End: 2025-07-08

## 2025-06-23 RX ORDER — DEXTROSE 50 % IN WATER 50 %
12.5 SYRINGE (ML) INTRAVENOUS ONCE
Refills: 0 | Status: DISCONTINUED | OUTPATIENT
Start: 2025-06-23 | End: 2025-07-08

## 2025-06-23 RX ORDER — CIPROFLOXACIN HCL 250 MG
1 TABLET ORAL
Refills: 0 | DISCHARGE

## 2025-06-23 RX ORDER — GLUCAGON 3 MG/1
1 POWDER NASAL ONCE
Refills: 0 | Status: DISCONTINUED | OUTPATIENT
Start: 2025-06-23 | End: 2025-07-08

## 2025-06-23 RX ORDER — CIPROFLOXACIN HCL 250 MG
1 TABLET ORAL
Qty: 10 | Refills: 0
Start: 2025-06-23 | End: 2025-06-27

## 2025-06-23 RX ORDER — DEXTROSE 50 % IN WATER 50 %
25 SYRINGE (ML) INTRAVENOUS ONCE
Refills: 0 | Status: DISCONTINUED | OUTPATIENT
Start: 2025-06-23 | End: 2025-07-08

## 2025-06-23 RX ORDER — CASPOFUNGIN ACETATE 5 MG/ML
50 INJECTION, POWDER, LYOPHILIZED, FOR SOLUTION INTRAVENOUS EVERY 24 HOURS
Refills: 0 | Status: DISCONTINUED | OUTPATIENT
Start: 2025-06-24 | End: 2025-07-08

## 2025-06-23 RX ORDER — SODIUM CHLORIDE 9 G/1000ML
1000 INJECTION, SOLUTION INTRAVENOUS
Refills: 0 | Status: DISCONTINUED | OUTPATIENT
Start: 2025-06-23 | End: 2025-07-08

## 2025-06-23 RX ORDER — ACETAMINOPHEN 500 MG/5ML
975 LIQUID (ML) ORAL EVERY 6 HOURS
Refills: 0 | Status: DISCONTINUED | OUTPATIENT
Start: 2025-06-23 | End: 2025-07-08

## 2025-06-23 RX ORDER — ONDANSETRON HCL/PF 4 MG/2 ML
4 VIAL (ML) INJECTION ONCE
Refills: 0 | Status: DISCONTINUED | OUTPATIENT
Start: 2025-06-23 | End: 2025-06-24

## 2025-06-23 RX ORDER — SENNA 187 MG
1 TABLET ORAL AT BEDTIME
Refills: 0 | Status: DISCONTINUED | OUTPATIENT
Start: 2025-06-23 | End: 2025-07-08

## 2025-06-23 RX ORDER — HEPARIN SODIUM 1000 [USP'U]/ML
5000 INJECTION INTRAVENOUS; SUBCUTANEOUS EVERY 8 HOURS
Refills: 0 | Status: DISCONTINUED | OUTPATIENT
Start: 2025-06-23 | End: 2025-07-08

## 2025-06-23 RX ORDER — INSULIN LISPRO 100 U/ML
INJECTION, SOLUTION INTRAVENOUS; SUBCUTANEOUS AT BEDTIME
Refills: 0 | Status: DISCONTINUED | OUTPATIENT
Start: 2025-06-23 | End: 2025-07-08

## 2025-06-23 RX ORDER — INSULIN LISPRO 100 U/ML
INJECTION, SOLUTION INTRAVENOUS; SUBCUTANEOUS
Refills: 0 | Status: DISCONTINUED | OUTPATIENT
Start: 2025-06-23 | End: 2025-07-08

## 2025-06-23 RX ORDER — FLUCONAZOLE 150 MG
1 TABLET ORAL
Qty: 14 | Refills: 0
Start: 2025-06-23 | End: 2025-07-06

## 2025-06-23 RX ORDER — HYDROMORPHONE/SOD CHLOR,ISO/PF 2 MG/10 ML
1 SYRINGE (ML) INJECTION ONCE
Refills: 0 | Status: DISCONTINUED | OUTPATIENT
Start: 2025-06-23 | End: 2025-06-24

## 2025-06-23 RX ORDER — DEXTROSE 50 % IN WATER 50 %
15 SYRINGE (ML) INTRAVENOUS ONCE
Refills: 0 | Status: DISCONTINUED | OUTPATIENT
Start: 2025-06-23 | End: 2025-07-08

## 2025-06-23 RX ORDER — HYDROMORPHONE/SOD CHLOR,ISO/PF 2 MG/10 ML
0.5 SYRINGE (ML) INJECTION
Refills: 0 | Status: DISCONTINUED | OUTPATIENT
Start: 2025-06-23 | End: 2025-06-24

## 2025-06-23 RX ADMIN — SODIUM CHLORIDE 125 MILLILITER(S): 9 INJECTION, SOLUTION INTRAVENOUS at 20:03

## 2025-06-23 RX ADMIN — HEPARIN SODIUM 5000 UNIT(S): 1000 INJECTION INTRAVENOUS; SUBCUTANEOUS at 22:01

## 2025-06-23 RX ADMIN — ROSUVASTATIN CALCIUM 10 MILLIGRAM(S): 5 TABLET, FILM COATED ORAL at 22:18

## 2025-06-23 RX ADMIN — Medication 1 TABLET(S): at 22:02

## 2025-06-23 NOTE — ASU PATIENT PROFILE, ADULT - FALL HARM RISK - UNIVERSAL INTERVENTIONS
Bed in lowest position, wheels locked, appropriate side rails in place/Call bell, personal items and telephone in reach/Instruct patient to call for assistance before getting out of bed or chair/Non-slip footwear when patient is out of bed/[place] to call system/Physically safe environment - no spills, clutter or unnecessary equipment/Purposeful Proactive Rounding/Room/bathroom lighting operational, light cord in reach

## 2025-06-23 NOTE — PRE-ANESTHESIA EVALUATION ADULT - NSANTHPEFT_GEN_ALL_CORE
General: NAD, resting comfortably in bed   HEENT: normocephalic, atraumatic, moist mucosa, clear oropharynx  Neck: normal ROM, no JVD, no masses or goiter   Heart: RRR  Lungs: normal work of breathing   Neuro: AOX3

## 2025-06-23 NOTE — ASU DISCHARGE PLAN (ADULT/PEDIATRIC) - FINANCIAL ASSISTANCE
[facility] provides services at a reduced cost to those who are determined to be eligible through [facility]’s financial assistance program. Information regarding [facility]’s financial assistance program can be found by going to [email or web address] or by calling [phone].

## 2025-06-23 NOTE — PROGRESS NOTE ADULT - SUBJECTIVE AND OBJECTIVE BOX
Post op Check    Pt seen and examined without complaints. Pain is controlled. Denies SOB/CP/N/V.     Vital Signs Last 24 Hrs  T(C): 36.8 (23 Jun 2025 18:30), Max: 36.8 (23 Jun 2025 18:30)  T(F): 98.2 (23 Jun 2025 18:30), Max: 98.2 (23 Jun 2025 18:30)  HR: 54 (23 Jun 2025 19:00) (54 - 66)  BP: 159/82 (23 Jun 2025 19:00) (127/68 - 171/81)  BP(mean): 113 (23 Jun 2025 19:00) (92 - 117)  RR: 16 (23 Jun 2025 19:00) (16 - 18)  SpO2: 100% (23 Jun 2025 19:00) (99% - 100%)    Parameters below as of 23 Jun 2025 19:00  Patient On (Oxygen Delivery Method): room air        I&O's Summary      Physical Exam  Gen: NAD, A&Ox3  Pulm: No respiratory distress, no subcostal retractions  Abd: Soft, NT, ND. SPT in place draining clear light strawberry urine

## 2025-06-23 NOTE — BRIEF OPERATIVE NOTE - NSICDXBRIEFPROCEDURE_GEN_ALL_CORE_FT
PROCEDURES:  Suprapubic tube placement 23-Jun-2025 19:10:20  Omkar Casanova  Rigid cystoscopy with Botox injection 23-Jun-2025 19:10:29  Omkar Casanova

## 2025-06-23 NOTE — ASU DISCHARGE PLAN (ADULT/PEDIATRIC) - ASU DC SPECIAL INSTRUCTIONSFT
CATHETER: Your suprapubic tube catheter is secured in place with a stitch. This will be removed at your post-operative appointment at your first tube change. The nurses will review with you instructions and care before you go home.     GENERAL: It is common to have blood in your urine after your procedure. It may be pink or even red; inform your doctor if you have a significant amount of clot in the urine or if you are unable to void at all or if your catheter stops draining. The urine may clear and then become bloody again especially as you are more physically active. It is not uncommon to have some burning when you urinate, this will gradually improve. With a catheter in place, it is not uncommon to have occasional leakage or urine or blood around the catheter. Please call your urologist if this is excessive and/or the urine is not draining through the catheter into the bag.  BATHING: You may shower  DIET: You may resume your regular diet and regular medication regimen.  PAIN: You may take Tylenol (acetaminophen) 650-975mg available over the counter, for pain every 6 hours as needed. Do not exceed 4000mg of Tylenol (acetaminophen) daily.  ANTIBIOTICS: You have been given a prescription for an antibiotic and an antifungal, please take this medication as instructed and be sure to complete the entire course.  STOOL SOFTENERS: Do not allow yourself to become constipated as straining may cause bleeding. Take stool softeners or a laxative (ex. Miralax, Colace, Senokot, ExLax, etc), available over the counter, if needed.  ACTIVITY: No heavy lifting or strenuous exercise until you are evaluated at your post-operative appointment. Otherwise, you may return to your usual level of physical activity.  FOLLOW-UP: If you did not already schedule your post-operative appointment, please call your urologist to schedule a follow-up appointment. Please follow-up with Dr. Randhawa in 1 week.   CALL YOUR UROLOGIST IF: You have any bleeding that does not stop, inability to void >8 hours, fever over 100.4 F, chills, persistent nausea/vomiting, changes in your incision concerning for infection, or if your pain is not controlled on your discharge pain medications.

## 2025-06-23 NOTE — PRE-ANESTHESIA EVALUATION ADULT - NSANTHPMHFT_GEN_ALL_CORE
55yoM PMH esophageal achalasia s/p surgical repair without symptoms, chronic cystitis presenting for cysto, suprapubic catheter. Last ozempic 5/30

## 2025-06-23 NOTE — PROGRESS NOTE ADULT - ASSESSMENT
A/P: 55y Male s/p SPT placement   DVT prophylaxis/OOB  Incentive spirometry  Strict I&O's  Analgesia and antiemetics as needed  Diet - consistent carbs  PACU labs   AM labs  Monitor urine color   Encourage hydration, IVFs   Flush PRN  A/P: 55y Male s/p SPT placement   DVT prophylaxis/OOB  Incentive spirometry  Strict I&O's  Analgesia and antiemetics as needed  Diet - consistent carbs  PACU labs   Monitor urine color   Encourage hydration, IVFs     Informed by PACU that patient had a vasovagal episode when getting up from bed to chair. Patient seen at bedside, denies any CP, SOB. Endorsed some back pain when moving. Currently feels well. EKG performed, sinus bradycardia.  - Continue to monitor patient for further episodes  - Encourage ambulation   - Can potentially DC home tonight if patient's urine color is acceptable and patient is ambulating well on his own without any further vasovagal episodes  A/P: 55y Male s/p SPT placement   DVT prophylaxis/OOB  Incentive spirometry  Strict I&O's  Analgesia and antiemetics as needed  Diet - consistent carbs  PACU labs   Monitor urine color   Encourage hydration, IVFs     Informed by PACU that patient had a vasovagal episode when getting up from bed to chair. Patient seen at bedside, denies any CP, SOB. Endorsed some back pain when moving. Currently feels well. EKG performed, sinus bradycardia.  - Continue to monitor patient for further episodes  - Encourage ambulation   - Can potentially DC home tonight if patient's urine color is acceptable and patient is ambulating well on his own without any further vasovagal episodes     Patient reassessed at 10:36pm. Patient was able to walk around the PACU unit with no complications. Patient feels well, wishes to go home. Urine color light pink lemonade color.   Patient safe for discharge tonight.

## 2025-06-23 NOTE — CHART NOTE - NSCHARTNOTEFT_GEN_A_CORE
Patient s/p suprapubic tube placement, rigid cystoscopy with botox injection. Called to bedside by RN for patient HR in 30's after mobilization from bed to chair. Patient symptomatic, felt light headed, diaphoretic. SBP maintained during episode. 5mg ephedrine given, with HR response into upper 40's, low 50's. Patient states he is starting to feel better. Urology team notified of episode in PACU. EKG performed, sinus bradycardia. Will continue to monitor in PACU, urology to follow as primary team.

## 2025-06-25 LAB
CULTURE RESULTS: NO GROWTH — SIGNIFICANT CHANGE UP
SPECIMEN SOURCE: SIGNIFICANT CHANGE UP

## 2025-06-30 ENCOUNTER — NON-APPOINTMENT (OUTPATIENT)
Age: 55
End: 2025-06-30

## 2025-06-30 ENCOUNTER — APPOINTMENT (OUTPATIENT)
Dept: UROLOGY | Facility: CLINIC | Age: 55
End: 2025-06-30

## 2025-07-01 ENCOUNTER — APPOINTMENT (OUTPATIENT)
Dept: UROLOGY | Facility: CLINIC | Age: 55
End: 2025-07-01
Payer: COMMERCIAL

## 2025-07-01 PROBLEM — R33.9 URINARY RETENTION: Status: ACTIVE | Noted: 2025-07-01

## 2025-07-01 PROCEDURE — 99024 POSTOP FOLLOW-UP VISIT: CPT

## 2025-07-23 ENCOUNTER — APPOINTMENT (OUTPATIENT)
Dept: UROLOGY | Facility: CLINIC | Age: 55
End: 2025-07-23

## 2025-07-25 ENCOUNTER — APPOINTMENT (OUTPATIENT)
Dept: UROLOGY | Facility: CLINIC | Age: 55
End: 2025-07-25
Payer: COMMERCIAL

## 2025-07-25 ENCOUNTER — OUTPATIENT (OUTPATIENT)
Dept: OUTPATIENT SERVICES | Facility: HOSPITAL | Age: 55
LOS: 1 days | End: 2025-07-25
Payer: COMMERCIAL

## 2025-07-25 VITALS
TEMPERATURE: 98 F | OXYGEN SATURATION: 100 % | HEART RATE: 77 BPM | RESPIRATION RATE: 15 BRPM | DIASTOLIC BLOOD PRESSURE: 82 MMHG | SYSTOLIC BLOOD PRESSURE: 159 MMHG

## 2025-07-25 DIAGNOSIS — Z98.890 OTHER SPECIFIED POSTPROCEDURAL STATES: Chronic | ICD-10-CM

## 2025-07-25 DIAGNOSIS — R82.71 BACTERIURIA: ICD-10-CM

## 2025-07-25 DIAGNOSIS — T83.83XA HEMORRHAGE DUE TO GENITOURINARY PROSTHETIC DEVICES, IMPLANTS AND GRAFTS, INITIAL ENCOUNTER: Chronic | ICD-10-CM

## 2025-07-25 DIAGNOSIS — R35.0 FREQUENCY OF MICTURITION: ICD-10-CM

## 2025-07-25 DIAGNOSIS — Z90.79 ACQUIRED ABSENCE OF OTHER GENITAL ORGAN(S): Chronic | ICD-10-CM

## 2025-07-25 DIAGNOSIS — S36.09XA OTHER INJURY OF SPLEEN, INITIAL ENCOUNTER: Chronic | ICD-10-CM

## 2025-07-25 DIAGNOSIS — R33.8 OTHER RETENTION OF URINE: ICD-10-CM

## 2025-07-25 DIAGNOSIS — B37.2 CANDIDIASIS OF SKIN AND NAIL: ICD-10-CM

## 2025-07-25 PROCEDURE — 99214 OFFICE O/P EST MOD 30 MIN: CPT | Mod: 57

## 2025-07-25 PROCEDURE — 51710 CHANGE OF BLADDER TUBE: CPT | Mod: 58

## 2025-07-25 PROCEDURE — 51710 CHANGE OF BLADDER TUBE: CPT

## 2025-07-25 PROCEDURE — C2627: CPT

## 2025-07-25 RX ORDER — NYSTATIN 100000 [USP'U]/G
100000 POWDER TOPICAL
Qty: 1 | Refills: 3 | Status: ACTIVE | COMMUNITY
Start: 2025-07-25 | End: 1900-01-01

## 2025-07-29 LAB
APPEARANCE: ABNORMAL
BACTERIA UR CULT: ABNORMAL
BACTERIA: ABNORMAL /HPF
BILIRUBIN URINE: NEGATIVE
BLOOD URINE: ABNORMAL
CALCIUM OXALATE CRYSTALS: PRESENT
CAST: 2 /LPF
COLOR: YELLOW
EPITHELIAL CELLS: 0 /HPF
GLUCOSE QUALITATIVE U: NEGATIVE MG/DL
KETONES URINE: NEGATIVE MG/DL
LEUKOCYTE ESTERASE URINE: ABNORMAL
MICROSCOPIC-UA: NORMAL
NITRITE URINE: NEGATIVE
PH URINE: 5.5
PROTEIN URINE: 300 MG/DL
RED BLOOD CELLS URINE: 493 /HPF
REVIEW: NORMAL
SPECIFIC GRAVITY URINE: 1.01
UROBILINOGEN URINE: 0.2 MG/DL
WHITE BLOOD CELLS URINE: 378 /HPF
YEAST-LIKE CELLS: PRESENT

## 2025-07-30 DIAGNOSIS — R33.8 OTHER RETENTION OF URINE: ICD-10-CM

## 2025-07-30 DIAGNOSIS — B37.2 CANDIDIASIS OF SKIN AND NAIL: ICD-10-CM

## 2025-07-30 DIAGNOSIS — R82.71 BACTERIURIA: ICD-10-CM

## (undated) DEVICE — SOL IRR POUR NS 0.9% 500ML

## (undated) DEVICE — GOWN TRIMAX LG

## (undated) DEVICE — SYR ASEPTO

## (undated) DEVICE — GLV 7.5 PROTEXIS (WHITE)

## (undated) DEVICE — SOL IRR BAG H2O 3000ML

## (undated) DEVICE — SOL IRR BAG NS 0.9% 3000ML

## (undated) DEVICE — POSITIONER CUSHION INSERT PRONE VIEW LG

## (undated) DEVICE — SOL IRR POUR H2O 1500ML

## (undated) DEVICE — VENODYNE/SCD SLEEVE CALF LARGE

## (undated) DEVICE — NDL LABORIE INJETAK ADJUSTABLE TIP 35CM

## (undated) DEVICE — ELCTR PLASMA LOOP MEDIUM ANGLED 24FR 12-30 DEG

## (undated) DEVICE — TUBING THERMADX UROLOGY

## (undated) DEVICE — GLV 8 PROTEXIS (WHITE)

## (undated) DEVICE — GLV 6.5 PROTEXIS (WHITE)

## (undated) DEVICE — FOLEY TRAY 16FR 5CC LTX UMETER CLOSED

## (undated) DEVICE — GLV 7 PROTEXIS (WHITE)

## (undated) DEVICE — ADAPTER CHECK FLO 9FR STERILE

## (undated) DEVICE — TUBING CONNECTING FOR DRAINAGE BAG MALE / FEMALE 14FR 30CM

## (undated) DEVICE — PREP BETADINE KIT

## (undated) DEVICE — ELCTR GROUNDING PAD ADULT COVIDIEN

## (undated) DEVICE — TUBING RANGER FLUID IRRIGATION SET DISP

## (undated) DEVICE — WARMING BLANKET UPPER ADULT

## (undated) DEVICE — NDL 20CM TROCAR

## (undated) DEVICE — PREP BETADINE SPONGE STICKS

## (undated) DEVICE — FOLEY CATH 2-WAY 14FR 5CC LATEX

## (undated) DEVICE — Device

## (undated) DEVICE — VENODYNE/SCD SLEEVE CALF MEDIUM

## (undated) DEVICE — DRAPE NEPHROSCOPY 72X118"

## (undated) DEVICE — POSITIONER FOAM EGG CRATE ULNAR 2PCS (PINK)

## (undated) DEVICE — DRAPE LINGEMAN TUR

## (undated) DEVICE — POSITIONER FOAM HEADREST (PINK)

## (undated) DEVICE — ELCTR PLASMA BUTTON OVAL 24FR 12-30 DEG

## (undated) DEVICE — BAG URINE W METER 2L

## (undated) DEVICE — NDL BIOPSY TROCAR TWO-PART 18G X 20CM

## (undated) DEVICE — DRSG TEGADERM 6"X8"

## (undated) DEVICE — SPECIMEN CONTAINER 100ML

## (undated) DEVICE — ELCTR PLASMA LOOP LARGE 24FR 12-16 DEG

## (undated) DEVICE — FOLEY CATH 2-WAY 16FR 5CC LATEX COUNCIL RED

## (undated) DEVICE — ACMI SELF-SEALING SEAL UP TO 7FR

## (undated) DEVICE — SYR IRRIGATION PISTON 60CC

## (undated) DEVICE — DRAPE U 47X51" NON STERILE

## (undated) DEVICE — PACK CYSTO

## (undated) DEVICE — FOLEY HOLDER STATLOCK 2 WAY ADULT

## (undated) DEVICE — FOLEY CATH 2-WAY 22FR 5CC LATEX COUNCIL RED

## (undated) DEVICE — ELCTR PLASMA ROLLER 24FR 12-30 DEG

## (undated) DEVICE — IRR BULB PATHFINDER + 10"

## (undated) DEVICE — DRAPE TOWEL BLUE 17" X 24"

## (undated) DEVICE — SOL IRR POUR H2O 250ML